# Patient Record
Sex: MALE | Race: BLACK OR AFRICAN AMERICAN | NOT HISPANIC OR LATINO | Employment: OTHER | URBAN - METROPOLITAN AREA
[De-identification: names, ages, dates, MRNs, and addresses within clinical notes are randomized per-mention and may not be internally consistent; named-entity substitution may affect disease eponyms.]

---

## 2017-01-23 ENCOUNTER — HOSPITAL ENCOUNTER (OUTPATIENT)
Dept: RADIOLOGY | Facility: HOSPITAL | Age: 52
Discharge: HOME | End: 2017-01-23
Attending: ORTHOPAEDIC SURGERY
Payer: COMMERCIAL

## 2017-01-23 ENCOUNTER — ALLSCRIPTS OFFICE VISIT (OUTPATIENT)
Dept: OTHER | Facility: OTHER | Age: 52
End: 2017-01-23

## 2017-01-23 DIAGNOSIS — G95.9 DISEASE OF SPINAL CORD (HCC): ICD-10-CM

## 2017-01-23 PROCEDURE — 72040 X-RAY EXAM NECK SPINE 2-3 VW: CPT

## 2017-04-17 ENCOUNTER — HOSPITAL ENCOUNTER (EMERGENCY)
Facility: HOSPITAL | Age: 52
Discharge: HOME | End: 2017-04-17
Attending: EMERGENCY MEDICINE | Admitting: EMERGENCY MEDICINE
Payer: COMMERCIAL

## 2017-04-17 ENCOUNTER — APPOINTMENT (EMERGENCY)
Dept: CT IMAGING | Facility: HOSPITAL | Age: 52
End: 2017-04-17
Payer: COMMERCIAL

## 2017-04-17 VITALS
DIASTOLIC BLOOD PRESSURE: 106 MMHG | RESPIRATION RATE: 16 BRPM | TEMPERATURE: 98.1 F | BODY MASS INDEX: 26.76 KG/M2 | WEIGHT: 197.53 LBS | SYSTOLIC BLOOD PRESSURE: 164 MMHG | HEART RATE: 64 BPM | HEIGHT: 72 IN | OXYGEN SATURATION: 96 %

## 2017-04-17 DIAGNOSIS — R10.84 GENERALIZED ABDOMINAL PAIN: ICD-10-CM

## 2017-04-17 DIAGNOSIS — I10 HYPERTENSION: ICD-10-CM

## 2017-04-17 DIAGNOSIS — K52.9 COLITIS: Primary | ICD-10-CM

## 2017-04-17 LAB
ALBUMIN SERPL BCP-MCNC: 3.9 G/DL (ref 3.5–5)
ALP SERPL-CCNC: 63 U/L (ref 46–116)
ALT SERPL W P-5'-P-CCNC: 17 U/L (ref 12–78)
ANION GAP SERPL CALCULATED.3IONS-SCNC: 6 MMOL/L (ref 4–13)
ANISOCYTOSIS BLD QL SMEAR: PRESENT
AST SERPL W P-5'-P-CCNC: 14 U/L (ref 5–45)
BASOPHILS # BLD MANUAL: 0 THOUSAND/UL (ref 0–0.1)
BASOPHILS NFR MAR MANUAL: 0 % (ref 0–1)
BILIRUB SERPL-MCNC: 0.5 MG/DL (ref 0.2–1)
BUN SERPL-MCNC: 11 MG/DL (ref 5–25)
CALCIUM SERPL-MCNC: 9 MG/DL (ref 8.3–10.1)
CHLORIDE SERPL-SCNC: 104 MMOL/L (ref 100–108)
CO2 SERPL-SCNC: 31 MMOL/L (ref 21–32)
CREAT SERPL-MCNC: 1.07 MG/DL (ref 0.6–1.3)
EOSINOPHIL # BLD MANUAL: 0 THOUSAND/UL (ref 0–0.4)
EOSINOPHIL NFR BLD MANUAL: 0 % (ref 0–6)
ERYTHROCYTE [DISTWIDTH] IN BLOOD BY AUTOMATED COUNT: 15.8 % (ref 11.6–15.1)
GFR SERPL CREATININE-BSD FRML MDRD: >60 ML/MIN/1.73SQ M
GLUCOSE SERPL-MCNC: 93 MG/DL (ref 65–140)
HCT VFR BLD AUTO: 43.4 % (ref 36.5–49.3)
HGB BLD-MCNC: 13.9 G/DL (ref 12–17)
LG PLATELETS BLD QL SMEAR: PRESENT
LIPASE SERPL-CCNC: 201 U/L (ref 73–393)
LYMPHOCYTES # BLD AUTO: 2.93 THOUSAND/UL (ref 0.6–4.47)
LYMPHOCYTES # BLD AUTO: 36 % (ref 14–44)
MCH RBC QN AUTO: 29.1 PG (ref 26.8–34.3)
MCHC RBC AUTO-ENTMCNC: 32 G/DL (ref 31.4–37.4)
MCV RBC AUTO: 91 FL (ref 82–98)
MONOCYTES # BLD AUTO: 0.49 THOUSAND/UL (ref 0–1.22)
MONOCYTES NFR BLD: 6 % (ref 4–12)
NEUTROPHILS # BLD MANUAL: 4.65 THOUSAND/UL (ref 1.85–7.62)
NEUTS SEG NFR BLD AUTO: 57 % (ref 43–75)
PLATELET # BLD AUTO: 284 THOUSANDS/UL (ref 149–390)
PLATELET BLD QL SMEAR: ADEQUATE
PMV BLD AUTO: 9.3 FL (ref 8.9–12.7)
POTASSIUM SERPL-SCNC: 4 MMOL/L (ref 3.5–5.3)
PROT SERPL-MCNC: 8.5 G/DL (ref 6.4–8.2)
RBC # BLD AUTO: 4.77 MILLION/UL (ref 3.88–5.62)
SODIUM SERPL-SCNC: 141 MMOL/L (ref 136–145)
TOTAL CELLS COUNTED SPEC: 100
VARIANT LYMPHS # BLD AUTO: 1 %
WBC # BLD AUTO: 8.15 THOUSAND/UL (ref 4.31–10.16)

## 2017-04-17 PROCEDURE — 80053 COMPREHEN METABOLIC PANEL: CPT | Performed by: PHYSICIAN ASSISTANT

## 2017-04-17 PROCEDURE — 74177 CT ABD & PELVIS W/CONTRAST: CPT

## 2017-04-17 PROCEDURE — 85027 COMPLETE CBC AUTOMATED: CPT | Performed by: PHYSICIAN ASSISTANT

## 2017-04-17 PROCEDURE — 83690 ASSAY OF LIPASE: CPT | Performed by: PHYSICIAN ASSISTANT

## 2017-04-17 PROCEDURE — 85007 BL SMEAR W/DIFF WBC COUNT: CPT | Performed by: PHYSICIAN ASSISTANT

## 2017-04-17 PROCEDURE — 99285 EMERGENCY DEPT VISIT HI MDM: CPT

## 2017-04-17 PROCEDURE — 36415 COLL VENOUS BLD VENIPUNCTURE: CPT | Performed by: PHYSICIAN ASSISTANT

## 2017-04-17 RX ORDER — LISINOPRIL 10 MG/1
10 TABLET ORAL DAILY
Qty: 7 TABLET | Refills: 0 | Status: SHIPPED | OUTPATIENT
Start: 2017-04-17 | End: 2018-05-30

## 2017-04-17 RX ADMIN — IOHEXOL 100 ML: 350 INJECTION, SOLUTION INTRAVENOUS at 11:40

## 2017-04-24 ENCOUNTER — DOCTOR'S OFFICE (OUTPATIENT)
Dept: URBAN - METROPOLITAN AREA CLINIC 137 | Facility: CLINIC | Age: 52
Setting detail: OPHTHALMOLOGY
End: 2017-04-24
Payer: COMMERCIAL

## 2017-04-24 ENCOUNTER — OPTICAL OFFICE (OUTPATIENT)
Dept: URBAN - METROPOLITAN AREA CLINIC 146 | Facility: CLINIC | Age: 52
Setting detail: OPHTHALMOLOGY
End: 2017-04-24
Payer: COMMERCIAL

## 2017-04-24 DIAGNOSIS — H52.4: ICD-10-CM

## 2017-04-24 DIAGNOSIS — H52.223: ICD-10-CM

## 2017-04-24 PROCEDURE — V2203 LENS SPHCYL BIFOCAL 4.00D/.1: HCPCS | Performed by: OPHTHALMOLOGY

## 2017-04-24 PROCEDURE — 92004 COMPRE OPH EXAM NEW PT 1/>: CPT | Performed by: OPHTHALMOLOGY

## 2017-04-24 PROCEDURE — V2744 TINT PHOTOCHROMATIC LENS/ES: HCPCS | Performed by: OPHTHALMOLOGY

## 2017-04-24 PROCEDURE — V2020 VISION SVCS FRAMES PURCHASES: HCPCS | Performed by: OPHTHALMOLOGY

## 2017-04-24 ASSESSMENT — REFRACTION_OUTSIDERX
OS_VA3: 20/
OS_ADD: +1.75
OD_CYLINDER: +0.50
OS_VA2: 20/20(J1+)
OS_VA1: 20/20
OU_VA: 20/
OD_AXIS: 180
OS_AXIS: 180
OS_CYLINDER: +0.25
OD_SPHERE: 0.00
OD_VA2: 20/20(J1+)
OS_SPHERE: +0.50
OD_VA3: 20/
OD_ADD: +1.75
OD_VA1: 20/20

## 2017-04-24 ASSESSMENT — REFRACTION_AUTOREFRACTION
OS_SPHERE: +0.50
OD_SPHERE: 0.00
OD_CYLINDER: +0.50
OS_CYLINDER: +0.25
OS_AXIS: 002
OD_AXIS: 003

## 2017-04-24 ASSESSMENT — CONFRONTATIONAL VISUAL FIELD TEST (CVF)
OD_FINDINGS: FULL
OS_FINDINGS: FULL

## 2017-04-24 ASSESSMENT — SPHEQUIV_DERIVED
OD_SPHEQUIV: 0.25
OS_SPHEQUIV: 0.625

## 2017-04-24 ASSESSMENT — REFRACTION_CURRENTRX
OS_OVR_VA: 20/
OD_OVR_VA: 20/
OS_OVR_VA: 20/
OS_OVR_VA: 20/

## 2017-04-24 ASSESSMENT — VISUAL ACUITY
OD_BCVA: 20/20-1
OS_BCVA: 20/20-1

## 2017-04-24 ASSESSMENT — REFRACTION_MANIFEST
OS_VA1: 20/
OD_VA1: 20/
OU_VA: 20/
OD_VA3: 20/
OS_VA3: 20/
OS_VA2: 20/
OD_VA2: 20/
OS_VA2: 20/
OD_VA2: 20/
OD_VA1: 20/
OD_VA3: 20/
OU_VA: 20/
OS_VA1: 20/
OS_VA3: 20/

## 2017-08-16 ENCOUNTER — GENERIC CONVERSION - ENCOUNTER (OUTPATIENT)
Dept: OTHER | Facility: OTHER | Age: 52
End: 2017-08-16

## 2017-09-13 ENCOUNTER — GENERIC CONVERSION - ENCOUNTER (OUTPATIENT)
Dept: OTHER | Facility: OTHER | Age: 52
End: 2017-09-13

## 2018-01-10 NOTE — CONSULTS
Therapy  Rehabilitation Services Referral:   Patient Status: post-operative  Diagnosis: C3-C6 POSTERIOR CERVICAL LAMINECTOMY AND DECOMPRESSION; C3-T2 INSTRUMENTED POSTERIOR CERVICAL SPINAL FUSION WITH POSSIBLE ADDITIONAL LEVELS WITH ALLOGRAFT AND AUTOGRAFT  Rehabilitation Services: evaluate and treat patient as needed  Frequency: 3 times per week, for 6 weeks  Please send progress report  I hereby certify that the services indicated above are medically necessary        Signatures   Electronically signed by : JOHNNA Harris ; Apr 7 2016  4:04PM EST                       (Author)

## 2018-01-11 NOTE — PROGRESS NOTES
Medical Alert: Tobacco User  Medications: Motrin 600 mg    Peridex  16 oz  Allergies:  Since Last Visit: Medical Alert: No Change    Medications: No Change    Allergies:        No Change  Pain Scale Type: Numeric Pain ScalePain Level: 0  Description:    Pt presented for wax rims  Tried in the wax rims, was was very high and flat  Reduce the height, got the correct VDO and flat plane  Pt was comfortable  swallowing, speaking, "S" sound tested  Bite taken with blue print  Pt  confirmed shade A1 and Mold 135  Pt left in good health  NV: wax try in when case is back    HAMLET Wang Ask    ----- Signed on Wednesday, September 13, 2017 at 11:16:20 AM  -----  ----- Provider: Anuj Lee Dentist -- Clinic: [facility] -----

## 2018-01-11 NOTE — MISCELLANEOUS
Active Problems   Cervical myelopathy (721 1) (G95 9)       Cervical neuritis (723 4) (M54 12)       Pseudoarthrosis of cervical spine (733 82) (S12 9XXA)        Cervical osteophyte (721 8) (M25 78)       Degenerative joint disease of cervical and lumbar spine (721 0) (M47 812)          Surgical History    1  History of Back Surgery   2  History of Rotator Cuff Repair    Family History    1  Family history of malignant neoplasm (V16 9) (Z80 9)    Social History    · Coffee   · Current every day smoker (305 1) (F17 200)   · Daily caffeinated cola consumption   · Drinks coffee   · Tea    Current Meds   1  OxyCODONE HCl - 5 MG Oral Capsule; TAKE 1 CAPSULE EVERY 3 TO 4 HOURS AS   NEEDED FOR PAIN;   Therapy: 32BGV1605 to (Evaluate:74Opo3361); Last Rx:83Bua5720 Ordered   2  Vicodin 5-300 MG Oral Tablet; TAKE 1 TABLET EVERY 4 TO 6 HOURS AS NEEDED FOR   PAIN;   Therapy: 38WOE0257 to (Evaluate:55Uac0742); Last Rx:77Lhy3918 Ordered    Allergies    1  No Known Drug Allergies    Message   Recorded as Task   Date: 04/08/2016 06:31 AM, Created By: System   Task Name: Hospital [facility]   Assigned To: Ina Knox   Regarding Patient: Albert Khoury, Status: In Progress   Comment:    System - 08 Apr 2016 6:31 AM     Patient discharged from hospital   Patient Name: Albert Khoury  Patient YOB: 1965  Discharge Date: 04/07/2016  [facility] - 08 Apr 2016 8:22 AM     TASK REASSIGNED: Previously Assigned To Sarah Nguyen - 08 Apr 2016 11:58 AM     TASK IN PROGRESS   Ayleen Hartley - 08 Apr 2016 12:16 PM     TASK EDITED  Left voice message asking pt to call and set up appt if he plans on coming here for primary care  He has not been seen at this office  Ayleen Waller - 08 Apr 2016 12:26 PM     TASK EDITED  [place]  HE DOES HAVE AN APPT IN [place]   [address]     Future Appointments    Date/Time Provider Specialty Site   04/24/2017 09:10 AM JOHNNA Capps   Orthopedic Surgery [facility]     Signatures   Electronically signed by : Ky Tang, ; Feb 28 2017  2:45PM EST                       (Administrative)    Electronically signed by : Ky Tang, ; Feb 28 2017  2:47PM EST                       (Administrative)    Electronically signed by : JOHNNA Spaulding ; Feb 28 2017  3:20PM EST                       (Co-author)

## 2018-01-11 NOTE — PROGRESS NOTES
Medical Alert: Tobacco User  Medications: Allergies:  Since Last Visit: Medical Alert: No Change    Medications: No Change    Allergies:        No Change  Pain Scale Type: Numeric Pain ScalePain Level: 0  Description:    S: Pt presented as emergency  CC: "My lower gums hurt and are sore"  Pt has a  upper complete denture and a lower complete denture that he did not bring today   with him  The sore spot started when he was wearing the lower denture  Then he   stopped wearing the lower denture because the sore spot, but the sore spot  became worse  O: Clinical exam shows a sore spot on lower anterior vestibule, and every  time pt closes #8/9 denture teeth  hit the sore spot  Pt has been staying  without the lower denture for 3 weeks now and for all three weeks the upper  denture incisors are biting down and cutting the lower anterior vestibule  A: sore spot on lower anterior vestibule where teeth #24/25 would be  P: Prescribed motrin for the pain and peridex  Explained to pt that he needs to   take out the upper denture till the sore spot heals  If he keeps wearing the  upper denture the front upper teeth would keep cutting and biting on the sore  spot and it would never heal  Also advised warm salt water rinses  Pt was  scheduled for denture adjustments  Advised pt to bring both upper an lower  dentures  Pt was requesting smth stronger then Motrin for pain, advised pt to take the  Motrin with two extra strength Tylenol OTC  Pt left in good health  NV: denture adjustments    HAMLET Arzate    ----- Signed on Monday, October 10, 2016 at 2:15:02 PM  -----  ----- Provider: Mauricio Weinberg Dentist -- Clinic: [facility] -----

## 2018-01-12 NOTE — PROGRESS NOTES
Medical Alert: Tobacco User  Medications: Motrin 600 mg    Peridex  16 oz  Allergies:  Since Last Visit: Medical Alert: No Change    Medications: No Change    Allergies:        No Change  Pain Scale Type: Numeric Pain ScalePain Level: 0  Description:    Pt presented for the lower denture adjustment but he didn't bring it with  him  Pt was reminded to bring the lower denture to this appointment  Sore  spot on the lower central vestibule (see previous note) is smaller than last  time, not as ulcerated as last time  But pt is concerned that its cancerous  Today he said that the ulcer has been there for 3-4 months but last visit  said it has been there for 2-3 weeks  Pt history doesn't add up  Pt wears  only the top denture  Lesion looks like its a sore spot that started from the  lower denture and pt keeps irritating with the upper denture teeth when  biting down  He also said that he hurts it by eating chips and hard foods  Decided to refer pt to [employer] since he is concerned about possibility of   cancer and since I cannot get a clear history from him  Gave pt referral to  [facility] and advised to come back with the lower denture for adjustments  Pt  agreed and left in good health  NV: lower denture adjustments    HAMLET Feldman    ----- Signed on Monday, October 24, 2016 at 3:48:23 PM  -----  ----- Provider: Connor Santo -- Clinic: [facility] -----

## 2018-01-13 VITALS
HEART RATE: 67 BPM | DIASTOLIC BLOOD PRESSURE: 94 MMHG | SYSTOLIC BLOOD PRESSURE: 150 MMHG | WEIGHT: 193 LBS | BODY MASS INDEX: 26.18 KG/M2

## 2018-01-16 NOTE — MISCELLANEOUS
Message   Recorded as Task   Date: 08/16/2017 02:22 PM, Created By: Bella Aguilar   Task Name: Miscellaneous   Assigned To: Bella Aguilar   Regarding Patient: Rodger Christensen, Status: In Progress   [facility] 16 Aug 2017 2:22 PM     TASK CREATED  Patient left a message with the answering service that he wanted to schedule an appt  We cannot schedule the patient at [address] because he has [place] & his PCP is not in [place]  Bella Aguilar - 71 Aug 2017 2:23 PM     TASK EDITED  Tried to call patient & his voice mail is not set up to take messages  Ivelisse Almaraz - 16 Aug 2017 2:23 PM     TASK IN PROGRESS        Active Problems    1  Cervical myelopathy (721 1) (G95 9)   2  Cervical neuritis (723 4) (M54 12)   3  Cervical osteophyte (721 8) (M25 78)   4  Degenerative joint disease of cervical and lumbar spine (721 0,721 3)   (M47 812,M47 816)   5  Pseudoarthrosis of cervical spine (733 82) (S12 9XXA)    Current Meds   1  Cephalexin 500 MG Oral Capsule (Keflex); TAKE 1 CAPSULE 4 TIMES DAILY; Therapy: 91OMZ0952 to (Evaluate:08Jun2016)  Requested for: 97MSR2392; Last   Rx:15Ruq6300 Ordered   2  EC-Naprosyn 500 MG Oral Tablet Delayed Release; TAKE 1 TABLET EVERY 12 HOURS   AS NEEDED; Therapy: 52ALU2353 to (Ahoskie Saris)  Requested for: 54Llr0241; Last   Rx:00Rhs6615 Ordered   3  Gabapentin 100 MG Oral Capsule Recorded   4  Gabapentin 300 MG Oral Capsule; TAKE ONE CAPSULE BY MOUTH TWICE A DAY; Therapy: 58UKF8042 to (Evaluate:60Cfs7799)  Requested for: 18FDA1099; Last   Rx:07Jun2016 Ordered   5  OxyCODONE HCl - 5 MG Oral Capsule; TAKE 1 CAPSULE EVERY 3 TO 4 HOURS AS   NEEDED FOR PAIN;   Therapy: 69NMT2752 to (Evaluate:94Dxd5979); Last ZK:85QCP8826 Ordered   6  OxyCODONE HCl - 5 MG Oral Capsule; TAKE 1 CAPSULE EVERY 3 TO 4 HOURS AS   NEEDED FOR PAIN;   Therapy: 93RPL3470 to (Evaluate:16Jun2016); Last Rx:06Jun2016 Ordered   7   OxyCODONE HCl - 5 MG Oral Tablet; TAKE 1 TABLET EVERY 4 HOURS AS NEEDED   FOR PAIN; Therapy: 15MLH5847 to (Evaluate:39Oor7844); Last XY:[id number] Ordered   8  OxyCODONE HCl - 5 MG Oral Tablet; TAKE 1 TABLET EVERY 4 TO 6 HOURS AS   NEEDED FOR PAIN;   Therapy: 21MXU0999 to (Evaluate:55Mnf8996); Last Rx:41Rvp4962 Ordered   9  TraMADol HCl - 50 MG Oral Tablet; TAKE 1 TABLET EVERY 4 TO 6 HOURS AS NEEDED   FOR PAIN;   Therapy: 37QDE4746 to (Evaluate:57Msh1916); Last Rx:23Jan2017 Ordered    Allergies    1  No Known Drug Allergies    Signatures   Electronically signed by :  Brian Marin, ; Aug 16 2017  2:24PM EST                       (Author)

## 2018-04-16 ENCOUNTER — HOSPITAL ENCOUNTER (OUTPATIENT)
Dept: RADIOLOGY | Facility: HOSPITAL | Age: 53
Discharge: HOME | End: 2018-04-16
Attending: ORTHOPAEDIC SURGERY
Payer: COMMERCIAL

## 2018-04-16 VITALS
BODY MASS INDEX: 25.6 KG/M2 | WEIGHT: 189 LBS | SYSTOLIC BLOOD PRESSURE: 120 MMHG | HEART RATE: 178 BPM | HEIGHT: 72 IN | DIASTOLIC BLOOD PRESSURE: 86 MMHG

## 2018-04-16 DIAGNOSIS — Z98.1 S/P CERVICAL SPINAL FUSION: ICD-10-CM

## 2018-04-16 DIAGNOSIS — S12.9XXD PSEUDOARTHROSIS OF CERVICAL SPINE, SUBSEQUENT ENCOUNTER: ICD-10-CM

## 2018-04-16 DIAGNOSIS — M48.02 CERVICAL STENOSIS OF SPINE: ICD-10-CM

## 2018-04-16 DIAGNOSIS — M54.2 NECK PAIN: ICD-10-CM

## 2018-04-16 DIAGNOSIS — M54.2 NECK PAIN: Primary | ICD-10-CM

## 2018-04-16 PROCEDURE — 72050 X-RAY EXAM NECK SPINE 4/5VWS: CPT

## 2018-04-16 PROCEDURE — 99213 OFFICE O/P EST LOW 20 MIN: CPT | Performed by: ORTHOPAEDIC SURGERY

## 2018-04-16 NOTE — PROGRESS NOTES
Assessment/Plan:      Patient seen examined by Dr Mick Villaseñor and myself  He has a history of cervical decompression and fusion C3-T2 for pseudoarthrosis and cervical stenosis with myelopathy in 2016  He Re presents today with complaints of posterior cervical pain  X-rays today show stable instrumentation  We will refer him to Pain Management for ongoing pain control  He can follow up with us on a p r n  basis  Problem List Items Addressed This Visit     Cervical stenosis of spine    Pseudoarthrosis of cervical spine (HCC)    S/P cervical spinal fusion      Other Visit Diagnoses     Neck pain    -  Primary    Relevant Orders    XR spine cervical complete 4 or 5 vw non injury            Subjective:      Patient ID: Cici Urbina is a 46 y o  male  HPI    The patient is a 43-year-old male who presents for follow-up appointment  He has a history of cervical decompression and fusion C3-T2 for pseudoarthrosis and cervical stenosis with myelopathy in 2016  Today he states that since his last visit over one year ago he has had progressively worsening posterior neck pain and soreness  This radiates to both of his shoulders  He denies any associated numbness and tingling of the upper extremities no bowel or bladder incontinence  No recent trauma  The following portions of the patient's history were reviewed and updated as appropriate: allergies, current medications, past family history, past medical history, past social history, past surgical history and problem list     Review of Systems   Constitutional: Negative for chills, diaphoresis, fatigue and fever  Respiratory: Negative for cough, shortness of breath and wheezing  Cardiovascular: Negative for chest pain, palpitations and leg swelling  Gastrointestinal: Negative for constipation, diarrhea and vomiting  Genitourinary: Negative for decreased urine volume and difficulty urinating     Musculoskeletal: Positive for arthralgias, neck pain and neck stiffness  Skin: Negative for pallor, rash and wound  Neurological: Negative for weakness and numbness  Objective:      /86   Pulse (!) 178   Ht 6' 0 01" (1 829 m)   Wt 85 7 kg (189 lb)   BMI 25 63 kg/m²          Physical Exam   Constitutional: He is oriented to person, place, and time  He appears well-developed and well-nourished  No distress  HENT:   Head: Normocephalic and atraumatic  Cardiovascular: Intact distal pulses  Pulmonary/Chest: Effort normal  No respiratory distress  He has no wheezes  Musculoskeletal: He exhibits no edema or tenderness  Neurological: He is alert and oriented to person, place, and time  Skin: Skin is warm and dry  No rash noted  He is not diaphoretic  No erythema  No pallor  Psychiatric: He has a normal mood and affect  Nursing note and vitals reviewed  No acute distress  Gait is normal  Inspection reveals well-healed posterior cervical incision no evidence of infection or wound dehiscence  There is mild tenderness to palpation posterior cervical region surrounding paraspinal musculature  Motor and sensory stable C5 through T1    Positive radial pulses bilaterally

## 2018-05-21 ENCOUNTER — TELEPHONE (OUTPATIENT)
Dept: PAIN MEDICINE | Facility: MEDICAL CENTER | Age: 53
End: 2018-05-21

## 2018-05-21 NOTE — TELEPHONE ENCOUNTER
237 Lakesha Mckeon- Patient called and LM on anserve to schedule  Intake done in [facility]  Patient to have prior records from [facility] sent and will need review

## 2018-05-22 NOTE — TELEPHONE ENCOUNTER
I called out to [facility] and they do have a few records from 2014- I called patient and made him aware  He said they gave him a hard time and stated they didn't have anything  He left bc he didn't want to argue w/ the young lady  I advised him to come to our office (he picked [facility] due to location) to sign a release of medical records  Once he signs that please sent [facility]  I will send it to [facility] and obtain records  Thank you

## 2018-05-22 NOTE — TELEPHONE ENCOUNTER
Pt is calling stating when he went to [facility] to retreive records but they stated he was not a pt there, but pt recalls being seen there because he received Cortizone shots  Pt states he is not able to get those records and wants to know what he has to do next  Pt was last seen 3 years ago from [facility]

## 2018-05-23 NOTE — TELEPHONE ENCOUNTER
Received Release of info and faxed to Dr Jerardo Woodson office  Once records are received I will have them reviewed and f/u with patient

## 2018-05-23 NOTE — TELEPHONE ENCOUNTER
237 [facility]- Patient called back and is appreciative of all the help  He stated he signed the release form and will be awaiting our c/b once his info is rec'd and rev'd

## 2018-05-30 ENCOUNTER — TELEPHONE (OUTPATIENT)
Dept: PAIN MEDICINE | Facility: MEDICAL CENTER | Age: 53
End: 2018-05-30

## 2018-05-30 ENCOUNTER — CONSULT (OUTPATIENT)
Dept: PAIN MEDICINE | Facility: CLINIC | Age: 53
End: 2018-05-30
Payer: COMMERCIAL

## 2018-05-30 ENCOUNTER — APPOINTMENT (OUTPATIENT)
Dept: RADIOLOGY | Facility: CLINIC | Age: 53
End: 2018-05-30
Payer: COMMERCIAL

## 2018-05-30 VITALS
DIASTOLIC BLOOD PRESSURE: 80 MMHG | WEIGHT: 194 LBS | BODY MASS INDEX: 26.28 KG/M2 | TEMPERATURE: 97.9 F | SYSTOLIC BLOOD PRESSURE: 136 MMHG | HEART RATE: 88 BPM | HEIGHT: 72 IN

## 2018-05-30 DIAGNOSIS — Z98.1 S/P CERVICAL SPINAL FUSION: ICD-10-CM

## 2018-05-30 DIAGNOSIS — M79.671 RIGHT FOOT PAIN: ICD-10-CM

## 2018-05-30 DIAGNOSIS — G89.4 CHRONIC PAIN SYNDROME: Primary | ICD-10-CM

## 2018-05-30 DIAGNOSIS — M96.1 POSTLAMINECTOMY SYNDROME OF CERVICAL REGION: ICD-10-CM

## 2018-05-30 PROCEDURE — 73630 X-RAY EXAM OF FOOT: CPT

## 2018-05-30 PROCEDURE — 99244 OFF/OP CNSLTJ NEW/EST MOD 40: CPT | Performed by: ANESTHESIOLOGY

## 2018-05-30 RX ORDER — DICLOFENAC SODIUM 75 MG/1
75 TABLET, DELAYED RELEASE ORAL 2 TIMES DAILY
Qty: 60 TABLET | Refills: 0 | Status: SHIPPED | OUTPATIENT
Start: 2018-05-30 | End: 2018-07-04 | Stop reason: SDUPTHER

## 2018-05-30 RX ORDER — GABAPENTIN 300 MG/1
100 CAPSULE ORAL 3 TIMES DAILY
COMMUNITY

## 2018-05-30 NOTE — PROGRESS NOTES
Assessment:  1  Chronic pain syndrome    2  S/P cervical spinal fusion    3  Postlaminectomy syndrome of cervical region    4  Right foot pain        Plan:  The patient's symptoms, history/physical are consistent with pain that is multifactorial in origin but predominantly the result of a chronic pain syndrome following his prior cervical fusion as well as prior right foot surgery  At this time, since marijuana has been helpful for him, I will refer him to Dr Ramy Temple for medical marijuana  To provide him relief in the interim, I will start him on diclofenac 75 mg twice daily to active a daily anti-inflammatory pain medication  I will also order x-rays of the right foot to evaluate for the etiology of the right foot pain  My impressions and treatment recommendations were discussed in detail with the patient who verbalized understanding and had no further questions  Discharge instructions were provided  I personally saw and examined the patient and I agree with the above discussed plan of care  Orders Placed This Encounter   Procedures    XR foot 3+ vw right     Standing Status:   Future     Standing Expiration Date:   5/30/2022     Scheduling Instructions:      Bring along any outside films relating to this procedure             Order Specific Question:   Reason for Exam:     Answer:   right foot pain    Ambulatory referral to Pain Management     Standing Status:   Future     Standing Expiration Date:   11/30/2018     Referral Priority:   Routine     Referral Type:   Consult - AMB     Referral Reason:   Specialty Services Required     Referred to Provider:   Provider Not In System     Number of Visits Requested:   1     Expiration Date:   5/30/2019     New Medications Ordered This Visit   Medications    gabapentin (NEURONTIN) 300 mg capsule     Sig: Take 100 mg by mouth 3 (three) times a day    diclofenac (VOLTAREN) 75 mg EC tablet     Sig: Take 1 tablet (75 mg total) by mouth 2 (two) times a day Dispense:  60 tablet     Refill:  0       History of Present Illness:    Lisette Child is a 46 y o  male who presents for consultation in regards to neck and upper back pain  Symptoms have been present for 5 years  He has had 2 prior spine surgeries including a posterior decompression and fusion C3-T2 by Dr Lux Olivarez in 2016  Current symptoms are severe rated 8-10/10 on numeric rating scale and felt constantly  Symptoms are sharp, pressure-like, throbbing across the neck and shoulders and upper back as well as shooting with pins and needles  Symptoms are aggravated with standing, bending, walking, exercise and decreased with lying down, sitting and relaxation  There is no change with coughing, sneezing or bowel movements  Treatment history has included his 2 prior cervical spine surgeries which have provided no relief  Prior nerve block injections have provided no relief  He has also had no relief with physical therapy and heat/ice treatment  He is currently on gabapentin 300 mg daily which is providing no relief  He uses marijuana for pain  I have personally reviewed and/or updated the patient's past medical history, past surgical history, family history, social history, current medications, allergies, and vital signs today  Review of Systems:    Review of Systems   Constitutional: Positive for unexpected weight change  Negative for fever  HENT: Negative for trouble swallowing  Eyes: Negative for visual disturbance  Respiratory: Negative for shortness of breath and wheezing  Cardiovascular: Negative for chest pain and palpitations  Gastrointestinal: Negative for constipation, diarrhea, nausea and vomiting  Endocrine: Negative for cold intolerance, heat intolerance and polydipsia  Genitourinary: Negative for difficulty urinating and frequency  Musculoskeletal: Positive for gait problem  Negative for arthralgias, joint swelling and myalgias  Skin: Negative for rash  Neurological: Positive for dizziness and weakness  Negative for seizures, syncope and headaches  Hematological: Does not bruise/bleed easily  Psychiatric/Behavioral: Positive for behavioral problems  Negative for dysphoric mood  All other systems reviewed and are negative  Patient Active Problem List   Diagnosis    Cervical stenosis of spine    Pseudoarthrosis of cervical spine (HCC)    Headache, temporal    Wound dehiscence    Essential hypertension    Anxiety    S/P cervical spinal fusion       Past Medical History:   Diagnosis Date    Anesthesia complication     body temp burns off anesthesia more quickly--woke up during surgery    Anxiety     due to medical conditions    Arthritis     Hypertension        Past Surgical History:   Procedure Laterality Date    BACK SURGERY      CERVICAL FUSION N/A 4/5/2016    Procedure: FUSION CERVICAL POSTERIOR - revision of instrumentation T1;  Surgeon: Tisa Galeazzi, MD;  [facility];  Service:     FOOT MASS EXCISION Right     HERNIA REPAIR Right     x2    INCISION AND DRAINAGE POSTERIOR SPINE N/A 4/27/2016    Procedure: INCISION AND DRAINAGE (I&D) SPINE;  Surgeon: Tisa Galeazzi, MD;  [facility];  Service:     [place] ARTHRODESIS POSTERIOR/POSTEROLATERAL EA ADDL N/A 4/4/2016    Procedure: C3-C6 POSTERIOR CERVICAL LAMINECTOMY AND DECOMPRESSION; C3-T2 INSTRUMENTED POSTERIOR CERVICAL SPINAL FUSION WITH POSSIBLE ADDITIONAL LEVELS WITH ALLOGRAFT AND AUTOGRAFT (IMPULSE MONITORING); Surgeon: Tisa Galeazzi, MD;  [facility];  Service: Orthopedics    SHOULDER OPEN ROTATOR CUFF REPAIR Bilateral        Family History   Problem Relation Age of Onset    Cancer Family        Social History     Occupational History    Not on file       Social History Main Topics    Smoking status: Current Every Day Smoker     Packs/day: 1 00     Years: 10 00     Last attempt to quit: 02/2016    Smokeless tobacco: Not on file    Alcohol use 1 2 oz/week     2 Shots of liquor per week      Comment: daily with tea    Drug use: Yes     Frequency: 7 0 times per week     Types: Marijuana      Comment: one joint daily for pain    Sexual activity: Not on file       Current Outpatient Prescriptions on File Prior to Visit   Medication Sig    [DISCONTINUED] lisinopril (ZESTRIL) 10 mg tablet Take 1 tablet by mouth daily for 7 days     No current facility-administered medications on file prior to visit  No Known Allergies    Physical Exam:    /80   Pulse 88   Temp 97 9 °F (36 6 °C)   Ht 6' (1 829 m)   Wt 88 kg (194 lb)   BMI 26 31 kg/m²     Constitutional: normal, well developed, well nourished, alert, in no distress and non-toxic and no overt pain behavior  Eyes: anicteric  HEENT: grossly intact  Neck: supple, symmetric, trachea midline and no masses   Pulmonary:even and unlabored  Cardiovascular:No edema or pitting edema present  Skin:Normal without rashes or lesions and well hydrated  Psychiatric:Mood and affect appropriate  Neurologic:Cranial Nerves II-XII grossly intact  Musculoskeletal:normal     Cervical Spine Exam  Appearance:  Large posterior vertical incisional scar  Palpation/Tenderness:  left cervical paraspinal tenderness  right cervical paraspinal tenderness  left trapezium tenderness  right trapezium tenderness  Sensory:  no sensory deficits noted  Range of Motion:  Flexion:   Moderately limited  with pain  Extension:  Moderately limited  with pain  Lateral Flexion - Left:  Moderately limited  with pain  Lateral Flexion - Right:  Moderately limited  with pain  Rotation - Left:  Moderately limited  with pain  Rotation - Right:  Moderately limited  with pain  Motor Strength:  Left Arm Flexion  5/5  Left Arm Extension  5/5  Right Arm Flexion  5/5  Right Arm Extension  5/5  Left Wrist Flexion  5/5  Left Wrist Extension  5/5  Left Finger Abduction  5/5  Right Finger Abduction  5/5  Left Pincer Grasp  5/5  Right Pincer Grasp  5/5  Left    5/5  Right   5/5    Imaging    XR CERVICAL SPINE (4/16/2018)     INDICATION:   M54 2: Cervicalgia      COMPARISON:  1/23/2017     VIEWS:  XR SPINE CERVICAL COMPLETE 4 OR 5 VW NON INJURY   Images: 4     FINDINGS:     Patient has undergone anterior and posterior fusion  Posterior fusion extends from C3 to T2 with articulating screws in the cervical spine and pedicle screws in the thoracic spine  Stable posterior hardware      There has been anterior fusion from C6 to T1 with anterior plate and screws extending into the vertebral bodies  The screws extending into the C6 vertebral body on the lateral projection are fractured, unchanged from the prior exam   Bone grafts are   unchanged in position within the C6-7 and C7-T1 disc spaces       Straightening of the normal cervical lordosis  No subluxation      Anterior osteophyte at C5-6, unchanged  Small posterior osteophytes at C6-7    Patient has undergone posterior decompression from C3 through C6       Minimal flexion and extension with no evidence of instability      The prevertebral soft tissues are within normal limits        The lung apices are clear

## 2018-05-30 NOTE — TELEPHONE ENCOUNTER
Pt states that he saw Dr Víctor Mendez this morning and he is referring him to Dr Magdalena Del Castillo for medical marijuana  Pt states that when he called the number, he was told that he would need to go online and register via a [place] medical marijuana website  She did not give him any additional information and there is nothing else on the referral from Dr Víctor Mendez about this  Patient is asking for someone to call him back with some additional information so he can get registered  Pt can be reached at [phone]

## 2018-06-04 ENCOUNTER — TELEPHONE (OUTPATIENT)
Dept: PAIN MEDICINE | Facility: CLINIC | Age: 53
End: 2018-06-04

## 2018-06-04 DIAGNOSIS — M79.671 RIGHT FOOT PAIN: Primary | ICD-10-CM

## 2018-06-04 NOTE — TELEPHONE ENCOUNTER
We don't have any specific podiatrists  He should call his insurance in regards to who accepts it    Dr Rochelle Hernandez phone number is [phone]

## 2018-06-04 NOTE — TELEPHONE ENCOUNTER
S/W pt, reviewed results of x-ray of foot  Pt verbalized understanding  Pt asking if FQ can provide a referral to a podiatrist, because his insurance would like for him to stay in the [place]!  Pt also asking if [facility] has another number to reach Dr Zina Brito in regards to getting registered for the medical marijuana? Advised will c/b with FQ recommendations        ----- Message from Jonn Stanton MD sent at 6/4/2018  9:22 AM EDT -----  Please let patient know that x-ray of foot showed calcaneal spur    He should make an appointment with a podiatrist

## 2018-06-11 NOTE — TELEPHONE ENCOUNTER
Pt called stating that he has not had any luck getting in touch with Dr Zina Brito  Also states that he did not have much luck with the podiatrist either  Pt asking if he can just come back in to see Dr Odails Houston and handle his pain through our office  Pt does not want to keep wasting his time trying to get an appt to see Dr Zina Brito  Pt would like a call back at [phone]

## 2018-06-11 NOTE — TELEPHONE ENCOUNTER
S/w pt and advised the same  Pt would like to resume seeing podiatrist, Dr Dennie Dutch  States he needs a referral         Pt would like referral faxed to Dr Sandra Musa office  Pt will call back with fax #

## 2018-06-11 NOTE — TELEPHONE ENCOUNTER
Patient called back stating he found a podiatrist  Dr Verito Ji fax#[phone]  Would like the referral faxed   any questions c/b [phone]

## 2018-07-04 DIAGNOSIS — G89.4 CHRONIC PAIN SYNDROME: ICD-10-CM

## 2018-07-05 RX ORDER — DICLOFENAC SODIUM 75 MG/1
TABLET, DELAYED RELEASE ORAL
Qty: 60 TABLET | Refills: 0 | Status: SHIPPED | OUTPATIENT
Start: 2018-07-05 | End: 2018-08-14 | Stop reason: SDUPTHER

## 2018-08-14 DIAGNOSIS — G89.4 CHRONIC PAIN SYNDROME: ICD-10-CM

## 2018-08-14 RX ORDER — DICLOFENAC SODIUM 75 MG/1
TABLET, DELAYED RELEASE ORAL
Qty: 60 TABLET | Refills: 0 | OUTPATIENT
Start: 2018-08-14

## 2018-08-14 RX ORDER — DICLOFENAC SODIUM 75 MG/1
75 TABLET, DELAYED RELEASE ORAL 2 TIMES DAILY
Qty: 60 TABLET | Refills: 5 | Status: SHIPPED | OUTPATIENT
Start: 2018-08-14

## 2018-08-14 NOTE — TELEPHONE ENCOUNTER
Left vm on home ph # that refills for diclofenac were sent to his [facility], and FQ only wants him to take 1 tab BID

## 2018-08-14 NOTE — TELEPHONE ENCOUNTER
S/W patient, states the diclofenac 75 mg tablet is helping, but when it is raining patient states he has flare ups and the pain is more excruciating  Patient admits to taking 2 tablets by mouth 2 times a day  Patient states he has 10 tablets left  Patient's prescription is for Diclofenac Sod EC 75 mg 1 tablet by mouth 2 times a day  Advised patient before increasing or decreasing a medication to always call our office to get the doctors permission  Patient apologized and verbalized understanding  Please advise  Thanks

## 2021-06-07 ENCOUNTER — HOSPITAL ENCOUNTER (EMERGENCY)
Facility: HOSPITAL | Age: 56
Discharge: HOME | End: 2021-06-07
Attending: EMERGENCY MEDICINE | Admitting: EMERGENCY MEDICINE
Payer: COMMERCIAL

## 2021-06-07 ENCOUNTER — APPOINTMENT (EMERGENCY)
Dept: RADIOLOGY | Facility: HOSPITAL | Age: 56
End: 2021-06-07
Payer: COMMERCIAL

## 2021-06-07 VITALS
TEMPERATURE: 98.4 F | BODY MASS INDEX: 25.06 KG/M2 | OXYGEN SATURATION: 100 % | DIASTOLIC BLOOD PRESSURE: 90 MMHG | WEIGHT: 185 LBS | HEART RATE: 88 BPM | SYSTOLIC BLOOD PRESSURE: 132 MMHG | HEIGHT: 72 IN | RESPIRATION RATE: 16 BRPM

## 2021-06-07 DIAGNOSIS — S76.012A MUSCLE STRAIN OF GLUTEAL REGION, LEFT, INITIAL ENCOUNTER: Primary | ICD-10-CM

## 2021-06-07 DIAGNOSIS — M62.838 MUSCLE SPASM: ICD-10-CM

## 2021-06-07 PROCEDURE — 99284 EMERGENCY DEPT VISIT MOD MDM: CPT | Performed by: PHYSICIAN ASSISTANT

## 2021-06-07 PROCEDURE — 99283 EMERGENCY DEPT VISIT LOW MDM: CPT

## 2021-06-07 PROCEDURE — 72170 X-RAY EXAM OF PELVIS: CPT

## 2021-06-07 NOTE — ED PROVIDER NOTES
History  Chief Complaint   Patient presents with    Hip Pain     Parient here with c/o left hip pain  Pain for 4-5 months  Taking OTC Aspirin and Tylenol with no relief  Pt with Past Medical History: Anxiety, Arthritis, HTN  Past Surgical History: Right FOOT MASS EXCISION, Right HERNIA REPAIR x 2, I&D POSTERIOR SPINE, C3-C6 POSTERIOR CERVICAL LAMINECTOMY AND DECOMPRESSION; C3-T2 INSTRUMENTED POSTERIOR CERVICAL SPINAL FUSION, B/L SHOULDER OPEN ROTATOR CUFF REPAIR  Presents to ED c/o several month h/o atraumatic, intermittent, but persistent, left buttock, posterior hip pain, worse with hip flexion, going up stairs  Pt told nurse taking OTC Aspirin and Tylenol with no relief; told me today he took " a puff of marijuana", refusing all PO medication here states "medications make him nervous"          Prior to Admission Medications   Prescriptions Last Dose Informant Patient Reported?  Taking?   diclofenac (VOLTAREN) 75 mg EC tablet Not Taking at Unknown time  No No   Sig: Take 1 tablet (75 mg total) by mouth 2 (two) times a day   Patient not taking: Reported on 6/7/2021   gabapentin (NEURONTIN) 300 mg capsule Not Taking at Unknown time Self Yes No   Sig: Take 100 mg by mouth 3 (three) times a day      Facility-Administered Medications: None       Past Medical History:   Diagnosis Date    Anesthesia complication     body temp burns off anesthesia more quickly--woke up during surgery    Anxiety     due to medical conditions    Arthritis     Hypertension        Past Surgical History:   Procedure Laterality Date    BACK SURGERY      CERVICAL FUSION N/A 4/5/2016    Procedure: FUSION CERVICAL POSTERIOR - revision of instrumentation T1;  Surgeon: Olesya Mejia MD;  [facility];  Service:     FOOT MASS EXCISION Right     HERNIA REPAIR Right     x2    INCISION AND DRAINAGE POSTERIOR SPINE N/A 4/27/2016    Procedure: INCISION AND DRAINAGE (I&D) SPINE;  Surgeon: Olesya Mejia MD;  [facility]; Service:     IL ARTHRODESIS POSTERIOR/POSTEROLATERAL EA ADDL N/A 2016    Procedure: C3-C6 POSTERIOR CERVICAL LAMINECTOMY AND DECOMPRESSION; C3-T2 INSTRUMENTED POSTERIOR CERVICAL SPINAL FUSION WITH POSSIBLE ADDITIONAL LEVELS WITH ALLOGRAFT AND AUTOGRAFT (IMPULSE MONITORING); Surgeon: Fabio Squires MD;  Location[facility];  Service: Orthopedics    SHOULDER OPEN ROTATOR CUFF REPAIR Bilateral        Family History   Problem Relation Age of Onset    Cancer Family      I have reviewed and agree with the history as documented  E-Cigarette/Vaping    E-Cigarette Use Never User      E-Cigarette/Vaping Substances     Social History     Tobacco Use    Smoking status: Current Every Day Smoker     Packs/day: 1 00     Years: 10      Pack years: 10      Last attempt to quit: 2016     Years since quittin 3    Smokeless tobacco: Never Used   Substance Use Topics    Alcohol use: Yes     Alcohol/week: 2 0 standard drinks     Types: 2 Shots of liquor per week     Frequency: 2-3 times a week     Drinks per session: 1 or 2     Comment: daily with tea    Drug use: Yes     Frequency: 7 0 times per week     Types: Marijuana     Comment: one joint daily for pain       Review of Systems   Constitutional: Negative for chills and fever  HENT: Negative for hearing loss, mouth sores, sore throat and trouble swallowing  Eyes: Negative for visual disturbance  Respiratory: Negative for cough and shortness of breath  Cardiovascular: Negative for chest pain and leg swelling  Gastrointestinal: Negative for abdominal pain and vomiting  Genitourinary: Negative for difficulty urinating, dysuria, frequency and genital sores  Musculoskeletal: Positive for arthralgias and myalgias  Negative for neck pain  Skin: Negative for color change and pallor  Neurological: Negative for dizziness, weakness, light-headedness and numbness  Psychiatric/Behavioral: Negative for behavioral problems     All other systems reviewed and are negative  Physical Exam  Physical Exam  Vitals signs and nursing note reviewed  Constitutional:       General: He is not in acute distress  Appearance: Normal appearance  He is well-developed  HENT:      Head: Normocephalic and atraumatic  Right Ear: External ear normal       Left Ear: External ear normal       Nose: Nose normal       Mouth/Throat:      Mouth: Mucous membranes are moist       Pharynx: Oropharynx is clear  Eyes:      Conjunctiva/sclera: Conjunctivae normal    Neck:      Musculoskeletal: Normal range of motion and neck supple  No muscular tenderness  Cardiovascular:      Rate and Rhythm: Normal rate  Pulmonary:      Effort: Pulmonary effort is normal  No respiratory distress  Musculoskeletal: Normal range of motion  General: Tenderness present  No swelling or deformity  Comments: Mild tenderness noted along left mid gluteaus musculature, no palpable mass, FROM of LLE no pain at hip joint, no skin changes  Skin:     General: Skin is warm and dry  Capillary Refill: Capillary refill takes less than 2 seconds  Findings: No erythema, lesion or rash  Neurological:      General: No focal deficit present  Mental Status: He is alert and oriented to person, place, and time  Motor: No weakness     Psychiatric:         Behavior: Behavior normal          Vital Signs  ED Triage Vitals [06/07/21 1123]   Temperature Pulse Respirations Blood Pressure SpO2   98 4 °F (36 9 °C) 88 16 132/90 100 %      Temp Source Heart Rate Source Patient Position - Orthostatic VS BP Location FiO2 (%)   Oral Monitor Sitting Left arm --      Pain Score       6           Vitals:    06/07/21 1123   BP: 132/90   Pulse: 88   Patient Position - Orthostatic VS: Sitting         Visual Acuity      ED Medications  Medications - No data to display    Diagnostic Studies  Results Reviewed     None                 XR pelvis ap only 1 or 2 views   ED Interpretation by Edmond Cummings Nimisha Quiroz PA-C (06/07 1144)   No acute  process                 Procedures  Procedures         ED Course                                           MDM    Disposition  Final diagnoses:   Muscle strain of gluteal region, left, initial encounter   Muscle spasm     Time reflects when diagnosis was documented in both MDM as applicable and the Disposition within this note     Time User Action Codes Description Comment    6/7/2021 11:53 AM [place] Add [S76 012A] Muscle strain of gluteal region, left, initial encounter     6/7/2021 11:53 AM [place] Add [F18 341] Muscle spasm       ED Disposition     ED Disposition Condition Date/Time Comment    Discharge Stable Mon Jun 7, 2021 11:52 AM Jermain Carlron discharge to home/self care  Follow-up Information     Follow up With Specialties Details Why Contact Info Additional Information    [address] [facility]   [address] [facility] [address] [phone]  [facility] [address] [facility], [address], [id number], [address]       [address] [facility]   [address] [facility]  [address] [phone]  [address] [facility] [place], [address] [place], [place], [address] [phone]          Patient's Medications   Discharge Prescriptions    No medications on file     No discharge procedures on file      PDMP Review     None          ED Provider  Electronically Signed by           Raisa Montes PA-C  06/07/21 4259

## 2021-10-15 ENCOUNTER — TELEPHONE (OUTPATIENT)
Dept: OBGYN CLINIC | Facility: HOSPITAL | Age: 56
End: 2021-10-15

## 2021-12-04 ENCOUNTER — HOSPITAL ENCOUNTER (OUTPATIENT)
Dept: RADIOLOGY | Facility: HOSPITAL | Age: 56
Discharge: HOME | End: 2021-12-04
Payer: COMMERCIAL

## 2021-12-04 DIAGNOSIS — M25.552 LEFT HIP PAIN: ICD-10-CM

## 2021-12-04 DIAGNOSIS — F17.209 NICOTINE DEPENDENCE WITH NICOTINE-INDUCED DISORDER, UNSPECIFIED NICOTINE PRODUCT TYPE: ICD-10-CM

## 2021-12-04 PROCEDURE — 71046 X-RAY EXAM CHEST 2 VIEWS: CPT

## 2021-12-04 PROCEDURE — 73502 X-RAY EXAM HIP UNI 2-3 VIEWS: CPT

## 2021-12-11 ENCOUNTER — APPOINTMENT (OUTPATIENT)
Dept: LAB | Facility: HOSPITAL | Age: 56
End: 2021-12-11
Payer: COMMERCIAL

## 2021-12-11 DIAGNOSIS — Z12.5 SPECIAL SCREENING FOR MALIGNANT NEOPLASM OF PROSTATE: ICD-10-CM

## 2021-12-11 LAB
25(OH)D3 SERPL-MCNC: 18.9 NG/ML (ref 30–100)
CHOLEST SERPL-MCNC: 225 MG/DL
EST. AVERAGE GLUCOSE BLD GHB EST-MCNC: 114 MG/DL
HBA1C MFR BLD: 5.6 %
HCV AB SER QL: NORMAL
HDLC SERPL-MCNC: 64 MG/DL
LDLC SERPL CALC-MCNC: 147 MG/DL (ref 0–100)
NONHDLC SERPL-MCNC: 161 MG/DL
PSA SERPL-MCNC: 0.5 NG/ML (ref 0–4)
TRIGL SERPL-MCNC: 69.2 MG/DL
TSH SERPL DL<=0.05 MIU/L-ACNC: 3.99 UIU/ML (ref 0.34–5.6)

## 2021-12-11 PROCEDURE — 83036 HEMOGLOBIN GLYCOSYLATED A1C: CPT

## 2021-12-11 PROCEDURE — 82306 VITAMIN D 25 HYDROXY: CPT

## 2021-12-11 PROCEDURE — 87389 HIV-1 AG W/HIV-1&-2 AB AG IA: CPT

## 2021-12-11 PROCEDURE — 84443 ASSAY THYROID STIM HORMONE: CPT

## 2021-12-11 PROCEDURE — 36415 COLL VENOUS BLD VENIPUNCTURE: CPT

## 2021-12-11 PROCEDURE — 86803 HEPATITIS C AB TEST: CPT

## 2021-12-11 PROCEDURE — 80061 LIPID PANEL: CPT

## 2021-12-11 PROCEDURE — G0103 PSA SCREENING: HCPCS

## 2021-12-14 LAB — HIV 1+2 AB+HIV1 P24 AG SERPL QL IA: NORMAL

## 2022-03-25 ENCOUNTER — HOSPITAL ENCOUNTER (OUTPATIENT)
Dept: CT IMAGING | Facility: HOSPITAL | Age: 57
Discharge: HOME | End: 2022-03-25
Payer: MEDICARE

## 2022-03-25 DIAGNOSIS — R93.89 ABNORMAL FINDINGS ON EXAMINATION OF BODY STRUCTURE: ICD-10-CM

## 2022-03-25 PROCEDURE — 71271 CT THORAX LUNG CANCER SCR C-: CPT

## 2022-05-31 ENCOUNTER — HOSPITAL ENCOUNTER (OUTPATIENT)
Dept: RADIOLOGY | Facility: HOSPITAL | Age: 57
Discharge: HOME | End: 2022-05-31
Payer: MEDICARE

## 2022-05-31 DIAGNOSIS — M54.59 OTHER LOW BACK PAIN: ICD-10-CM

## 2022-05-31 DIAGNOSIS — M79.671 PAIN IN RIGHT FOOT: ICD-10-CM

## 2022-05-31 PROCEDURE — 73502 X-RAY EXAM HIP UNI 2-3 VIEWS: CPT

## 2022-05-31 PROCEDURE — 72120 X-RAY BEND ONLY L-S SPINE: CPT

## 2022-05-31 PROCEDURE — 73630 X-RAY EXAM OF FOOT: CPT

## 2022-07-29 ENCOUNTER — TELEPHONE (OUTPATIENT)
Dept: SLEEP CENTER | Facility: CLINIC | Age: 57
End: 2022-07-29

## 2022-07-29 NOTE — TELEPHONE ENCOUNTER
Returned patient's call  Patient was a no-show for a consultation scheduled with Dr Samantha Angulo on 7/28/2022  Patient is scheduled for consultation with Dr Zamora Manifold 8/3/2022

## 2022-08-03 ENCOUNTER — OFFICE VISIT (OUTPATIENT)
Dept: SLEEP CENTER | Facility: CLINIC | Age: 57
End: 2022-08-03
Payer: MEDICARE

## 2022-08-03 ENCOUNTER — TELEPHONE (OUTPATIENT)
Dept: SLEEP CENTER | Facility: CLINIC | Age: 57
End: 2022-08-03

## 2022-08-03 VITALS
HEIGHT: 72 IN | HEART RATE: 83 BPM | WEIGHT: 176 LBS | DIASTOLIC BLOOD PRESSURE: 84 MMHG | BODY MASS INDEX: 23.84 KG/M2 | SYSTOLIC BLOOD PRESSURE: 128 MMHG

## 2022-08-03 DIAGNOSIS — F41.9 ANXIETY AND DEPRESSION: ICD-10-CM

## 2022-08-03 DIAGNOSIS — G47.30 SLEEP APNEA, UNSPECIFIED TYPE: Primary | ICD-10-CM

## 2022-08-03 DIAGNOSIS — F32.A ANXIETY AND DEPRESSION: ICD-10-CM

## 2022-08-03 DIAGNOSIS — G47.19 EXCESSIVE DAYTIME SLEEPINESS: ICD-10-CM

## 2022-08-03 DIAGNOSIS — F43.10 PTSD (POST-TRAUMATIC STRESS DISORDER): ICD-10-CM

## 2022-08-03 DIAGNOSIS — I10 ESSENTIAL HYPERTENSION: ICD-10-CM

## 2022-08-03 DIAGNOSIS — G47.50 PARASOMNIA, UNSPECIFIED TYPE: ICD-10-CM

## 2022-08-03 DIAGNOSIS — G47.09 OTHER INSOMNIA: ICD-10-CM

## 2022-08-03 DIAGNOSIS — G89.4 CHRONIC PAIN DISORDER: ICD-10-CM

## 2022-08-03 PROCEDURE — 99244 OFF/OP CNSLTJ NEW/EST MOD 40: CPT | Performed by: INTERNAL MEDICINE

## 2022-08-03 RX ORDER — HYDROXYZINE HYDROCHLORIDE 25 MG/1
TABLET, FILM COATED ORAL
COMMUNITY
Start: 2022-07-19

## 2022-08-03 RX ORDER — ALBUTEROL SULFATE 90 UG/1
AEROSOL, METERED RESPIRATORY (INHALATION)
COMMUNITY
Start: 2022-05-12

## 2022-08-03 RX ORDER — MELOXICAM 7.5 MG/1
7.5 TABLET ORAL 2 TIMES DAILY PRN
COMMUNITY
Start: 2022-07-19

## 2022-08-03 RX ORDER — MIRTAZAPINE 15 MG/1
15 TABLET, FILM COATED ORAL
COMMUNITY
Start: 2022-07-19

## 2022-08-03 NOTE — TELEPHONE ENCOUNTER
Dr Aiden Alcala asked that I contact patient's PCP for information on patient's treating  Psychiatrist  Called The phone number for E  [address] and spoke with Dayan Said  She reports patient was never seen by them   Records went back to 2016     I tried to [place] A[place]IN at [phone] referring   [facility]   Left a call back message

## 2022-08-03 NOTE — PROGRESS NOTES
Consultation - Sleep Center   Gabby Gloria   62 y o  male  :1965  BAM:[id number]  DOS:8/3/2022    Physician Requesting Consult: Ferny Marshall             Reason for Consult : At your kind request I saw Gabby Gloria  for initial sleep evaluation today  The patient is here to evaluate for suspected Obstructive Sleep Apnea but not of his own volition  Obtaining a coherent history was very challenging  He has memory deficits and rambling speech  PFSH, Problem List, Medications & Allergies were reviewed in EMR  Matt Keith  has a past medical history of Anesthesia complication, Anxiety, Arthritis, and Hypertension  He has a current medication list which includes the following prescription(s): albuterol, gabapentin, hydroxyzine hcl, meloxicam, mirtazapine, and diclofenac  HPI:  He is here at his wife's behest   His main complaints are  pain and recurrent night ruiz that is disturbing sleep  He has had previous cervical surgery and has chronic neck pain  Recently he is also been experiencing hip pain  Presently he sleeps alone because my wife refuses to sleep with me"  He states she wanted to participate in this encounter by phone but he refused  He denies snoring or  reports breathing difficulty during sleep  He reports some heavy breathing  that he attributes to anxiety, but denies choking or gasping  Other Complaints:  His nephew was murdered and body was chopped up   Since then he is experiencing recurring nightmares that involves him 'shooting people"  He is under care of psychiatrist and was prescribed Remeron but refuses to take the medication because I do not want to be stuck in the nightmares"  He got a prescription for hydroxyzine but had to discontinue use because it caused " itching and feeling like things are crawling all over me    (He states he stayed up examining his limbs with a flashlight and had to completely sanitize my home for a few days")  Restless Leg Syndrome: reports no suggestive symptoms but reports neck and hip pain (for which he is due to see pain management in a few days)  Parasomnia:  Unclear whether he has acted out dream content because he states his body is 'jumping and jerking" to the extent that he has injured himself but not others  He denied sleep walking  Sleep Routine (on average):   Typical Bedtime:  9:00 p m  Gets OOB:  8:00 a m  TIB:11 hrs  Sleep latency:< 15 minutes Sleep Interruptions:  "Because of nightmares and I am too scared to go back to sleep "  He notes he will only sleep if someone is present to watch over me as he fears he may die in his sleep  Awakens: spontaneously  Upon awakening: usually feels refreshed " only if I get sufficient sleep and no nightmares   He estimates getting 4 hrs sleep  Adilia Chapa reports Excessive Daytime Sleepiness  takes planned naps   He rated himself at Total score: 24 /24 on the [place] Sleepiness Scale  Habits:  reports that he has been smoking  He has a 10 00 pack-year smoking history  He has never used smokeless tobacco  ;   E-Cigarette/Vaping    E-Cigarette Use Never User     ;  reports current drug use  Frequency: 7 00 times per week  Drug: Marijuana  ;  reports current alcohol use of about 2 0 standard drinks of alcohol per week  ; Caffeine use:very little ; Exercise routine: none   Family History: Negative for sleep disturbance  ROS - reviewed and as attached  Significant for weight loss because of poor appetite  He reports shortness of breath, chest tightness and wheezing  He has palpitations and sweats excessively during sleep  He reports frequent headaches  He has musculoskeletal aches and pains  He also reports feelings of anxiety and depression  Melodie Blend      EXAM:  /84   Pulse 83   Ht 6' (1 829 m)   Wt 79 8 kg (176 lb)   BMI 23 87 kg/m²    General: Well groomed male, drowsy appearing, in mild distress and complaining of "severe" hip pain   Neurological:  Reduced Alertness but orientated for person and place;  cooperative; Cranial nerves intact;    Psychiatric: Speech:incoherent at times; appears depressed and has a flat affect; paranoid thought that is not goal directed; denied suicidal or homicidal ideation but states has thoughts to юлия his hip to relieve the pain    Skin: warm and dry; Color& Hydration good; no facial rashes or lesions   HEENT:  Craniofacial anatomy: normal Sinuses: non- tender  TMJ: Normal    Eyes: EOM's intact;  conjunctiva/corneas clear   Ears: Externallyappear normal     Nasal Airway: is patent Septum:intact; Mucosa: normal; Turbinates: normal; Rhinorrhea: None   Mouth: Lips: normal posture; Dentition: dentures - upper & lower  Mucosa:moist  ; Hard Palate:normal    Oropharryx: crowded and AP narrowing Tongue: Mallampati:Class IV, Mobile and MacroglossiaSoft Palate:  redundant  and Uvular Hypertrophy Tonsils: absent  Neck:; Neck Circumference: 15 5 "; Supple; no abnormal masses; Thyroid:normal  Trachea:central     Lymph: No Cervical or Submandibular Lymhadenopathy  Heart: S1,S2 normal; RRR; no gallop; no murmurs   Lungs: Respiratory Effort:normal  Air entry good bilaterally  No wheezes  No rales  Abdomen: Obese, Soft & non-tender    Extremities: No pedal edema  No clubbing or cyanosis  Musculoskeletal:  Motor normal; Gait:normal        IMPRESSION: Primary/Secondary Sleep Diagnoses (to Medical or Psych conditions) & Comorbidities   1  Sleep apnea, unspecified type  Ambulatory Referral to Sleep Medicine    Diagnostic Sleep Study   2  Other insomnia  Diagnostic Sleep Study   3  Parasomnia, unspecified type  Diagnostic Sleep Study   4  Excessive daytime sleepiness     5  Anxiety and depression     6  PTSD (post-traumatic stress disorder)     7  Chronic pain disorder     8   Essential hypertension          PLAN:   With respect to above conditions, comprehensive counseling provided on pathophysiology; effects on symptoms and comorbidities, diagnostic strategies & limitations; treatment options; risks or no treament; risks & benefits of the various therapeutic options; costs and insurance aspects  I advised weight reduction, avoiding sleeping supine, using alcohol or sedating medications close to bed time and on safe driving practices  Nocturnal polysomnography is indicated and a diagnostic study will be scheduled  Patient is totally unwilling to try Positive airway pressure therapy and is not a candidate for a mandibular advancement device (because of dentures)  The only other option would be surgery  His main concern and problems are pain and his psychiatric issues  He is sleep difficulties secondary and he needs the primary issues to be addressed with some urgency  There is no record of visits with psychiatrist in [facility], so we were unable to contact the psychiatrist   We tried contacting the referring provider but apparently she no longer works at the clinic where he was seen  He did not complete the medical communication consent and only provided a name and number for a [employer]  The person was not available and there was no option to leave a message  We continue attempts to make contact with PCP/psychiatrist/ family  Follow-up to be scheduled after the studies to review results, further details of treatment options and to initiate/adjust therapy  Sincerely,      Authenticated electronically on 30/46/45   Board Certified Specialist     Portions of the record may have been created with voice recognition software  Occasional wrong word or "sound a like" substitutions may have occurred due to the inherent limitations of voice recognition software  There may also be notations and random deletions of words or characters from malfunctioning software  Read the chart carefully and recognize, using context, where substitutions/deletions have occurred

## 2022-08-03 NOTE — PROGRESS NOTES
Review of Systems      Genitourinary difficulty with erection   Cardiology Frequent chest pain or angina,  and palpitations/fluttering feeling in the chest   Gastrointestinal none   Neurology frequent headaches, muscle weakness, numbness/tingling of an extremity, loss of consciousness, forgetfulness, loss of consciousness/blacking out and balance problems   Constitutional excessive sweating at night and weight change   Integumentary none   Psychiatry anxiety, depression, aggressiveness or irritability and mood change   Musculoskeletal joint pain, muscle aches, back pain and legs twitching/jerking   Pulmonary shortness of breath with activity, chest tightness and wheezing   ENT none   Endocrine none   Hematological none

## 2022-08-03 NOTE — PATIENT INSTRUCTIONS

## 2022-08-04 NOTE — TELEPHONE ENCOUNTER
Spoke with Steve Singh from [facility]  She did confirm that Alcides Sinclair is a patient there  He does see [facility] for counseling and Nydia Mota  Per Steve Singh she will send a message to have them reach out to Dr Natanael Haney  Provided the phone number to the [facility] office Dr Thurman's location for today along with the nurses phone number

## 2022-08-10 NOTE — TELEPHONE ENCOUNTER
Dr Marely Najera from [address] [place] [address] manager called for you    [phone] ext [phone]    She left message stating that the patient does have two up coming appointments   Behavioral therapist on 8/23/2022 and the Psychiatric NP on 8/30/2022

## 2022-11-14 ENCOUNTER — OFFICE VISIT (OUTPATIENT)
Dept: DENTISTRY | Facility: CLINIC | Age: 57
End: 2022-11-14

## 2022-11-14 VITALS — DIASTOLIC BLOOD PRESSURE: 86 MMHG | SYSTOLIC BLOOD PRESSURE: 130 MMHG | HEART RATE: 85 BPM

## 2022-11-14 DIAGNOSIS — K08.539 FRACTURE OF DENTURE: Primary | ICD-10-CM

## 2022-11-14 NOTE — PROGRESS NOTES
Patient presents for a dental emergency and verbally consents for treatment:  Reviewed health history-  Pt is ASA type II  Treatment consents signed: Yes  Perio: edentulous   Pain Scale:0  Caries Assessment: n/a  Radiographs:n/a  Oral Hygiene instruction reviewed and given  Hygiene recall visits recommended to the patient      S:Pt presented with CC:"I dropped my denture and tooth broke off"  O:Pt has complete upper and lower denture  #10 denture tooth broken in half  A: broken denture tooth    P:Explained to pt that we will have to send denture back to the lab so they can repair the tooth  Pt requested to have a new set done so he can have one as back up  Request placed in work que to be send to insurance  All questions answered  Pt left satisfied and in good health  Prognosis is Good  Referrals Needed: No      Next visit: alginate impressions for upper and lower complete denture      Tyron Bailey

## 2023-07-26 ENCOUNTER — TELEPHONE (OUTPATIENT)
Age: 58
End: 2023-07-26

## 2023-07-26 DIAGNOSIS — Z12.11 SCREEN FOR COLON CANCER: Primary | ICD-10-CM

## 2023-07-26 NOTE — LETTER
Constantine Solomon. [address] INSTRUCTIONS  PLEASE NOTE. .. AS OF JUNE 1, 2014, OUR OFFICE REQUIRES 72 HOURS NOTICE OF CANCELLATION/RESCHEDULE OF A PROCEDURE TO AVOID INCURRING A MISSED APPOINTMENT FEE. Your Colonoscopy Procedure has been scheduled at:  [address]. [place] [phone]    The Date and Time of your Procedure is: Thursday, August 3, 2023     Dr. Anselmo Bardales  will be performing the procedure. Report to the [facility] 45 minutes prior to the procedure. The total time at the facility will be approximately 1 1/2 hours. Please bring to your procedure at [facility]:   1. Insurance Cards and referrals if required by [employer] company   2. Valid Photo ID   3. Power of  Form if required    Use the bowel preparation as directed. Check with your family doctor if you are taking a blood thinner (Coumadin, Plavix, Xarelto, Pradaxa, Gingko biloba, Ginseng, Feverfew, [place]'s Wort). We suggest stopping these for 3 days. Special instructions may be needed if you are taking aspirin or any aspirin-containing medication. Check with your family physician. If you are on DIABETIC MEDICATION (tablets or insulin) your doctor may make changes in your preparation. Take all medications usual unless otherwise instructed. Feel free to call the physician's office to answer any questions or address any concerns you have regarding your procedure or preparation. A nurse will be happy to assist you. Because anesthesia is given to you during the procedure, the center requires that you be discharged under supervision of an adult to take you home after the procedure is performed. They will also need to sign as a witness on your discharge instructions. Public Transportation such as VAST, BUS, TAXI is Not Acceptable.     It is important you notify our office of any insurance changes prior to your procedure and, if necessary, supply us with referrals from your primary care physician. COLONOSCOPY PREPARATION INSTRUCTIONS    Purchase (prescription not required):  · 238 gram bottle of Miralax® (Glycolax®)  · 4 Dulcolax® (Bisacodyl) Laxative Tablets  · 64 oz. bottle of Gatorade® or your preference of a non-carbonated clear liquid - NOT RED OR PURPLE     One Day Prior to Colonoscopy Procedure  · Nothing to eat the day before your procedure, only clear liquids. · It is important that you drink plenty of clear liquids throughout the day to prevent dehydration. Clear Liquids include:  o Water/Iced Tea/Lemonade/Gatorade®/Black Coffee or tea (no milk or creamers  o Soft drinks: orange, ginger ale, cola, Pepsi®, Sprite®, 7Up®  o Amadou-Aid® (lemonade or orange flavors only)  o Strained fruit juices without pulp such as apple, white grape, white cranberry  o Jell-O®, lemon, lime or orange (no fruit or toppings)  o Popsicles, Alexey Ice (No Ice Cream, sherbets, or fruit bars)  o Chicken or beef bouillon/broth  DO NOT EAT OR DRINK ANYTHING RED OR PURPLE  DO NOT DRINK ANY ALCOHOLIC BEVERAGES  DIABETIC PATIENTS: Consult your physician    At 4:00 pm, take (2) Dulcolax® (Bisacodyl) Laxative Tablets. Swallow the tablets whole with an 8 oz. glass of water. At 8:00 pm, take the additional (2) Dulcolax® (Bisacodyl) Laxative Tablets with 8 oz. of water. The package may direct you not to exceed (2) tablets at any time but for the purpose of this examination, you should take (4) total.    Mix the 238 gm of Miralax® in 64 oz. of Gatorade® and shake the solution until the Miralax® is dissolved. You will drink half (32 oz) of this solution this evening, beginning between 4 and 6 o'clock. Drink 8 oz glassfuls at your own pace. It may take several hours to drink the solution. Remember to stay close to toilet facilities.     DAY OF COLONOSCOPY PROCEDURE    Five (5) hours before your procedure, drink the other half (32 oz) of the Miralax®/Gatorade® mixture within a two (2) hour period. This may require you to get up very early if you are scheduled for an early procedure. NOTHING IS TO BE TAKEN BY MOUTH 3 HOURS PRIOR TO PROCEDURE. If you use an inhaler, please bring it with you to your procedure.

## 2023-07-26 NOTE — LETTER
Cheryl Reeves. [address]      Your Colonoscopy Procedure has been scheduled at: [facility]  [address] [facility]      The Date of your Procedure is: Friday, August 25, 2023   The facility will call you 1-2 days prior to your procedure with your arrival time. Dr. Naomi Castano  will be performing the procedure. Please bring to your procedure at [address] and referrals if required by your insurance company              2. Valid Photo ID              3. Power of  Form if required              4. Current list of medications with dose, route, and frequency. Use the bowel preparation as directed. Check with your family doctor if you are taking a blood thinner (Coumadin, Plavix, Xarelto, Pradaxa, Gingko biloba, Ginseng, Feverfew, [place]'s Wort). We suggest stopping these for 3 days. Special instructions may be needed if you are taking aspirin or any aspirin-containing medication. Check with your family physician. If you are on DIABETIC MEDICATION (tablets or insulin) your doctor may make changes in your preparation. Feel free to call the physician's office to answer any questions or address any concerns you have regarding your procedure or preparation. A nurse will be happy to assist you. Because anesthesia is given to you during the procedure, the center requires that you be discharged under supervision of an adult to take you home after the procedure is performed. Public Transportation such as VAST, BUS, TAXI is Not Acceptable. It is important you notify our office of any insurance changes prior to your procedure and, if necessary, supply us with referrals from your primary care physician.                      COLONOSCOPY PREPARATION INSTRUCTIONS    Purchase (prescription not required):  · 238 gram bottle of Miralax® (Glycolax®)  · 4 Dulcolax® (Bisacodyl) Laxative Tablets  · 64 oz. bottle of Gatorade® or your preference of a non-carbonated clear liquid - NOT RED OR PURPLE     One Day Prior to Colonoscopy Procedure  · Nothing to eat the day before your procedure, only clear liquids. · It is important that you drink plenty of clear liquids throughout the day to prevent dehydration. Clear Liquids include:  o Water/Iced Tea/Lemonade/Gatorade®/Black Coffee or tea (no milk or creamers  o Soft drinks: orange, ginger ale, cola, Pepsi®, Sprite®, 7Up®  o Amadou-Aid® (lemonade or orange flavors only)  o Strained fruit juices without pulp such as apple, white grape, white cranberry  o Jell-O®, lemon, lime or orange (no fruit or toppings)  o Popsicles, Alexey Ice (No Ice Cream, sherbets, or fruit bars)  o Chicken or beef bouillon/broth  DO NOT EAT OR DRINK ANYTHING RED OR PURPLE  DO NOT DRINK ANY ALCOHOLIC BEVERAGES  DIABETIC PATIENTS: Consult your physician    At 4:00 pm, take (2) Dulcolax® (Bisacodyl) Laxative Tablets. Swallow the tablets whole with an 8 oz. glass of water. At 8:00 pm, take the additional (2) Dulcolax® (Bisacodyl) Laxative Tablets with 8 oz. of water. The package may direct you not to exceed (2) tablets at any time but for the purpose of this examination, you should take (4) total.    Mix the 238 gm of Miralax® in 64 oz. of Gatorade® and shake the solution until the Miralax® is dissolved. You will drink half (32 oz) of this solution this evening, beginning between 4 and 6 o'clock. Drink 8 oz glassfuls at your own pace. It may take several hours to drink the solution. Remember to stay close to toilet facilities. DAY OF COLONOSCOPY PROCEDURE    Five (5) hours before your procedure, drink the other half (32 oz) of the Miralax®/Gatorade® mixture within a two (2) hour period. This may require you to get up very early if you are scheduled for an early procedure. NOTHING IS TO BE TAKEN BY MOUTH 3 HOURS PRIOR TO PROCEDURE.      If you use an inhaler, please bring it with you to your procedure.

## 2023-07-26 NOTE — TELEPHONE ENCOUNTER
New patient, no problems, no priors, BMI 23    8/3/23 [facility] TR instructions mailed to patient    Spoke to Keke who is patient's caregiver/. She was not able to do 8/3/23. Rescheduled patient for 8/25/23 at [facility] TR instructions mailed to patient and Keke.

## 2023-08-24 ENCOUNTER — TELEPHONE (OUTPATIENT)
Age: 58
End: 2023-08-24

## 2023-08-24 RX ORDER — SODIUM CHLORIDE, SODIUM LACTATE, POTASSIUM CHLORIDE, CALCIUM CHLORIDE 600; 310; 30; 20 MG/100ML; MG/100ML; MG/100ML; MG/100ML
75 INJECTION, SOLUTION INTRAVENOUS CONTINUOUS
Status: CANCELLED | OUTPATIENT
Start: 2023-08-24

## 2023-08-25 ENCOUNTER — HOSPITAL ENCOUNTER (OUTPATIENT)
Dept: GASTROENTEROLOGY | Facility: AMBULATORY SURGERY CENTER | Age: 58
Discharge: HOME | End: 2023-08-25
Attending: COLON & RECTAL SURGERY
Payer: MEDICARE

## 2023-08-25 ENCOUNTER — ANESTHESIA (OUTPATIENT)
Dept: GASTROENTEROLOGY | Facility: AMBULATORY SURGERY CENTER | Age: 58
End: 2023-08-25

## 2023-08-25 ENCOUNTER — ANESTHESIA EVENT (OUTPATIENT)
Dept: GASTROENTEROLOGY | Facility: AMBULATORY SURGERY CENTER | Age: 58
End: 2023-08-25

## 2023-08-25 VITALS
RESPIRATION RATE: 18 BRPM | WEIGHT: 180 LBS | HEART RATE: 61 BPM | HEIGHT: 72 IN | OXYGEN SATURATION: 98 % | SYSTOLIC BLOOD PRESSURE: 123 MMHG | DIASTOLIC BLOOD PRESSURE: 79 MMHG | BODY MASS INDEX: 24.38 KG/M2 | TEMPERATURE: 98.8 F

## 2023-08-25 DIAGNOSIS — Z12.11 SCREEN FOR COLON CANCER: ICD-10-CM

## 2023-08-25 PROBLEM — IMO0001 SMOKING: Status: ACTIVE | Noted: 2023-08-25

## 2023-08-25 PROBLEM — F17.200 SMOKING: Status: ACTIVE | Noted: 2023-08-25

## 2023-08-25 PROCEDURE — 45385 COLONOSCOPY W/LESION REMOVAL: CPT | Performed by: COLON & RECTAL SURGERY

## 2023-08-25 PROCEDURE — 88305 TISSUE EXAM BY PATHOLOGIST: CPT | Performed by: PATHOLOGY

## 2023-08-25 RX ORDER — PROPOFOL 10 MG/ML
INJECTION, EMULSION INTRAVENOUS AS NEEDED
Status: DISCONTINUED | OUTPATIENT
Start: 2023-08-25 | End: 2023-08-25

## 2023-08-25 RX ORDER — SODIUM CHLORIDE, SODIUM LACTATE, POTASSIUM CHLORIDE, CALCIUM CHLORIDE 600; 310; 30; 20 MG/100ML; MG/100ML; MG/100ML; MG/100ML
INJECTION, SOLUTION INTRAVENOUS CONTINUOUS PRN
Status: DISCONTINUED | OUTPATIENT
Start: 2023-08-25 | End: 2023-08-25

## 2023-08-25 RX ORDER — SODIUM CHLORIDE, SODIUM LACTATE, POTASSIUM CHLORIDE, CALCIUM CHLORIDE 600; 310; 30; 20 MG/100ML; MG/100ML; MG/100ML; MG/100ML
75 INJECTION, SOLUTION INTRAVENOUS CONTINUOUS
Status: DISCONTINUED | OUTPATIENT
Start: 2023-08-25 | End: 2023-08-29 | Stop reason: HOSPADM

## 2023-08-25 RX ADMIN — PROPOFOL 50 MG: 10 INJECTION, EMULSION INTRAVENOUS at 09:11

## 2023-08-25 RX ADMIN — PROPOFOL 50 MG: 10 INJECTION, EMULSION INTRAVENOUS at 09:22

## 2023-08-25 RX ADMIN — PROPOFOL 50 MG: 10 INJECTION, EMULSION INTRAVENOUS at 09:16

## 2023-08-25 RX ADMIN — PROPOFOL 150 MG: 10 INJECTION, EMULSION INTRAVENOUS at 09:09

## 2023-08-25 RX ADMIN — PROPOFOL 50 MG: 10 INJECTION, EMULSION INTRAVENOUS at 09:24

## 2023-08-25 RX ADMIN — PROPOFOL 50 MG: 10 INJECTION, EMULSION INTRAVENOUS at 09:28

## 2023-08-25 RX ADMIN — PROPOFOL 50 MG: 10 INJECTION, EMULSION INTRAVENOUS at 09:19

## 2023-08-25 RX ADMIN — PROPOFOL 50 MG: 10 INJECTION, EMULSION INTRAVENOUS at 09:30

## 2023-08-25 RX ADMIN — PROPOFOL 50 MG: 10 INJECTION, EMULSION INTRAVENOUS at 09:21

## 2023-08-25 RX ADMIN — PROPOFOL 50 MG: 10 INJECTION, EMULSION INTRAVENOUS at 09:10

## 2023-08-25 RX ADMIN — SODIUM CHLORIDE, SODIUM LACTATE, POTASSIUM CHLORIDE, CALCIUM CHLORIDE: 600; 310; 30; 20 INJECTION, SOLUTION INTRAVENOUS at 08:50

## 2023-08-25 RX ADMIN — PROPOFOL 50 MG: 10 INJECTION, EMULSION INTRAVENOUS at 09:13

## 2023-08-25 RX ADMIN — PROPOFOL 50 MG: 10 INJECTION, EMULSION INTRAVENOUS at 09:26

## 2023-08-25 RX ADMIN — PROPOFOL 50 MG: 10 INJECTION, EMULSION INTRAVENOUS at 09:14

## 2023-08-25 NOTE — H&P
History and Physical   Colon and Rectal Surgery   Lucie Larkin 62 y.o. male MRN: [id number]  Unit/Bed#:  Encounter: [id number]  08/25/23   9:04 AM      CC:  Screening of the colon. History of Present Illness   HPI:  Lucie Larkin is a 62 y.o. male with no GI symptoms. He has seen blood with a bowel movement in the past.    Historical Information   Past Medical History:   Diagnosis Date   • Anesthesia complication     body temp burns off anesthesia more quickly--woke up during surgery   • Anxiety     due to medical conditions   • Arthritis    • Hypertension      Past Surgical History:   Procedure Laterality Date   • BACK SURGERY     • CERVICAL FUSION N/A 4/5/2016    Procedure: FUSION CERVICAL POSTERIOR - revision of instrumentation T1;  Surgeon: Soledad Mendez MD;  Location: [facility];  Service:    • FOOT MASS EXCISION Right    • HERNIA REPAIR Right     x2   • INCISION AND DRAINAGE POSTERIOR SPINE N/A 4/27/2016    Procedure: INCISION AND DRAINAGE (I&D) SPINE;  Surgeon: Soledad Mendez MD;  Location: [facility];  Service:    • IA ARTHRODESIS PST/PSTLAT TQ 1NTRSPC EA ADDL NTRSPC N/A 4/4/2016    Procedure: C3-C6 POSTERIOR CERVICAL LAMINECTOMY AND DECOMPRESSION; C3-T2 INSTRUMENTED POSTERIOR CERVICAL SPINAL FUSION WITH POSSIBLE ADDITIONAL LEVELS WITH ALLOGRAFT AND AUTOGRAFT (IMPULSE MONITORING);   Surgeon: Soledad Mendez MD;  Location: [facility];  Service: Orthopedics   • SHOULDER OPEN ROTATOR CUFF REPAIR Bilateral        Meds/Allergies     (Not in a hospital admission)        Current Outpatient Medications:   •  albuterol (PROVENTIL HFA,VENTOLIN HFA) 90 mcg/act inhaler, INHALE 2 PUFFS INTO THE LUNGS EVERY 4 HOURS AS NEEDED FOR 30 DAYS, Disp: , Rfl:   •  diclofenac (VOLTAREN) 75 mg EC tablet, Take 1 tablet (75 mg total) by mouth 2 (two) times a day (Patient not taking: Reported on 6/7/2021), Disp: 60 tablet, Rfl: 5  •  gabapentin (NEURONTIN) 300 mg capsule, Take 100 mg by mouth 3 (three) times a day, Disp: , Rfl:   •  hydrOXYzine HCL (ATARAX) 25 mg tablet, TAKE 1 TABLET BY MOUTH THREE TIMES A DAY AS NEEDED FOR 30 DAYS, Disp: , Rfl:   •  meloxicam (MOBIC) 7.5 mg tablet, Take 7.5 mg by mouth 2 (two) times a day as needed, Disp: , Rfl:   •  mirtazapine (REMERON) 15 mg tablet, Take 15 mg by mouth daily at bedtime, Disp: , Rfl:     Current Facility-Administered Medications:   •  lactated ringers infusion, 75 mL/hr, Intravenous, Continuous, Eunice Jones MD    Facility-Administered Medications Ordered in Other Encounters:   •  lactated ringers infusion, , Intravenous, Continuous PRN, Farzana Radnall CRNA, Last Rate: 75 mL/hr at 08/25/23 0850, New Bag at 08/25/23 0850    No Known Allergies      Social History   Social History     Substance and Sexual Activity   Alcohol Use Yes   • Alcohol/week: 2.0 standard drinks of alcohol   • Types: 2 Shots of liquor per week    Comment: daily with tea     Social History     Substance and Sexual Activity   Drug Use Yes   • Frequency: 7.0 times per week   • Types: Marijuana    Comment: one joint daily for pain last 2 days ago     Social History     Tobacco Use   Smoking Status Every Day   • Packs/day: 0.25   • Years: 15.00   • Total pack years: 3.75   • Types: Cigarettes   Smokeless Tobacco Never         Family History:   Family History   Problem Relation Age of Onset   • Cancer Family          Objective     Current Vitals:   Blood Pressure: 139/99 (08/25/23 0843)  Pulse: 59 (nsr) (08/25/23 0843)  Temperature: 98.8 °F (37.1 °C) (08/25/23 0843)  Temp Source: Temporal (08/25/23 0843)  Respirations: 18 (08/25/23 0843)  Height: 6' (182.9 cm) (08/25/23 0843)  Weight - Scale: 81.6 kg (180 lb) (08/25/23 0843)  SpO2: 99 % (08/25/23 0843)  No intake or output data in the 24 hours ending 08/25/23 0904    Physical Exam:  General:  Well nourished, no distress. Neuro: Alert and oriented  Eyes:Sclera anicteric, conjunctiva pink. Pulm: Clear to auscultation bilaterally.  No respiratory Distress. CV:  Regular rate and rhythm. No murmurs. Abdomen:  Soft, flat, non-tender, without masses or hepatosplenomegaly. Lab Results:       ASSESSMENT:  Lina Alvarado. is a 62 y.o. male for screening. PLAN:  Colonoscopy. Risks , including, but not limited to, bleeding, perforation, missed lesions, and potential need for surgery, were reviewed. Alternatives to colonoscopy were discussed.   Bebeto Weiner MD

## 2023-08-25 NOTE — ANESTHESIA PREPROCEDURE EVALUATION
Procedure:  COLONOSCOPY    Relevant Problems   CARDIO   (+) Essential hypertension      NEURO/PSYCH   (+) Anxiety   (+) Headache, temporal      PULMONARY   (+) Smoking      Other   (+) S/P cervical spinal fusion        Physical Exam    Airway    Mallampati score: II  TM Distance: >3 FB  Neck ROM: full     Dental   upper dentures and lower dentures,     Cardiovascular  Rhythm: regular, Rate: normal,     Pulmonary  Breath sounds clear to auscultation,     Other Findings        Anesthesia Plan  ASA Score- 2     Anesthesia Type- IV sedation with anesthesia with ASA Monitors. Additional Monitors:   Airway Plan:           Plan Factors-    Chart reviewed. Patient is not a current smoker. Induction- intravenous. Postoperative Plan-     Informed Consent- Anesthetic plan and risks discussed with patient. I personally reviewed this patient with the CRNA. Discussed and agreed on the Anesthesia Plan with the CRNA. Kaylin Flores Patient admits to drinking 4 airplane size bottles of alcohol daily and smokes marijuana daily.

## 2023-08-25 NOTE — ANESTHESIA POSTPROCEDURE EVALUATION
Post-Op Assessment Note    CV Status:  Stable  Pain Score: 0    Pain management: adequate     Mental Status:  Sleepy and arousable   Hydration Status:  Stable   PONV Controlled:  Controlled   Airway Patency:  Patent      Post Op Vitals Reviewed: Yes      Staff: CRNA         There were no known notable events for this encounter.     BP   98/65   Temp      Pulse  62   Resp   19   SpO2   100

## 2023-08-31 PROCEDURE — 88305 TISSUE EXAM BY PATHOLOGIST: CPT | Performed by: PATHOLOGY

## 2024-02-16 ENCOUNTER — APPOINTMENT (EMERGENCY)
Dept: RADIOLOGY | Facility: HOSPITAL | Age: 59
DRG: 244 | End: 2024-02-16
Payer: MEDICARE

## 2024-02-16 ENCOUNTER — HOSPITAL ENCOUNTER (INPATIENT)
Facility: HOSPITAL | Age: 59
LOS: 4 days | Discharge: HOME HEALTH | DRG: 244 | End: 2024-02-20
Attending: EMERGENCY MEDICINE | Admitting: SURGERY
Payer: MEDICARE

## 2024-02-16 DIAGNOSIS — N17.9 AKI (ACUTE KIDNEY INJURY) (HCC): ICD-10-CM

## 2024-02-16 DIAGNOSIS — K57.92 DIVERTICULITIS: Primary | ICD-10-CM

## 2024-02-16 DIAGNOSIS — R10.9 ABDOMINAL PAIN: ICD-10-CM

## 2024-02-16 DIAGNOSIS — K57.20 DIVERTICULITIS OF LARGE INTESTINE WITH ABSCESS WITHOUT BLEEDING: ICD-10-CM

## 2024-02-16 LAB
ALBUMIN SERPL BCP-MCNC: 4.4 G/DL (ref 3.5–5)
ALP SERPL-CCNC: 40 U/L (ref 34–104)
ALT SERPL W P-5'-P-CCNC: 9 U/L (ref 7–52)
ANION GAP SERPL CALCULATED.3IONS-SCNC: 10 MMOL/L
APTT PPP: 29 SECONDS (ref 23–37)
AST SERPL W P-5'-P-CCNC: 12 U/L (ref 13–39)
BASOPHILS # BLD AUTO: 0.03 THOUSANDS/ÂΜL (ref 0–0.1)
BASOPHILS NFR BLD AUTO: 0 % (ref 0–1)
BILIRUB SERPL-MCNC: 1.37 MG/DL (ref 0.2–1)
BILIRUB UR QL STRIP: NEGATIVE
BUN SERPL-MCNC: 16 MG/DL (ref 5–25)
CALCIUM SERPL-MCNC: 9.9 MG/DL (ref 8.4–10.2)
CHLORIDE SERPL-SCNC: 101 MMOL/L (ref 96–108)
CLARITY UR: CLEAR
CO2 SERPL-SCNC: 25 MMOL/L (ref 21–32)
COLOR UR: YELLOW
CREAT SERPL-MCNC: 1.19 MG/DL (ref 0.6–1.3)
EOSINOPHIL # BLD AUTO: 0 THOUSAND/ÂΜL (ref 0–0.61)
EOSINOPHIL NFR BLD AUTO: 0 % (ref 0–6)
ERYTHROCYTE [DISTWIDTH] IN BLOOD BY AUTOMATED COUNT: 15.3 % (ref 11.6–15.1)
FLUAV RNA RESP QL NAA+PROBE: NEGATIVE
FLUBV RNA RESP QL NAA+PROBE: NEGATIVE
GFR SERPL CREATININE-BSD FRML MDRD: 66 ML/MIN/1.73SQ M
GLUCOSE SERPL-MCNC: 121 MG/DL (ref 65–140)
GLUCOSE UR STRIP-MCNC: NEGATIVE MG/DL
HCT VFR BLD AUTO: 42.6 % (ref 36.5–49.3)
HGB BLD-MCNC: 14.2 G/DL (ref 12–17)
HGB UR QL STRIP.AUTO: NEGATIVE
IMM GRANULOCYTES # BLD AUTO: 0.1 THOUSAND/UL (ref 0–0.2)
IMM GRANULOCYTES NFR BLD AUTO: 1 % (ref 0–2)
INR PPP: 1.04 (ref 0.84–1.19)
KETONES UR STRIP-MCNC: NEGATIVE MG/DL
LACTATE SERPL-SCNC: 0.8 MMOL/L (ref 0.5–2)
LEUKOCYTE ESTERASE UR QL STRIP: NEGATIVE
LIPASE SERPL-CCNC: 19 U/L (ref 11–82)
LYMPHOCYTES # BLD AUTO: 1.01 THOUSANDS/ÂΜL (ref 0.6–4.47)
LYMPHOCYTES NFR BLD AUTO: 5 % (ref 14–44)
MCH RBC QN AUTO: 30 PG (ref 26.8–34.3)
MCHC RBC AUTO-ENTMCNC: 33.3 G/DL (ref 31.4–37.4)
MCV RBC AUTO: 90 FL (ref 82–98)
MONOCYTES # BLD AUTO: 1.02 THOUSAND/ÂΜL (ref 0.17–1.22)
MONOCYTES NFR BLD AUTO: 5 % (ref 4–12)
NEUTROPHILS # BLD AUTO: 16.71 THOUSANDS/ÂΜL (ref 1.85–7.62)
NEUTS SEG NFR BLD AUTO: 89 % (ref 43–75)
NITRITE UR QL STRIP: NEGATIVE
NRBC BLD AUTO-RTO: 0 /100 WBCS
PH UR STRIP.AUTO: 7.5 [PH]
PLATELET # BLD AUTO: 259 THOUSANDS/UL (ref 149–390)
PMV BLD AUTO: 9 FL (ref 8.9–12.7)
POTASSIUM SERPL-SCNC: 3.9 MMOL/L (ref 3.5–5.3)
PROT SERPL-MCNC: 7.9 G/DL (ref 6.4–8.4)
PROT UR STRIP-MCNC: NEGATIVE MG/DL
PROTHROMBIN TIME: 13.9 SECONDS (ref 11.6–14.5)
RBC # BLD AUTO: 4.73 MILLION/UL (ref 3.88–5.62)
RSV RNA RESP QL NAA+PROBE: NEGATIVE
SARS-COV-2 RNA RESP QL NAA+PROBE: NEGATIVE
SODIUM SERPL-SCNC: 136 MMOL/L (ref 135–147)
SP GR UR STRIP.AUTO: 1.01 (ref 1–1.03)
UROBILINOGEN UR QL STRIP.AUTO: 0.2 E.U./DL
WBC # BLD AUTO: 18.87 THOUSAND/UL (ref 4.31–10.16)

## 2024-02-16 PROCEDURE — 85610 PROTHROMBIN TIME: CPT | Performed by: EMERGENCY MEDICINE

## 2024-02-16 PROCEDURE — 0241U HB NFCT DS VIR RESP RNA 4 TRGT: CPT | Performed by: EMERGENCY MEDICINE

## 2024-02-16 PROCEDURE — 83690 ASSAY OF LIPASE: CPT | Performed by: EMERGENCY MEDICINE

## 2024-02-16 PROCEDURE — 85730 THROMBOPLASTIN TIME PARTIAL: CPT | Performed by: EMERGENCY MEDICINE

## 2024-02-16 PROCEDURE — 85025 COMPLETE CBC W/AUTO DIFF WBC: CPT | Performed by: EMERGENCY MEDICINE

## 2024-02-16 PROCEDURE — G1004 CDSM NDSC: HCPCS

## 2024-02-16 PROCEDURE — 81003 URINALYSIS AUTO W/O SCOPE: CPT | Performed by: EMERGENCY MEDICINE

## 2024-02-16 PROCEDURE — 36415 COLL VENOUS BLD VENIPUNCTURE: CPT | Performed by: EMERGENCY MEDICINE

## 2024-02-16 PROCEDURE — 99285 EMERGENCY DEPT VISIT HI MDM: CPT | Performed by: EMERGENCY MEDICINE

## 2024-02-16 PROCEDURE — 96365 THER/PROPH/DIAG IV INF INIT: CPT

## 2024-02-16 PROCEDURE — 96375 TX/PRO/DX INJ NEW DRUG ADDON: CPT

## 2024-02-16 PROCEDURE — 94760 N-INVAS EAR/PLS OXIMETRY 1: CPT

## 2024-02-16 PROCEDURE — 83605 ASSAY OF LACTIC ACID: CPT | Performed by: EMERGENCY MEDICINE

## 2024-02-16 PROCEDURE — 96376 TX/PRO/DX INJ SAME DRUG ADON: CPT

## 2024-02-16 PROCEDURE — 74177 CT ABD & PELVIS W/CONTRAST: CPT

## 2024-02-16 PROCEDURE — 80053 COMPREHEN METABOLIC PANEL: CPT | Performed by: EMERGENCY MEDICINE

## 2024-02-16 PROCEDURE — 87040 BLOOD CULTURE FOR BACTERIA: CPT | Performed by: EMERGENCY MEDICINE

## 2024-02-16 PROCEDURE — 99285 EMERGENCY DEPT VISIT HI MDM: CPT

## 2024-02-16 RX ORDER — MIRTAZAPINE 15 MG/1
15 TABLET, FILM COATED ORAL
Status: DISCONTINUED | OUTPATIENT
Start: 2024-02-16 | End: 2024-02-20 | Stop reason: HOSPADM

## 2024-02-16 RX ORDER — SODIUM CHLORIDE, SODIUM LACTATE, POTASSIUM CHLORIDE, CALCIUM CHLORIDE 600; 310; 30; 20 MG/100ML; MG/100ML; MG/100ML; MG/100ML
125 INJECTION, SOLUTION INTRAVENOUS CONTINUOUS
Status: DISCONTINUED | OUTPATIENT
Start: 2024-02-16 | End: 2024-02-19

## 2024-02-16 RX ORDER — PANTOPRAZOLE SODIUM 40 MG/10ML
40 INJECTION, POWDER, LYOPHILIZED, FOR SOLUTION INTRAVENOUS
Status: DISCONTINUED | OUTPATIENT
Start: 2024-02-17 | End: 2024-02-20 | Stop reason: HOSPADM

## 2024-02-16 RX ORDER — LANOLIN ALCOHOL/MO/W.PET/CERES
3 CREAM (GRAM) TOPICAL
Status: DISCONTINUED | OUTPATIENT
Start: 2024-02-16 | End: 2024-02-20 | Stop reason: HOSPADM

## 2024-02-16 RX ORDER — NICOTINE 21 MG/24HR
1 PATCH, TRANSDERMAL 24 HOURS TRANSDERMAL DAILY
Status: DISCONTINUED | OUTPATIENT
Start: 2024-02-17 | End: 2024-02-20 | Stop reason: HOSPADM

## 2024-02-16 RX ORDER — LABETALOL HYDROCHLORIDE 5 MG/ML
10 INJECTION, SOLUTION INTRAVENOUS EVERY 4 HOURS PRN
Status: DISCONTINUED | OUTPATIENT
Start: 2024-02-16 | End: 2024-02-20 | Stop reason: HOSPADM

## 2024-02-16 RX ORDER — KETOROLAC TROMETHAMINE 30 MG/ML
15 INJECTION, SOLUTION INTRAMUSCULAR; INTRAVENOUS ONCE
Status: COMPLETED | OUTPATIENT
Start: 2024-02-16 | End: 2024-02-16

## 2024-02-16 RX ORDER — ENOXAPARIN SODIUM 100 MG/ML
40 INJECTION SUBCUTANEOUS DAILY
Status: DISCONTINUED | OUTPATIENT
Start: 2024-02-17 | End: 2024-02-20 | Stop reason: HOSPADM

## 2024-02-16 RX ORDER — ACETAMINOPHEN 325 MG/1
975 TABLET ORAL EVERY 6 HOURS SCHEDULED
Status: DISCONTINUED | OUTPATIENT
Start: 2024-02-17 | End: 2024-02-20 | Stop reason: HOSPADM

## 2024-02-16 RX ORDER — MORPHINE SULFATE 4 MG/ML
4 INJECTION, SOLUTION INTRAMUSCULAR; INTRAVENOUS ONCE
Status: COMPLETED | OUTPATIENT
Start: 2024-02-16 | End: 2024-02-16

## 2024-02-16 RX ORDER — CIPROFLOXACIN 2 MG/ML
400 INJECTION, SOLUTION INTRAVENOUS ONCE
Status: DISCONTINUED | OUTPATIENT
Start: 2024-02-16 | End: 2024-02-16

## 2024-02-16 RX ORDER — METRONIDAZOLE 500 MG/100ML
500 INJECTION, SOLUTION INTRAVENOUS ONCE
Status: DISCONTINUED | OUTPATIENT
Start: 2024-02-16 | End: 2024-02-16

## 2024-02-16 RX ORDER — GABAPENTIN 100 MG/1
100 CAPSULE ORAL 3 TIMES DAILY
Status: DISCONTINUED | OUTPATIENT
Start: 2024-02-16 | End: 2024-02-20 | Stop reason: HOSPADM

## 2024-02-16 RX ORDER — OXYCODONE HYDROCHLORIDE 10 MG/1
10 TABLET ORAL EVERY 4 HOURS PRN
Status: DISCONTINUED | OUTPATIENT
Start: 2024-02-16 | End: 2024-02-20

## 2024-02-16 RX ORDER — OXYCODONE HYDROCHLORIDE 5 MG/1
5 TABLET ORAL EVERY 4 HOURS PRN
Status: DISCONTINUED | OUTPATIENT
Start: 2024-02-16 | End: 2024-02-20

## 2024-02-16 RX ORDER — ONDANSETRON 2 MG/ML
4 INJECTION INTRAMUSCULAR; INTRAVENOUS EVERY 6 HOURS PRN
Status: DISCONTINUED | OUTPATIENT
Start: 2024-02-16 | End: 2024-02-20 | Stop reason: HOSPADM

## 2024-02-16 RX ORDER — HYDROMORPHONE HCL/PF 1 MG/ML
0.5 SYRINGE (ML) INJECTION
Status: DISCONTINUED | OUTPATIENT
Start: 2024-02-16 | End: 2024-02-20

## 2024-02-16 RX ORDER — METRONIDAZOLE 500 MG/1
500 TABLET ORAL ONCE
Status: DISCONTINUED | OUTPATIENT
Start: 2024-02-16 | End: 2024-02-16

## 2024-02-16 RX ORDER — ALBUTEROL SULFATE 90 UG/1
2 AEROSOL, METERED RESPIRATORY (INHALATION)
Status: DISCONTINUED | OUTPATIENT
Start: 2024-02-16 | End: 2024-02-20 | Stop reason: HOSPADM

## 2024-02-16 RX ORDER — ACETAMINOPHEN 325 MG/1
650 TABLET ORAL ONCE
Status: COMPLETED | OUTPATIENT
Start: 2024-02-16 | End: 2024-02-16

## 2024-02-16 RX ORDER — HYDROXYZINE HYDROCHLORIDE 25 MG/1
25 TABLET, FILM COATED ORAL EVERY 6 HOURS PRN
Status: DISCONTINUED | OUTPATIENT
Start: 2024-02-16 | End: 2024-02-20 | Stop reason: HOSPADM

## 2024-02-16 RX ADMIN — MIRTAZAPINE 15 MG: 15 TABLET, FILM COATED ORAL at 23:25

## 2024-02-16 RX ADMIN — SODIUM CHLORIDE, SODIUM LACTATE, POTASSIUM CHLORIDE, AND CALCIUM CHLORIDE 125 ML/HR: .6; .31; .03; .02 INJECTION, SOLUTION INTRAVENOUS at 23:25

## 2024-02-16 RX ADMIN — Medication 3 MG: at 23:25

## 2024-02-16 RX ADMIN — IOHEXOL 100 ML: 350 INJECTION, SOLUTION INTRAVENOUS at 19:29

## 2024-02-16 RX ADMIN — ACETAMINOPHEN 975 MG: 325 TABLET ORAL at 23:25

## 2024-02-16 RX ADMIN — PIPERACILLIN AND TAZOBACTAM 4.5 G: 4; .5 INJECTION, POWDER, FOR SOLUTION INTRAVENOUS at 23:31

## 2024-02-16 RX ADMIN — MORPHINE SULFATE 4 MG: 4 INJECTION INTRAVENOUS at 19:51

## 2024-02-16 RX ADMIN — GABAPENTIN 100 MG: 100 CAPSULE ORAL at 23:25

## 2024-02-16 RX ADMIN — CIPROFLOXACIN 400 MG: 2 INJECTION, SOLUTION INTRAVENOUS at 21:15

## 2024-02-16 RX ADMIN — ACETAMINOPHEN 650 MG: 325 TABLET ORAL at 17:12

## 2024-02-16 RX ADMIN — OXYCODONE HYDROCHLORIDE 10 MG: 10 TABLET ORAL at 23:25

## 2024-02-16 RX ADMIN — MORPHINE SULFATE 4 MG: 4 INJECTION INTRAVENOUS at 21:19

## 2024-02-16 RX ADMIN — KETOROLAC TROMETHAMINE 15 MG: 30 INJECTION, SOLUTION INTRAMUSCULAR; INTRAVENOUS at 18:53

## 2024-02-16 RX ADMIN — ALBUTEROL SULFATE 2 PUFF: 90 AEROSOL, METERED RESPIRATORY (INHALATION) at 23:26

## 2024-02-16 RX ADMIN — METRONIDAZOLE 500 MG: 500 INJECTION, SOLUTION INTRAVENOUS at 21:27

## 2024-02-16 RX ADMIN — HYDROXYZINE HYDROCHLORIDE 25 MG: 25 TABLET ORAL at 23:25

## 2024-02-16 NOTE — ED PROVIDER NOTES
"History  Chief Complaint   Patient presents with    Fever     States he started with fever, chills and sweating last night. Sore throat this morning. Abdominal pain lower abdomen at umbilicus and at rectum. Pain with sitting. No BM for 2 days.     Abdominal Pain     Pt is a 59yo M who presents for abdominal pain and constipation.  Patient reports his pain started 3 days ago.  Patient states it is primarily periumbilical/suprapubic.  Patient states he has also not had a bowel movement since that time.  He states he had a small amount of liquid stool today but feels as though he has more stool \"right there\" that will not pass.  Patient reports he has had constipation in the past.  Patient denies any recent blood in stools.  Patient denies any nausea or vomiting.  Patient reports he has been taking multiple over-the-counter medications with some intermittent relief but states his pain always comes back and has been worsening.  Patient also reports fevers and chills at home.  Patient reports history of 2 previous hernia repairs but denies any other abdominal surgeries.         Prior to Admission Medications   Prescriptions Last Dose Informant Patient Reported? Taking?   albuterol (PROVENTIL HFA,VENTOLIN HFA) 90 mcg/act inhaler More than a month  Yes No   Sig: INHALE 2 PUFFS INTO THE LUNGS EVERY 4 HOURS AS NEEDED FOR 30 DAYS   diclofenac (VOLTAREN) 75 mg EC tablet Not Taking  No No   Sig: Take 1 tablet (75 mg total) by mouth 2 (two) times a day   Patient not taking: Reported on 6/7/2021   gabapentin (NEURONTIN) 300 mg capsule More than a month Self Yes No   Sig: Take 100 mg by mouth 3 (three) times a day   hydrOXYzine HCL (ATARAX) 25 mg tablet Past Month  Yes Yes   Sig: TAKE 1 TABLET BY MOUTH THREE TIMES A DAY AS NEEDED FOR 30 DAYS   meloxicam (MOBIC) 7.5 mg tablet More than a month  Yes No   Sig: Take 7.5 mg by mouth 2 (two) times a day as needed   mirtazapine (REMERON) 15 mg tablet Not Taking  Yes No   Sig: Take 15 mg " by mouth daily at bedtime   Patient not taking: Reported on 2/16/2024      Facility-Administered Medications: None       Past Medical History:   Diagnosis Date    Anesthesia complication     body temp burns off anesthesia more quickly--woke up during surgery    Anxiety     due to medical conditions    Arthritis     Hypertension        Past Surgical History:   Procedure Laterality Date    BACK SURGERY      CERVICAL FUSION N/A 4/5/2016    Procedure: FUSION CERVICAL POSTERIOR - revision of instrumentation T1;  Surgeon: Srikanth Siu MD;  Location: [facility];  Service:     FOOT MASS EXCISION Right     HERNIA REPAIR Right     x2    INCISION AND DRAINAGE POSTERIOR SPINE N/A 4/27/2016    Procedure: INCISION AND DRAINAGE (I&D) SPINE;  Surgeon: Srikanth Siu MD;  Location: [facility];  Service:     SD ARTHRODESIS PST/PSTLAT TQ 1NTRSPC EA ADDL NTRSPC N/A 4/4/2016    Procedure: C3-C6 POSTERIOR CERVICAL LAMINECTOMY AND DECOMPRESSION; C3-T2 INSTRUMENTED POSTERIOR CERVICAL SPINAL FUSION WITH POSSIBLE ADDITIONAL LEVELS WITH ALLOGRAFT AND AUTOGRAFT (IMPULSE MONITORING);  Surgeon: Srikanth Siu MD;  Location: [facility];  Service: Orthopedics    SHOULDER OPEN ROTATOR CUFF REPAIR Bilateral        Family History   Problem Relation Age of Onset    Cancer Family      I have reviewed and agree with the history as documented.    E-Cigarette/Vaping    E-Cigarette Use Never User      E-Cigarette/Vaping Substances    Nicotine No     THC No     CBD No     Flavoring No     Other No     Unknown No      Social History     Tobacco Use    Smoking status: Every Day     Current packs/day: 0.25     Average packs/day: 0.3 packs/day for 15.0 years (3.8 ttl pk-yrs)     Types: Cigarettes    Smokeless tobacco: Never   Vaping Use    Vaping status: Never Used   Substance Use Topics    Alcohol use: Yes     Alcohol/week: 2.0 standard drinks of alcohol     Types: 2 Shots of liquor per week     Comment: daily with tea    Drug use: Yes     Frequency:  7.0 times per week     Types: Marijuana     Comment: one joint daily for pain last 2 days ago       Review of Systems   Constitutional:  Positive for appetite change (decreased), chills and fever.   Gastrointestinal:  Positive for abdominal pain, constipation and rectal pain.   All other systems reviewed and are negative.      Physical Exam  Physical Exam  Vitals reviewed.   Constitutional:       Appearance: He is well-developed. He is not toxic-appearing or diaphoretic.      Comments: Uncomfortable appearing   HENT:      Head: Normocephalic and atraumatic.      Right Ear: External ear normal.      Left Ear: External ear normal.      Nose: Nose normal.      Mouth/Throat:      Pharynx: Oropharynx is clear.   Eyes:      Extraocular Movements: Extraocular movements intact.      Pupils: Pupils are equal, round, and reactive to light.   Cardiovascular:      Rate and Rhythm: Normal rate and regular rhythm.      Heart sounds: Normal heart sounds.   Pulmonary:      Effort: Pulmonary effort is normal. No respiratory distress.      Breath sounds: Normal breath sounds. No stridor. No wheezing.   Abdominal:      General: Bowel sounds are normal. There is no distension.      Palpations: Abdomen is soft. There is no mass.      Tenderness: There is abdominal tenderness (RLQ, suprapubic, epigastric). There is no right CVA tenderness, left CVA tenderness or guarding.   Genitourinary:     Rectum: Normal.   Musculoskeletal:         General: No tenderness or deformity. Normal range of motion.      Cervical back: Neck supple.   Skin:     General: Skin is warm and dry.      Capillary Refill: Capillary refill takes less than 2 seconds.      Coloration: Skin is not pale.      Findings: No erythema or rash.   Neurological:      General: No focal deficit present.      Mental Status: He is alert and oriented to person, place, and time.   Psychiatric:         Speech: Speech normal.         Behavior: Behavior is cooperative.         Vital  Signs  ED Triage Vitals [02/16/24 1706]   Temperature Pulse Respirations Blood Pressure SpO2   (!) 102 °F (38.9 °C) 102 20 132/81 98 %      Temp Source Heart Rate Source Patient Position - Orthostatic VS BP Location FiO2 (%)   Tympanic Monitor Sitting Left arm --      Pain Score       9           Vitals:    02/16/24 2030 02/16/24 2100 02/16/24 2200 02/16/24 2236   BP: 110/59 103/66 115/58 123/73   Pulse: 80 74 74 83   Patient Position - Orthostatic VS:    Lying         Visual Acuity      ED Medications  Medications   albuterol (PROVENTIL HFA,VENTOLIN HFA) inhaler 2 puff (2 puffs Inhalation Given 2/16/24 2326)   gabapentin (NEURONTIN) capsule 100 mg (100 mg Oral Given 2/16/24 2325)   hydrOXYzine HCL (ATARAX) tablet 25 mg (25 mg Oral Given 2/16/24 2325)   mirtazapine (REMERON) tablet 15 mg (15 mg Oral Given 2/16/24 2325)   lactated ringers infusion (125 mL/hr Intravenous New Bag 2/16/24 2325)   ondansetron (ZOFRAN) injection 4 mg (has no administration in time range)   nicotine (NICODERM CQ) 14 mg/24hr TD 24 hr patch 1 patch (has no administration in time range)   enoxaparin (LOVENOX) subcutaneous injection 40 mg (has no administration in time range)   pantoprazole (PROTONIX) injection 40 mg (has no administration in time range)   oxyCODONE (ROXICODONE) IR tablet 5 mg (has no administration in time range)   oxyCODONE (ROXICODONE) immediate release tablet 10 mg (10 mg Oral Given 2/16/24 2325)   HYDROmorphone (DILAUDID) injection 0.5 mg (has no administration in time range)   melatonin tablet 3 mg (3 mg Oral Given 2/16/24 2325)   acetaminophen (TYLENOL) tablet 975 mg (975 mg Oral Given 2/16/24 2325)   piperacillin-tazobactam (ZOSYN) IVPB 4.5 g (has no administration in time range)   labetalol (NORMODYNE) injection 10 mg (has no administration in time range)   acetaminophen (TYLENOL) tablet 650 mg (650 mg Oral Given 2/16/24 6514)   ketorolac (TORADOL) injection 15 mg (15 mg Intravenous Given 2/16/24 3369)   iohexol  (OMNIPAQUE) 350 MG/ML injection (MULTI-DOSE) 100 mL (100 mL Intravenous Given 2/16/24 1929)   morphine injection 4 mg (4 mg Intravenous Given 2/16/24 1951)   morphine injection 4 mg (4 mg Intravenous Given 2/16/24 2119)       Diagnostic Studies  Results Reviewed       Procedure Component Value Units Date/Time    Lactic acid, plasma (w/reflex if result > 2.0) [[id number]]  (Normal) Collected: 02/16/24 2108    Lab Status: Final result Specimen: Blood from Arm, Left Updated: 02/16/24 2131     LACTIC ACID 0.8 mmol/L     Narrative:      Result may be elevated if tourniquet was used during collection.    Protime-INR [[id number]]  (Normal) Collected: 02/16/24 2108    Lab Status: Final result Specimen: Blood from Arm, Left Updated: 02/16/24 2128     Protime 13.9 seconds      INR 1.04    APTT [[id number]]  (Normal) Collected: 02/16/24 2108    Lab Status: Final result Specimen: Blood from Arm, Left Updated: 02/16/24 2128     PTT 29 seconds     Blood culture [[id number]] Collected: 02/16/24 2108    Lab Status: In process Specimen: Blood from Arm, Right Updated: 02/16/24 2114    Blood culture [[id number]] Collected: 02/16/24 2108    Lab Status: In process Specimen: Blood from Arm, Left Updated: 02/16/24 2113    FLU/RSV/COVID - if FLU/RSV clinically relevant [[id number]]  (Normal) Collected: 02/16/24 1847    Lab Status: Final result Specimen: Nares from Nose Updated: 02/16/24 1955     SARS-CoV-2 Negative     INFLUENZA A PCR Negative     INFLUENZA B PCR Negative     RSV PCR Negative    Narrative:      FOR PEDIATRIC PATIENTS - copy/paste COVID Guidelines URL to browser: [email or web address]    SARS-CoV-2 assay is a Nucleic Acid Amplification assay intended for the  qualitative detection of nucleic acid from SARS-CoV-2 in nasopharyngeal  swabs. Results are for the presumptive identification of SARS-CoV-2 RNA.    Positive results are indicative of infection with SARS-CoV-2, the  virus  causing COVID-19, but do not rule out bacterial infection or co-infection  with other viruses. Laboratories within the [place] and its  territories are required to report all positive results to the appropriate  public health authorities. Negative results do not preclude SARS-CoV-2  infection and should not be used as the sole basis for treatment or other  patient management decisions. Negative results must be combined with  clinical observations, patient history, and epidemiological information.  This test has not been FDA cleared or approved.    This test has been authorized by FDA under an Emergency Use Authorization  (EUA). This test is only authorized for the duration of time the  declaration that circumstances exist justifying the authorization of the  emergency use of an in vitro diagnostic tests for detection of SARS-CoV-2  virus and/or diagnosis of COVID-19 infection under section 564(b)(1) of  the Act, 21 U.S.C. 360bbb-3(b)(1), unless the authorization is terminated  or revoked sooner. The test has been validated but independent review by FDA  and CLIA is pending.    Test performed using [employer] GeneXpert: This RT-PCR assay targets N2,  a region unique to SARS-CoV-2. A conserved region in the E-gene was chosen  for pan-Sarbecovirus detection which includes SARS-CoV-2.    According to CMS-2020-01-R, this platform meets the definition of high-throughput technology.    Comprehensive metabolic panel [[id number]]  (Abnormal) Collected: 02/16/24 1847    Lab Status: Final result Specimen: Blood from Arm, Left Updated: 02/16/24 1909     Sodium 136 mmol/L      Potassium 3.9 mmol/L      Chloride 101 mmol/L      CO2 25 mmol/L      ANION GAP 10 mmol/L      BUN 16 mg/dL      Creatinine 1.19 mg/dL      Glucose 121 mg/dL      Calcium 9.9 mg/dL      AST 12 U/L      ALT 9 U/L      Alkaline Phosphatase 40 U/L      Total Protein 7.9 g/dL      Albumin 4.4 g/dL      Total Bilirubin 1.37 mg/dL      eGFR 66  ml/min/1.73sq m     Narrative:      National Kidney Disease Foundation guidelines for Chronic Kidney Disease (CKD):     Stage 1 with normal or high GFR (GFR > 90 mL/min/1.73 square meters)    Stage 2 Mild CKD (GFR = 60-89 mL/min/1.73 square meters)    Stage 3A Moderate CKD (GFR = 45-59 mL/min/1.73 square meters)    Stage 3B Moderate CKD (GFR = 30-44 mL/min/1.73 square meters)    Stage 4 Severe CKD (GFR = 15-29 mL/min/1.73 square meters)    Stage 5 End Stage CKD (GFR <15 mL/min/1.73 square meters)  Note: GFR calculation is accurate only with a steady state creatinine    Lipase [[id number]]  (Normal) Collected: 02/16/24 1847    Lab Status: Final result Specimen: Blood from Arm, Left Updated: 02/16/24 1909     Lipase 19 u/L     UA (URINE) with reflex to Scope [[id number]] Collected: 02/16/24 1847    Lab Status: Final result Specimen: Urine, Clean Catch Updated: 02/16/24 1855     Color, UA Yellow     Clarity, UA Clear     Specific Gravity, UA 1.015     pH, UA 7.5     Leukocytes, UA Negative     Nitrite, UA Negative     Protein, UA Negative mg/dl      Glucose, UA Negative mg/dl      Ketones, UA Negative mg/dl      Urobilinogen, UA 0.2 E.U./dl      Bilirubin, UA Negative     Occult Blood, UA Negative    CBC and differential [[id number]]  (Abnormal) Collected: 02/16/24 1847    Lab Status: Final result Specimen: Blood from Arm, Left Updated: 02/16/24 1853     WBC 18.87 Thousand/uL      RBC 4.73 Million/uL      Hemoglobin 14.2 g/dL      Hematocrit 42.6 %      MCV 90 fL      MCH 30.0 pg      MCHC 33.3 g/dL      RDW 15.3 %      MPV 9.0 fL      Platelets 259 Thousands/uL      nRBC 0 /100 WBCs      Neutrophils Relative 89 %      Immat GRANS % 1 %      Lymphocytes Relative 5 %      Monocytes Relative 5 %      Eosinophils Relative 0 %      Basophils Relative 0 %      Neutrophils Absolute 16.71 Thousands/µL      Immature Grans Absolute 0.10 Thousand/uL      Lymphocytes Absolute 1.01 Thousands/µL      Monocytes Absolute 1.02  Thousand/µL      Eosinophils Absolute 0.00 Thousand/µL      Basophils Absolute 0.03 Thousands/µL                    CT abdomen pelvis with contrast    (Results Pending)              Procedures  Procedures         ED Course  ED Course as of 02/16/24 2329 Fri Feb 16, 2024 1835 Temperature(!): 102 °F (38.9 °C)  Pt given tylenol upon arrival.    1857 Leukocytes, UA: Negative   1857 Nitrite, UA: Negative   1857 Blood, UA: Negative   1857 WBC(!): 18.87  Elevated. Awaiting further w/u.    1857 CBC and differential(!)  Reviewed and without actionable derangement.    1909 Creatinine: 1.19  Mildly elevated from most recent prior. Not meeting criteria for SOY.    1910 Comprehensive metabolic panel(!)  Reviewed and without actionable derangement.    1910 Lipase: 19  WNL   1920 Pt in CT scan.    1933 Awaiting CT read.    1956 FLU/RSV/COVID - if FLU/RSV clinically relevant  Negative.    2043 CT being read.    2103 Per rads, contained perforated diverticulitis. Recommending surgical consult. Surg made aware via TT.    2108 Pt made aware of results. Pt states only slight improvement in his pain. Will redose pain medication.    2129 PTT: 29  WNL   2129 POCT INR: 1.04  WNL   2131 LACTIC ACID: 0.8  WNL                               SBIRT 20yo+      Flowsheet Row Most Recent Value   Initial Alcohol Screen: US AUDIT-C     1. How often do you have a drink containing alcohol? 1 Filed at: 02/16/2024 1708   2. How many drinks containing alcohol do you have on a typical day you are drinking?  0 Filed at: 02/16/2024 1708   3a. Male UNDER 65: How often do you have five or more drinks on one occasion? 0 Filed at: 02/16/2024 1708   Audit-C Score 1 Filed at: 02/16/2024 1708   TAYA: How many times in the past year have you...    Used an illegal drug or used a prescription medication for non-medical reasons? Never Filed at: 02/16/2024 1708                      Medical Decision Making  Pt is a 59yo M who presents with abdominal pain. Exam  pertinent for abdominal tenderness.    Differential diagnosis to include but not limited to constipation, obstruction, appendicitis, urinary tract infection, viral syndrome.  Will plan for abdominal labs and CT abdomen pelvis.  Will also plan for urinalysis.  Will treat symptomatically and reassess.  See ED course for results and details.    Plan to admit pt to Surgery. Pt discussed with admitting team and admission orders placed. Pt admitted without incident.       Amount and/or Complexity of Data Reviewed  Labs: ordered. Decision-making details documented in ED Course.  Radiology: ordered.    Risk  OTC drugs.  Prescription drug management.  Decision regarding hospitalization.             Disposition  Final diagnoses:   Diverticulitis   Abdominal pain     Time reflects when diagnosis was documented in both MDM as applicable and the Disposition within this note       Time User Action Codes Description Comment    2/16/2024  9:40 PM Ilana Hunter [K57.92] Diverticulitis     2/16/2024  9:40 PM Ilana Hunter [R10.9] Abdominal pain           ED Disposition       ED Disposition   Admit    Condition   Stable    Date/Time   Fri Feb 16, 2024 2108    Comment                  Follow-up Information    None         Current Discharge Medication List        CONTINUE these medications which have NOT CHANGED    Details   hydrOXYzine HCL (ATARAX) 25 mg tablet TAKE 1 TABLET BY MOUTH THREE TIMES A DAY AS NEEDED FOR 30 DAYS      albuterol (PROVENTIL HFA,VENTOLIN HFA) 90 mcg/act inhaler INHALE 2 PUFFS INTO THE LUNGS EVERY 4 HOURS AS NEEDED FOR 30 DAYS      diclofenac (VOLTAREN) 75 mg EC tablet Take 1 tablet (75 mg total) by mouth 2 (two) times a day  Qty: 60 tablet, Refills: 5    Associated Diagnoses: Chronic pain syndrome      gabapentin (NEURONTIN) 300 mg capsule Take 100 mg by mouth 3 (three) times a day      meloxicam (MOBIC) 7.5 mg tablet Take 7.5 mg by mouth 2 (two) times a day as needed      mirtazapine (REMERON)  15 mg tablet Take 15 mg by mouth daily at bedtime             No discharge procedures on file.    PDMP Review       None            ED Provider  Electronically Signed by             Ilana Hunter MD  02/16/24 1092

## 2024-02-17 LAB
ANION GAP SERPL CALCULATED.3IONS-SCNC: 9 MMOL/L
BUN SERPL-MCNC: 21 MG/DL (ref 5–25)
CALCIUM SERPL-MCNC: 9.4 MG/DL (ref 8.4–10.2)
CHLORIDE SERPL-SCNC: 99 MMOL/L (ref 96–108)
CO2 SERPL-SCNC: 29 MMOL/L (ref 21–32)
CREAT SERPL-MCNC: 1.5 MG/DL (ref 0.6–1.3)
ERYTHROCYTE [DISTWIDTH] IN BLOOD BY AUTOMATED COUNT: 15.5 % (ref 11.6–15.1)
GFR SERPL CREATININE-BSD FRML MDRD: 50 ML/MIN/1.73SQ M
GLUCOSE SERPL-MCNC: 98 MG/DL (ref 65–140)
HCT VFR BLD AUTO: 41.1 % (ref 36.5–49.3)
HGB BLD-MCNC: 13.6 G/DL (ref 12–17)
MAGNESIUM SERPL-MCNC: 2 MG/DL (ref 1.9–2.7)
MCH RBC QN AUTO: 30.4 PG (ref 26.8–34.3)
MCHC RBC AUTO-ENTMCNC: 33.1 G/DL (ref 31.4–37.4)
MCV RBC AUTO: 92 FL (ref 82–98)
PHOSPHATE SERPL-MCNC: 4.3 MG/DL (ref 2.7–4.5)
PLATELET # BLD AUTO: 255 THOUSANDS/UL (ref 149–390)
PMV BLD AUTO: 10 FL (ref 8.9–12.7)
POTASSIUM SERPL-SCNC: 4.7 MMOL/L (ref 3.5–5.3)
RBC # BLD AUTO: 4.47 MILLION/UL (ref 3.88–5.62)
SODIUM SERPL-SCNC: 137 MMOL/L (ref 135–147)
WBC # BLD AUTO: 18.27 THOUSAND/UL (ref 4.31–10.16)

## 2024-02-17 PROCEDURE — 99222 1ST HOSP IP/OBS MODERATE 55: CPT | Performed by: SURGERY

## 2024-02-17 PROCEDURE — 80048 BASIC METABOLIC PNL TOTAL CA: CPT | Performed by: SURGERY

## 2024-02-17 PROCEDURE — 85027 COMPLETE CBC AUTOMATED: CPT | Performed by: SURGERY

## 2024-02-17 PROCEDURE — 0W9G30Z DRAINAGE OF PERITONEAL CAVITY WITH DRAINAGE DEVICE, PERCUTANEOUS APPROACH: ICD-10-PCS | Performed by: SURGERY

## 2024-02-17 PROCEDURE — NC001 PR NO CHARGE: Performed by: RADIOLOGY

## 2024-02-17 PROCEDURE — 83735 ASSAY OF MAGNESIUM: CPT | Performed by: SURGERY

## 2024-02-17 PROCEDURE — 84100 ASSAY OF PHOSPHORUS: CPT | Performed by: SURGERY

## 2024-02-17 RX ADMIN — ALBUTEROL SULFATE 2 PUFF: 90 AEROSOL, METERED RESPIRATORY (INHALATION) at 16:04

## 2024-02-17 RX ADMIN — PIPERACILLIN AND TAZOBACTAM 4.5 G: 4; .5 INJECTION, POWDER, FOR SOLUTION INTRAVENOUS at 11:58

## 2024-02-17 RX ADMIN — OXYCODONE HYDROCHLORIDE 10 MG: 10 TABLET ORAL at 23:41

## 2024-02-17 RX ADMIN — PIPERACILLIN AND TAZOBACTAM 4.5 G: 4; .5 INJECTION, POWDER, FOR SOLUTION INTRAVENOUS at 22:58

## 2024-02-17 RX ADMIN — PIPERACILLIN AND TAZOBACTAM 4.5 G: 4; .5 INJECTION, POWDER, FOR SOLUTION INTRAVENOUS at 05:08

## 2024-02-17 RX ADMIN — ACETAMINOPHEN 975 MG: 325 TABLET ORAL at 11:58

## 2024-02-17 RX ADMIN — SODIUM CHLORIDE, SODIUM LACTATE, POTASSIUM CHLORIDE, AND CALCIUM CHLORIDE 125 ML/HR: .6; .31; .03; .02 INJECTION, SOLUTION INTRAVENOUS at 21:12

## 2024-02-17 RX ADMIN — MIRTAZAPINE 15 MG: 15 TABLET, FILM COATED ORAL at 22:59

## 2024-02-17 RX ADMIN — ALBUTEROL SULFATE 2 PUFF: 90 AEROSOL, METERED RESPIRATORY (INHALATION) at 11:58

## 2024-02-17 RX ADMIN — ACETAMINOPHEN 975 MG: 325 TABLET ORAL at 17:42

## 2024-02-17 RX ADMIN — ACETAMINOPHEN 975 MG: 325 TABLET ORAL at 05:08

## 2024-02-17 RX ADMIN — ALBUTEROL SULFATE 2 PUFF: 90 AEROSOL, METERED RESPIRATORY (INHALATION) at 20:23

## 2024-02-17 RX ADMIN — OXYCODONE HYDROCHLORIDE 10 MG: 10 TABLET ORAL at 12:04

## 2024-02-17 RX ADMIN — Medication 3 MG: at 22:58

## 2024-02-17 RX ADMIN — PIPERACILLIN AND TAZOBACTAM 4.5 G: 4; .5 INJECTION, POWDER, FOR SOLUTION INTRAVENOUS at 17:42

## 2024-02-17 RX ADMIN — GABAPENTIN 100 MG: 100 CAPSULE ORAL at 16:04

## 2024-02-17 RX ADMIN — ACETAMINOPHEN 975 MG: 325 TABLET ORAL at 23:01

## 2024-02-17 RX ADMIN — ALBUTEROL SULFATE 2 PUFF: 90 AEROSOL, METERED RESPIRATORY (INHALATION) at 09:46

## 2024-02-17 RX ADMIN — GABAPENTIN 100 MG: 100 CAPSULE ORAL at 20:24

## 2024-02-17 RX ADMIN — GABAPENTIN 100 MG: 100 CAPSULE ORAL at 09:45

## 2024-02-17 NOTE — RESPIRATORY THERAPY NOTE
RT Protocol Note  Homero Driver Jr. 58 y.o. male MRN: [id number]  Unit/Bed#: [address] [place] [address] Encounter: [id number]    Assessment    Active Problems:  There are no active Hospital Problems.      Home Pulmonary Medications:  Albuterol inhaler       Past Medical History:   Diagnosis Date    Anesthesia complication     body temp burns off anesthesia more quickly--woke up during surgery    Anxiety     due to medical conditions    Arthritis     Hypertension      Social History     Socioeconomic History    Marital status: /Civil Union     Spouse name: None    Number of children: None    Years of education: None    Highest education level: None   Occupational History    None   Tobacco Use    Smoking status: Every Day     Current packs/day: 0.25     Average packs/day: 0.3 packs/day for 15.0 years (3.8 ttl pk-yrs)     Types: Cigarettes    Smokeless tobacco: Never   Vaping Use    Vaping status: Never Used   Substance and Sexual Activity    Alcohol use: Yes     Alcohol/week: 2.0 standard drinks of alcohol     Types: 2 Shots of liquor per week     Comment: daily with tea    Drug use: Yes     Frequency: 7.0 times per week     Types: Marijuana     Comment: one joint daily for pain last 2 days ago    Sexual activity: None   Other Topics Concern    None   Social History Narrative    Daily caffeinated cola consumption    Drinks coffee    Tea     Social Determinants of Health     Financial Resource Strain: Not on file   Food Insecurity: Not on file   Transportation Needs: Not on file   Physical Activity: Not on file   Stress: Not on file   Social Connections: Not on file   Intimate Partner Violence: Not on file   Housing Stability: Not on file       Subjective         Objective    Physical Exam:   Assessment Type: Assess only  General Appearance: Awake, Alert  Respiratory Pattern: Normal  Chest Assessment: Chest expansion symmetrical  Bilateral Breath Sounds: Diminished  Cough: Productive  O2 Device: RA    Vitals:  Blood pressure  123/73, pulse 79, temperature 100.3 °F (37.9 °C), temperature source Oral, resp. rate 20, height 6' (1.829 m), weight 79.4 kg (175 lb), SpO2 99%.          Imaging and other studies:     O2 Device: RA     Plan    Respiratory Plan: Home Bronchodilator Patient pathway, Discontinue Protocol        Resp Comments: Patient uses an inhaler PRN at home and has one ordered here

## 2024-02-17 NOTE — ED NOTES
Pt triggered SIRS, MD made aware. Hold sepsis work up per MD at this time.      Ivonne Marcum RN  02/16/24 2043

## 2024-02-17 NOTE — PLAN OF CARE
Problem: GASTROINTESTINAL - ADULT  Goal: Minimal or absence of nausea and/or vomiting  Description: INTERVENTIONS:  - Administer IV fluids if ordered to ensure adequate hydration  - Maintain NPO status until nausea and vomiting are resolved  - Nasogastric tube if ordered  - Administer ordered antiemetic medications as needed  - Provide nonpharmacologic comfort measures as appropriate  - Advance diet as tolerated, if ordered  - Consider nutrition services referral to assist patient with adequate nutrition and appropriate food choices  Outcome: Progressing  Goal: Maintains or returns to baseline bowel function  Description: INTERVENTIONS:  - Assess bowel function  - Encourage oral fluids to ensure adequate hydration  - Administer IV fluids if ordered to ensure adequate hydration  - Administer ordered medications as needed  - Encourage mobilization and activity  - Consider nutritional services referral to assist patient with adequate nutrition and appropriate food choices  Outcome: Progressing  Goal: Maintains adequate nutritional intake  Description: INTERVENTIONS:  - Monitor percentage of each meal consumed  - Identify factors contributing to decreased intake, treat as appropriate  - Assist with meals as needed  - Monitor I&O, weight, and lab values if indicated  - Obtain nutrition services referral as needed  Outcome: Progressing  Goal: Oral mucous membranes remain intact  Description: INTERVENTIONS  - Assess oral mucosa and hygiene practices  - Implement preventative oral hygiene regimen  - Implement oral medicated treatments as ordered  - Initiate Nutrition services referral as needed  Outcome: Progressing     Problem: METABOLIC, FLUID AND ELECTROLYTES - ADULT  Goal: Electrolytes maintained within normal limits  Description: INTERVENTIONS:  - Monitor labs and assess patient for signs and symptoms of electrolyte imbalances  - Administer electrolyte replacement as ordered  - Monitor response to electrolyte  replacements, including repeat lab results as appropriate  - Instruct patient on fluid and nutrition as appropriate  Outcome: Progressing  Goal: Fluid balance maintained  Description: INTERVENTIONS:  - Monitor labs   - Monitor I/O and WT  - Instruct patient on fluid and nutrition as appropriate  - Assess for signs & symptoms of volume excess or deficit  Outcome: Progressing

## 2024-02-17 NOTE — H&P
H&P Exam - General Surgery   Homero Driver Jr. 58 y.o. male MRN: [id number]  Unit/Bed#: ED 07 Encounter: [id number]    Assessment/Plan     Assessment:  59 y/o M w perforated diverticulitis, Hinchey 2.     Vitals stable. Febrile on initial eval 102. Abdomen: soft, moderate tender, moderate distension.     Wbc: 18  Cr: 1.19  Lactate: 0.8    Plan:  Surgery admission  NPO  IVF, LR @ 125  Zosyn, IV abx  Serial abdominal exams  IR consult for drainage of pelvic collection    History of Present Illness   History, ROS and PFSH unobtainable from any source due to none.  HPI:  Homero Driver Jr. is a 58 y.o. male who presents with perforated diverticulitis. H/o Htn, and anxiety, arthritis. Reports 3d of abdominal pain. Pain is worse around the umbilicus and suprapubic discomfort. Feels constipated but did endorse a bowel mvt earlier today. No h/o antiplatelet or anticoagulation use. Last colonoscopy in 8/2023. Mild diverticulosis at that time and 2 pedunculated polys removed. No prior history of diverticulitis. Discussed treatment plan of IR if the IR physician able to place drain into pelvic collection. He was agreeable.     Review of Systems   Constitutional:  Positive for chills and fever.   HENT: Negative.     Eyes: Negative.    Respiratory: Negative.     Cardiovascular: Negative.    Gastrointestinal:  Positive for abdominal distention, abdominal pain, constipation, diarrhea and nausea.   Endocrine: Negative.    Genitourinary: Negative.    Musculoskeletal: Negative.    Skin: Negative.    Allergic/Immunologic: Negative.    Neurological: Negative.    Hematological: Negative.    Psychiatric/Behavioral: Negative.         Historical Information   Past Medical History:   Diagnosis Date    Anesthesia complication     body temp burns off anesthesia more quickly--woke up during surgery    Anxiety     due to medical conditions    Arthritis     Hypertension      Past Surgical History:   Procedure Laterality Date    BACK SURGERY       CERVICAL FUSION N/A 4/5/2016    Procedure: FUSION CERVICAL POSTERIOR - revision of instrumentation T1;  Surgeon: Srikanth Siu MD;  [facility];  Service:     FOOT MASS EXCISION Right     HERNIA REPAIR Right     x2    INCISION AND DRAINAGE POSTERIOR SPINE N/A 4/27/2016    Procedure: INCISION AND DRAINAGE (I&D) SPINE;  Surgeon: Srikanth Siu MD;  [facility];  Service:     CA ARTHRODESIS PST/PSTLAT TQ 1NTRSPC EA ADDL NTRSPC N/A 4/4/2016    Procedure: C3-C6 POSTERIOR CERVICAL LAMINECTOMY AND DECOMPRESSION; C3-T2 INSTRUMENTED POSTERIOR CERVICAL SPINAL FUSION WITH POSSIBLE ADDITIONAL LEVELS WITH ALLOGRAFT AND AUTOGRAFT (IMPULSE MONITORING);  Surgeon: Srikanth Siu MD;  [facility];  Service: Orthopedics    SHOULDER OPEN ROTATOR CUFF REPAIR Bilateral      Social History   Social History     Substance and Sexual Activity   Alcohol Use Yes    Alcohol/week: 2.0 standard drinks of alcohol    Types: 2 Shots of liquor per week    Comment: daily with tea     Social History     Substance and Sexual Activity   Drug Use Yes    Frequency: 7.0 times per week    Types: Marijuana    Comment: one joint daily for pain last 2 days ago     Social History     Tobacco Use   Smoking Status Every Day    Current packs/day: 0.25    Average packs/day: 0.3 packs/day for 15.0 years (3.8 ttl pk-yrs)    Types: Cigarettes   Smokeless Tobacco Never     E-Cigarette/Vaping    E-Cigarette Use Never User      E-Cigarette/Vaping Substances    Nicotine No     THC No     CBD No     Flavoring No     Other No     Unknown No      Family History: non-contributory    Meds/Allergies   all current active meds have been reviewed  No Known Allergies    Objective   Vitals: Blood pressure 103/66, pulse 74, temperature 99 °F (37.2 °C), temperature source Oral, resp. rate 20, weight 79.4 kg (175 lb), SpO2 100%.,Body mass index is 23.73 kg/m².  No intake or output data in the 24 hours ending 02/16/24 0644  Invasive Devices        Peripheral Intravenous Line  Duration             Peripheral IV 02/16/24 Left Antecubital <1 day                    Physical Exam  Vitals reviewed.   Constitutional:       Appearance: Normal appearance.   HENT:      Head: Normocephalic and atraumatic.      Nose: Nose normal.      Mouth/Throat:      Mouth: Mucous membranes are moist.   Eyes:      Pupils: Pupils are equal, round, and reactive to light.   Cardiovascular:      Rate and Rhythm: Normal rate.      Pulses: Normal pulses.   Pulmonary:      Effort: Pulmonary effort is normal.   Abdominal:      General: Abdomen is flat. There is no distension.      Palpations: Abdomen is soft.      Tenderness: There is abdominal tenderness. There is no guarding.   Genitourinary:     Comments: deferred  Musculoskeletal:         General: Normal range of motion.      Cervical back: Normal range of motion and neck supple.   Skin:     General: Skin is warm.      Capillary Refill: Capillary refill takes 2 to 3 seconds.   Neurological:      General: No focal deficit present.      Mental Status: He is alert and oriented to person, place, and time.   Psychiatric:         Mood and Affect: Mood normal.         Lab Results: I have personally reviewed pertinent reports.    Imaging: I have personally reviewed pertinent reports.    EKG, Pathology, and Other Studies: I have personally reviewed pertinent reports.      Code Status: Level 1 - Full Code  Advance Directive and Living Will:      Power of :    POLST:      Counseling / Coordination of Care  Counseling/Coordination of Care: Total floor / unit time spent today 30 minutes. Greater than 50% of total time was spent with the patient and / or family counseling and / or coordination of care. A description of the counseling / coordination of care: 30

## 2024-02-17 NOTE — TELEMEDICINE
e-Consult (IPC)  - Interventional Radiology  Homero Driver Jr. 58 y.o. male MRN: [id number]  Unit/Bed#: [address] [place] [address] Encounter: [id number]          Interventional Radiology has been consulted to evaluate Homero Driver Jr.    We were consulted by Dr. Phan concerning this patient with collection.    Inpatient Consult to IR  Consult performed by: Manas Goldsmith DO  Consult ordered by: Miguelito Phan MD        02/17/24    Assessment/Recommendation:   Perf diverticulits, VSS, +leukocytosis, pelvic abscess, possibly amenable to perc drainage via transgluteal approach.     Keep NPO, will attempt over weekend depending on emergency cases.       5-10 minutes, >50% of the total time devoted to medical consultative verbal/EMR discussion between providers. Written report will be generated in the EMR.     Thank you for allowing Interventional Radiology to participate in the care of Homero Driver Jr.. Please don't hesitate to call or TigerText us with any questions.     Manas Goldsmith DO

## 2024-02-18 ENCOUNTER — APPOINTMENT (INPATIENT)
Dept: RADIOLOGY | Facility: HOSPITAL | Age: 59
DRG: 244 | End: 2024-02-18
Attending: RADIOLOGY
Payer: MEDICARE

## 2024-02-18 LAB
ANION GAP SERPL CALCULATED.3IONS-SCNC: 7 MMOL/L
BUN SERPL-MCNC: 14 MG/DL (ref 5–25)
CALCIUM SERPL-MCNC: 8.5 MG/DL (ref 8.4–10.2)
CHLORIDE SERPL-SCNC: 106 MMOL/L (ref 96–108)
CO2 SERPL-SCNC: 25 MMOL/L (ref 21–32)
CREAT SERPL-MCNC: 1.12 MG/DL (ref 0.6–1.3)
GFR SERPL CREATININE-BSD FRML MDRD: 72 ML/MIN/1.73SQ M
GLUCOSE SERPL-MCNC: 98 MG/DL (ref 65–140)
POTASSIUM SERPL-SCNC: 3.9 MMOL/L (ref 3.5–5.3)
SODIUM SERPL-SCNC: 138 MMOL/L (ref 135–147)

## 2024-02-18 PROCEDURE — 87076 CULTURE ANAEROBE IDENT EACH: CPT | Performed by: RADIOLOGY

## 2024-02-18 PROCEDURE — 99232 SBSQ HOSP IP/OBS MODERATE 35: CPT | Performed by: SURGERY

## 2024-02-18 PROCEDURE — 87070 CULTURE OTHR SPECIMN AEROBIC: CPT | Performed by: RADIOLOGY

## 2024-02-18 PROCEDURE — 87186 SC STD MICRODIL/AGAR DIL: CPT | Performed by: RADIOLOGY

## 2024-02-18 PROCEDURE — 49406 IMAGE CATH FLUID PERI/RETRO: CPT | Performed by: RADIOLOGY

## 2024-02-18 PROCEDURE — 99152 MOD SED SAME PHYS/QHP 5/>YRS: CPT | Performed by: RADIOLOGY

## 2024-02-18 PROCEDURE — C1769 GUIDE WIRE: HCPCS

## 2024-02-18 PROCEDURE — NC001 PR NO CHARGE: Performed by: RADIOLOGY

## 2024-02-18 PROCEDURE — 87205 SMEAR GRAM STAIN: CPT | Performed by: RADIOLOGY

## 2024-02-18 PROCEDURE — 87077 CULTURE AEROBIC IDENTIFY: CPT | Performed by: RADIOLOGY

## 2024-02-18 PROCEDURE — 87075 CULTR BACTERIA EXCEPT BLOOD: CPT | Performed by: RADIOLOGY

## 2024-02-18 PROCEDURE — C1729 CATH, DRAINAGE: HCPCS

## 2024-02-18 PROCEDURE — 87185 SC STD ENZYME DETCJ PER NZM: CPT | Performed by: RADIOLOGY

## 2024-02-18 PROCEDURE — 80048 BASIC METABOLIC PNL TOTAL CA: CPT | Performed by: INTERNAL MEDICINE

## 2024-02-18 PROCEDURE — C9113 INJ PANTOPRAZOLE SODIUM, VIA: HCPCS | Performed by: SURGERY

## 2024-02-18 RX ORDER — SODIUM CHLORIDE 9 MG/ML
10 INJECTION, SOLUTION INTRAMUSCULAR; INTRAVENOUS; SUBCUTANEOUS DAILY
Qty: 300 ML | Refills: 2 | Status: SHIPPED | OUTPATIENT
Start: 2024-02-18 | End: 2024-05-18

## 2024-02-18 RX ORDER — LIDOCAINE WITH 8.4% SOD BICARB 0.9%(10ML)
SYRINGE (ML) INJECTION AS NEEDED
Status: COMPLETED | OUTPATIENT
Start: 2024-02-18 | End: 2024-02-18

## 2024-02-18 RX ORDER — MIDAZOLAM HYDROCHLORIDE 2 MG/2ML
INJECTION, SOLUTION INTRAMUSCULAR; INTRAVENOUS AS NEEDED
Status: COMPLETED | OUTPATIENT
Start: 2024-02-18 | End: 2024-02-18

## 2024-02-18 RX ORDER — FENTANYL CITRATE 50 UG/ML
INJECTION, SOLUTION INTRAMUSCULAR; INTRAVENOUS AS NEEDED
Status: COMPLETED | OUTPATIENT
Start: 2024-02-18 | End: 2024-02-18

## 2024-02-18 RX ADMIN — SODIUM CHLORIDE, SODIUM LACTATE, POTASSIUM CHLORIDE, AND CALCIUM CHLORIDE 125 ML/HR: .6; .31; .03; .02 INJECTION, SOLUTION INTRAVENOUS at 06:46

## 2024-02-18 RX ADMIN — FENTANYL CITRATE 50 MCG: 50 INJECTION, SOLUTION INTRAMUSCULAR; INTRAVENOUS at 13:20

## 2024-02-18 RX ADMIN — GABAPENTIN 100 MG: 100 CAPSULE ORAL at 21:02

## 2024-02-18 RX ADMIN — PIPERACILLIN AND TAZOBACTAM 4.5 G: 4; .5 INJECTION, POWDER, FOR SOLUTION INTRAVENOUS at 19:39

## 2024-02-18 RX ADMIN — SODIUM CHLORIDE, SODIUM LACTATE, POTASSIUM CHLORIDE, AND CALCIUM CHLORIDE 125 ML/HR: .6; .31; .03; .02 INJECTION, SOLUTION INTRAVENOUS at 20:04

## 2024-02-18 RX ADMIN — FENTANYL CITRATE 50 MCG: 50 INJECTION, SOLUTION INTRAMUSCULAR; INTRAVENOUS at 13:05

## 2024-02-18 RX ADMIN — ACETAMINOPHEN 975 MG: 325 TABLET ORAL at 14:05

## 2024-02-18 RX ADMIN — FENTANYL CITRATE 50 MCG: 50 INJECTION, SOLUTION INTRAMUSCULAR; INTRAVENOUS at 13:29

## 2024-02-18 RX ADMIN — MIDAZOLAM 1 MG: 1 INJECTION INTRAMUSCULAR; INTRAVENOUS at 13:05

## 2024-02-18 RX ADMIN — GABAPENTIN 100 MG: 100 CAPSULE ORAL at 16:02

## 2024-02-18 RX ADMIN — PIPERACILLIN AND TAZOBACTAM 4.5 G: 4; .5 INJECTION, POWDER, FOR SOLUTION INTRAVENOUS at 05:59

## 2024-02-18 RX ADMIN — Medication 3 MG: at 21:02

## 2024-02-18 RX ADMIN — PIPERACILLIN AND TAZOBACTAM 4.5 G: 4; .5 INJECTION, POWDER, FOR SOLUTION INTRAVENOUS at 14:05

## 2024-02-18 RX ADMIN — ACETAMINOPHEN 975 MG: 325 TABLET ORAL at 19:38

## 2024-02-18 RX ADMIN — GABAPENTIN 100 MG: 100 CAPSULE ORAL at 09:19

## 2024-02-18 RX ADMIN — ALBUTEROL SULFATE 2 PUFF: 90 AEROSOL, METERED RESPIRATORY (INHALATION) at 21:05

## 2024-02-18 RX ADMIN — Medication 5 ML: at 13:13

## 2024-02-18 RX ADMIN — FENTANYL CITRATE 50 MCG: 50 INJECTION, SOLUTION INTRAMUSCULAR; INTRAVENOUS at 13:35

## 2024-02-18 RX ADMIN — OXYCODONE HYDROCHLORIDE 10 MG: 10 TABLET ORAL at 18:07

## 2024-02-18 RX ADMIN — MIDAZOLAM 1 MG: 1 INJECTION INTRAMUSCULAR; INTRAVENOUS at 13:21

## 2024-02-18 RX ADMIN — Medication 4 ML: at 13:10

## 2024-02-18 RX ADMIN — ACETAMINOPHEN 975 MG: 325 TABLET ORAL at 06:47

## 2024-02-18 RX ADMIN — MIRTAZAPINE 15 MG: 15 TABLET, FILM COATED ORAL at 21:02

## 2024-02-18 RX ADMIN — PANTOPRAZOLE SODIUM 40 MG: 40 INJECTION, POWDER, FOR SOLUTION INTRAVENOUS at 06:47

## 2024-02-18 NOTE — CONSULTS
NEPHROLOGY HOSPITAL CONSULTATION   Homero Driver Jr. 58 y.o. male MRN: [id number]  Unit/Bed#: [address] [place] [address] Encounter: [id number]    ASSESSMENT and PLAN:    58-year-old male with no history of kidney disease who presented for abdominal pain and found to have perforated diverticulitis.  Nephrology consulted for acute kidney injury    Acute kidney injury  -- Baseline creatinine appears to be around 1 mg/dL  -- Admission creatinine 1.19 mg/dL  -- Creatinine worsened yesterday to 1.5 mg/dL  -- Suspected to be in the setting of acute perforated diverticulitis, along with contrast associate nephropathy  -- No evidence of hydronephrosis on imaging  -- Urine analysis is bland with no evidence of proteinuria or microscopic hematuria  -- No lab work today to comment on.  Check a BMP  -- Continue balance crystalloid fluids with lactated Ringer's    Perforated diverticulitis  -- Plan for IR drainage of the pelvic abscess today    Blood pressure/volume status  -- Euvolemic, normotensive    Fever  -- Currently afebrile Tmax 102 degrees  -- Antibiotics as per the primary team  -- Secondary to diverticulitis    SUMMARY OF RECOMMENDATIONS:    Please see above    HISTORY OF PRESENT ILLNESS:  Requesting Physician: Lux Duarte MD  Reason for Consult: Acute kidney injury    Homero Driver Jr. is a 58 y.o. male who was admitted to [facility] after presenting with abdominal pain. A renal consultation is requested today for assistance in the management of acute kidney injury.  Patient has taken 4 doses of NSAIDs prior to admission.  Developed acute pain on Thursday.  Renal function prior to admission appears to be around 1 mg/dL.  Present with a creatinine of 1.19 mg/dL which has now worsened to 1.5 yesterday.  Received a CT scan with contrast yesterday.  No blood work today as of yet.  Currently on intravenous fluids.  Reports no symptoms of urinary retention.  Plan for IR granted drainage of the abscess.    PAST MEDICAL  HISTORY:  Past Medical History:   Diagnosis Date    Anesthesia complication     body temp burns off anesthesia more quickly--woke up during surgery    Anxiety     due to medical conditions    Arthritis     Hypertension        PAST SURGICAL HISTORY:  Past Surgical History:   Procedure Laterality Date    BACK SURGERY      CERVICAL FUSION N/A 4/5/2016    Procedure: FUSION CERVICAL POSTERIOR - revision of instrumentation T1;  Surgeon: Srikanth Siu MD;  [facility];  Service:     FOOT MASS EXCISION Right     HERNIA REPAIR Right     x2    INCISION AND DRAINAGE POSTERIOR SPINE N/A 4/27/2016    Procedure: INCISION AND DRAINAGE (I&D) SPINE;  Surgeon: Srikanth Siu MD;  [facility];  Service:     OR ARTHRODESIS PST/PSTLAT TQ 1NTRSPC EA ADDL NTRSPC N/A 4/4/2016    Procedure: C3-C6 POSTERIOR CERVICAL LAMINECTOMY AND DECOMPRESSION; C3-T2 INSTRUMENTED POSTERIOR CERVICAL SPINAL FUSION WITH POSSIBLE ADDITIONAL LEVELS WITH ALLOGRAFT AND AUTOGRAFT (IMPULSE MONITORING);  Surgeon: Srikanth Siu MD;  [facility];  Service: Orthopedics    SHOULDER OPEN ROTATOR CUFF REPAIR Bilateral        ALLERGIES:  No Known Allergies    SOCIAL HISTORY:  Social History     Substance and Sexual Activity   Alcohol Use Yes    Alcohol/week: 2.0 standard drinks of alcohol    Types: 2 Shots of liquor per week    Comment: daily with tea     Social History     Substance and Sexual Activity   Drug Use Yes    Frequency: 7.0 times per week    Types: Marijuana    Comment: one joint daily for pain last 2 days ago     Social History     Tobacco Use   Smoking Status Every Day    Current packs/day: 0.25    Average packs/day: 0.3 packs/day for 15.0 years (3.8 ttl pk-yrs)    Types: Cigarettes   Smokeless Tobacco Never       FAMILY HISTORY:  Family History   Problem Relation Age of Onset    Cancer Family        MEDICATIONS:    Current Facility-Administered Medications:     acetaminophen (TYLENOL) tablet 975 mg, 975 mg, Oral, Q6H [facility],  Miguelito Phan MD, 975 mg at 02/18/24 0647    albuterol (PROVENTIL HFA,VENTOLIN HFA) inhaler 2 puff, 2 puff, Inhalation, 4x Daily, Miguelito Phan MD, 2 puff at 02/17/24 2023    enoxaparin (LOVENOX) subcutaneous injection 40 mg, 40 mg, Subcutaneous, Daily, Miguelito Phan MD    gabapentin (NEURONTIN) capsule 100 mg, 100 mg, Oral, TID, Miguelito Phan MD, 100 mg at 02/17/24 2024    HYDROmorphone (DILAUDID) injection 0.5 mg, 0.5 mg, Intravenous, Q3H PRN, Miguelito Phan MD    hydrOXYzine HCL (ATARAX) tablet 25 mg, 25 mg, Oral, Q6H PRN, Miguelito Phan MD, 25 mg at 02/16/24 2325    labetalol (NORMODYNE) injection 10 mg, 10 mg, Intravenous, Q4H PRN, Miguelito Phan MD    lactated ringers infusion, 125 mL/hr, Intravenous, Continuous, Miguelito Phan MD, Last Rate: 125 mL/hr at 02/18/24 0646, 125 mL/hr at 02/18/24 0646    melatonin tablet 3 mg, 3 mg, Oral, HS, Miguelito Phan MD, 3 mg at 02/17/24 2258    mirtazapine (REMERON) tablet 15 mg, 15 mg, Oral, HS, Miguelito Phan MD, 15 mg at 02/17/24 2259    nicotine (NICODERM CQ) 14 mg/24hr TD 24 hr patch 1 patch, 1 patch, Transdermal, Daily, Miguelito Phan MD    ondansetron (ZOFRAN) injection 4 mg, 4 mg, Intravenous, Q6H PRN, Miguelito Phan MD    oxyCODONE (ROXICODONE) immediate release tablet 10 mg, 10 mg, Oral, Q4H PRN, Miguelito Phan MD, 10 mg at 02/17/24 2341    oxyCODONE (ROXICODONE) IR tablet 5 mg, 5 mg, Oral, Q4H PRN, Miguelito Phan MD    pantoprazole (PROTONIX) injection 40 mg, 40 mg, Intravenous, Q24H BERYL, Miguelito Phan MD, 40 mg at 02/18/24 0647    piperacillin-tazobactam (ZOSYN) IVPB 4.5 g, 4.5 g, Intravenous, Q6H, Miguelito Phan MD, 4.5 g at 02/18/24 0559    REVIEW OF SYSTEMS:  Constitutional: Negative for fatigue, anorexia, fever, chills, diaphoresis  HENT: Negative for postnasal drip  Eyes: Negative for visual disturbance.   Respiratory: Negative for cough, shortness of breath and wheezing.   Cardiovascular: Negative for chest pain, palpitations and leg  swelling.   Gastrointestinal: Positive for abdominal pain  Genitourinary: No dysuria, hematuria  Endocrine: Negative for polyuria.   Musculoskeletal: Negative for arthralgias, back pain and joint swelling.   Skin: Negative for rash.   Neurological: Negative for focal weakness, headaches, dizziness.  Hematological: Negative for easy bruising or bleeding.  Psychiatric/Behavioral: Negative for confusion and sleep disturbance.   All the systems were reviewed and were negative except as documented on the HPI.    PHYSICAL EXAM:  Current Weight: Weight - Scale: 79.4 kg (175 lb)  First Weight: Weight - Scale: 79.4 kg (175 lb)  Vitals:    02/17/24 0900 02/17/24 2025 02/17/24 2334 02/18/24 0745   BP: 111/80 137/74 141/79 112/69   BP Location: Right arm Right arm Right arm Right arm   Pulse: 86 70 81 86   Resp: 18 18 18 18   Temp: 98.3 °F (36.8 °C)  99.6 °F (37.6 °C) 99.2 °F (37.3 °C)   TempSrc: Oral  Oral Oral   SpO2: 99% 98% 100% 97%   Weight:       Height:           Intake/Output Summary (Last 24 hours) at 2/18/2024 0834  Last data filed at 2/17/2024 2340  Gross per 24 hour   Intake 1210 ml   Output --   Net 1210 ml     Physical Exam  Vitals and nursing note reviewed.   Constitutional:       General: He is not in acute distress.     Appearance: He is well-developed.   HENT:      Head: Normocephalic and atraumatic.   Eyes:      General: No scleral icterus.     Conjunctiva/sclera: Conjunctivae normal.      Pupils: Pupils are equal, round, and reactive to light.   Cardiovascular:      Rate and Rhythm: Normal rate and regular rhythm.      Heart sounds: S1 normal and S2 normal. No murmur heard.     No friction rub. No gallop.   Pulmonary:      Effort: Pulmonary effort is normal. No respiratory distress.      Breath sounds: Normal breath sounds. No wheezing or rales.   Abdominal:      General: Bowel sounds are normal.      Palpations: Abdomen is soft.      Tenderness: There is no abdominal tenderness. There is no rebound.    Musculoskeletal:         General: Normal range of motion.      Cervical back: Normal range of motion and neck supple.   Skin:     Findings: No rash.   Neurological:      Mental Status: He is alert and oriented to person, place, and time.   Psychiatric:         Behavior: Behavior normal.           Invasive Devices:      Lab Results:   Results from last 7 days   Lab Units 02/17/24  0516 02/16/24  1847   WBC Thousand/uL 18.27* 18.87*   HEMOGLOBIN g/dL 13.6 14.2   HEMATOCRIT % 41.1 42.6   PLATELETS Thousands/uL 255 259   POTASSIUM mmol/L 4.7 3.9   CHLORIDE mmol/L 99 101   CO2 mmol/L 29 25   BUN mg/dL 21 16   CREATININE mg/dL 1.50* 1.19   CALCIUM mg/dL 9.4 9.9   MAGNESIUM mg/dL 2.0  --    PHOSPHORUS mg/dL 4.3  --    ALK PHOS U/L  --  40   ALT U/L  --  9   AST U/L  --  12*

## 2024-02-18 NOTE — PROGRESS NOTES
Patient arrived to IR for drain placement.    /69 (BP Location: Right arm)   Pulse 86   Temp 99.2 °F (37.3 °C) (Oral)   Resp 18   Ht 6' (1.829 m)   Wt 79.4 kg (175 lb)   SpO2 97%   BMI 23.73 kg/m²       The procedure and risks were discussed with the patient.  All questions were answered. Informed consent was obtained.

## 2024-02-18 NOTE — PLAN OF CARE
Problem: GASTROINTESTINAL - ADULT  Goal: Minimal or absence of nausea and/or vomiting  Description: INTERVENTIONS:  - Administer IV fluids if ordered to ensure adequate hydration  - Maintain NPO status until nausea and vomiting are resolved  - Nasogastric tube if ordered  - Administer ordered antiemetic medications as needed  - Provide nonpharmacologic comfort measures as appropriate  - Advance diet as tolerated, if ordered  - Consider nutrition services referral to assist patient with adequate nutrition and appropriate food choices  Outcome: Progressing  Goal: Maintains or returns to baseline bowel function  Description: INTERVENTIONS:  - Assess bowel function  - Encourage oral fluids to ensure adequate hydration  - Administer IV fluids if ordered to ensure adequate hydration  - Administer ordered medications as needed  - Encourage mobilization and activity  - Consider nutritional services referral to assist patient with adequate nutrition and appropriate food choices  Outcome: Progressing  Goal: Maintains adequate nutritional intake  Description: INTERVENTIONS:  - Monitor percentage of each meal consumed  - Identify factors contributing to decreased intake, treat as appropriate  - Assist with meals as needed  - Monitor I&O, weight, and lab values if indicated  - Obtain nutrition services referral as needed  Outcome: Progressing  Goal: Oral mucous membranes remain intact  Description: INTERVENTIONS  - Assess oral mucosa and hygiene practices  - Implement preventative oral hygiene regimen  - Implement oral medicated treatments as ordered  - Initiate Nutrition services referral as needed  Outcome: Progressing

## 2024-02-18 NOTE — SEDATION DOCUMENTATION
Babita-diverticular abscess drain placed in the left buttock by Dr. Goldsmith. Tube to bulb suction. 8 cc's purulent fluid removed and sent to lab. Dry dressing to site. Patient tolerated well.

## 2024-02-18 NOTE — DISCHARGE INSTRUCTIONS
"     TUBE CARE INSTRUCTIONS    Care after your procedure:    Resume your normal diet. Small sips of flat soda will help with nausea.    1. The properly functioning catheter should be forward flushed once (1x) daily with 10ml of normal saline using clean technique. You will be given a prescription for flushes.   To flush the tube, clean both connections with alcohol swab.Twist off the drainage bag/ bulb  tubing and twist the saline syringe into the drainage tube and flush. Remove the syringe and twist the drainage bag / bulb tubing tubing back on.    2. The drainage bag/bulb may be emptied as necessary. Keep a record of the amount of fluid you drain from your tube. This should be done with clean technique as well.     3. A fresh dressing should be applied daily over the tube insertion site.     4. As the tube is secured to the skin with only a suture,try not to pull on your tube. Tub baths are not permitted. Showers are permitted if the patient's skin entry site is prevented from getting wet. Similarly, washcloth \"baths\" are acceptable.       Contact Interventional Radiology at [phone] if:    1. Leakage or large amounts of liquid around the catheter.    2. Fever of 101 degrees lasting several hours without other obvious cause (such as sore throat, flu, etc).    3. Persistent nausea or vomiting.    4. Diminished drainage, which may be associated with pressure or pain. Or when the     drainage from your tube is less than 10mls for 48 hours.    5. Catheter pulled back or falls out.      The following pharmacies carry the flush syringes.       [facility]                     [facility] [address]                         [phone]  [place]                       [place]  Phone [phone]            Phone [phone]                                                                                        "                Jordy Brewer   [facility]             [facility]                                [phone]  [address] [place]  Phone [phone]            Phone [phone]                      [facility]                                                                                                          [phone]  [facility]  [address]  [place]                                                                               [facility]  Phone [phone]

## 2024-02-18 NOTE — BRIEF OP NOTE (RAD/CATH)
Drain Placement Procedure Note    PATIENT NAME: Homero Driver Jr.  : 1965  MRN: [id number]     Pre-op Diagnosis:   1. Diverticulitis    2. Abdominal pain    3. SOY (acute kidney injury) ([facility])      Post-op Diagnosis:   1. Diverticulitis    2. Abdominal pain    3. SOY (acute kidney injury) ([facility])        Surgeon:   Manas Goldsmith DO  Assistants:     No qualified resident was available.    Estimated Blood Loss: none  Findings: 8.5F pigtail drain placed via transgluteal approach into lower pelvic abscess, connected to bulb drainage.    Specimens: Purulent aspirate,5 mL    Complications:  none    Anesthesia: conscious sedation and local    Manas Goldsmith DO     Date: 2024  Time: 1:40 PM

## 2024-02-18 NOTE — PROGRESS NOTES
Progress Note - Homero Driver Jr. 58 y.o. male MRN: [id number]    Unit/Bed#: [address] [place] [address] Encounter: [id number]      Assessment:  57 y/o M w perforated diverticulitis, Hinchey 2.     Vss. Afebrile. Abdomen soft, moderated tender left sided, non distended.     Plan:  NPO  IVF  Continue abx  To IR for drainage of pelvic abscess today.     Subjective:   No complaints other than abdominal discomfort. Denied any fever chills, night sweats.     Objective:     Vitals: Blood pressure 112/69, pulse 86, temperature 99.2 °F (37.3 °C), temperature source Oral, resp. rate 18, height 6' (1.829 m), weight 79.4 kg (175 lb), SpO2 97%.,Body mass index is 23.73 kg/m².      Intake/Output Summary (Last 24 hours) at 2/18/2024 0819  Last data filed at 2/17/2024 2340  Gross per 24 hour   Intake 1210 ml   Output --   Net 1210 ml       Physical Exam  General: NAD.   HEENT: NC/AT. MMM  Cv: RRR.   Lungs: normal effort  Ab: Soft, non distended.   Ex: no CCE  Neuro: AAOx3      Invasive Devices       Peripheral Intravenous Line  Duration             Peripheral IV 02/16/24 Left Antecubital 1 day                    Lab, Imaging and other studies: I have personally reviewed pertinent reports.    VTE Pharmacologic Prophylaxis: Sequential compression device (Venodyne)   VTE Mechanical Prophylaxis: sequential compression device

## 2024-02-19 LAB
ANION GAP SERPL CALCULATED.3IONS-SCNC: 7 MMOL/L
BASOPHILS # BLD AUTO: 0.02 THOUSANDS/ÂΜL (ref 0–0.1)
BASOPHILS NFR BLD AUTO: 0 % (ref 0–1)
BUN SERPL-MCNC: 9 MG/DL (ref 5–25)
CALCIUM SERPL-MCNC: 8.9 MG/DL (ref 8.4–10.2)
CHLORIDE SERPL-SCNC: 106 MMOL/L (ref 96–108)
CO2 SERPL-SCNC: 24 MMOL/L (ref 21–32)
CREAT SERPL-MCNC: 1.04 MG/DL (ref 0.6–1.3)
EOSINOPHIL # BLD AUTO: 0.07 THOUSAND/ÂΜL (ref 0–0.61)
EOSINOPHIL NFR BLD AUTO: 1 % (ref 0–6)
ERYTHROCYTE [DISTWIDTH] IN BLOOD BY AUTOMATED COUNT: 14.9 % (ref 11.6–15.1)
GFR SERPL CREATININE-BSD FRML MDRD: 78 ML/MIN/1.73SQ M
GLUCOSE SERPL-MCNC: 82 MG/DL (ref 65–140)
HCT VFR BLD AUTO: 39.1 % (ref 36.5–49.3)
HGB BLD-MCNC: 12.2 G/DL (ref 12–17)
IMM GRANULOCYTES # BLD AUTO: 0.04 THOUSAND/UL (ref 0–0.2)
IMM GRANULOCYTES NFR BLD AUTO: 0 % (ref 0–2)
LYMPHOCYTES # BLD AUTO: 1.18 THOUSANDS/ÂΜL (ref 0.6–4.47)
LYMPHOCYTES NFR BLD AUTO: 11 % (ref 14–44)
MCH RBC QN AUTO: 29.3 PG (ref 26.8–34.3)
MCHC RBC AUTO-ENTMCNC: 31.2 G/DL (ref 31.4–37.4)
MCV RBC AUTO: 94 FL (ref 82–98)
MONOCYTES # BLD AUTO: 0.78 THOUSAND/ÂΜL (ref 0.17–1.22)
MONOCYTES NFR BLD AUTO: 7 % (ref 4–12)
NEUTROPHILS # BLD AUTO: 9.2 THOUSANDS/ÂΜL (ref 1.85–7.62)
NEUTS SEG NFR BLD AUTO: 81 % (ref 43–75)
NRBC BLD AUTO-RTO: 0 /100 WBCS
PLATELET # BLD AUTO: 256 THOUSANDS/UL (ref 149–390)
PMV BLD AUTO: 10.1 FL (ref 8.9–12.7)
POTASSIUM SERPL-SCNC: 5.1 MMOL/L (ref 3.5–5.3)
RBC # BLD AUTO: 4.16 MILLION/UL (ref 3.88–5.62)
SODIUM SERPL-SCNC: 137 MMOL/L (ref 135–147)
WBC # BLD AUTO: 11.29 THOUSAND/UL (ref 4.31–10.16)

## 2024-02-19 PROCEDURE — 80048 BASIC METABOLIC PNL TOTAL CA: CPT | Performed by: SURGERY

## 2024-02-19 PROCEDURE — C9113 INJ PANTOPRAZOLE SODIUM, VIA: HCPCS | Performed by: SURGERY

## 2024-02-19 PROCEDURE — 99232 SBSQ HOSP IP/OBS MODERATE 35: CPT | Performed by: INTERNAL MEDICINE

## 2024-02-19 PROCEDURE — 85025 COMPLETE CBC W/AUTO DIFF WBC: CPT | Performed by: SURGERY

## 2024-02-19 PROCEDURE — 99232 SBSQ HOSP IP/OBS MODERATE 35: CPT | Performed by: SURGERY

## 2024-02-19 RX ADMIN — GABAPENTIN 100 MG: 100 CAPSULE ORAL at 22:27

## 2024-02-19 RX ADMIN — ALBUTEROL SULFATE 2 PUFF: 90 AEROSOL, METERED RESPIRATORY (INHALATION) at 15:51

## 2024-02-19 RX ADMIN — OXYCODONE HYDROCHLORIDE 10 MG: 10 TABLET ORAL at 01:59

## 2024-02-19 RX ADMIN — GABAPENTIN 100 MG: 100 CAPSULE ORAL at 15:51

## 2024-02-19 RX ADMIN — NICOTINE 1 PATCH: 14 PATCH, EXTENDED RELEASE TRANSDERMAL at 09:59

## 2024-02-19 RX ADMIN — ACETAMINOPHEN 975 MG: 325 TABLET ORAL at 08:30

## 2024-02-19 RX ADMIN — OXYCODONE HYDROCHLORIDE 10 MG: 10 TABLET ORAL at 14:52

## 2024-02-19 RX ADMIN — MIRTAZAPINE 15 MG: 15 TABLET, FILM COATED ORAL at 22:27

## 2024-02-19 RX ADMIN — Medication 3 MG: at 22:27

## 2024-02-19 RX ADMIN — PANTOPRAZOLE SODIUM 40 MG: 40 INJECTION, POWDER, FOR SOLUTION INTRAVENOUS at 06:02

## 2024-02-19 RX ADMIN — PIPERACILLIN AND TAZOBACTAM 4.5 G: 4; .5 INJECTION, POWDER, FOR SOLUTION INTRAVENOUS at 19:40

## 2024-02-19 RX ADMIN — ACETAMINOPHEN 975 MG: 325 TABLET ORAL at 01:54

## 2024-02-19 RX ADMIN — GABAPENTIN 100 MG: 100 CAPSULE ORAL at 09:58

## 2024-02-19 RX ADMIN — ENOXAPARIN SODIUM 40 MG: 40 INJECTION SUBCUTANEOUS at 09:59

## 2024-02-19 RX ADMIN — ACETAMINOPHEN 975 MG: 325 TABLET ORAL at 19:40

## 2024-02-19 RX ADMIN — ALBUTEROL SULFATE 2 PUFF: 90 AEROSOL, METERED RESPIRATORY (INHALATION) at 08:34

## 2024-02-19 RX ADMIN — ALBUTEROL SULFATE 2 PUFF: 90 AEROSOL, METERED RESPIRATORY (INHALATION) at 19:41

## 2024-02-19 RX ADMIN — PIPERACILLIN AND TAZOBACTAM 4.5 G: 4; .5 INJECTION, POWDER, FOR SOLUTION INTRAVENOUS at 14:49

## 2024-02-19 RX ADMIN — ALBUTEROL SULFATE 2 PUFF: 90 AEROSOL, METERED RESPIRATORY (INHALATION) at 13:03

## 2024-02-19 RX ADMIN — PIPERACILLIN AND TAZOBACTAM 4.5 G: 4; .5 INJECTION, POWDER, FOR SOLUTION INTRAVENOUS at 08:27

## 2024-02-19 RX ADMIN — ACETAMINOPHEN 975 MG: 325 TABLET ORAL at 14:49

## 2024-02-19 RX ADMIN — PIPERACILLIN AND TAZOBACTAM 4.5 G: 4; .5 INJECTION, POWDER, FOR SOLUTION INTRAVENOUS at 01:54

## 2024-02-19 RX ADMIN — LABETALOL HYDROCHLORIDE 10 MG: 5 INJECTION, SOLUTION INTRAVENOUS at 19:53

## 2024-02-19 RX ADMIN — SODIUM CHLORIDE, SODIUM LACTATE, POTASSIUM CHLORIDE, AND CALCIUM CHLORIDE 125 ML/HR: .6; .31; .03; .02 INJECTION, SOLUTION INTRAVENOUS at 05:56

## 2024-02-19 NOTE — PROGRESS NOTES
NEPHROLOGY HOSPITAL PROGRESS NOTE   Homero Driver Jr. 58 y.o. male MRN: [id number]  Unit/Bed#: [address] [place] [address] Encounter: [id number]  Reason for Consult: SOY    ASSESSMENT and PLAN:    58-year-old male with no significant past medical history who initially presents with abdominal pain found to have perforated diverticulitis.  Nephrology was consulted for acute kidney injury.    1-acute kidney injury-    - Baseline creatinine 1 mg/dL  - Admission creatinine on 2/16 1.19 mg deciliter  - Creatinine rising to 1.5 mg/dL on 2/17  - Etiology-likely in setting of hemodynamic shifts/changes in setting of perforated diverticulitis, and contrast nephropathy  - Patient received CT with contrast on 2/16  - Urinalysis relatively bland  - No hydronephrosis on imaging  - Initially on intravenous fluids    Plan  - I/os; avoid nephrotoxic agents  - Avoid hypotension  - From renal standpoint, renal function has returned to baseline with creatinine 1.1 mg/dL on 2/19.  - Reviewed with primary team advanced practitioner, please call renal team back if questions or issues arise and we are in agreement with this plan.  - Drain with feculent appearing material-Per primary team who is evaluating.    2-electrolytes-appropriate sodium.  Potassium borderline elevated but hemolyzed.  No changes for now.    3-acid/base-appropriate    4-fevers-per primary team.  Likely in setting of perforated diverticulitis.  Afebrile overnight.    5-perforated diverticulitis-IR for pelvic abscess drain placement-further plans per IR and surgery team    SUBJECTIVE / 24H INTERVAL HISTORY:  Patient denies complaints but still with pain in rectum region.  New    OBJECTIVE:  Current Weight: Weight - Scale: 79.4 kg (175 lb)  Vitals:    02/18/24 1500 02/18/24 1932 02/18/24 2255 02/19/24 0815   BP: 132/72 140/79 162/79 144/85   BP Location: Right arm Right arm  Right arm   Pulse: 77 80 72 77   Resp:  20  20   Temp: 99.1 °F (37.3 °C) 97.8 °F (36.6 °C) 98 °F (36.7 °C) 98 °F  (36.7 °C)   TempSrc: Tympanic Oral Oral Oral   SpO2: 99% 99%  100%   Weight:       Height:           Intake/Output Summary (Last 24 hours) at 2/19/2024 0916  Last data filed at 2/19/2024 0735  Gross per 24 hour   Intake 5 ml   Output 28 ml   Net -23 ml     General: NAD  Skin: no rash  Eyes: anicteric sclera  ENT: moist mucous membrane  Neck: supple  Chest: CTA b/l, no ronchii, no wheeze, no rubs, no rales  CVS: s1s2, no murmur, no gallop, no rub  Abdomen: soft, nontender, nl sounds  Extremities: no edema LE b/l, left gluteal drain site covered.  Drain with feculent colored material.  : no rosado  Neuro: AAOX3  Psych: normal affect    Medications:    Current Facility-Administered Medications:     acetaminophen (TYLENOL) tablet 975 mg, 975 mg, Oral, Q6H BERYL, Miguelito Phan MD, 975 mg at 02/19/24 0830    albuterol (PROVENTIL HFA,VENTOLIN HFA) inhaler 2 puff, 2 puff, Inhalation, 4x Daily, Miguelito Phan MD, 2 puff at 02/19/24 0834    enoxaparin (LOVENOX) subcutaneous injection 40 mg, 40 mg, Subcutaneous, Daily, Miguelito Phan MD    gabapentin (NEURONTIN) capsule 100 mg, 100 mg, Oral, TID, Miguelito Phan MD, 100 mg at 02/18/24 2102    HYDROmorphone (DILAUDID) injection 0.5 mg, 0.5 mg, Intravenous, Q3H PRN, Miguelito Phan MD    hydrOXYzine HCL (ATARAX) tablet 25 mg, 25 mg, Oral, Q6H PRN, Miguelito Phan MD, 25 mg at 02/16/24 2325    labetalol (NORMODYNE) injection 10 mg, 10 mg, Intravenous, Q4H PRN, Miguelito Phan MD    melatonin tablet 3 mg, 3 mg, Oral, HS, Miguelito Phan MD, 3 mg at 02/18/24 2102    mirtazapine (REMERON) tablet 15 mg, 15 mg, Oral, HS, Miguelito Phan MD, 15 mg at 02/18/24 2102    nicotine (NICODERM CQ) 14 mg/24hr TD 24 hr patch 1 patch, 1 patch, Transdermal, Daily, Miguelito Phan MD    ondansetron (ZOFRAN) injection 4 mg, 4 mg, Intravenous, Q6H PRN, Miguelito Phan MD    oxyCODONE (ROXICODONE) immediate release tablet 10 mg, 10 mg, Oral, Q4H PRN, Miguelito Phan MD, 10 mg at 02/19/24 0151     oxyCODONE (ROXICODONE) IR tablet 5 mg, 5 mg, Oral, Q4H PRN, Miguelito Phan MD    pantoprazole (PROTONIX) injection 40 mg, 40 mg, Intravenous, Q24H BERYL, Miguelito Phan MD, 40 mg at 02/19/24 0602    piperacillin-tazobactam (ZOSYN) IVPB 4.5 g, 4.5 g, Intravenous, Q6H, Miguelito Phan MD, 4.5 g at 02/19/24 0827    Laboratory Results:  Results from last 7 days   Lab Units 02/19/24  0441 02/18/24  0948 02/17/24  0516 02/16/24  1847   WBC Thousand/uL 11.29*  --  18.27* 18.87*   HEMOGLOBIN g/dL 12.2  --  13.6 14.2   HEMATOCRIT % 39.1  --  41.1 42.6   PLATELETS Thousands/uL 256  --  255 259   POTASSIUM mmol/L 5.1 3.9 4.7 3.9   CHLORIDE mmol/L 106 106 99 101   CO2 mmol/L 24 25 29 25   BUN mg/dL 9 14 21 16   CREATININE mg/dL 1.04 1.12 1.50* 1.19   CALCIUM mg/dL 8.9 8.5 9.4 9.9   MAGNESIUM mg/dL  --   --  2.0  --    PHOSPHORUS mg/dL  --   --  4.3  --

## 2024-02-19 NOTE — UTILIZATION REVIEW
NOTIFICATION OF INPATIENT ADMISSION   AUTHORIZATION REQUEST   SERVICING FACILITY:   [place] [address]  Tax ID: [id number]  NPI: [id number] ATTENDING PROVIDER:  Attending Name and NPI#: Lux Duarte Md [[id number]]  Address: [address] [place] [address]  Phone: [phone]   ADMISSION INFORMATION:  Place of Service: Inpatient [facility]  Place of Service Code: 21  Inpatient Admission Date/Time: 2/16/24  9:38 PM  Discharge Date/Time: No discharge date for patient encounter.  Admitting Diagnosis Code/Description:  Diverticulitis [K57.92]  Abdominal pain [R10.9]  Fever [R50.9]     UTILIZATION REVIEW CONTACT:  [place] Utilization   Network Utilization Review Department  Phone: [phone]  Fax [phone]  Email: [email or web address]  Contact for approvals/pending authorizations, clinical reviews, and discharge.     PHYSICIAN ADVISORY SERVICES:  Medical Necessity Denial & Ggdy-cv-Ayza Review  Phone: [phone]  Fax: [phone]  Email: [email or web address]     DISCHARGE SUPPORT TEAM:  For Patients Discharge Needs & Updates  Phone: [phone] opt. 2 Fax: [phone]  Email: [email or web address]

## 2024-02-19 NOTE — UTILIZATION REVIEW
Initial Clinical Review    Admission: Date/Time/Statement:   Admission Orders (From admission, onward)       Ordered        02/16/24 2138  Inpatient Admission  Once                          Orders Placed This Encounter   Procedures    Inpatient Admission     Standing Status:   Standing     Number of Occurrences:   1     Order Specific Question:   Level of Care     Answer:   Med Surg [16]     Order Specific Question:   Estimated length of stay     Answer:   More than 2 Midnights     Order Specific Question:   Certification     Answer:   I certify that inpatient services are medically necessary for this patient for a duration of greater than two midnights. See H&P and MD Progress Notes for additional information about the patient's course of treatment.     ED Arrival Information       Expected   -    Arrival   2/16/2024 16:28    Acuity   Emergent              Means of arrival   Walk-In    Escorted by   Self    Service   Surgery-General    Admission type   Emergency              Arrival complaint   CONSTIPATED             Chief Complaint   Patient presents with    Fever     States he started with fever, chills and sweating last night. Sore throat this morning. Abdominal pain lower abdomen at umbilicus and at rectum. Pain with sitting. No BM for 2 days.     Abdominal Pain       Initial Presentation:   58 yom to ER from home for periumbilical/suprapubic abd pain & constipation x 3 days. Patient reports he has been taking multiple over-the-counter medications with some intermittent relief but states his pain always comes back and has been worsening. Also reports fevers and chills at home. Hx anxiety, arthritis, HTN, hernia repairs x 2. Presents, febrile, tachycardic, abd pain & tenderness>RLQ, suprapubic & epigastric areas. Admission CT highly suspicious for perforated acute sigmoid diverticulitis with small collection along the inferior margin of mid distal sigmoid bowel loop concerning for developing abscess;  leukocytosis, elevated Tbili.  Admitted to inpatient status for  perforated diverticulitis.    Date: 2/17/24   Day 2:   Hinchey 2 diverticulitis. Continued conservative tx wiith IVABT, cultures pending.    2/18/24:  IVABT continued. Scheduled for drain placement today in IR. Mike consulted for SOY.  Per renal: SOY  -- Baseline creatinine appears to be around 1 mg/dL  -- Admission creatinine 1.19 mg/dL  -- Creatinine worsened yesterday to 1.5 mg/dL  -- Suspected to be in the setting of acute perforated diverticulitis, along with contrast associate nephropathy  -- No evidence of hydronephrosis on imaging  -- Urine analysis is bland with no evidence of proteinuria or microscopic hematuria  -- Continue balance crystalloid fluids with lactated Ringer's    To IR for: drain placement  Findings: 8.5F pigtail drain placed via transgluteal approach into lower pelvic abscess, connected to bulb drainage.  Specimens: Purulent aspirate, 5 mL  Complications:  none  Anesthesia: conscious sedation and local    2/19/24:  S/p perforated hinchey 2 diverticulitis s/p IR drain placement on 2/18. Maintain IR drain. Continue IVABT, Abd +flatus, +BM.       ED Triage Vitals [02/16/24 1706]   Temperature Pulse Respirations Blood Pressure SpO2   (!) 102 °F (38.9 °C) 102 20 132/81 98 %      Temp Source Heart Rate Source Patient Position - Orthostatic VS BP Location FiO2 (%)   Tympanic Monitor Sitting Left arm --      Pain Score       9          Wt Readings from Last 1 Encounters:   02/16/24 79.4 kg (175 lb)     Additional Vital Signs:   Date/Time Temp Pulse Resp BP MAP (mmHg) SpO2 O2 Device Patient Position - Orthostatic VS   02/19/24 0815 98 °F (36.7 °C) 77 20 144/85 109 100 % None (Room air) Lying   02/18/24 2255 98 °F (36.7 °C) 72 -- 162/79 106 -- -- Lying   02/18/24 1932 97.8 °F (36.6 °C) 80 20 140/79 104 99 % None (Room air) Lying   02/18/24 1500 99.1 °F (37.3 °C) 77 -- 132/72 95 99 % None (Room air) Lying   02/18/24 1406 97.9 °F (36.6  °C) 77 17 141/93 -- 100 % None (Room air) Lying   02/18/24 13:40:47 -- 75 16 152/82 -- 100 % -- --   02/18/24 13:35:03 -- 82 20 139/81 -- 100 % -- --   02/18/24 1329 -- 79 19 137/73 -- 100 % -- --   02/18/24 13:24:38 -- 80 18 131/67 -- 100 % -- --   02/18/24 13:20:55 -- 79 16 149/76 -- 100 % -- --   02/18/24 13:16:11 -- 78 16 149/81 -- 100 % -- --   02/18/24 13:10:55 -- 77 14 150/78 -- 100 % -- --   02/18/24 13:05:49 -- 78 17 150/87 -- 99 % -- --   02/18/24 12:45:45 -- 84 16 148/81 -- 100 % -- --   02/18/24 0745 99.2 °F (37.3 °C) 86 18 112/69 -- 97 % None (Room air) Lying   02/17/24 2334 99.6 °F (37.6 °C) 81 18 141/79 104 100 % -- Lying   02/17/24 2025 -- 70 18 137/74 94 98 % None (Room air) Lying   02/17/24 0900 98.3 °F (36.8 °C) 86 18 111/80 92 99 % None (Room air) Lying   02/17/24 0401 99 °F (37.2 °C) 78 19 120/89 85 98 % None (Room air) --     Pertinent Labs/Diagnostic Test Results:   CT abdomen pelvis with contrast   Final Result (02/16 2105)      Findings above highly suspicious for perforated acute sigmoid diverticulitis with small collection along the inferior margin of mid distal sigmoid bowel loop concerning for developing abscess. Surgical consult advised.      Cholelithiasis and additional nonemergent findings above.        Results from last 7 days   Lab Units 02/16/24  1847   SARS-COV-2  Negative     Results from last 7 days   Lab Units 02/19/24  0441 02/17/24  0516 02/16/24  1847   WBC Thousand/uL 11.29* 18.27* 18.87*   HEMOGLOBIN g/dL 12.2 13.6 14.2   HEMATOCRIT % 39.1 41.1 42.6   PLATELETS Thousands/uL 256 255 259   NEUTROS ABS Thousands/µL 9.20*  --  16.71*     Results from last 7 days   Lab Units 02/19/24  0441 02/18/24  0948 02/17/24  0516 02/16/24  1847   SODIUM mmol/L 137 138 137 136   POTASSIUM mmol/L 5.1 3.9 4.7 3.9   CHLORIDE mmol/L 106 106 99 101   CO2 mmol/L 24 25 29 25   ANION GAP mmol/L 7 7 9 10   BUN mg/dL 9 14 21 16   CREATININE mg/dL 1.04 1.12 1.50* 1.19   EGFR ml/min/1.73sq m 78 72 50  66   CALCIUM mg/dL 8.9 8.5 9.4 9.9   MAGNESIUM mg/dL  --   --  2.0  --    PHOSPHORUS mg/dL  --   --  4.3  --      Results from last 7 days   Lab Units 02/16/24  1847   AST U/L 12*   ALT U/L 9   ALK PHOS U/L 40   TOTAL PROTEIN g/dL 7.9   ALBUMIN g/dL 4.4   TOTAL BILIRUBIN mg/dL 1.37*     Results from last 7 days   Lab Units 02/19/24  0441 02/18/24  0948 02/17/24  0516 02/16/24  1847   GLUCOSE RANDOM mg/dL 82 98 98 121     Results from last 7 days   Lab Units 02/16/24  2108   PROTIME seconds 13.9   INR  1.04   PTT seconds 29     Results from last 7 days   Lab Units 02/16/24  2108   LACTIC ACID mmol/L 0.8     Results from last 7 days   Lab Units 02/16/24  1847   LIPASE u/L 19     Results from last 7 days   Lab Units 02/16/24  1847   CLARITY UA  Clear   COLOR UA  Yellow   SPEC GRAV UA  1.015   PH UA  7.5   GLUCOSE UA mg/dl Negative   KETONES UA mg/dl Negative   BLOOD UA  Negative   PROTEIN UA mg/dl Negative   NITRITE UA  Negative   BILIRUBIN UA  Negative   UROBILINOGEN UA E.U./dl 0.2   LEUKOCYTES UA  Negative     Results from last 7 days   Lab Units 02/16/24  1847   INFLUENZA A PCR  Negative   INFLUENZA B PCR  Negative   RSV PCR  Negative     Results from last 7 days   Lab Units 02/18/24  1337 02/16/24  2108   BLOOD CULTURE   --  No Growth at 24 hrs.  No Growth at 24 hrs.   GRAM STAIN RESULT  2+ Polys*  2+ Gram positive rods*  1+ Gram negative rods*  --      ED Treatment:   Medication Administration from 02/16/2024 1627 to 02/16/2024 2233         Date/Time Order Dose Route Action     02/16/2024 1712 EST acetaminophen (TYLENOL) tablet 650 mg 650 mg Oral Given     02/16/2024 1853 EST ketorolac (TORADOL) injection 15 mg 15 mg Intravenous Given     02/16/2024 1929 EST iohexol (OMNIPAQUE) 350 MG/ML injection (MULTI-DOSE) 100 mL 100 mL Intravenous Given     02/16/2024 1951 EST morphine injection 4 mg 4 mg Intravenous Given     02/16/2024 2115 EST ciprofloxacin (CIPRO) IVPB (premix in 5% dextrose) 400 mg 200 mL 400 mg  Intravenous New Bag     02/16/2024 2119 EST morphine injection 4 mg 4 mg Intravenous Given     02/16/2024 2127 EST metroNIDAZOLE (FLAGYL) IVPB (premix) 500 mg 100 mL 500 mg Intravenous New Bag          Past Medical History:   Diagnosis Date    Anesthesia complication     body temp burns off anesthesia more quickly--woke up during surgery    Anxiety     due to medical conditions    Arthritis     Hypertension      Admitting Diagnosis: Diverticulitis [K57.92]  Abdominal pain [R10.9]  Fever [R50.9]  Age/Sex: 58 y.o. male  Admission Orders:  Scd/foot pumps  Consult IR  Consult renal    IR tube care:  Location: Pelvic   Laterality: Left   Tube to: KECIA bulb drainage   Record Tube Output? Yes   Record Tube Output: Every 24 Hours   Flush Tube? Yes   Flush tube with: 5ml NSS   Flush frequency: Once Each Day       Scheduled Medications:  Medications 02/10 02/11 02/12 02/13 02/14 02/15 02/16 02/17 02/18 02/19   acetaminophen (TYLENOL) tablet 650 mg  Dose: 650 mg  Freq: Once Route: PO  Indications of Use: FEVER  Start: 02/16/24 1715 End: 02/16/24 1712          1712 [C]           acetaminophen (TYLENOL) tablet 975 mg  Dose: 975 mg  Freq: Every 6 hours scheduled Route: PO  Start: 02/17/24 0000          2325      0508     1158     1742     2301      0647     1405     1938      0154     0830     1400     2000        albuterol (PROVENTIL HFA,VENTOLIN HFA) inhaler 2 puff  Dose: 2 puff  Freq: 4 times daily (RESP) Route: IN  Start: 02/16/24 2200   Admin Instructions:             2326      0946     1156     1607     2023      (1424) (9749) (7209) [phone]     1200     1600     2000        ciprofloxacin (CIPRO) IVPB (premix in 5% dextrose) 400 mg 200 mL  Dose: 400 mg  Freq: Once Route: IV  Last Dose: 400 mg (02/16/24 2115)  Start: 02/16/24 2100 End: 02/16/24 2139   Admin Instructions:      Order specific questions:             2115           enoxaparin (LOVENOX) subcutaneous injection 40 mg  Dose: 40 mg  Freq: Daily  Route: SC  Start: 02/17/24 0900   Admin Instructions:              4325 (4525) 5916        gabapentin (NEURONTIN) capsule 100 mg  Dose: 100 mg  Freq: 3 times daily Route: PO  Start: 02/16/24 2200   Admin Instructions:             2325      0945     1604     2024      0919     1602     2102      0958     1600     2100        ketorolac (TORADOL) injection 15 mg  Dose: 15 mg  Freq: Once Route: IV  Start: 02/16/24 1845 End: 02/16/24 1853   Admin Instructions:             1853           melatonin tablet 3 mg  Dose: 3 mg  Freq: Daily at bedtime Route: PO  Start: 02/16/24 2200          2325      2258      2102      2200        metroNIDAZOLE (FLAGYL) IVPB (premix) 500 mg 100 mL  Dose: 500 mg  Freq: Once Route: IV  Last Dose: Stopped (02/16/24 2150)  Start: 02/16/24 2130 End: 02/16/24 2139   Admin Instructions:      Order specific questions:             2127 2150           metroNIDAZOLE (FLAGYL) tablet 500 mg  Dose: 500 mg  Freq: Once Route: PO  Start: 02/16/24 2100 End: 02/16/24 2120   Admin Instructions:      Order specific questions:             2120) 5270-D/C'd         mirtazapine (REMERON) tablet 15 mg  Dose: 15 mg  Freq: Daily at bedtime Route: PO  Start: 02/16/24 2200          2325      2259 2102 2200        morphine injection 4 mg  Dose: 4 mg  Freq: Once Route: IV  Start: 02/16/24 2115 End: 02/16/24 2119   Admin Instructions:             2119           morphine injection 4 mg  Dose: 4 mg  Freq: Once Route: IV  Start: 02/16/24 1945 End: 02/16/24 1951   Admin Instructions:             1951           nicotine (NICODERM CQ) 14 mg/24hr TD 24 hr patch 1 patch  Dose: 1 patch  Freq: Daily Route: TD  Start: 02/17/24 0900   Admin Instructions:              (2168) (5413) 3717        pantoprazole (PROTONIX) injection 40 mg  Dose: 40 mg  Freq: Every 24 hours scheduled Route: IV  Start: 02/17/24 0700   Admin Instructions:              ([phone]        piperacillin-tazobactam  (ZOSYN) IVPB 4.5 g  Dose: 4.5 g  Freq: Every 6 hours Route: IV  Start: 02/16/24 2300   Order specific questions:             2331      0508     1158     1742     2258      0559     1405     1939      0154     0827     1400     2000        Legend:       [id number]/1202/1302/1402/1502/1602/1702/1802/19        Continuous Meds Sorted by Name  for Homero Driver Jr. as of 02/10/24 through 2/19/24  Legend:       Medications 02/10 02/11 02/12 02/13 02/14 02/15 02/16 02/17 02/18 02/19   lactated ringers infusion  Rate: 125 mL/hr Dose: 125 mL/hr  Freq: Continuous Route: IV  Last Dose: Stopped (02/19/24 0848)  Start: 02/16/24 2145 End: 02/19/24 0804          2325      2112      0646     2004      0556     0804-D/C'd  0848        Legend:       [id number]/1102/1202/1302/1402/1502/1602/1702/1802/19        PRN Meds Sorted by Name  for Homero Driver Jr. as of 02/10/24 through 2/19/24  Legend:         Medications 02/10 02/11 02/12 02/13 02/14 02/15 02/16 02/17 02/18 02/19   fentanyl citrate (PF) 100 MCG/2ML  Freq: As needed Route: IV  Start: 02/18/24 1305 End: 02/18/24 1335            1305     1320     1329     1335         HYDROmorphone (DILAUDID) injection 0.5 mg  Dose: 0.5 mg  Freq: Every 3 hours PRN Route: IV  PRN Reason: breakthrough pain  Start: 02/16/24 2138   Admin Instructions:                   hydrOXYzine HCL (ATARAX) tablet 25 mg  Dose: 25 mg  Freq: Every 6 hours PRN Route: PO  PRN Reasons: itching,anxiety  Start: 02/16/24 2138   Admin Instructions:             2325           iohexol (OMNIPAQUE) 350 MG/ML injection (MULTI-DOSE) 100 mL  Dose: 100 mL  Freq: Once in imaging Route: IV  PRN Reason: contrast  Start: 02/16/24 1929 End: 02/16/24 1929 1929           labetalol (NORMODYNE) injection 10 mg  Dose: 10 mg  Freq: Every 4 hours PRN Route: IV  PRN Reason: high blood pressure  PRN Comment: systolic > 160  Start: 02/16/24 2139   Admin Instructions:                   lidocaine 1% buffered  Freq:  As needed Route: INFILTRATION  Start: 02/18/24 1310 End: 02/18/24 1313            1310     1313         midazolam (VERSED) injection  Freq: As needed  Start: 02/18/24 1305 End: 02/18/24 1321            1305     1321         ondansetron (ZOFRAN) injection 4 mg  Dose: 4 mg  Freq: Every 6 hours PRN Route: IV  PRN Reasons: nausea,vomiting  Start: 02/16/24 2138   Admin Instructions:                   oxyCODONE (ROXICODONE) immediate release tablet 10 mg  Dose: 10 mg  Freq: Every 4 hours PRN Route: PO  PRN Reason: severe pain  Start: 02/16/24 2138   Admin Instructions:             2325      1204     2341      1807      0159        oxyCODONE (ROXICODONE) IR tablet 5 mg  Dose: 5 mg  Freq: Every 4 hours PRN Route: PO  PRN Reason: moderate pain  Start: 02/16/24 2138   Admin Instructions:                       Network Utilization Review Department  ATTENTION: Please call with any questions or concerns to [phone] and carefully listen to the prompts so that you are directed to the right person. All voicemails are confidential.   For Discharge needs, contact Care Management DC Support Team at [phone] opt. 2  Send all requests for admission clinical reviews, approved or denied determinations and any other requests to dedicated fax number below belonging to the campus where the patient is receiving treatment. List of dedicated fax numbers for the Facilities:  FACILITY NAME UR FAX NUMBER   ADMISSION DENIALS (Administrative/Medical Necessity) [phone]   DISCHARGE SUPPORT TEAM (NETWORK) [phone]   PARENT CHILD HEALTH (Maternity/NICU/Pediatrics) [phone]   [facility] [phone]   [facility] [phone]   [facility] [phone]   [facility] [phone]   [facility] [phone]   [facility] [phone]   [facility] [phone]    [facility] [phone]   [facility] [phone]   Critical access hospital [phone]   [facility] [phone]   [facility] [phone]

## 2024-02-19 NOTE — CASE MANAGEMENT
Case Management Assessment & Discharge Planning Note    Patient name Homreo Driver Jr.  Location 3 [place] [facility]/3 [place] 324-* MRN [id number]  : 1965 Date 2024       Current Admission Date: 2024  Current Admission Diagnosis:Diverticulitis, Abdominal pain, Fever   Patient Active Problem List    Diagnosis Date Noted    Smoking 2023    S/P cervical spinal fusion 2018    Essential hypertension 2016    Anxiety 2016    Wound dehiscence 2016    Headache, temporal 2016    Cervical stenosis of spine 2016    Pseudoarthrosis of cervical spine (HCC) 2016      LOS (days): 3  Geometric Mean LOS (GMLOS) (days):   Days to GMLOS:     OBJECTIVE:    Risk of Unplanned Readmission Score: 5.97         Current admission status: Inpatient  Referral Reason: VNA    Preferred Pharmacy:   [facility]/pharmacy #[id number] - [address] [address]  Phone: [phone] Fax: [phone]    Primary Care Provider: No primary care provider on file.    Primary Insurance: [employer]  Secondary Insurance:     ASSESSMENT:  Active Health Care Proxies    There are no active Health Care Proxies on file.       Readmission Root Cause  30 Day Readmission: No    Patient Information  Admitted from:: Home  Mental Status: Alert  During Assessment patient was accompanied by: Not accompanied during assessment  Assessment information provided by:: Patient  Primary Caregiver: Spouse  Caregiver's Name:: Laura Meera (Significant Other)  Caregiver's Relationship to Patient:: Significant Other  Caregiver's Telephone Number:: [phone]  Support Systems: Spouse/significant other  County of Residence: [place]  What [facility] do you live in?: [place]  Type of Current Residence: Apartment  Living Arrangements: Lives w/ Spouse/significant other    Activities of Daily Living Prior to Admission  Functional Status: Independent  Ambulates independently?:  Yes  Does patient use assisted devices?: No  Does patient currently own DME?: No  Does patient have a history of Outpatient Therapy (PT/OT)?: No  Does the patient have a history of Short-Term Rehab?: No  Does patient have a history of HHC?: Yes  Does patient currently have HHC?: No     Patient Information Continued  Does patient have prescription coverage?: Yes  Does patient receive dialysis treatments?: No     Means of Transportation  Means of Transport to [facility]:: Family transport      Social Determinants of Health (SDOH)      Flowsheet Row Most Recent Value   Housing Stability    In the last 12 months, was there a time when you were not able to pay the mortgage or rent on time? N   In the last 12 months, how many places have you lived? 1   In the last 12 months, was there a time when you did not have a steady place to sleep or slept in a shelter (including now)? N   Transportation Needs    In the past 12 months, has lack of transportation kept you from medical appointments or from getting medications? no   In the past 12 months, has lack of transportation kept you from meetings, work, or from getting things needed for daily living? No   Food Insecurity    Within the past 12 months, you worried that your food would run out before you got the money to buy more. Never true   Within the past 12 months, the food you bought just didn't last and you didn't have money to get more. Never true   Utilities    In the past 12 months has the electric, gas, oil, or water company threatened to shut off services in your home? No            DISCHARGE DETAILS:    Discharge planning discussed with:: Patient  Freedom of Choice: Yes  Comments - Freedom of Choice: HHC, no preference per patient     Were Treatment Team discharge recommendations reviewed with patient/caregiver?: Yes  Did patient/caregiver verbalize understanding of patient care needs?: Yes  Were patient/caregiver advised of the risks associated with not following  Treatment Team discharge recommendations?: Yes    Contacts  Patient Contacts: Laura Estes (Significant Other)  Relationship to Patient:: Family    Requested Home Health Care         Is the patient interested in HHC at discharge?: Yes  Home Health Discipline requested:: Nursing  Home Health Follow-Up Provider:: PCP  Home Health Services Needed:: Post-Op Care and Assessment, Other (comment) (Patient will require drain site care, flushing, and drainage instructions)  Homebound Criteria Met:: Requires the Assistance of Another Person for Safe Ambulation or to Leave the Home  Supporting Clincal Findings:: Limited Endurance    DME Referral Provided  Referral made for DME?: No    Other Referral/Resources/Interventions Provided:  Interventions: HHC  Referral Comments: CM spoke with patient at bedside, introduced self and role, conduct assessment and discharge planning.Patient stated he lives with his wife for 20 years and she assists him with transportation for medical appts. Discussed hhc for drain care, patient agreeable, denied having any preference and agreeable for CM to send referrals. Referrals sent in [facility] for SN services.     Treatment Team Recommendation: Home with Home Health Care  Discharge Destination Plan:: Home with Home Health Care  Transport at Discharge : Family

## 2024-02-19 NOTE — PROGRESS NOTES
Progress Note - Homero Driver Jr. 58 y.o. male MRN: [id number]    Unit/Bed#: [address] [place] [address] Encounter: [id number]      Assessment:  59 y/o M w perforated hinchey 2 diverticulitis s/p IR drain placement on 2/18.     Doing well. Vss. Afebrile. Abd s/nt/nd  + flatus  + bm    Plan:  Surg soft diet  Continue iv abx  Maintain IR drain  Lovenox Dvt ppx  Anticipate dc home on 2/20 w drain in place and outpatient c scope in 6 weeks  Outpatient referral to GI    Subjective:   Feels well. + flatus. + bowel mvt. Denied fever, chills, chest pain, shortness of breath, nausea, vomiting, or abdominal pain this morning.       Objective:     Vitals: Blood pressure 162/79, pulse 72, temperature 98 °F (36.7 °C), temperature source Oral, resp. rate 20, height 6' (1.829 m), weight 79.4 kg (175 lb), SpO2 99%.,Body mass index is 23.73 kg/m².      Intake/Output Summary (Last 24 hours) at 2/19/2024 0804  Last data filed at 2/19/2024 0735  Gross per 24 hour   Intake 5 ml   Output 28 ml   Net -23 ml       Physical Exam  General: NAD.   HEENT: NC/AT. MMM  Cv: RRR.   Lungs: normal effort  Ab: Soft, NT/ND  Ex: no CCE  Neuro: AAOx3    Scheduled Meds:  Current Facility-Administered Medications   Medication Dose Route Frequency Provider Last Rate    acetaminophen  975 mg Oral Q6H [facility] Miguelito Phan MD      albuterol  2 puff Inhalation 4x Daily Miguelito Phan MD      enoxaparin  40 mg Subcutaneous Daily Miguelito Phan MD      gabapentin  100 mg Oral TID Miguelito Phan MD      HYDROmorphone  0.5 mg Intravenous Q3H PRN Miguelito Phan MD      hydrOXYzine HCL  25 mg Oral Q6H PRN Miguelito Phan MD      labetalol  10 mg Intravenous Q4H PRN Miguelito Phan MD      melatonin  3 mg Oral HS Miguelito Phna MD      mirtazapine  15 mg Oral HS Miguelito Phan MD      nicotine  1 patch Transdermal Daily Miguelito Phan MD      ondansetron  4 mg Intravenous Q6H PRN Miguelito Phan MD      oxyCODONE  10 mg Oral Q4H PRN Miguelito Phan MD      oxyCODONE  5 mg  Oral Q4H PRN Miguelito Phan MD      pantoprazole  40 mg Intravenous Q24H [facility] Miguelito Phan MD      piperacillin-tazobactam  4.5 g Intravenous Q6H Miguelito Phan MD       Continuous Infusions:   PRN Meds:.  HYDROmorphone    hydrOXYzine HCL    labetalol    ondansetron    oxyCODONE    oxyCODONE      Invasive Devices       Peripheral Intravenous Line  Duration             Peripheral IV 02/16/24 Left Antecubital 2 days              Drain  Duration             Abscess Drain Buttock <1 day                    Lab, Imaging and other studies: I have personally reviewed pertinent reports.    VTE Pharmacologic Prophylaxis: Sequential compression device (Venodyne)   VTE Mechanical Prophylaxis: sequential compression device

## 2024-02-20 ENCOUNTER — HOME HEALTH ADMISSION (OUTPATIENT)
Dept: HOME HEALTH SERVICES | Facility: HOME HEALTHCARE | Age: 59
End: 2024-02-20

## 2024-02-20 ENCOUNTER — HOME CARE VISIT (OUTPATIENT)
Dept: HOME HEALTH SERVICES | Facility: HOME HEALTHCARE | Age: 59
End: 2024-02-20

## 2024-02-20 VITALS
RESPIRATION RATE: 19 BRPM | WEIGHT: 175 LBS | HEIGHT: 72 IN | BODY MASS INDEX: 23.7 KG/M2 | TEMPERATURE: 98.5 F | DIASTOLIC BLOOD PRESSURE: 84 MMHG | SYSTOLIC BLOOD PRESSURE: 154 MMHG | HEART RATE: 90 BPM | OXYGEN SATURATION: 100 %

## 2024-02-20 PROBLEM — K57.20 DIVERTICULITIS OF LARGE INTESTINE WITH ABSCESS: Status: ACTIVE | Noted: 2024-02-20

## 2024-02-20 LAB
ANION GAP SERPL CALCULATED.3IONS-SCNC: 7 MMOL/L
BASOPHILS # BLD AUTO: 0.02 THOUSANDS/ÂΜL (ref 0–0.1)
BASOPHILS NFR BLD AUTO: 0 % (ref 0–1)
BUN SERPL-MCNC: 8 MG/DL (ref 5–25)
CALCIUM SERPL-MCNC: 9 MG/DL (ref 8.4–10.2)
CHLORIDE SERPL-SCNC: 105 MMOL/L (ref 96–108)
CO2 SERPL-SCNC: 26 MMOL/L (ref 21–32)
CREAT SERPL-MCNC: 0.88 MG/DL (ref 0.6–1.3)
EOSINOPHIL # BLD AUTO: 0.07 THOUSAND/ÂΜL (ref 0–0.61)
EOSINOPHIL NFR BLD AUTO: 1 % (ref 0–6)
ERYTHROCYTE [DISTWIDTH] IN BLOOD BY AUTOMATED COUNT: 14.7 % (ref 11.6–15.1)
GFR SERPL CREATININE-BSD FRML MDRD: 94 ML/MIN/1.73SQ M
GLUCOSE SERPL-MCNC: 99 MG/DL (ref 65–140)
HCT VFR BLD AUTO: 35.8 % (ref 36.5–49.3)
HGB BLD-MCNC: 12 G/DL (ref 12–17)
IMM GRANULOCYTES # BLD AUTO: 0.04 THOUSAND/UL (ref 0–0.2)
IMM GRANULOCYTES NFR BLD AUTO: 0 % (ref 0–2)
LYMPHOCYTES # BLD AUTO: 1.51 THOUSANDS/ÂΜL (ref 0.6–4.47)
LYMPHOCYTES NFR BLD AUTO: 16 % (ref 14–44)
MCH RBC QN AUTO: 30.3 PG (ref 26.8–34.3)
MCHC RBC AUTO-ENTMCNC: 33.5 G/DL (ref 31.4–37.4)
MCV RBC AUTO: 90 FL (ref 82–98)
MONOCYTES # BLD AUTO: 0.87 THOUSAND/ÂΜL (ref 0.17–1.22)
MONOCYTES NFR BLD AUTO: 9 % (ref 4–12)
NEUTROPHILS # BLD AUTO: 7.23 THOUSANDS/ÂΜL (ref 1.85–7.62)
NEUTS SEG NFR BLD AUTO: 74 % (ref 43–75)
NRBC BLD AUTO-RTO: 0 /100 WBCS
PLATELET # BLD AUTO: 276 THOUSANDS/UL (ref 149–390)
PMV BLD AUTO: 9.6 FL (ref 8.9–12.7)
POTASSIUM SERPL-SCNC: 3.9 MMOL/L (ref 3.5–5.3)
RBC # BLD AUTO: 3.96 MILLION/UL (ref 3.88–5.62)
SODIUM SERPL-SCNC: 138 MMOL/L (ref 135–147)
WBC # BLD AUTO: 9.74 THOUSAND/UL (ref 4.31–10.16)

## 2024-02-20 PROCEDURE — 99238 HOSP IP/OBS DSCHRG MGMT 30/<: CPT | Performed by: PHYSICIAN ASSISTANT

## 2024-02-20 PROCEDURE — C9113 INJ PANTOPRAZOLE SODIUM, VIA: HCPCS | Performed by: SURGERY

## 2024-02-20 PROCEDURE — 80048 BASIC METABOLIC PNL TOTAL CA: CPT | Performed by: SURGERY

## 2024-02-20 PROCEDURE — 85025 COMPLETE CBC W/AUTO DIFF WBC: CPT | Performed by: SURGERY

## 2024-02-20 PROCEDURE — NC001 PR NO CHARGE: Performed by: SURGERY

## 2024-02-20 RX ORDER — HYDROMORPHONE HCL IN WATER/PF 6 MG/30 ML
0.2 PATIENT CONTROLLED ANALGESIA SYRINGE INTRAVENOUS
Status: DISCONTINUED | OUTPATIENT
Start: 2024-02-20 | End: 2024-02-20 | Stop reason: HOSPADM

## 2024-02-20 RX ORDER — OXYCODONE HYDROCHLORIDE 5 MG/1
5 TABLET ORAL EVERY 4 HOURS PRN
Status: DISCONTINUED | OUTPATIENT
Start: 2024-02-20 | End: 2024-02-20 | Stop reason: HOSPADM

## 2024-02-20 RX ORDER — METRONIDAZOLE 500 MG/1
500 TABLET ORAL EVERY 8 HOURS SCHEDULED
Qty: 18 TABLET | Refills: 0 | Status: SHIPPED | OUTPATIENT
Start: 2024-02-20 | End: 2024-02-26

## 2024-02-20 RX ORDER — CEFPODOXIME PROXETIL 200 MG/1
200 TABLET, FILM COATED ORAL 2 TIMES DAILY
Qty: 12 TABLET | Refills: 0 | Status: SHIPPED | OUTPATIENT
Start: 2024-02-20 | End: 2024-02-26

## 2024-02-20 RX ADMIN — ENOXAPARIN SODIUM 40 MG: 40 INJECTION SUBCUTANEOUS at 09:33

## 2024-02-20 RX ADMIN — OXYCODONE HYDROCHLORIDE 10 MG: 10 TABLET ORAL at 01:36

## 2024-02-20 RX ADMIN — ALBUTEROL SULFATE 2 PUFF: 90 AEROSOL, METERED RESPIRATORY (INHALATION) at 09:45

## 2024-02-20 RX ADMIN — PIPERACILLIN AND TAZOBACTAM 4.5 G: 4; .5 INJECTION, POWDER, FOR SOLUTION INTRAVENOUS at 01:30

## 2024-02-20 RX ADMIN — PIPERACILLIN AND TAZOBACTAM 4.5 G: 4; .5 INJECTION, POWDER, FOR SOLUTION INTRAVENOUS at 09:31

## 2024-02-20 RX ADMIN — OXYCODONE HYDROCHLORIDE 5 MG: 5 TABLET ORAL at 09:31

## 2024-02-20 RX ADMIN — NICOTINE 1 PATCH: 14 PATCH, EXTENDED RELEASE TRANSDERMAL at 09:31

## 2024-02-20 RX ADMIN — PANTOPRAZOLE SODIUM 40 MG: 40 INJECTION, POWDER, FOR SOLUTION INTRAVENOUS at 06:04

## 2024-02-20 RX ADMIN — ACETAMINOPHEN 975 MG: 325 TABLET ORAL at 09:31

## 2024-02-20 RX ADMIN — GABAPENTIN 100 MG: 100 CAPSULE ORAL at 09:31

## 2024-02-20 NOTE — PROGRESS NOTES
Progress Note - Homero Driver Jr. 58 y.o. male MRN: [id number]    Unit/Bed#: [address] [place] [address] Encounter: [id number]      Assessment:  57 y/o M w perforated hinchey 2 diverticulitis s/p IR drain placement on 2/18.     Vss. Afebrile. Abd soft, mild LLQ tender, non distended.   IR drain w 30 cc purulent output  Wbc: 11-> 9    Plan:  Soft diet  Dc on 10 d cefpodoxime flagyl  Follow up with IR for drain check in 2 weeks  Follow up w GI for c scope in 6 weeks  Follow up w surgery clinic in 2 weeks    Subjective:   Feels well. Had normal bowel mvt and passing flatus. Did 100 sit ups yesterday in hospital room and complains of abd discomfort today. Explained this is not advised. Needs to rest.     Objective:     Vitals: Blood pressure (!) 169/106, pulse 90, temperature 98.5 °F (36.9 °C), resp. rate 19, height 6' (1.829 m), weight 79.4 kg (175 lb), SpO2 100%.,Body mass index is 23.73 kg/m².      Intake/Output Summary (Last 24 hours) at 2/20/2024 0933  Last data filed at 2/19/2024 1512  Gross per 24 hour   Intake 5 ml   Output 10 ml   Net -5 ml       Physical Exam  General: NAD.   HEENT: NC/AT. MMM  Cv: RRR.   Lungs: normal effort  Ab: Soft, NT/ND  Ex: no CCE  Neuro: AAOx3    Scheduled Meds:  Current Facility-Administered Medications   Medication Dose Route Frequency Provider Last Rate    acetaminophen  975 mg Oral Q6H [facility] Miguelito Phan MD      albuterol  2 puff Inhalation 4x Daily Miguelito Phan MD      enoxaparin  40 mg Subcutaneous Daily Miguelito Phan MD      gabapentin  100 mg Oral TID Miguelito Phan MD      HYDROmorphone  0.2 mg Intravenous Q3H PRN Jesús Ibarra PA-C      hydrOXYzine HCL  25 mg Oral Q6H PRN Miguelito Phan MD      labetalol  10 mg Intravenous Q4H PRN Miguelito Phan MD      melatonin  3 mg Oral HS Miguelito Phan MD      mirtazapine  15 mg Oral HS Miguelito Phan MD      nicotine  1 patch Transdermal Daily Miguelito Phan MD      ondansetron  4 mg Intravenous Q6H PRN Miguelito Phan MD      oxyCODONE   5 mg Oral Q4H PRN Jesús Ibarra PA-C      pantoprazole  40 mg Intravenous Q24H [facility] Miguelito Phan MD      piperacillin-tazobactam  4.5 g Intravenous Q6H Miguelito Phan MD       Continuous Infusions:   PRN Meds:.  HYDROmorphone    hydrOXYzine HCL    labetalol    ondansetron    oxyCODONE      Invasive Devices       Peripheral Intravenous Line  Duration             Peripheral IV 02/19/24 Left;Ventral (anterior) Forearm <1 day              Drain  Duration             Abscess Drain Buttock 1 day                    Lab, Imaging and other studies: I have personally reviewed pertinent reports.    VTE Pharmacologic Prophylaxis: Sequential compression device (Venodyne)   VTE Mechanical Prophylaxis: sequential compression device

## 2024-02-20 NOTE — DISCHARGE SUMMARY
Discharge Summary - Homero Driver Jr. 58 y.o. male MRN: [id number]    Unit/Bed#: [address] [place] [address] Encounter: [id number]    Admission Date: 2/16/2024   Discharge Date: 2/20/2024    Admitting Diagnosis:   Diverticulitis [K57.92]  Abdominal pain [R10.9]  Fever [R50.9]    Discharge Diagnoses: Principal Problem:    Diverticulitis of large intestine with abscess      Consultations: Nephrology, interventional radiology    Procedures Performed: Percutaneous drain placement by IR    HPI per admission H&P:  Homero Driver Jr. is a 58 y.o. male who presents with perforated diverticulitis. H/o Htn, and anxiety, arthritis. Reports 3d of abdominal pain. Pain is worse around the umbilicus and suprapubic discomfort. Feels constipated but did endorse a bowel mvt earlier today. No h/o antiplatelet or anticoagulation use. Last colonoscopy in 8/2023. Mild diverticulosis at that time and 2 pedunculated polys removed. No prior history of diverticulitis. Discussed treatment plan of IR if the IR physician able to place drain into pelvic collection. He was agreeable.        Hospital Course: Homero Driver Jr. is a 58 y.o. male admitted for complicated diverticulitis.  Patient met sepsis criteria with fever and leukocytosis in the setting of perforated diverticulitis with contained pelvic fluid collection that was identified on CT.  Interventional radiology was consulted for percutaneous drain placement which elicited purulent output.  Patient was kept on Zosyn.  Nephrology was consulted for acute kidney injury that developed on hospital day 1 after CT with IV contrast that ultimately resolved with IV fluid resuscitation.  Patient's abdominal pain improved and he tolerated advance to low fiber diet.  Patient referred to GI for outpatient screening colonoscopy in 6 to 8 weeks.  Patient to follow-up with PCP to discuss hypertension.  Sent home with instruction to complete total 10 day course of antibiotics.        Condition at Discharge: good      Discharge instructions/Information to patient and family:   See after visit summary for information provided to patient and family.      Provisions for Follow-Up Care:  See after visit summary for information related to follow-up care and any pertinent home health orders.      Disposition: Home    Planned Readmission: No    Discharge Statement   I spent 20 minutes discharging the patient. This time was spent on the day of discharge. I had direct contact with the patient on the day of discharge. Additional documentation is required if more than 30 minutes were spent on discharge.     Discharge Medications:  See after visit summary for reconciled discharge medications provided to patient and family.

## 2024-02-20 NOTE — DISCHARGE INSTR - AVS FIRST PAGE
Please rest and recover after leaving the hospital.  Please consider taking off work for at least 7 to 10 days.  Please record the daily amount of output from the drainage tube.   Please follow a low fiber diet and complete the total antibiotic course  Follow remaining instructions as stated below.

## 2024-02-21 LAB
BACTERIA SPEC ANAEROBE CULT: ABNORMAL
BACTERIA SPEC BFLD CULT: ABNORMAL
GRAM STN SPEC: ABNORMAL

## 2024-02-21 NOTE — UTILIZATION REVIEW
NOTIFICATION OF ADMISSION DISCHARGE   This is a Notification of Discharge from [facility]. Please be advised that this patient has been discharge from our facility. Below you will find the admission and discharge date and time including the patient’s disposition.   UTILIZATION REVIEW CONTACT:  Pam Jay  Utilization   Network Utilization Review Department  Phone: [phone] x carefully listen to the prompts. All voicemails are confidential.  Email: [email or web address]     ADMISSION INFORMATION  PRESENTATION DATE: 2/16/2024  6:15 PM  OBERVATION ADMISSION DATE:   INPATIENT ADMISSION DATE: 2/16/24  9:38 PM   DISCHARGE DATE: 2/20/2024  2:20 PM   DISPOSITION:Home with Home Health Care    Network Utilization Review Department  ATTENTION: Please call with any questions or concerns to [phone] and carefully listen to the prompts so that you are directed to the right person. All voicemails are confidential.   For Discharge needs, contact Care Management DC Support Team at [phone] opt. 2  Send all requests for admission clinical reviews, approved or denied determinations and any other requests to dedicated fax number below belonging to the campus where the patient is receiving treatment. List of dedicated fax numbers for the Facilities:  FACILITY NAME UR FAX NUMBER   ADMISSION DENIALS (Administrative/Medical Necessity) [phone]   DISCHARGE SUPPORT TEAM ([facility]) [phone]   PARENT CHILD HEALTH (Maternity/NICU/Pediatrics) [phone]   [facility] [phone]   [facility] [phone]   [facility] [phone]   [facility] [phone]   [facility] [phone]   [facility] [phone]   [facility] [phone]   [facility]  [phone]   [facility] [phone]   [facility] [phone]   [facility] [phone]   [facility] [phone]

## 2024-02-22 LAB
BACTERIA BLD CULT: NORMAL
BACTERIA BLD CULT: NORMAL

## 2024-03-01 ENCOUNTER — HOSPITAL ENCOUNTER (OUTPATIENT)
Dept: NON INVASIVE DIAGNOSTICS | Facility: HOSPITAL | Age: 59
Discharge: HOME | End: 2024-03-01
Attending: RADIOLOGY
Payer: MEDICARE

## 2024-03-01 DIAGNOSIS — K57.92 DIVERTICULITIS: Primary | ICD-10-CM

## 2024-03-01 PROCEDURE — C1887 CATHETER, GUIDING: HCPCS

## 2024-03-01 PROCEDURE — C1769 GUIDE WIRE: HCPCS

## 2024-03-01 PROCEDURE — 49423 EXCHANGE DRAINAGE CATHETER: CPT | Performed by: RADIOLOGY

## 2024-03-01 PROCEDURE — 75984 XRAY CONTROL CATHETER CHANGE: CPT | Performed by: RADIOLOGY

## 2024-03-01 PROCEDURE — 49423 EXCHANGE DRAINAGE CATHETER: CPT

## 2024-03-01 PROCEDURE — C1729 CATH, DRAINAGE: HCPCS

## 2024-03-01 PROCEDURE — 75984 XRAY CONTROL CATHETER CHANGE: CPT

## 2024-03-01 RX ORDER — LIDOCAINE WITH 8.4% SOD BICARB 0.9%(10ML)
SYRINGE (ML) INJECTION AS NEEDED
Status: COMPLETED | OUTPATIENT
Start: 2024-03-01 | End: 2024-03-01

## 2024-03-01 RX ADMIN — Medication 6 ML: at 08:46

## 2024-03-01 RX ADMIN — IOHEXOL 20 ML: 350 INJECTION, SOLUTION INTRAVENOUS at 09:38

## 2024-03-01 NOTE — SEDATION DOCUMENTATION
Procedure completed by Dr Carmona. Pt tolerated without issues. Education provided to pt prior to and throughout procedure, questions answered as offered. Tube sutured, dry dressing to site.

## 2024-03-01 NOTE — DISCHARGE INSTRUCTIONS
" Please call Dr Lux Duarte's office to schedule a follow-up appointment at [phone].            TUBE CARE INSTRUCTIONS    Care after your procedure:    Resume your normal diet. Small sips of flat soda will help with nausea.    1. The properly functioning catheter should be forward flushed once (1x) daily with 5ml of normal saline using clean technique. You will be given a prescription for flushes.   To flush the tube, clean both connections with alcohol swab.Twist off the drainage bag/ bulb  tubing and twist the saline syringe into the drainage tube and flush. Remove the syringe and twist the drainage bag / bulb tubing tubing back on.    2. The drainage bag/bulb may be emptied as necessary. Keep a record of the amount of fluid you drain from your tube. This should be done with clean technique as well.     3. A fresh dressing should be applied daily over the tube insertion site.     4. As the tube is secured to the skin with only a suture,try not to pull on your tube. Tub baths are not permitted. Showers are permitted if the patient's skin entry site is prevented from getting wet. Similarly, washcloth \"baths\" are acceptable.     Contact Interventional Radiology at [phone] (GÓMEZ PATIENTS: Contact Interventional Radiology at [phone]) ([facility] PATIENTS: Contact Interventional Radiology at [phone]) if:    1. Leakage or large amounts of liquid around the catheter.    2. Fever of 101 degrees lasting several hours without other obvious cause (such as sore throat, flu, etc).    3. Persistent nausea or vomiting.    4. Diminished drainage, which may be associated with pressure or pain. Or when the     drainage from your tube is less than 10mls for 48 hours.    5. Catheter pulled back or falls out.      The following pharmacies carry the flush syringes.       [facility]                     [facility] [address]                     [address] [facility]                         [phone]  [place]                       [place]  Phone [phone]            Phone [phone]                                                                                                       [facility]             [facility]                                [phone]  [address] [place] [address]  [employer] [facility] the [facility] [place]NA [place]  Phone [phone]            Phone [phone]                      [facility]                                                                                                          [phone]  [facility]  [address]  [place]                                                                               [facility]  Phone [phone]

## 2024-03-01 NOTE — BRIEF OP NOTE (RAD/CATH)
INTERVENTIONAL RADIOLOGY PROCEDURE NOTE    Date: 3/1/2024    Procedure: IR DRAINAGE TUBE CHECK/CHANGE/REPOSITION/REINSERTION/UPSIZE     Preoperative diagnosis:   1. Diverticulitis       Postoperative diagnosis: Same.    Surgeon: Billy Carmona MD     Assistant: None. No qualified resident was available.    Blood loss: minimal    Specimens: none    Findings: Diverticular abscess drain was occluded and partially pulled back.  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  Plan:  Return in 2 weeks for another tube change.  Flush with only 5cc NSS qd.  Following up with GI.  I will schedule the patient to also follow up with Dr. Lux Duarte who saw the patient in the hospital just in case the fistula does not close on it's own and requires surgical resection.    Complications: None immediate.    Anesthesia: local

## 2024-03-09 ENCOUNTER — HOSPITAL ENCOUNTER (OUTPATIENT)
Dept: RADIOLOGY | Facility: HOSPITAL | Age: 59
Discharge: HOME | End: 2024-03-09
Payer: MEDICARE

## 2024-03-09 DIAGNOSIS — S46.011A STRAIN OF MUSCLE(S) AND TENDON(S) OF THE ROTATOR CUFF OF RIGHT SHOULDER, INITIAL ENCOUNTER: ICD-10-CM

## 2024-03-09 DIAGNOSIS — M54.12 CERVICAL RADICULOPATHY: ICD-10-CM

## 2024-03-09 PROCEDURE — 72040 X-RAY EXAM NECK SPINE 2-3 VW: CPT

## 2024-03-09 PROCEDURE — 73030 X-RAY EXAM OF SHOULDER: CPT

## 2024-03-11 ENCOUNTER — TELEPHONE (OUTPATIENT)
Age: 59
End: 2024-03-11

## 2024-03-11 NOTE — TELEPHONE ENCOUNTER
Patient is looking to call [facility], I gave him the phone number [phone]

## 2024-03-21 ENCOUNTER — HOSPITAL ENCOUNTER (OUTPATIENT)
Dept: NON INVASIVE DIAGNOSTICS | Facility: HOSPITAL | Age: 59
Discharge: HOME | End: 2024-03-21
Attending: RADIOLOGY | Admitting: RADIOLOGY
Payer: MEDICARE

## 2024-03-21 DIAGNOSIS — K57.92 DIVERTICULITIS: ICD-10-CM

## 2024-03-21 PROCEDURE — 49424 ASSESS CYST CONTRAST INJECT: CPT

## 2024-03-21 PROCEDURE — 49424 ASSESS CYST CONTRAST INJECT: CPT | Performed by: RADIOLOGY

## 2024-03-21 PROCEDURE — 76080 X-RAY EXAM OF FISTULA: CPT

## 2024-03-21 PROCEDURE — 76080 X-RAY EXAM OF FISTULA: CPT | Performed by: RADIOLOGY

## 2024-03-21 RX ADMIN — IOHEXOL 5 ML: 350 INJECTION, SOLUTION INTRAVENOUS at 11:52

## 2024-03-21 NOTE — SEDATION DOCUMENTATION
Procedure completed by Dr Draper. Pt tolerated without issues. Per Dr Draper's assessment, now connected to bag drainage. Supplies and prescriptions for flushes provided to pt. Again re-iterated need for follow-up with surgery. Pt verbalized understanding.

## 2024-03-21 NOTE — BRIEF OP NOTE (RAD/CATH)
INTERVENTIONAL RADIOLOGY PROCEDURE NOTE    Date: 3/21/2024    Procedure:   Procedure Summary       Date: 03/21/24 Room / Location: [facility] Cardiac Cath Lab    Anesthesia Start:  Anesthesia Stop:     Procedure: IR DRAINAGE TUBE CHECK/CHANGE/REPOSITION/REINSERTION/UPSIZE Diagnosis:       Diverticulitis      (Sigmoid diverticular abscess with fistula to colon for another 2 week check and follow-up with GI and Surgery)    Scheduled Providers:  Responsible Provider:     Anesthesia Type: Not recorded ASA Status: Not recorded            Preoperative diagnosis:   1. Diverticulitis         Postoperative diagnosis: Same.    Surgeon: Jakob Draper MD     Assistant: None. No qualified resident was available.    Blood loss: None    Specimens: None     Findings: Diverticulitis abscess drain check showed persistent small fistulous communication to colon.  Drain kept in place to bag gravity drainage.    Complications: None immediate.    Anesthesia: none

## 2024-03-21 NOTE — H&P
Interventional Radiology  History and Physical 3/21/2024     Homero Driver Jr.   1965   [id number]    Assessment/Plan:  58 year old male with history of perforated sigmoid diverticulitis s/p drainage catheter placement on 2/18/2024 followed by exchange on 3/1/2024 returns for drain check.    Problem List Items Addressed This Visit    None  Visit Diagnoses       Diverticulitis        Relevant Orders    IR drainage tube check/change/reposition/reinsertion/upsize               Subjective:     Patient ID: Homero Driver Jr. is a 58 y.o. male.    History of Present Illness  Patient with history of perforated sigmoid diverticulitis s/p drainage catheter placement on 2/18/2024 followed by exchange on 3/1/2024 returns for drain check.    Review of Systems      Past Medical History:   Diagnosis Date    Anesthesia complication     body temp burns off anesthesia more quickly--woke up during surgery    Anxiety     due to medical conditions    Arthritis     Hypertension         Past Surgical History:   Procedure Laterality Date    BACK SURGERY      CERVICAL FUSION N/A 4/5/2016    Procedure: FUSION CERVICAL POSTERIOR - revision of instrumentation T1;  Surgeon: Srikanth Siu MD;  [facility];  Service:     FOOT MASS EXCISION Right     HERNIA REPAIR Right     x2    INCISION AND DRAINAGE POSTERIOR SPINE N/A 4/27/2016    Procedure: INCISION AND DRAINAGE (I&D) SPINE;  Surgeon: Srikanth Siu MD;  [facility];  Service:     IR DRAINAGE TUBE CHECK/CHANGE/REPOSITION/REINSERTION/UPSIZE  3/1/2024    IR DRAINAGE TUBE PLACEMENT  2/18/2024    AK ARTHRODESIS PST/PSTLAT TQ 1NTRSPC EA ADDL NTRSPC N/A 4/4/2016    Procedure: C3-C6 POSTERIOR CERVICAL LAMINECTOMY AND DECOMPRESSION; C3-T2 INSTRUMENTED POSTERIOR CERVICAL SPINAL FUSION WITH POSSIBLE ADDITIONAL LEVELS WITH ALLOGRAFT AND AUTOGRAFT (IMPULSE MONITORING);  Surgeon: Srikanth Siu MD;  [facility];  Service: Orthopedics    SHOULDER OPEN ROTATOR CUFF REPAIR  "Bilateral         Social History     Tobacco Use   Smoking Status Every Day    Current packs/day: 0.25    Average packs/day: 0.3 packs/day for 15.0 years (3.8 ttl pk-yrs)    Types: Cigarettes   Smokeless Tobacco Never        Social History     Substance and Sexual Activity   Alcohol Use Yes    Alcohol/week: 2.0 standard drinks of alcohol    Types: 2 Shots of liquor per week    Comment: daily with tea        Social History     Substance and Sexual Activity   Drug Use Yes    Frequency: 7.0 times per week    Types: Marijuana    Comment: one joint daily for pain last 2 days ago        No Known Allergies    Current Outpatient Medications   Medication Sig Dispense Refill    albuterol (PROVENTIL HFA,VENTOLIN HFA) 90 mcg/act inhaler INHALE 2 PUFFS INTO THE LUNGS EVERY 4 HOURS AS NEEDED FOR 30 DAYS      diclofenac (VOLTAREN) 75 mg EC tablet Take 1 tablet (75 mg total) by mouth 2 (two) times a day (Patient not taking: Reported on 6/7/2021) 60 tablet 5    gabapentin (NEURONTIN) 300 mg capsule Take 100 mg by mouth 3 (three) times a day      hydrOXYzine HCL (ATARAX) 25 mg tablet TAKE 1 TABLET BY MOUTH THREE TIMES A DAY AS NEEDED FOR 30 DAYS      meloxicam (MOBIC) 7.5 mg tablet Take 7.5 mg by mouth 2 (two) times a day as needed      mirtazapine (REMERON) 15 mg tablet Take 15 mg by mouth daily at bedtime (Patient not taking: Reported on 2/16/2024)      sodium chloride, PF, 0.9 % 10 mL by Intracatheter route daily Intracatheter flushing daily. May substitute prefilled syringe with normal saline 10 mL vials, 10 mL syringes, and 18 g blunt needles 300 mL 2     No current facility-administered medications for this encounter.          Objective:    There were no vitals filed for this visit.     Physical Exam  Genitourinary:     Comments: Transgluteal abscess drain present.          No results found for: \"BNP\"   Lab Results   Component Value Date    WBC 9.74 02/20/2024    HGB 12.0 02/20/2024    HCT 35.8 (L) 02/20/2024    MCV 90 " 02/20/2024     02/20/2024     Lab Results   Component Value Date    INR 1.04 02/16/2024    INR 0.99 02/29/2016    PROTIME 13.9 02/16/2024    PROTIME 12.5 02/29/2016     Lab Results   Component Value Date    PTT 29 02/16/2024         I have personally reviewed pertinent imaging and laboratory results.     Code Status: Prior  Advance Directive and Living Will:      Power of :    POLST:      This text is generated with voice recognition software. There may be translation, syntax,  or grammatical errors. If you have any questions, please contact the dictating provider.

## 2024-03-21 NOTE — DISCHARGE INSTRUCTIONS
"     TUBE CARE INSTRUCTIONS    Care after your procedure:    Resume your normal diet. Small sips of flat soda will help with nausea.    1. The properly functioning catheter should be forward flushed once (1x) daily with 5ml of normal saline using clean technique. You will be given a prescription for flushes.   To flush the tube, clean both connections with alcohol swab.Twist off the drainage bag/ bulb  tubing and twist the saline syringe into the drainage tube and flush. Remove the syringe and twist the drainage bag / bulb tubing tubing back on.    2. The drainage bag/bulb may be emptied as necessary. Keep a record of the amount of fluid you drain from your tube. This should be done with clean technique as well.     3. A fresh dressing should be applied daily over the tube insertion site.     4. As the tube is secured to the skin with only a suture,try not to pull on your tube. Tub baths are not permitted. Showers are permitted if the patient's skin entry site is prevented from getting wet. Similarly, washcloth \"baths\" are acceptable.     Contact Interventional Radiology at [phone] ([place] PATIENTS: Contact Interventional Radiology at [phone]) ([place] PATIENTS: Contact Interventional Radiology at [phone]) if:    1. Leakage or large amounts of liquid around the catheter.    2. Fever of 101 degrees lasting several hours without other obvious cause (such as sore throat, flu, etc).    3. Persistent nausea or vomiting.    4. Diminished drainage, which may be associated with pressure or pain. Or when the     drainage from your tube is less than 10mls for 48 hours.    5. Catheter pulled back or falls out.      The following pharmacies carry the flush syringes.       [facility]                     [facility] [address] [address]                     [address] [place] [address]                         [phone]  John uMrrell " [place]  Phone [phone]            Phone [phone]                                                                                                       Jordy Brewer   [facility]             [facility]                                [phone]  [address] [place] [address] GEOVANNA AUSTIN  Phone [phone]            Phone [phone]                      [facility]                                                                                                          [phone]  [facility]  [address]  John AUSTIN                                                                               [facility]  Phone [phone]

## 2024-04-05 ENCOUNTER — HOSPITAL ENCOUNTER (OUTPATIENT)
Dept: NON INVASIVE DIAGNOSTICS | Facility: HOSPITAL | Age: 59
Discharge: HOME | End: 2024-04-05
Attending: RADIOLOGY
Payer: MEDICARE

## 2024-04-05 DIAGNOSIS — K57.92 DIVERTICULITIS: ICD-10-CM

## 2024-04-05 PROCEDURE — 76080 X-RAY EXAM OF FISTULA: CPT | Performed by: RADIOLOGY

## 2024-04-05 PROCEDURE — 76080 X-RAY EXAM OF FISTULA: CPT

## 2024-04-05 PROCEDURE — 75984 XRAY CONTROL CATHETER CHANGE: CPT

## 2024-04-05 PROCEDURE — 49423 EXCHANGE DRAINAGE CATHETER: CPT

## 2024-04-05 PROCEDURE — 49424 ASSESS CYST CONTRAST INJECT: CPT

## 2024-04-05 PROCEDURE — 49424 ASSESS CYST CONTRAST INJECT: CPT | Performed by: RADIOLOGY

## 2024-04-05 RX ADMIN — IOHEXOL 8 ML: 350 INJECTION, SOLUTION INTRAVENOUS at 11:22

## 2024-04-05 NOTE — DISCHARGE INSTRUCTIONS
"     TUBE CARE INSTRUCTIONS    Care after your procedure:    Resume your normal diet. Small sips of flat soda will help with nausea.    1. The properly functioning catheter should be forward flushed once (1x) daily with 10ml of normal saline using clean technique. You will be given a prescription for flushes.   To flush the tube, clean both connections with alcohol swab.Twist off the drainage bag/ bulb  tubing and twist the saline syringe into the drainage tube and flush. Remove the syringe and twist the drainage bag / bulb tubing tubing back on.    2. The drainage bag/bulb may be emptied as necessary. Keep a record of the amount of fluid you drain from your tube. This should be done with clean technique as well.     3. A fresh dressing should be applied daily over the tube insertion site.     4. As the tube is secured to the skin with only a suture,try not to pull on your tube. Tub baths are not permitted. Showers are permitted if the patient's skin entry site is prevented from getting wet. Similarly, washcloth \"baths\" are acceptable.     Contact Interventional Radiology at [phone] ([place] PATIENTS: Contact Interventional Radiology at [phone]) ([place] PATIENTS: Contact Interventional Radiology at [phone]) if:    1. Leakage or large amounts of liquid around the catheter.    2. Fever of 101 degrees lasting several hours without other obvious cause (such as sore throat, flu, etc).    3. Persistent nausea or vomiting.    4. Diminished drainage, which may be associated with pressure or pain. Or when the     drainage from your tube is less than 10mls for 48 hours.    5. Catheter pulled back or falls out.      The following pharmacies carry the flush syringes.       [facility]                     [facility] [address] [address]                     [address] [place] [address]                         [phone]  John Murrell " [place]  Phone [phone]            Phone [phone]                                                                                                       Jordy Brewer   [facility]             [facility]                                [phone]  [address] [place] [address] GEOVANNA AUSTIN  Phone [phone]            Phone [phone]                      [facility]                                                                                                          [phone]  [facility]  [address]  John AUSTIN                                                                               [facility]  Phone [phone]

## 2024-04-05 NOTE — SEDATION DOCUMENTATION
Pt tolerated tube check. Drainage tube remain intact to bag drain. Dry dressing to site. Pt to follow up in 2weeks. Pt instructed to follow up with surgery team.

## 2024-04-05 NOTE — BRIEF OP NOTE (RAD/CATH)
INTERVENTIONAL RADIOLOGY PROCEDURE NOTE    Date: 4/5/2024    Procedure:   Procedure Summary       Date: 04/05/24 Room / Location: [facility] Cardiac Cath Lab    Anesthesia Start:  Anesthesia Stop:     Procedure: IR DRAINAGE TUBE CHECK/CHANGE/REPOSITION/REINSERTION/UPSIZE Diagnosis:       Diverticulitis      (diverticulitis abscess drain check)    Scheduled Providers:  Responsible Provider:     Anesthesia Type: Not recorded ASA Status: Not recorded            Preoperative diagnosis:   1. Diverticulitis         Postoperative diagnosis: Same.    Surgeon: Jose Roberto Sy MD     Assistant: None. No qualified resident was available.    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 Catheter left in place.    Complications: None immediate.    Anesthesia: none

## 2024-04-19 ENCOUNTER — TELEPHONE (OUTPATIENT)
Age: 59
End: 2024-04-19

## 2024-04-22 ENCOUNTER — HOSPITAL ENCOUNTER (OUTPATIENT)
Facility: HOSPITAL | Age: 59
Setting detail: SURGERY ADMIT
End: 2024-04-22
Attending: SURGERY | Admitting: SURGERY
Payer: MEDICARE

## 2024-04-22 ENCOUNTER — OFFICE VISIT (OUTPATIENT)
Dept: SURGERY | Facility: CLINIC | Age: 59
End: 2024-04-22
Payer: MEDICARE

## 2024-04-22 VITALS
BODY MASS INDEX: 25.41 KG/M2 | DIASTOLIC BLOOD PRESSURE: 93 MMHG | HEIGHT: 72 IN | OXYGEN SATURATION: 99 % | HEART RATE: 71 BPM | TEMPERATURE: 96.9 F | SYSTOLIC BLOOD PRESSURE: 152 MMHG | WEIGHT: 187.6 LBS

## 2024-04-22 DIAGNOSIS — Z01.818 PREOPERATIVE EXAMINATION: Primary | ICD-10-CM

## 2024-04-22 DIAGNOSIS — K57.20 PERFORATION OF SIGMOID COLON DUE TO DIVERTICULITIS: ICD-10-CM

## 2024-04-22 PROCEDURE — 99213 OFFICE O/P EST LOW 20 MIN: CPT | Performed by: SURGERY

## 2024-04-22 RX ORDER — METRONIDAZOLE 500 MG/100ML
500 INJECTION, SOLUTION INTRAVENOUS ONCE
OUTPATIENT
Start: 2024-04-22 | End: 2024-04-22

## 2024-04-22 RX ORDER — CEFAZOLIN SODIUM 2 G/50ML
2000 SOLUTION INTRAVENOUS ONCE
OUTPATIENT
Start: 2024-04-22 | End: 2024-04-22

## 2024-04-22 NOTE — PROGRESS NOTES
"[facility] History and Physical Note:    Assessment:  Perforated sigmoid diverticulitis with fistula to colon    Plan:  CBC, CMP   2. Laparoscopic hand-assisted sigmoid colectomy, possible Jazmin's  3.  We will place bilateral ureteral stents at the time of surgery due to the degree of pelvic involvement.    Chief Complaint:  \"I'm here for f/u\"    HPI  Pt is a 57 yo male with h/o perforated sigmoid diverticulitis in Feb. This was treated with perc drainage and ABx.  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 Catheter left in place.  Complications: None immediate.  Anesthesia: none     Pt has missed/rescheduled some f/u дмитрий'ts with me.    Last colonoscopy Aug 2023:  IMPRESSION:  Two polyps measuring from 7 mm up to 12 mm in the rectosigmoid; removed by cold snare  Diverticulosis of mild severity in the sigmoid colon.  RECOMMENDATION:    Repeat colonoscopy in 3 years     Personal history of colon polyps       PMH:  Past Medical History:   Diagnosis Date    Anesthesia complication     body temp burns off anesthesia more quickly--woke up during surgery    Anxiety     due to medical conditions    Arthritis     Hypertension        PSH:  Past Surgical History:   Procedure Laterality Date    BACK SURGERY      CERVICAL FUSION N/A 4/5/2016    Procedure: FUSION CERVICAL POSTERIOR - revision of instrumentation T1;  Surgeon: Srikanth Siu MD;  [facility];  Service:     FOOT MASS EXCISION Right     HERNIA REPAIR Right     x2    INCISION AND DRAINAGE POSTERIOR SPINE N/A 4/27/2016    Procedure: INCISION AND DRAINAGE (I&D) SPINE;  Surgeon: Srikanth Siu MD;  [facility];  Service:     IR DRAINAGE TUBE CHECK/CHANGE/REPOSITION/REINSERTION/UPSIZE  3/1/2024    IR DRAINAGE TUBE CHECK/CHANGE/REPOSITION/REINSERTION/UPSIZE  3/21/2024    IR DRAINAGE TUBE CHECK/CHANGE/REPOSITION/REINSERTION/UPSIZE  4/5/2024    IR " DRAINAGE TUBE PLACEMENT  2/18/2024    [place] ARTHRODESIS PST/PSTLAT TQ 1NTRSPC EA ADDL NTRSPC N/A 4/4/2016    Procedure: C3-C6 POSTERIOR CERVICAL LAMINECTOMY AND DECOMPRESSION; C3-T2 INSTRUMENTED POSTERIOR CERVICAL SPINAL FUSION WITH POSSIBLE ADDITIONAL LEVELS WITH ALLOGRAFT AND AUTOGRAFT (IMPULSE MONITORING);  Surgeon: Srikanth Siu MD;  [facility];  Service: Orthopedics    SHOULDER OPEN ROTATOR CUFF REPAIR Bilateral        Home Meds:  Current Outpatient Medications on File Prior to Visit   Medication Sig Dispense Refill    albuterol (PROVENTIL HFA,VENTOLIN HFA) 90 mcg/act inhaler INHALE 2 PUFFS INTO THE LUNGS EVERY 4 HOURS AS NEEDED FOR 30 DAYS      diclofenac (VOLTAREN) 75 mg EC tablet Take 1 tablet (75 mg total) by mouth 2 (two) times a day (Patient not taking: Reported on 6/7/2021) 60 tablet 5    gabapentin (NEURONTIN) 300 mg capsule Take 100 mg by mouth 3 (three) times a day      hydrOXYzine HCL (ATARAX) 25 mg tablet TAKE 1 TABLET BY MOUTH THREE TIMES A DAY AS NEEDED FOR 30 DAYS      meloxicam (MOBIC) 7.5 mg tablet Take 7.5 mg by mouth 2 (two) times a day as needed      mirtazapine (REMERON) 15 mg tablet Take 15 mg by mouth daily at bedtime (Patient not taking: Reported on 2/16/2024)      sodium chloride, PF, 0.9 % 10 mL by Intracatheter route daily Intracatheter flushing daily. May substitute prefilled syringe with normal saline 10 mL vials, 10 mL syringes, and 18 g blunt needles 300 mL 2     No current facility-administered medications on file prior to visit.       Allergies:  No Known Allergies    Social Hx:  Social History     Socioeconomic History    Marital status: /Civil Union     Spouse name: Not on file    Number of children: Not on file    Years of education: Not on file    Highest education level: Not on file   Occupational History    Not on file   Tobacco Use    Smoking status: Every Day     Current packs/day: 0.25     Average packs/day: 0.3 packs/day for 15.0 years (3.8 ttl pk-yrs)      Types: Cigarettes    Smokeless tobacco: Never   Vaping Use    Vaping status: Never Used   Substance and Sexual Activity    Alcohol use: Yes     Alcohol/week: 2.0 standard drinks of alcohol     Types: 2 Shots of liquor per week     Comment: daily with tea    Drug use: Yes     Frequency: 7.0 times per week     Types: Marijuana     Comment: one joint daily for pain last 2 days ago    Sexual activity: Not on file   Other Topics Concern    Not on file   Social History Narrative    Daily caffeinated cola consumption    Drinks coffee    Tea     Social Determinants of Health     Financial Resource Strain: Not on file   Food Insecurity: No Food Insecurity (2/19/2024)    Hunger Vital Sign     Worried About Running Out of Food in the Last Year: Never true     Ran Out of Food in the Last Year: Never true   Transportation Needs: No Transportation Needs (2/19/2024)    PRAPARE - Transportation     Lack of Transportation (Medical): No     Lack of Transportation (Non-Medical): No   Physical Activity: Not on file   Stress: Not on file   Social Connections: Not on file   Intimate Partner Violence: Not on file   Housing Stability: Low Risk  (2/19/2024)    Housing Stability Vital Sign     Unable to Pay for Housing in the Last Year: No     Number of Places Lived in the Last Year: 1     Unstable Housing in the Last Year: No        Family Hx:    Family History   Problem Relation Age of Onset    Cancer Family          Review of Systems   Constitutional:  Negative for chills and fever.   Respiratory:          Smoking   Cardiovascular: Negative.    Gastrointestinal:         See HPI   Genitourinary: Negative.    Musculoskeletal: Negative.    Skin: Negative.    Neurological: Negative.    Hematological: Negative.      /93 (BP Location: Left arm, Patient Position: Sitting, Cuff Size: Large)   Pulse 71   Temp (!) 96.9 °F (36.1 °C) (Core)   Ht 6' (1.829 m)   Wt 85.1 kg (187 lb 9.6 oz)   SpO2 99%   BMI 25.44 kg/m²         Physical  Exam  Vitals reviewed.   Constitutional:       General: He is not in acute distress.     Appearance: Normal appearance.   HENT:      Head: Normocephalic and atraumatic.      Nose: Nose normal.   Cardiovascular:      Rate and Rhythm: Normal rate and regular rhythm.   Pulmonary:      Effort: Pulmonary effort is normal. No respiratory distress.      Breath sounds: Normal breath sounds.   Abdominal:      General: Abdomen is flat. There is no distension.      Palpations: Abdomen is soft.      Tenderness: There is no abdominal tenderness.      Comments: Trans gluteal drain in place   Musculoskeletal:         General: Normal range of motion.      Cervical back: Normal range of motion and neck supple. No rigidity.   Skin:     General: Skin is warm and dry.   Neurological:      General: No focal deficit present.      Mental Status: He is alert.   Psychiatric:         Mood and Affect: Mood normal.     Pertinent labs reviewed    Pertinent images and available reads personally reviewed    Pertinent notes reviewed       Lux Duarte MD FACS  [facility]  [phone]

## 2024-04-22 NOTE — H&P
"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 Laparoscopic hand-assisted sigmoid colectomy, possible Jazmin's  3.  We will place bilateral ureteral stents at the time of surgery due to the degree of pelvic involvement.    Chief Complaint:  \"I'm here for f/u\"    HPI  Pt is a 59 yo male with h/o perforated sigmoid diverticulitis in Feb. This was treated with perc drainage and ABx.  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 Catheter left in place.  Complications: None immediate.  Anesthesia: none     Pt has missed/rescheduled some f/u дмитрий'ts with me.    Last colonoscopy Aug 2023:  IMPRESSION:  Two polyps measuring from 7 mm up to 12 mm in the rectosigmoid; removed by cold snare  Diverticulosis of mild severity in the sigmoid colon.  RECOMMENDATION:    Repeat colonoscopy in 3 years     Personal history of colon polyps       PMH:  Past Medical History:   Diagnosis Date    Anesthesia complication     body temp burns off anesthesia more quickly--woke up during surgery    Anxiety     due to medical conditions    Arthritis     Hypertension        PSH:  Past Surgical History:   Procedure Laterality Date    BACK SURGERY      CERVICAL FUSION N/A 4/5/2016    Procedure: FUSION CERVICAL POSTERIOR - revision of instrumentation T1;  Surgeon: Srikanth Siu MD;  [facility];  Service:     FOOT MASS EXCISION Right     HERNIA REPAIR Right     x2    INCISION AND DRAINAGE POSTERIOR SPINE N/A 4/27/2016    Procedure: INCISION AND DRAINAGE (I&D) SPINE;  Surgeon: Srikanth Siu MD;  [facility];  Service:     IR DRAINAGE TUBE CHECK/CHANGE/REPOSITION/REINSERTION/UPSIZE  3/1/2024    IR DRAINAGE TUBE CHECK/CHANGE/REPOSITION/REINSERTION/UPSIZE  3/21/2024    IR DRAINAGE TUBE CHECK/CHANGE/REPOSITION/REINSERTION/UPSIZE  4/5/2024    IR " DRAINAGE TUBE PLACEMENT  2/18/2024    [place] ARTHRODESIS PST/PSTLAT TQ 1NTRSPC EA ADDL NTRSPC N/A 4/4/2016    Procedure: C3-C6 POSTERIOR CERVICAL LAMINECTOMY AND DECOMPRESSION; C3-T2 INSTRUMENTED POSTERIOR CERVICAL SPINAL FUSION WITH POSSIBLE ADDITIONAL LEVELS WITH ALLOGRAFT AND AUTOGRAFT (IMPULSE MONITORING);  Surgeon: Srikanth Siu MD;  [facility];  Service: Orthopedics    SHOULDER OPEN ROTATOR CUFF REPAIR Bilateral        Home Meds:  Current Outpatient Medications on File Prior to Visit   Medication Sig Dispense Refill    albuterol (PROVENTIL HFA,VENTOLIN HFA) 90 mcg/act inhaler INHALE 2 PUFFS INTO THE LUNGS EVERY 4 HOURS AS NEEDED FOR 30 DAYS      diclofenac (VOLTAREN) 75 mg EC tablet Take 1 tablet (75 mg total) by mouth 2 (two) times a day (Patient not taking: Reported on 6/7/2021) 60 tablet 5    gabapentin (NEURONTIN) 300 mg capsule Take 100 mg by mouth 3 (three) times a day      hydrOXYzine HCL (ATARAX) 25 mg tablet TAKE 1 TABLET BY MOUTH THREE TIMES A DAY AS NEEDED FOR 30 DAYS      meloxicam (MOBIC) 7.5 mg tablet Take 7.5 mg by mouth 2 (two) times a day as needed      mirtazapine (REMERON) 15 mg tablet Take 15 mg by mouth daily at bedtime (Patient not taking: Reported on 2/16/2024)      sodium chloride, PF, 0.9 % 10 mL by Intracatheter route daily Intracatheter flushing daily. May substitute prefilled syringe with normal saline 10 mL vials, 10 mL syringes, and 18 g blunt needles 300 mL 2     No current facility-administered medications on file prior to visit.       Allergies:  No Known Allergies    Social Hx:  Social History     Socioeconomic History    Marital status: /Civil Union     Spouse name: Not on file    Number of children: Not on file    Years of education: Not on file    Highest education level: Not on file   Occupational History    Not on file   Tobacco Use    Smoking status: Every Day     Current packs/day: 0.25     Average packs/day: 0.3 packs/day for 15.0 years (3.8 ttl pk-yrs)      Types: Cigarettes    Smokeless tobacco: Never   Vaping Use    Vaping status: Never Used   Substance and Sexual Activity    Alcohol use: Yes     Alcohol/week: 2.0 standard drinks of alcohol     Types: 2 Shots of liquor per week     Comment: daily with tea    Drug use: Yes     Frequency: 7.0 times per week     Types: Marijuana     Comment: one joint daily for pain last 2 days ago    Sexual activity: Not on file   Other Topics Concern    Not on file   Social History Narrative    Daily caffeinated cola consumption    Drinks coffee    Tea     Social Determinants of Health     Financial Resource Strain: Not on file   Food Insecurity: No Food Insecurity (2/19/2024)    Hunger Vital Sign     Worried About Running Out of Food in the Last Year: Never true     Ran Out of Food in the Last Year: Never true   Transportation Needs: No Transportation Needs (2/19/2024)    PRAPARE - Transportation     Lack of Transportation (Medical): No     Lack of Transportation (Non-Medical): No   Physical Activity: Not on file   Stress: Not on file   Social Connections: Not on file   Intimate Partner Violence: Not on file   Housing Stability: Low Risk  (2/19/2024)    Housing Stability Vital Sign     Unable to Pay for Housing in the Last Year: No     Number of Places Lived in the Last Year: 1     Unstable Housing in the Last Year: No        Family Hx:    Family History   Problem Relation Age of Onset    Cancer Family          Review of Systems   Constitutional:  Negative for chills and fever.   Respiratory:          Smoking   Cardiovascular: Negative.    Gastrointestinal:         See HPI   Genitourinary: Negative.    Musculoskeletal: Negative.    Skin: Negative.    Neurological: Negative.    Hematological: Negative.      /93 (BP Location: Left arm, Patient Position: Sitting, Cuff Size: Large)   Pulse 71   Temp (!) 96.9 °F (36.1 °C) (Core)   Ht 6' (1.829 m)   Wt 85.1 kg (187 lb 9.6 oz)   SpO2 99%   BMI 25.44 kg/m²         Physical  Exam  Vitals reviewed.   Constitutional:       General: He is not in acute distress.     Appearance: Normal appearance.   HENT:      Head: Normocephalic and atraumatic.      Nose: Nose normal.   Cardiovascular:      Rate and Rhythm: Normal rate and regular rhythm.   Pulmonary:      Effort: Pulmonary effort is normal. No respiratory distress.      Breath sounds: Normal breath sounds.   Abdominal:      General: Abdomen is flat. There is no distension.      Palpations: Abdomen is soft.      Tenderness: There is no abdominal tenderness.      Comments: Trans gluteal drain in place   Musculoskeletal:         General: Normal range of motion.      Cervical back: Normal range of motion and neck supple. No rigidity.   Skin:     General: Skin is warm and dry.   Neurological:      General: No focal deficit present.      Mental Status: He is alert.   Psychiatric:         Mood and Affect: Mood normal.     Pertinent labs reviewed    Pertinent images and available reads personally reviewed    Pertinent notes reviewed       Lux Duarte MD FACS  [facility]  [phone]

## 2024-04-24 ENCOUNTER — OFFICE VISIT (OUTPATIENT)
Dept: GASTROENTEROLOGY | Facility: CLINIC | Age: 59
End: 2024-04-24
Payer: MEDICARE

## 2024-04-24 VITALS
HEART RATE: 69 BPM | WEIGHT: 187 LBS | HEIGHT: 72 IN | BODY MASS INDEX: 25.33 KG/M2 | SYSTOLIC BLOOD PRESSURE: 162 MMHG | DIASTOLIC BLOOD PRESSURE: 120 MMHG

## 2024-04-24 DIAGNOSIS — Z12.11 SCREENING FOR COLON CANCER: Primary | ICD-10-CM

## 2024-04-24 DIAGNOSIS — K57.20 DIVERTICULITIS OF LARGE INTESTINE WITH ABSCESS WITHOUT BLEEDING: ICD-10-CM

## 2024-04-24 PROCEDURE — 99243 OFF/OP CNSLTJ NEW/EST LOW 30: CPT | Performed by: NURSE PRACTITIONER

## 2024-04-24 RX ORDER — DULOXETIN HYDROCHLORIDE 20 MG/1
CAPSULE, DELAYED RELEASE ORAL EVERY EVENING
COMMUNITY
Start: 2024-04-20

## 2024-04-24 RX ORDER — CYCLOBENZAPRINE HCL 10 MG
TABLET ORAL
COMMUNITY
Start: 2024-02-28

## 2024-04-24 NOTE — PROGRESS NOTES
[facility] - Outpatient Consultation  Homero Driver Jr. 58 y.o. male MRN: [id number]  Encounter: [id number]          ASSESSMENT AND PLAN:      1. Diverticulitis of large intestine with abscess without bleeding  Patient  was recently hospitalized 2/2024 due to perforated diverticulitis. Patient was treated medically and drain was placed by IR at that time.  Patient is scheduled for laparoscopic hand-assisted sigmoid colectomy, possible Rea's procedure on 5/8 with Dr. Collazo .   - Ambulatory Referral to Gastroenterology   -I reached out to Dr. Collazo to find out if he was requesting a colonoscopy to be obtained prior to surgical intervention.  Patient aware patient's last colonoscopy was August 2023.  Per Dr. Duarte no colonoscopy is needed at this time prior to surgery, patient notified.    2. Screening for colon cancer  3. Hx adenomatous colon polyp  Colonoscopy done August 2023 which showed 2 polyps measuring from 7 mm to 12 mm in sigmoid colon removed by cold snare.  Diverticulosis of mild severity in the sigmoid colon.  Recommendations at that time was for repeat colonoscopy in 3 years.  -Surveillance colonoscopy due 8/2026    ______________________________________________________________________    HPI:  Homero Driver Jr. patient denies nausea, vomiting, acid reflux, heartburn, epigastric abdominal pain.  Patient reported prior to admission to the hospital for diverticular is a pleasant 58-year-old male with a past medical history of hypertension, fever and cirrhosis of the cervical spine,s/p cervical spinal fusion, wound dehiscence, anxiety, smoking, and diverticulitis of large intestine with abscess who presents to office as consult.  Patient  was recently hospitalized 2/2024 due to perforated diverticulitis.  Patient was treated medically and drain was placed by IR at that time.  Patient is scheduled for laparoscopic hand-assisted sigmoid colectomy, possible Rea's procedure on  5/8 with Dr. Collazo. Patient denies nausea, vomiting, acid reflux, heartburn, epigastric abdominal pain. Patient denies any black tarry stool.  Patient reported he did have a small amount of blood in stool prior to hospitalization for diverticulitis with perforation. Patient reports no further episodes of blood in stool since that time and since drain was placed by IR. Bowel patterns are regular.  Abdomen exam benign no abdominal tenderness or guarding. Recent lab work reviewed.     CT abdomen and pelvis done 2/16/2024 showed findings above highly suspicious for perforated acute sigmoid diverticulitis with small collection along the inferior margin of mid distal sigmoid bowel loop concerning for developing abscess.  Cholelithiasis and additional nonemergent findings above.  Colonoscopy done August 2023 which showed 2 polyps measuring from 7 mm to 12 mm in sigmoid colon removed by cold snare.  Diverticulosis of mild severity in the sigmoid colon.  Recommendations at that time was for repeat colonoscopy in 3 years. Biopsy showed tubular adenoma and hyperplastic polyp.  Negative for high-grade dysplasia or carcinoma.Patient drinks socially.  Patient does smoke. Patient uses marijuana occasionally.  No family history of gastric or colon cancer.      REVIEW OF SYSTEMS:    CONSTITUTIONAL: Denies any fever, chills, rigors, and weight loss.  HEENT: No earache or tinnitus. Denies hearing loss or visual disturbances.  CARDIOVASCULAR: No chest pain or palpitations.   RESPIRATORY: Denies any cough, hemoptysis, shortness of breath or dyspnea on exertion.  GASTROINTESTINAL: As noted in the History of Present Illness.   GENITOURINARY: No problems with urination. Denies any hematuria or dysuria.  NEUROLOGIC: No dizziness or vertigo, denies headaches.   MUSCULOSKELETAL: Denies any muscle or joint pain.   SKIN: Denies skin rashes or itching.   ENDOCRINE: Denies excessive thirst. Denies intolerance to heat or cold.  PSYCHOSOCIAL:  Denies depression or anxiety. Denies any recent memory loss.       Historical Information   Past Medical History:   Diagnosis Date    Anesthesia complication     body temp burns off anesthesia more quickly--woke up during surgery    Anxiety     due to medical conditions    Arthritis     Hypertension      Past Surgical History:   Procedure Laterality Date    BACK SURGERY      CERVICAL FUSION N/A 4/5/2016    Procedure: FUSION CERVICAL POSTERIOR - revision of instrumentation T1;  Surgeon: Srikanth Siu MD;  [facility];  Service:     FOOT MASS EXCISION Right     HERNIA REPAIR Right     x2    INCISION AND DRAINAGE POSTERIOR SPINE N/A 4/27/2016    Procedure: INCISION AND DRAINAGE (I&D) SPINE;  Surgeon: Srikanth Siu MD;  [facility];  Service:     IR DRAINAGE TUBE CHECK/CHANGE/REPOSITION/REINSERTION/UPSIZE  3/1/2024    IR DRAINAGE TUBE CHECK/CHANGE/REPOSITION/REINSERTION/UPSIZE  3/21/2024    IR DRAINAGE TUBE CHECK/CHANGE/REPOSITION/REINSERTION/UPSIZE  4/5/2024    IR DRAINAGE TUBE PLACEMENT  2/18/2024    [place] ARTHRODESIS PST/PSTLAT TQ 1NTRSPC EA ADDL NTRSPC N/A 4/4/2016    Procedure: C3-C6 POSTERIOR CERVICAL LAMINECTOMY AND DECOMPRESSION; C3-T2 INSTRUMENTED POSTERIOR CERVICAL SPINAL FUSION WITH POSSIBLE ADDITIONAL LEVELS WITH ALLOGRAFT AND AUTOGRAFT (IMPULSE MONITORING);  Surgeon: Srikanth Siu MD;  [facility];  Service: Orthopedics    SHOULDER OPEN ROTATOR CUFF REPAIR Bilateral      Social History   Social History     Substance and Sexual Activity   Alcohol Use Yes    Alcohol/week: 2.0 standard drinks of alcohol    Types: 2 Shots of liquor per week    Comment: daily with tea     Social History     Substance and Sexual Activity   Drug Use Yes    Frequency: 7.0 times per week    Types: Marijuana    Comment: one joint daily for pain last 2 days ago     Social History     Tobacco Use   Smoking Status Every Day    Current packs/day: 0.25    Average packs/day: 0.3 packs/day for 15.0 years  (3.8 ttl pk-yrs)    Types: Cigarettes   Smokeless Tobacco Never     Family History   Problem Relation Age of Onset    Cancer Family        Meds/Allergies       Current Outpatient Medications:     albuterol (PROVENTIL HFA,VENTOLIN HFA) 90 mcg/act inhaler    cyclobenzaprine (FLEXERIL) 10 mg tablet    diclofenac (VOLTAREN) 75 mg EC tablet    DULoxetine (CYMBALTA) 20 mg capsule    gabapentin (NEURONTIN) 300 mg capsule    hydrOXYzine HCL (ATARAX) 25 mg tablet    meloxicam (MOBIC) 7.5 mg tablet    mirtazapine (REMERON) 15 mg tablet    sodium chloride, PF, 0.9 %    No Known Allergies        Objective     Blood pressure (!) 162/120, pulse 69, height 6' (1.829 m), weight 84.8 kg (187 lb). Body mass index is 25.36 kg/m².        PHYSICAL EXAM:      General Appearance:   Alert, cooperative, no distress   HEENT:   Normocephalic, atraumatic, anicteric.     Neck:  Supple, symmetrical, trachea midline   Lungs:   Clear to auscultation bilaterally; no rales, rhonchi or wheezing; respirations unlabored    Heart::   Regular rate and rhythm; no murmur, rub, or gallop.   Abdomen:   Soft, non-tender, non-distended; normal bowel sounds; no masses, no organomegaly    Genitalia:   Deferred    Rectal:   Deferred    Extremities:  No cyanosis, clubbing or edema    Pulses:  2+ and symmetric    Skin:  No jaundice, rashes, or lesions    Lymph nodes:  No palpable cervical lymphadenopathy        Lab Results:   No visits with results within 1 Day(s) from this visit.   Latest known visit with results is:   Admission on 02/16/2024, Discharged on 02/20/2024   Component Date Value    WBC 02/16/2024 18.87 (H)     RBC 02/16/2024 4.73     Hemoglobin 02/16/2024 14.2     Hematocrit 02/16/2024 42.6     MCV 02/16/2024 90     MCH 02/16/2024 30.0     MCHC 02/16/2024 33.3     RDW 02/16/2024 15.3 (H)     MPV 02/16/2024 9.0     Platelets 02/16/2024 259     nRBC 02/16/2024 0     Segmented % 02/16/2024 89 (H)     Immature Grans % 02/16/2024 1     Lymphocytes %  02/16/2024 5 (L)     Monocytes % 02/16/2024 5     Eosinophils Relative 02/16/2024 0     Basophils Relative 02/16/2024 0     Absolute Neutrophils 02/16/2024 16.71 (H)     Absolute Immature Grans 02/16/2024 0.10     Absolute Lymphocytes 02/16/2024 1.01     Absolute Monocytes 02/16/2024 1.02     Eosinophils Absolute 02/16/2024 0.00     Basophils Absolute 02/16/2024 0.03     Sodium 02/16/2024 136     Potassium 02/16/2024 3.9     Chloride 02/16/2024 101     CO2 02/16/2024 25     ANION GAP 02/16/2024 10     BUN 02/16/2024 16     Creatinine 02/16/2024 1.19     Glucose 02/16/2024 121     Calcium 02/16/2024 9.9     AST 02/16/2024 12 (L)     ALT 02/16/2024 9     Alkaline Phosphatase 02/16/2024 40     Total Protein 02/16/2024 7.9     Albumin 02/16/2024 4.4     Total Bilirubin 02/16/2024 1.37 (H)     eGFR 02/16/2024 66     Lipase 02/16/2024 19     SARS-CoV-2 02/16/2024 Negative     INFLUENZA A PCR 02/16/2024 Negative     INFLUENZA B PCR 02/16/2024 Negative     RSV PCR 02/16/2024 Negative     Color, UA 02/16/2024 Yellow     Clarity, UA 02/16/2024 Clear     Specific Gravity, UA 02/16/2024 1.015     pH, UA 02/16/2024 7.5     Leukocytes, UA 02/16/2024 Negative     Nitrite, UA 02/16/2024 Negative     Protein, UA 02/16/2024 Negative     Glucose, UA 02/16/2024 Negative     Ketones, UA 02/16/2024 Negative     Urobilinogen, UA 02/16/2024 0.2     Bilirubin, UA 02/16/2024 Negative     Occult Blood, UA 02/16/2024 Negative     Blood Culture 02/16/2024 No Growth After 5 Days.     Blood Culture 02/16/2024 No Growth After 5 Days.     LACTIC ACID 02/16/2024 0.8     Protime 02/16/2024 13.9     INR 02/16/2024 1.04     PTT 02/16/2024 29     Sodium 02/17/2024 137     Potassium 02/17/2024 4.7     Chloride 02/17/2024 99     CO2 02/17/2024 29     ANION GAP 02/17/2024 9     BUN 02/17/2024 21     Creatinine 02/17/2024 1.50 (H)     Glucose 02/17/2024 98     Calcium 02/17/2024 9.4     eGFR 02/17/2024 50     Magnesium 02/17/2024 2.0     Phosphorus  02/17/2024 4.3     WBC 02/17/2024 18.27 (H)     RBC 02/17/2024 4.47     Hemoglobin 02/17/2024 13.6     Hematocrit 02/17/2024 41.1     MCV 02/17/2024 92     MCH 02/17/2024 30.4     MCHC 02/17/2024 33.1     RDW 02/17/2024 15.5 (H)     Platelets 02/17/2024 255     MPV 02/17/2024 10.0     Sodium 02/18/2024 138     Potassium 02/18/2024 3.9     Chloride 02/18/2024 106     CO2 02/18/2024 25     ANION GAP 02/18/2024 7     BUN 02/18/2024 14     Creatinine 02/18/2024 1.12     Glucose 02/18/2024 98     Calcium 02/18/2024 8.5     eGFR 02/18/2024 72     Body Fluid Culture, Ster* 02/18/2024 1+ Growth of Pseudomonas aeruginosa (A)     Body Fluid Culture, Ster* 02/18/2024 1+ Growth of Escherichia coli (A)     Body Fluid Culture, Ster* 02/18/2024 1+ Growth of     Gram Stain Result 02/18/2024 2+ Polys (A)     Gram Stain Result 02/18/2024 2+ Gram positive rods (A)     Gram Stain Result 02/18/2024 1+ Gram negative rods (A)     Anaerobic Culture 02/18/2024 4+ Growth of Bacteroides fragilis (A)     Anaerobic Culture 02/18/2024 4+ Growth of Bacteroides vulgatus group (A)     Anaerobic Culture 02/18/2024 4+ Growth of - Presumptive -  Eggerthella lenta (A)     WBC 02/19/2024 11.29 (H)     RBC 02/19/2024 4.16     Hemoglobin 02/19/2024 12.2     Hematocrit 02/19/2024 39.1     MCV 02/19/2024 94     MCH 02/19/2024 29.3     MCHC 02/19/2024 31.2 (L)     RDW 02/19/2024 14.9     MPV 02/19/2024 10.1     Platelets 02/19/2024 256     nRBC 02/19/2024 0     Segmented % 02/19/2024 81 (H)     Immature Grans % 02/19/2024 0     Lymphocytes % 02/19/2024 11 (L)     Monocytes % 02/19/2024 7     Eosinophils Relative 02/19/2024 1     Basophils Relative 02/19/2024 0     Absolute Neutrophils 02/19/2024 9.20 (H)     Absolute Immature Grans 02/19/2024 0.04     Absolute Lymphocytes 02/19/2024 1.18     Absolute Monocytes 02/19/2024 0.78     Eosinophils Absolute 02/19/2024 0.07     Basophils Absolute 02/19/2024 0.02     Sodium 02/19/2024 137     Potassium 02/19/2024 5.1      Chloride 02/19/2024 106     CO2 02/19/2024 24     ANION GAP 02/19/2024 7     BUN 02/19/2024 9     Creatinine 02/19/2024 1.04     Glucose 02/19/2024 82     Calcium 02/19/2024 8.9     eGFR 02/19/2024 78     WBC 02/20/2024 9.74     RBC 02/20/2024 3.96     Hemoglobin 02/20/2024 12.0     Hematocrit 02/20/2024 35.8 (L)     MCV 02/20/2024 90     MCH 02/20/2024 30.3     MCHC 02/20/2024 33.5     RDW 02/20/2024 14.7     MPV 02/20/2024 9.6     Platelets 02/20/2024 276     nRBC 02/20/2024 0     Segmented % 02/20/2024 74     Immature Grans % 02/20/2024 0     Lymphocytes % 02/20/2024 16     Monocytes % 02/20/2024 9     Eosinophils Relative 02/20/2024 1     Basophils Relative 02/20/2024 0     Absolute Neutrophils 02/20/2024 7.23     Absolute Immature Grans 02/20/2024 0.04     Absolute Lymphocytes 02/20/2024 1.51     Absolute Monocytes 02/20/2024 0.87     Eosinophils Absolute 02/20/2024 0.07     Basophils Absolute 02/20/2024 0.02     Sodium 02/20/2024 138     Potassium 02/20/2024 3.9     Chloride 02/20/2024 105     CO2 02/20/2024 26     ANION GAP 02/20/2024 7     BUN 02/20/2024 8     Creatinine 02/20/2024 0.88     Glucose 02/20/2024 99     Calcium 02/20/2024 9.0     eGFR 02/20/2024 94          Radiology Results:   IR drainage tube check/change/reposition/reinsertion/upsize    Result Date: 4/5/2024  Narrative: Abscess tube check Clinical History: 58-year-old male with pelvic abscess Contrast: 8 mL of iohexol (OMNIPAQUE) Fluoro time: 0.3 MIN Number of Images: Multiple Radiation dose: 8 mGy Conscious sedation time: N/A Technique: The patient was brought to the interventional radiology suite and placed supine on the table. After a  view was obtained, contrast was injected into the abscess drainage catheter and multiple images were obtained. Findings: Persistent fistulous communication to bowel. The drainage catheter was left in place.     Impression: Impression: 1.  Persistent fistulous communication to bowel. The drainage  catheter was left in place. PLAN: Left to bag drainage. The patient is flushing once daily with 5 cc. Tube check in 2 weeks. Workstation performed: [id number]

## 2024-04-25 ENCOUNTER — APPOINTMENT (OUTPATIENT)
Dept: LAB | Facility: CLINIC | Age: 59
End: 2024-04-25
Payer: MEDICARE

## 2024-04-26 ENCOUNTER — APPOINTMENT (OUTPATIENT)
Dept: LAB | Facility: CLINIC | Age: 59
End: 2024-04-26
Payer: MEDICARE

## 2024-04-26 DIAGNOSIS — Z01.818 PREOPERATIVE EXAMINATION: ICD-10-CM

## 2024-04-26 LAB
ALBUMIN SERPL BCP-MCNC: 4.2 G/DL (ref 3.5–5)
ALP SERPL-CCNC: 54 U/L (ref 34–104)
ALT SERPL W P-5'-P-CCNC: 6 U/L (ref 7–52)
ANION GAP SERPL CALCULATED.3IONS-SCNC: 8 MMOL/L (ref 4–13)
AST SERPL W P-5'-P-CCNC: 11 U/L (ref 13–39)
BILIRUB SERPL-MCNC: 0.63 MG/DL (ref 0.2–1)
BUN SERPL-MCNC: 18 MG/DL (ref 5–25)
CALCIUM SERPL-MCNC: 9.3 MG/DL (ref 8.4–10.2)
CHLORIDE SERPL-SCNC: 105 MMOL/L (ref 96–108)
CO2 SERPL-SCNC: 29 MMOL/L (ref 21–32)
CREAT SERPL-MCNC: 0.8 MG/DL (ref 0.6–1.3)
ERYTHROCYTE [DISTWIDTH] IN BLOOD BY AUTOMATED COUNT: 15.9 % (ref 11.6–15.1)
GFR SERPL CREATININE-BSD FRML MDRD: 98 ML/MIN/1.73SQ M
GLUCOSE P FAST SERPL-MCNC: 90 MG/DL (ref 65–99)
HCT VFR BLD AUTO: 40.2 % (ref 36.5–49.3)
HGB BLD-MCNC: 12.4 G/DL (ref 12–17)
MCH RBC QN AUTO: 28.4 PG (ref 26.8–34.3)
MCHC RBC AUTO-ENTMCNC: 30.8 G/DL (ref 31.4–37.4)
MCV RBC AUTO: 92 FL (ref 82–98)
PLATELET # BLD AUTO: 324 THOUSANDS/UL (ref 149–390)
PMV BLD AUTO: 9.7 FL (ref 8.9–12.7)
POTASSIUM SERPL-SCNC: 4.4 MMOL/L (ref 3.5–5.3)
PROT SERPL-MCNC: 7.4 G/DL (ref 6.4–8.4)
RBC # BLD AUTO: 4.36 MILLION/UL (ref 3.88–5.62)
SODIUM SERPL-SCNC: 142 MMOL/L (ref 135–147)
WBC # BLD AUTO: 7.17 THOUSAND/UL (ref 4.31–10.16)

## 2024-04-26 PROCEDURE — 85027 COMPLETE CBC AUTOMATED: CPT

## 2024-04-26 PROCEDURE — 36415 COLL VENOUS BLD VENIPUNCTURE: CPT

## 2024-04-26 PROCEDURE — 80053 COMPREHEN METABOLIC PANEL: CPT

## 2024-04-30 NOTE — PRE-PROCEDURE INSTRUCTIONS
Pre-Surgery Instructions:   Medication Instructions    albuterol (PROVENTIL HFA,VENTOLIN HFA) 90 mcg/act inhaler Uses PRN- OK to take day of surgery    cyclobenzaprine (FLEXERIL) 10 mg tablet Hold day of surgery.    diclofenac (VOLTAREN) 75 mg EC tablet Hold day of surgery.    DULoxetine (CYMBALTA) 20 mg capsule Take night before surgery    gabapentin (NEURONTIN) 300 mg capsule Take night before surgery    meloxicam (MOBIC) 7.5 mg tablet Stop taking 7 days prior to surgery.    mirtazapine (REMERON) 15 mg tablet Take night before surgery    Medication instructions for day surgery reviewed. Please use only a sip of water to take your instructed medications. Avoid all over the counter vitamins, supplements and NSAIDS for one week prior to surgery per anesthesia guidelines. Tylenol is ok to take as needed.     You will receive a call one business day prior to surgery with an arrival time and hospital directions. If your surgery is scheduled on a Monday, the hospital will be calling you on the Friday prior to your surgery. If you have not heard from anyone by 8pm, please call the hospital supervisor through the hospital  at [phone]. ([place] [phone] or [place] [phone]).    Do not eat or drink anything after midnight the night before your surgery, including candy, mints, lifesavers, or chewing gum. Do not drink alcohol 24hrs before your surgery. Try not to smoke at least 24hrs before your surgery.       Follow the pre surgery showering instructions as listed in the “My Surgical Experience Booklet” or otherwise provided by your surgeon's office. Do not use a blade to shave the surgical area 1 week before surgery. It is okay to use a clean electric clippers up to 24 hours before surgery. Do not apply any lotions, creams, including makeup, cologne, deodorant, or perfumes after showering on the day of your surgery. Do not use dry shampoo, hair spray, hair gel, or any type of hair products.     No  contact lenses, eye make-up, or artificial eyelashes. Remove nail polish, including gel polish, and any artificial, gel, or acrylic nails if possible. Remove all jewelry including rings and body piercing jewelry.     Wear causal clothing that is easy to take on and off. Consider your type of surgery.    Keep any valuables, jewelry, piercings at home. Please bring any specially ordered equipment (sling, braces) if indicated.    Arrange for a responsible person to drive you to and from the hospital on the day of your surgery. Please confirm the visitor policy for the day of your procedure when you receive your phone call with an arrival time.     Call the surgeon's office with any new illnesses, exposures, or additional questions prior to surgery.    Please reference your “My Surgical Experience Booklet” for additional information to prepare for your upcoming surgery.

## 2024-05-05 PROBLEM — M19.90 ARTHRITIS: Status: ACTIVE | Noted: 2024-05-05

## 2024-05-05 PROBLEM — Z92.84 HISTORY OF UNINTENDED AWARENESS UNDER GENERAL ANESTHESIA: Status: ACTIVE | Noted: 2024-05-05

## 2024-05-05 RX ORDER — SODIUM CHLORIDE, SODIUM LACTATE, POTASSIUM CHLORIDE, CALCIUM CHLORIDE 600; 310; 30; 20 MG/100ML; MG/100ML; MG/100ML; MG/100ML
75 INJECTION, SOLUTION INTRAVENOUS CONTINUOUS
OUTPATIENT
Start: 2024-05-08

## 2024-05-07 ENCOUNTER — TELEPHONE (OUTPATIENT)
Dept: SURGERY | Facility: CLINIC | Age: 59
End: 2024-05-07

## 2024-05-07 NOTE — TELEPHONE ENCOUNTER
Patient called and informed that his surgery is approved and we a go for tomorrow. He's to start his bowel prep

## 2024-05-08 ENCOUNTER — TELEPHONE (OUTPATIENT)
Dept: SURGERY | Facility: CLINIC | Age: 59
End: 2024-05-08

## 2024-05-08 NOTE — TELEPHONE ENCOUNTER
I informed patient that Sofia Gonzalez from Dr Abernathy office will be calling him and schedule the surgery. The surgery will be at [facility] with Dr Abernathy

## 2024-05-09 ENCOUNTER — TELEPHONE (OUTPATIENT)
Age: 59
End: 2024-05-09

## 2024-05-09 NOTE — TELEPHONE ENCOUNTER
Patient reschedule his 05/09/2024 11:15 am appt to 05/28/2024 at 11:00 am due to family emergency.

## 2024-05-20 ENCOUNTER — TELEPHONE (OUTPATIENT)
Dept: SURGERY | Facility: CLINIC | Age: 59
End: 2024-05-20

## 2024-05-23 ENCOUNTER — HOSPITAL ENCOUNTER (OUTPATIENT)
Dept: NON INVASIVE DIAGNOSTICS | Facility: HOSPITAL | Age: 59
Discharge: HOME | End: 2024-05-23
Payer: MEDICARE

## 2024-05-23 DIAGNOSIS — K57.92 DIVERTICULITIS: ICD-10-CM

## 2024-05-23 PROCEDURE — 49424 ASSESS CYST CONTRAST INJECT: CPT | Performed by: RADIOLOGY

## 2024-05-23 PROCEDURE — 76080 X-RAY EXAM OF FISTULA: CPT

## 2024-05-23 PROCEDURE — 49424 ASSESS CYST CONTRAST INJECT: CPT

## 2024-05-23 PROCEDURE — 76080 X-RAY EXAM OF FISTULA: CPT | Performed by: RADIOLOGY

## 2024-05-23 RX ORDER — SODIUM CHLORIDE 9 MG/ML
10 INJECTION, SOLUTION INTRAMUSCULAR; INTRAVENOUS; SUBCUTANEOUS DAILY
Qty: 300 ML | Refills: 0 | Status: SHIPPED | OUTPATIENT
Start: 2024-05-23 | End: 2024-05-30

## 2024-05-23 RX ADMIN — IOHEXOL 5 ML: 350 INJECTION, SOLUTION INTRAVENOUS at 14:44

## 2024-05-23 NOTE — BRIEF OP NOTE (RAD/CATH)
INTERVENTIONAL RADIOLOGY PROCEDURE NOTE    Date: 5/23/2024    Procedure:   Procedure Summary       Date: 05/23/24 Room / Location: Pending sale to [facility]    Anesthesia Start:  Anesthesia Stop:     Procedure: IR DRAINAGE TUBE CHECK/CHANGE/REPOSITION/REINSERTION/UPSIZE Diagnosis:       Diverticulitis      (tube check)    Scheduled Providers:  Responsible Provider:     Anesthesia Type: Not recorded ASA Status: Not recorded            Preoperative diagnosis:   1. Diverticulitis         Postoperative diagnosis: Same.    Surgeon: Jose Roberto Sy MD     Assistant: None. No qualified resident was available.    Blood loss: None    Specimens: None     Findings: Persistent fistula to colon, tube left in place.    Scheduled to see surgery 5/28/24.    Patient did not want a new skin stitch placed.     Complications: None immediate.    Anesthesia: none

## 2024-05-23 NOTE — SEDATION DOCUMENTATION
Procedure completed by Dr Sy. Per his assessment, tube to stay in place at this time. Education provided on importance of following up with surgery for further management. Pt verbalized understanding.

## 2024-05-24 PROBLEM — Z12.11 SCREENING FOR COLON CANCER: Status: RESOLVED | Noted: 2024-04-24 | Resolved: 2024-05-24

## 2024-05-30 ENCOUNTER — OFFICE VISIT (OUTPATIENT)
Dept: SURGERY | Facility: CLINIC | Age: 59
End: 2024-05-30
Payer: MEDICARE

## 2024-05-30 VITALS
HEART RATE: 85 BPM | OXYGEN SATURATION: 98 % | SYSTOLIC BLOOD PRESSURE: 124 MMHG | WEIGHT: 185 LBS | HEIGHT: 72 IN | TEMPERATURE: 98.9 F | DIASTOLIC BLOOD PRESSURE: 80 MMHG | BODY MASS INDEX: 25.06 KG/M2

## 2024-05-30 DIAGNOSIS — K57.92 DIVERTICULITIS: ICD-10-CM

## 2024-05-30 DIAGNOSIS — K57.20 DIVERTICULITIS OF LARGE INTESTINE WITH ABSCESS WITHOUT BLEEDING: Primary | ICD-10-CM

## 2024-05-30 PROBLEM — M19.90 ARTHRITIS: Status: RESOLVED | Noted: 2024-05-05 | Resolved: 2024-05-30

## 2024-05-30 PROBLEM — Z01.818 PREOPERATIVE EXAMINATION: Status: RESOLVED | Noted: 2024-04-22 | Resolved: 2024-05-30

## 2024-05-30 PROCEDURE — 99213 OFFICE O/P EST LOW 20 MIN: CPT | Performed by: SURGERY

## 2024-05-30 RX ORDER — SODIUM CHLORIDE 9 MG/ML
10 INJECTION, SOLUTION INTRAMUSCULAR; INTRAVENOUS; SUBCUTANEOUS DAILY
Qty: 300 ML | Refills: 0 | Status: SHIPPED | OUTPATIENT
Start: 2024-05-30 | End: 2024-08-28

## 2024-05-30 NOTE — PROGRESS NOTES
Assessment/Plan:     Diagnoses and all orders for this visit:    Diverticulitis of large intestine with abscess without bleeding    Diverticulitis  -     sodium chloride, PF, 0.9 %; 10 mL by Intracatheter route daily Intracatheter flushing daily. May substitute prefilled syringe with normal saline 10 mL vials, 10 mL syringes, and 18 g blunt needles  -     Type and screen; Future  -     ECG 12 lead; Future  -     polyethylene glycol (GOLYTELY) 4000 mL solution; Take 4,000 mL by mouth once for 1 dose     Patient is being scheduled for open sigmoid colectomy with intraoperative flexible sigmoidoscopy.  Patient will have ureteric stents placed preoperatively by the urologist.    Procedure was discussed with patient and informed consent was obtained.    Risks include bleeding, infection, wound complications, need for blood transfusion, anastomotic dehiscence, need for colostomy, ureteric injury    Bowel prep instructions were given to the patient.  He will take clear liquid diet the day prior to surgery and 1 gallon of GoLytely.  Prescription given for preoperative antibiotic bowel prep.    PATs: Type and screen, EKG    Urology will be consulted for preoperative ureteric stents.    Subjective:      Patient ID: Homero Driver Jr. is a 58 y.o. male.  Retired male    HPI  Patient was admitted in February 2024 at [facility] with perforated sigmoid diverticulitis.  He was found to have a pericolonic abscess for which a transgluteal drain was placed.  This has grown a mixture of anaerobes with Pseudomonas and E. coli.  The catheter is still present as he has a persistent colocutaneous fistula.  Patient was hence scheduled for a sigmoid colectomy at [facility] last month, this however could not be performed because of insurance reasons.    This was patient's first episode of diverticulitis.  Patient had undergone a colonoscopy last year which revealed mild sigmoid diverticulosis.    The following portions of the  patient's history were reviewed and updated as appropriate: allergies, current medications, past family history, past medical history, past social history, past surgical history, and problem list.    Review of Systems    History of smoking.  Patient smokes about quarter pack per day  History of bilateral shoulder surgeries.  Patient states that he still has rotator cuff problems with left shoulder  History of cervical stenosis with cervical fusion  History of right inguinal hernia repair performed twice    Patient denies history of cardiac disease    Patient will undergo a preoperative evaluation by his PCP next month.    Patient has history of wakefulness under general anesthesia.    Patient is single.  He lives with his daughter.    Objective:    /80 (BP Location: Left arm, Patient Position: Sitting, Cuff Size: Standard)   Pulse 85   Temp 98.9 °F (37.2 °C) (Tympanic)   Ht 6' (1.829 m)   Wt 83.9 kg (185 lb)   SpO2 98%   BMI 25.09 kg/m²        Physical Exam    No pallor or icterus  No cervical lymphadenopathy    Heart sounds are normal  Chest is clear to auscultation  Abdominal exam reveals a flat abdomen with no tenderness or masses.  Patient has a left gluteal drain.  External genitalia are normal.  Extremity exam is normal.

## 2024-06-03 DIAGNOSIS — K57.20 DIVERTICULITIS OF LARGE INTESTINE WITH ABSCESS WITHOUT BLEEDING: Primary | ICD-10-CM

## 2024-06-03 RX ORDER — POLYETHYLENE GLYCOL-3350 AND ELECTROLYTES WITH FLAVOR PACK 240; 5.84; 2.98; 6.72; 22.72 G/278.26G; G/278.26G; G/278.26G; G/278.26G; G/278.26G
4000 POWDER, FOR SOLUTION ORAL ONCE
Qty: 4000 ML | Refills: 0 | Status: SHIPPED | OUTPATIENT
Start: 2024-06-03 | End: 2024-06-03

## 2024-06-27 ENCOUNTER — ANESTHESIA EVENT (OUTPATIENT)
Dept: PERIOP | Facility: HOSPITAL | Age: 59
DRG: 230 | End: 2024-06-27
Payer: MEDICARE

## 2024-06-27 RX ORDER — AMLODIPINE BESYLATE 10 MG/1
10 TABLET ORAL DAILY
COMMUNITY
Start: 2024-05-06

## 2024-06-27 RX ORDER — NEOMYCIN SULFATE 500 MG/1
TABLET ORAL
COMMUNITY
Start: 2024-05-02 | End: 2024-07-31

## 2024-06-27 RX ORDER — MELOXICAM 15 MG/1
TABLET ORAL
COMMUNITY
Start: 2024-05-20

## 2024-06-27 RX ORDER — METRONIDAZOLE 500 MG/1
TABLET ORAL
COMMUNITY
Start: 2024-05-02 | End: 2024-07-31

## 2024-06-27 NOTE — PRE-PROCEDURE INSTRUCTIONS
Pre-Surgery Instructions:   Medication Instructions    albuterol (PROVENTIL HFA,VENTOLIN HFA) 90 mcg/act inhaler Uses PRN- OK to take day of surgery    amLODIPine (NORVASC) 10 mg tablet Take day of surgery.    cyclobenzaprine (FLEXERIL) 10 mg tablet Uses PRN- OK to take day of surgery    DULoxetine (CYMBALTA) 20 mg capsule Take night before surgery    gabapentin (NEURONTIN) 300 mg capsule Take night before surgery    meloxicam (MOBIC) 15 mg tablet Stop taking 3 days prior to surgery.    metroNIDAZOLE (FLAGYL) 500 mg tablet Instructions provided by MD    mirtazapine (REMERON) 15 mg tablet Take night before surgery    neomycin (MYCIFRADIN) 500 mg tablet Instructions provided by MD    polyethylene glycol (GaviLyte-C) 4000 mL solution Instructions provided by MD    sodium chloride, PF, 0.9 % Instructions provided by MD   Medication instructions for day surgery reviewed. Please use only a sip of water to take your instructed medications. Avoid all over the counter vitamins, supplements and NSAIDS for one week prior to surgery per anesthesia guidelines. Tylenol is ok to take as needed.     You will receive a call one business day prior to surgery with an arrival time and hospital directions. If your surgery is scheduled on a Monday, the hospital will be calling you on the Friday prior to your surgery. If you have not heard from anyone by 8pm, please call the hospital supervisor through the hospital  at [phone]. ([place] [phone] or [place] [phone]).    Do not eat or drink anything after midnight the night before your surgery, including candy, mints, lifesavers, or chewing gum. Do not drink alcohol 24hrs before your surgery. Try not to smoke at least 24hrs before your surgery.       Follow the pre surgery showering instructions as listed in the “My Surgical Experience Booklet” or otherwise provided by your surgeon's office. Do not use a blade to shave the surgical area 1 week before surgery. It is  okay to use a clean electric clippers up to 24 hours before surgery. Do not apply any lotions, creams, including makeup, cologne, deodorant, or perfumes after showering on the day of your surgery. Do not use dry shampoo, hair spray, hair gel, or any type of hair products.     No contact lenses, eye make-up, or artificial eyelashes. Remove nail polish, including gel polish, and any artificial, gel, or acrylic nails if possible. Remove all jewelry including rings and body piercing jewelry.     Wear causal clothing that is easy to take on and off. Consider your type of surgery.    Keep any valuables, jewelry, piercings at home. Please bring any specially ordered equipment (sling, braces) if indicated.    Arrange for a responsible person to drive you to and from the hospital on the day of your surgery. Please confirm the visitor policy for the day of your procedure when you receive your phone call with an arrival time.     Call the surgeon's office with any new illnesses, exposures, or additional questions prior to surgery.    Please reference your “My Surgical Experience Booklet” for additional information to prepare for your upcoming surgery.    Aware bowel prep and antibiotic order by surgeon.    Advise Smoking Cessation Education not interested.

## 2024-07-08 ENCOUNTER — TELEPHONE (OUTPATIENT)
Dept: SURGERY | Facility: CLINIC | Age: 59
End: 2024-07-08

## 2024-07-08 NOTE — TELEPHONE ENCOUNTER
LM for patient that I don't see medical clearance for his upcoming surgery. If patient doesn't go for clearance, his surgery will have to be postponed.

## 2024-07-10 ENCOUNTER — APPOINTMENT (OUTPATIENT)
Dept: LAB | Facility: HOSPITAL | Age: 59
End: 2024-07-10
Payer: MEDICARE

## 2024-07-10 ENCOUNTER — OFFICE VISIT (OUTPATIENT)
Dept: LAB | Facility: HOSPITAL | Age: 59
End: 2024-07-10
Payer: MEDICARE

## 2024-07-10 ENCOUNTER — LAB REQUISITION (OUTPATIENT)
Dept: LAB | Facility: HOSPITAL | Age: 59
End: 2024-07-10
Payer: MEDICARE

## 2024-07-10 DIAGNOSIS — K57.92 DIVERTICULITIS: ICD-10-CM

## 2024-07-10 DIAGNOSIS — Z01.818 OTHER SPECIFIED PRE-OPERATIVE EXAMINATION: ICD-10-CM

## 2024-07-10 DIAGNOSIS — K57.20 DIVERTICULITIS OF LARGE INTESTINE WITH PERFORATION AND ABSCESS WITHOUT BLEEDING: ICD-10-CM

## 2024-07-10 LAB
ABO GROUP BLD: NORMAL
BLD GP AB SCN SERPL QL: NEGATIVE
RH BLD: POSITIVE
SPECIMEN EXPIRATION DATE: NORMAL

## 2024-07-10 PROCEDURE — 36415 COLL VENOUS BLD VENIPUNCTURE: CPT

## 2024-07-10 PROCEDURE — 93005 ELECTROCARDIOGRAM TRACING: CPT

## 2024-07-10 PROCEDURE — 86900 BLOOD TYPING SEROLOGIC ABO: CPT | Performed by: SURGERY

## 2024-07-10 PROCEDURE — 86850 RBC ANTIBODY SCREEN: CPT | Performed by: SURGERY

## 2024-07-10 PROCEDURE — 86901 BLOOD TYPING SEROLOGIC RH(D): CPT | Performed by: SURGERY

## 2024-07-11 ENCOUNTER — NURSE TRIAGE (OUTPATIENT)
Dept: SURGERY | Facility: CLINIC | Age: 59
End: 2024-07-11

## 2024-07-11 LAB
ATRIAL RATE: 57 BPM
P AXIS: 39 DEGREES
PR INTERVAL: 158 MS
QRS AXIS: 43 DEGREES
QRSD INTERVAL: 106 MS
QT INTERVAL: 450 MS
QTC INTERVAL: 438 MS
T WAVE AXIS: 52 DEGREES
VENTRICULAR RATE: 57 BPM

## 2024-07-11 PROCEDURE — 93010 ELECTROCARDIOGRAM REPORT: CPT | Performed by: INTERNAL MEDICINE

## 2024-07-11 NOTE — TELEPHONE ENCOUNTER
"Pt of Dr. Abernathy, surgery scheduled for tomorrow 7/12/24- SIGMOID COLECTOMY, FLEXIBLE SIGMOIDOSCOPY (Abdomen)       FLEXIBLE SIGMOIDOSCOPY (Abdomen)       INSERTION URETERAL CATHETERS PREOP (Bilateral: Ureter)     REASON FOR CALL: pre-op physical needed?    Coral from patient's PCP Dr. Reyes's office calling.  States pt called and said he needed a physical prior to tomorrow's surgery and they need to confirm if he needs this and would also need pre-op physical form. Advise will reach out to Surgery Coordinator and call back.    Office [phone]    Answer Assessment - Initial Assessment Questions  1. REASON FOR CALL or QUESTION: \"What is your reason for calling today?\" or \"How can I best help you?\" or \"What question do you have that I can help answer?\"      PCP office calling regarding, pt called them about having pre-op physical before tomorrow's surgery.    Protocols used: Information Only Call - No Triage-ADULT-[place]    "

## 2024-07-11 NOTE — TELEPHONE ENCOUNTER
Called [place] office since I was not sure which location pt goes to for PCP. No answer, left message stating that patient does need pre-clearance completed and I just need fax number for the office to send clearance form. Asked them to call my direct number back [phone].

## 2024-07-12 ENCOUNTER — ANESTHESIA (OUTPATIENT)
Dept: PERIOP | Facility: HOSPITAL | Age: 59
DRG: 230 | End: 2024-07-12
Payer: MEDICARE

## 2024-07-15 ENCOUNTER — APPOINTMENT (OUTPATIENT)
Dept: LAB | Facility: HOSPITAL | Age: 59
End: 2024-07-15
Payer: MEDICARE

## 2024-07-15 DIAGNOSIS — Z01.89 ENCOUNTER FOR OTHER SPECIFIED SPECIAL EXAMINATIONS: ICD-10-CM

## 2024-07-15 LAB
ALBUMIN SERPL BCG-MCNC: 4.4 G/DL (ref 3.5–5)
ALP SERPL-CCNC: 41 U/L (ref 34–104)
ALT SERPL W P-5'-P-CCNC: 6 U/L (ref 7–52)
ANION GAP SERPL CALCULATED.3IONS-SCNC: 7 MMOL/L (ref 4–13)
AST SERPL W P-5'-P-CCNC: 12 U/L (ref 13–39)
BASOPHILS # BLD AUTO: 0.01 THOUSANDS/ÂΜL (ref 0–0.1)
BASOPHILS NFR BLD AUTO: 0 % (ref 0–1)
BILIRUB SERPL-MCNC: 0.55 MG/DL (ref 0.2–1)
BUN SERPL-MCNC: 10 MG/DL (ref 5–25)
CALCIUM SERPL-MCNC: 9.8 MG/DL (ref 8.4–10.2)
CHLORIDE SERPL-SCNC: 102 MMOL/L (ref 96–108)
CO2 SERPL-SCNC: 31 MMOL/L (ref 21–32)
CREAT SERPL-MCNC: 0.9 MG/DL (ref 0.6–1.3)
EOSINOPHIL # BLD AUTO: 0.02 THOUSAND/ÂΜL (ref 0–0.61)
EOSINOPHIL NFR BLD AUTO: 0 % (ref 0–6)
ERYTHROCYTE [DISTWIDTH] IN BLOOD BY AUTOMATED COUNT: 15.7 % (ref 11.6–15.1)
GFR SERPL CREATININE-BSD FRML MDRD: 93 ML/MIN/1.73SQ M
GLUCOSE P FAST SERPL-MCNC: 97 MG/DL (ref 65–99)
HCT VFR BLD AUTO: 42.5 % (ref 36.5–49.3)
HGB BLD-MCNC: 13.6 G/DL (ref 12–17)
IMM GRANULOCYTES # BLD AUTO: 0.03 THOUSAND/UL (ref 0–0.2)
IMM GRANULOCYTES NFR BLD AUTO: 0 % (ref 0–2)
INR PPP: 1.05 (ref 0.84–1.19)
LYMPHOCYTES # BLD AUTO: 1.12 THOUSANDS/ÂΜL (ref 0.6–4.47)
LYMPHOCYTES NFR BLD AUTO: 12 % (ref 14–44)
MCH RBC QN AUTO: 29.1 PG (ref 26.8–34.3)
MCHC RBC AUTO-ENTMCNC: 32 G/DL (ref 31.4–37.4)
MCV RBC AUTO: 91 FL (ref 82–98)
MONOCYTES # BLD AUTO: 0.47 THOUSAND/ÂΜL (ref 0.17–1.22)
MONOCYTES NFR BLD AUTO: 5 % (ref 4–12)
NEUTROPHILS # BLD AUTO: 7.75 THOUSANDS/ÂΜL (ref 1.85–7.62)
NEUTS SEG NFR BLD AUTO: 83 % (ref 43–75)
NRBC BLD AUTO-RTO: 0 /100 WBCS
PLATELET # BLD AUTO: 291 THOUSANDS/UL (ref 149–390)
PMV BLD AUTO: 9.7 FL (ref 8.9–12.7)
POTASSIUM SERPL-SCNC: 4.3 MMOL/L (ref 3.5–5.3)
PROT SERPL-MCNC: 7.8 G/DL (ref 6.4–8.4)
PROTHROMBIN TIME: 13.6 SECONDS (ref 11.6–14.5)
RBC # BLD AUTO: 4.67 MILLION/UL (ref 3.88–5.62)
SODIUM SERPL-SCNC: 140 MMOL/L (ref 135–147)
WBC # BLD AUTO: 9.4 THOUSAND/UL (ref 4.31–10.16)

## 2024-07-15 PROCEDURE — 36415 COLL VENOUS BLD VENIPUNCTURE: CPT

## 2024-07-15 PROCEDURE — 85025 COMPLETE CBC W/AUTO DIFF WBC: CPT

## 2024-07-15 PROCEDURE — 80053 COMPREHEN METABOLIC PANEL: CPT

## 2024-07-15 PROCEDURE — 85610 PROTHROMBIN TIME: CPT

## 2024-07-16 PROCEDURE — 93010 ELECTROCARDIOGRAM REPORT: CPT | Performed by: INTERNAL MEDICINE

## 2024-07-19 ENCOUNTER — TELEPHONE (OUTPATIENT)
Age: 59
End: 2024-07-19

## 2024-07-19 NOTE — TELEPHONE ENCOUNTER
Patient called reporting his surgery for SIGMOID COLECTOMY, FLEXIBLE SIGMOIDOSCOPY was postponed until 7/26/24, patient is inquiring if labs and EKG that was completed on 7/15/24 can be used or does he need to repeat. [phone][facility]. Please advise.

## 2024-07-19 NOTE — TELEPHONE ENCOUNTER
Patient of . Having sigmoid colectomy done on 7/26/24. Reaching out to confirm all recent labs done on 7/15/24 and EKG done on 7/10/24 are still good and that he does not have to repeat. Confirmed this with patient.

## 2024-07-23 NOTE — TELEPHONE ENCOUNTER
BLANCO for patient to call back to schedule appt with Dr. Abernathy on 7/25 for a pre-op appt with him.

## 2024-07-25 ENCOUNTER — OFFICE VISIT (OUTPATIENT)
Dept: SURGERY | Facility: CLINIC | Age: 59
End: 2024-07-25
Payer: MEDICARE

## 2024-07-25 VITALS
BODY MASS INDEX: 24.3 KG/M2 | HEART RATE: 78 BPM | HEIGHT: 72 IN | TEMPERATURE: 96.4 F | DIASTOLIC BLOOD PRESSURE: 86 MMHG | OXYGEN SATURATION: 98 % | SYSTOLIC BLOOD PRESSURE: 124 MMHG | WEIGHT: 179.4 LBS

## 2024-07-25 DIAGNOSIS — K80.20 CHOLELITHIASIS: ICD-10-CM

## 2024-07-25 DIAGNOSIS — K57.20 DIVERTICULITIS OF LARGE INTESTINE WITH ABSCESS WITHOUT BLEEDING: Primary | ICD-10-CM

## 2024-07-25 PROBLEM — I10 HTN (HYPERTENSION): Status: ACTIVE | Noted: 2024-07-25

## 2024-07-25 PROBLEM — G47.30 SLEEP APNEA: Status: ACTIVE | Noted: 2024-07-25

## 2024-07-25 PROCEDURE — 99213 OFFICE O/P EST LOW 20 MIN: CPT | Performed by: SURGERY

## 2024-07-25 NOTE — H&P (VIEW-ONLY)
Assessment/Plan:     Diagnoses and all orders for this visit:    Diverticulitis of large intestine with abscess without bleeding    Cholelithiasis     Patient is scheduled for surgery tomorrow at [facility].  Patient will undergo placement of ureteric stents by Urology followed by sigmoid colectomy, flexible sigmoidoscopy and cholecystectomy.    Procedure was discussed with patient and informed consent was obtained.  Patient was also told that if access to the gallbladder is difficult we may forego the cholecystectomy.    Risks include bleeding, infection, anastomotic dehiscence, wound complications, ureteric injury, need for blood transfusion, bile duct injury.    Bowel prep instructions have been given.  Patient will be on clear liquid diet today.  He will drink GoLytely and also take his oral antibiotics.    Preadmission testing is complete  Patient got preoperative clearance from his PCP    Subjective:      Patient ID: Homero Driver Jr. is a 59 y.o. male.  Works in [employer]  Patient was admitted in February 2024 at [facility] with perforated sigmoid diverticulitis.  He was found to have a pericolonic abscess for which a transgluteal drain was placed.  This has grown a mixture of anaerobes with Pseudomonas and E. coli.  The catheter is still present as he has a persistent colocutaneous fistula.  Patient was hence scheduled for a sigmoid colectomy at [facility] last month, this however could not be performed because of insurance reasons.     This was patient's first episode of diverticulitis.  Patient had undergone a colonoscopy last year which revealed mild sigmoid diverticulosis.     The following portions of the patient's history were reviewed and updated as appropriate: allergies, current medications, past family history, past medical history, past social history, past surgical history.     Review of Systems     History of smoking.  Patient smokes about quarter pack per day  History of  bilateral shoulder surgeries.  Patient states that he still has rotator cuff problems with left shoulder  History of cervical stenosis with cervical fusion  History of right inguinal hernia repair performed twice     Patient denies history of cardiac disease       Patient has history of wakefulness under general anesthesia.     Patient is single.  He lives with his daughter and his girlfriend     Objective:     /80 (BP Location: Left arm, Patient Position: Sitting, Cuff Size: Standard)   Pulse 85   Temp 98.9 °F (37.2 °C) (Tympanic)   Ht 6' (1.829 m)   Wt 83.9 kg (185 lb)   SpO2 98%   BMI 25.09 kg/m²         Physical Exam     No pallor or icterus  No cervical lymphadenopathy     Heart sounds are normal  Chest is clear to auscultation  Abdominal exam reveals a flat abdomen with no tenderness or masses.  Patient has a left gluteal drain.  External genitalia are normal.  Extremity exam is normal.       The following portions of the patient's history were reviewed and updated as appropriate: allergies, current medications, past family history, past medical history, past social history, past surgical history, and problem list.    Patient's CT scan has shown gallstones.  Patient does not have history of biliary colic.  Merits of a incidental cholecystectomy were discussed with the patient.  I told him if it is accessible we would remove his gallbladder however if this is technically not possible we will not do it.  Patient is agreeable to this.  Patient has amended his consent form.

## 2024-07-25 NOTE — ANESTHESIA PREPROCEDURE EVALUATION
Procedure:  SIGMOID COLECTOMY, FLEXIBLE SIGMOIDOSCOPY, CHOLECYSTECTOMY (Abdomen)  FLEXIBLE SIGMOIDOSCOPY (Abdomen)  INSERTION URETERAL CATHETERS PREOP (Bilateral: Ureter)    Relevant Problems   ANESTHESIA   (+) History of unintended awareness under general anesthesia      CARDIO   (+) HTN (hypertension)      NEURO/PSYCH   (+) Anxiety      PULMONARY   (+) Sleep apnea   (+) Smoking      Digestive   (+) Cholelithiasis      Neurology/Sleep   (+) S/P cervical spinal fusion      Orthopedic/Musculoskeletal   (+) Cervical stenosis of spine      FEN/Gastrointestinal   (+) Diverticulitis of large intestine with abscess        Physical Exam    Airway    Mallampati score: II  TM Distance: >3 FB  Neck ROM: limited     Dental    upper dentures and lower dentures    Cardiovascular  Cardiovascular exam normal    Pulmonary  Pulmonary exam normal     Other Findings        Anesthesia Plan  ASA Score- 2     Anesthesia Type- general with ASA Monitors.         Additional Monitors:     Airway Plan: ETT.           Plan Factors-Exercise tolerance (METS): >4 METS.    Chart reviewed. EKG reviewed.  Existing labs reviewed. Patient summary reviewed.    Patient is a current smoker.  Patient instructed to abstain from smoking on day of procedure. Patient did not smoke on day of surgery.            Induction- intravenous.    Postoperative Plan- Plan for postoperative opioid use.     Perioperative Resuscitation Plan - Level 1 - Full Code.       Informed Consent- Anesthetic plan and risks discussed with patient and spouse.  I personally reviewed this patient with the CRNA. Discussed and agreed on the Anesthesia Plan with the CRNA..      Lab Results   Component Value Date    HGBA1C 5.6 12/11/2021       Lab Results   Component Value Date     03/25/2015    K 4.3 07/15/2024     07/15/2024    CO2 31 07/15/2024    ANIONGAP 6 03/25/2015    BUN 10 07/15/2024    CREATININE 0.90 07/15/2024    GLUCOSE 82 03/25/2015    GLUF 97 07/15/2024    CALCIUM  9.8 07/15/2024    AST 12 (L) 07/15/2024    ALT 6 (L) 07/15/2024    ALKPHOS 41 07/15/2024    PROT 7.5 03/25/2015    BILITOT 0.7 03/25/2015    EGFR 93 07/15/2024       Lab Results   Component Value Date    WBC 9.40 07/15/2024    HGB 13.6 07/15/2024    HCT 42.5 07/15/2024    MCV 91 07/15/2024     07/15/2024     Sinus bradycardia  Otherwise normal ECG  When compared with ECG of 29-FEB-2016 11:01,  No significant change was found  Confirmed by Luis Enrique Godoy ([id number]) on 7/16/2024 9:59:07 AM      Specimen Collected: 07/10/24 10:22

## 2024-07-25 NOTE — PROGRESS NOTES
Assessment/Plan:     Diagnoses and all orders for this visit:    Diverticulitis of large intestine with abscess without bleeding    Cholelithiasis     Patient is scheduled for surgery tomorrow at [facility].  Patient will undergo placement of ureteric stents by Urology followed by sigmoid colectomy, flexible sigmoidoscopy and cholecystectomy.    Procedure was discussed with patient and informed consent was obtained.  Patient was also told that if access to the gallbladder is difficult we may forego the cholecystectomy.    Risks include bleeding, infection, anastomotic dehiscence, wound complications, ureteric injury, need for blood transfusion, bile duct injury.    Bowel prep instructions have been given.  Patient will be on clear liquid diet today.  He will drink GoLytely and also take his oral antibiotics.    Preadmission testing is complete  Patient got preoperative clearance from his PCP    Subjective:      Patient ID: Homero Driver Jr. is a 59 y.o. male.  Works in [employer]  Patient was admitted in February 2024 at [facility] with perforated sigmoid diverticulitis.  He was found to have a pericolonic abscess for which a transgluteal drain was placed.  This has grown a mixture of anaerobes with Pseudomonas and E. coli.  The catheter is still present as he has a persistent colocutaneous fistula.  Patient was hence scheduled for a sigmoid colectomy at [facility] last month, this however could not be performed because of insurance reasons.     This was patient's first episode of diverticulitis.  Patient had undergone a colonoscopy last year which revealed mild sigmoid diverticulosis.     The following portions of the patient's history were reviewed and updated as appropriate: allergies, current medications, past family history, past medical history, past social history, past surgical history.     Review of Systems     History of smoking.  Patient smokes about quarter pack per day  History of  bilateral shoulder surgeries.  Patient states that he still has rotator cuff problems with left shoulder  History of cervical stenosis with cervical fusion  History of right inguinal hernia repair performed twice     Patient denies history of cardiac disease       Patient has history of wakefulness under general anesthesia.     Patient is single.  He lives with his daughter and his girlfriend     Objective:     /80 (BP Location: Left arm, Patient Position: Sitting, Cuff Size: Standard)   Pulse 85   Temp 98.9 °F (37.2 °C) (Tympanic)   Ht 6' (1.829 m)   Wt 83.9 kg (185 lb)   SpO2 98%   BMI 25.09 kg/m²         Physical Exam     No pallor or icterus  No cervical lymphadenopathy     Heart sounds are normal  Chest is clear to auscultation  Abdominal exam reveals a flat abdomen with no tenderness or masses.  Patient has a left gluteal drain.  External genitalia are normal.  Extremity exam is normal.       The following portions of the patient's history were reviewed and updated as appropriate: allergies, current medications, past family history, past medical history, past social history, past surgical history, and problem list.    Patient's CT scan has shown gallstones.  Patient does not have history of biliary colic.  Merits of a incidental cholecystectomy were discussed with the patient.  I told him if it is accessible we would remove his gallbladder however if this is technically not possible we will not do it.  Patient is agreeable to this.  Patient has amended his consent form.

## 2024-07-26 ENCOUNTER — HOSPITAL ENCOUNTER (INPATIENT)
Facility: HOSPITAL | Age: 59
LOS: 5 days | Discharge: HOME HEALTH | DRG: 230 | End: 2024-07-31
Attending: SURGERY | Admitting: SURGERY
Payer: MEDICARE

## 2024-07-26 DIAGNOSIS — F41.9 ANXIETY: ICD-10-CM

## 2024-07-26 DIAGNOSIS — F17.200 SMOKING: ICD-10-CM

## 2024-07-26 DIAGNOSIS — Z90.49 HISTORY OF OPEN SIGMOIDECTOMY: Primary | ICD-10-CM

## 2024-07-26 DIAGNOSIS — I10 HYPERTENSION, UNSPECIFIED TYPE: ICD-10-CM

## 2024-07-26 DIAGNOSIS — Z98.890 HISTORY OF OPEN SIGMOIDECTOMY: Primary | ICD-10-CM

## 2024-07-26 DIAGNOSIS — K57.20 DIVERTICULITIS OF LARGE INTESTINE WITH ABSCESS WITHOUT BLEEDING: ICD-10-CM

## 2024-07-26 LAB
PLATELET # BLD AUTO: 261 THOUSANDS/UL (ref 149–390)
PMV BLD AUTO: 8.7 FL (ref 8.9–12.7)

## 2024-07-26 PROCEDURE — 47600 CHOLECYSTECTOMY: CPT | Performed by: SURGERY

## 2024-07-26 PROCEDURE — C1758 CATHETER, URETERAL: HCPCS | Performed by: SURGERY

## 2024-07-26 PROCEDURE — 0DTN0ZZ RESECTION OF SIGMOID COLON, OPEN APPROACH: ICD-10-PCS | Performed by: SURGERY

## 2024-07-26 PROCEDURE — 0D1B0Z4 BYPASS ILEUM TO CUTANEOUS, OPEN APPROACH: ICD-10-PCS | Performed by: SURGERY

## 2024-07-26 PROCEDURE — 94664 DEMO&/EVAL PT USE INHALER: CPT

## 2024-07-26 PROCEDURE — 85049 AUTOMATED PLATELET COUNT: CPT | Performed by: PHYSICIAN ASSISTANT

## 2024-07-26 PROCEDURE — 47600 CHOLECYSTECTOMY: CPT | Performed by: PHYSICIAN ASSISTANT

## 2024-07-26 PROCEDURE — 0DJD8ZZ INSPECTION OF LOWER INTESTINAL TRACT, VIA NATURAL OR ARTIFICIAL OPENING ENDOSCOPIC: ICD-10-PCS | Performed by: SURGERY

## 2024-07-26 PROCEDURE — C1769 GUIDE WIRE: HCPCS | Performed by: SURGERY

## 2024-07-26 PROCEDURE — C1765 ADHESION BARRIER: HCPCS | Performed by: SURGERY

## 2024-07-26 PROCEDURE — 44146 PARTIAL REMOVAL OF COLON: CPT | Performed by: PHYSICIAN ASSISTANT

## 2024-07-26 PROCEDURE — 88307 TISSUE EXAM BY PATHOLOGIST: CPT | Performed by: STUDENT IN AN ORGANIZED HEALTH CARE EDUCATION/TRAINING PROGRAM

## 2024-07-26 PROCEDURE — 88304 TISSUE EXAM BY PATHOLOGIST: CPT | Performed by: STUDENT IN AN ORGANIZED HEALTH CARE EDUCATION/TRAINING PROGRAM

## 2024-07-26 PROCEDURE — 44146 PARTIAL REMOVAL OF COLON: CPT | Performed by: SURGERY

## 2024-07-26 PROCEDURE — 0FT40ZZ RESECTION OF GALLBLADDER, OPEN APPROACH: ICD-10-PCS | Performed by: SURGERY

## 2024-07-26 RX ORDER — HYDROMORPHONE HCL/PF 1 MG/ML
SYRINGE (ML) INJECTION AS NEEDED
Status: DISCONTINUED | OUTPATIENT
Start: 2024-07-26 | End: 2024-07-26

## 2024-07-26 RX ORDER — ONDANSETRON 2 MG/ML
INJECTION INTRAMUSCULAR; INTRAVENOUS AS NEEDED
Status: DISCONTINUED | OUTPATIENT
Start: 2024-07-26 | End: 2024-07-26

## 2024-07-26 RX ORDER — LEVOFLOXACIN 5 MG/ML
750 INJECTION, SOLUTION INTRAVENOUS ONCE
Status: COMPLETED | OUTPATIENT
Start: 2024-07-26 | End: 2024-07-26

## 2024-07-26 RX ORDER — ENOXAPARIN SODIUM 100 MG/ML
40 INJECTION SUBCUTANEOUS DAILY
Status: DISCONTINUED | OUTPATIENT
Start: 2024-07-27 | End: 2024-07-31 | Stop reason: HOSPADM

## 2024-07-26 RX ORDER — MAGNESIUM HYDROXIDE 1200 MG/15ML
LIQUID ORAL AS NEEDED
Status: DISCONTINUED | OUTPATIENT
Start: 2024-07-26 | End: 2024-07-26 | Stop reason: HOSPADM

## 2024-07-26 RX ORDER — HYDROMORPHONE HCL/PF 1 MG/ML
0.5 SYRINGE (ML) INJECTION
Status: DISCONTINUED | OUTPATIENT
Start: 2024-07-26 | End: 2024-07-26 | Stop reason: HOSPADM

## 2024-07-26 RX ORDER — KETAMINE HCL IN NACL, ISO-OSM 100MG/10ML
SYRINGE (ML) INJECTION AS NEEDED
Status: DISCONTINUED | OUTPATIENT
Start: 2024-07-26 | End: 2024-07-26

## 2024-07-26 RX ORDER — ONDANSETRON 2 MG/ML
4 INJECTION INTRAMUSCULAR; INTRAVENOUS ONCE AS NEEDED
Status: DISCONTINUED | OUTPATIENT
Start: 2024-07-26 | End: 2024-07-26 | Stop reason: HOSPADM

## 2024-07-26 RX ORDER — AMLODIPINE BESYLATE 10 MG/1
10 TABLET ORAL DAILY
Status: DISCONTINUED | OUTPATIENT
Start: 2024-07-27 | End: 2024-07-31 | Stop reason: HOSPADM

## 2024-07-26 RX ORDER — DEXAMETHASONE SODIUM PHOSPHATE 10 MG/ML
INJECTION, SOLUTION INTRAMUSCULAR; INTRAVENOUS AS NEEDED
Status: DISCONTINUED | OUTPATIENT
Start: 2024-07-26 | End: 2024-07-26

## 2024-07-26 RX ORDER — ALBUTEROL SULFATE 90 UG/1
1 AEROSOL, METERED RESPIRATORY (INHALATION) EVERY 4 HOURS PRN
Status: DISCONTINUED | OUTPATIENT
Start: 2024-07-26 | End: 2024-07-31 | Stop reason: HOSPADM

## 2024-07-26 RX ORDER — METRONIDAZOLE 500 MG/100ML
500 INJECTION, SOLUTION INTRAVENOUS ONCE
Status: COMPLETED | OUTPATIENT
Start: 2024-07-26 | End: 2024-07-26

## 2024-07-26 RX ORDER — SODIUM CHLORIDE, SODIUM LACTATE, POTASSIUM CHLORIDE, CALCIUM CHLORIDE 600; 310; 30; 20 MG/100ML; MG/100ML; MG/100ML; MG/100ML
50 INJECTION, SOLUTION INTRAVENOUS CONTINUOUS
Status: DISCONTINUED | OUTPATIENT
Start: 2024-07-26 | End: 2024-07-26

## 2024-07-26 RX ORDER — DULOXETIN HYDROCHLORIDE 20 MG/1
20 CAPSULE, DELAYED RELEASE ORAL DAILY
Status: DISCONTINUED | OUTPATIENT
Start: 2024-07-27 | End: 2024-07-31 | Stop reason: HOSPADM

## 2024-07-26 RX ORDER — ONDANSETRON 2 MG/ML
4 INJECTION INTRAMUSCULAR; INTRAVENOUS EVERY 6 HOURS PRN
Status: DISCONTINUED | OUTPATIENT
Start: 2024-07-26 | End: 2024-07-31 | Stop reason: HOSPADM

## 2024-07-26 RX ORDER — ACETAMINOPHEN 10 MG/ML
1000 INJECTION, SOLUTION INTRAVENOUS EVERY 6 HOURS PRN
Status: DISCONTINUED | OUTPATIENT
Start: 2024-07-26 | End: 2024-07-29

## 2024-07-26 RX ORDER — HEPARIN SODIUM 5000 [USP'U]/ML
5000 INJECTION, SOLUTION INTRAVENOUS; SUBCUTANEOUS ONCE
Status: COMPLETED | OUTPATIENT
Start: 2024-07-26 | End: 2024-07-26

## 2024-07-26 RX ORDER — GLYCOPYRROLATE 0.2 MG/ML
INJECTION INTRAMUSCULAR; INTRAVENOUS AS NEEDED
Status: DISCONTINUED | OUTPATIENT
Start: 2024-07-26 | End: 2024-07-26

## 2024-07-26 RX ORDER — PROPOFOL 10 MG/ML
INJECTION, EMULSION INTRAVENOUS AS NEEDED
Status: DISCONTINUED | OUTPATIENT
Start: 2024-07-26 | End: 2024-07-26

## 2024-07-26 RX ORDER — SODIUM CHLORIDE, SODIUM LACTATE, POTASSIUM CHLORIDE, CALCIUM CHLORIDE 600; 310; 30; 20 MG/100ML; MG/100ML; MG/100ML; MG/100ML
50 INJECTION, SOLUTION INTRAVENOUS CONTINUOUS
Status: DISCONTINUED | OUTPATIENT
Start: 2024-07-26 | End: 2024-07-30

## 2024-07-26 RX ORDER — SODIUM CHLORIDE, SODIUM LACTATE, POTASSIUM CHLORIDE, CALCIUM CHLORIDE 600; 310; 30; 20 MG/100ML; MG/100ML; MG/100ML; MG/100ML
INJECTION, SOLUTION INTRAVENOUS CONTINUOUS PRN
Status: DISCONTINUED | OUTPATIENT
Start: 2024-07-26 | End: 2024-07-26

## 2024-07-26 RX ORDER — FENTANYL CITRATE 50 UG/ML
INJECTION, SOLUTION INTRAMUSCULAR; INTRAVENOUS AS NEEDED
Status: DISCONTINUED | OUTPATIENT
Start: 2024-07-26 | End: 2024-07-26

## 2024-07-26 RX ORDER — BUPIVACAINE HYDROCHLORIDE AND EPINEPHRINE 2.5; 5 MG/ML; UG/ML
INJECTION, SOLUTION INFILTRATION; PERINEURAL AS NEEDED
Status: DISCONTINUED | OUTPATIENT
Start: 2024-07-26 | End: 2024-07-26 | Stop reason: HOSPADM

## 2024-07-26 RX ORDER — MIDAZOLAM HYDROCHLORIDE 2 MG/2ML
INJECTION, SOLUTION INTRAMUSCULAR; INTRAVENOUS AS NEEDED
Status: DISCONTINUED | OUTPATIENT
Start: 2024-07-26 | End: 2024-07-26

## 2024-07-26 RX ORDER — LABETALOL HYDROCHLORIDE 5 MG/ML
INJECTION, SOLUTION INTRAVENOUS AS NEEDED
Status: DISCONTINUED | OUTPATIENT
Start: 2024-07-26 | End: 2024-07-26

## 2024-07-26 RX ORDER — ROCURONIUM BROMIDE 10 MG/ML
INJECTION, SOLUTION INTRAVENOUS AS NEEDED
Status: DISCONTINUED | OUTPATIENT
Start: 2024-07-26 | End: 2024-07-26

## 2024-07-26 RX ORDER — FENTANYL CITRATE/PF 50 MCG/ML
50 SYRINGE (ML) INJECTION
Status: DISCONTINUED | OUTPATIENT
Start: 2024-07-26 | End: 2024-07-26 | Stop reason: HOSPADM

## 2024-07-26 RX ORDER — LIDOCAINE HYDROCHLORIDE 20 MG/ML
INJECTION, SOLUTION EPIDURAL; INFILTRATION; INTRACAUDAL; PERINEURAL AS NEEDED
Status: DISCONTINUED | OUTPATIENT
Start: 2024-07-26 | End: 2024-07-26

## 2024-07-26 RX ADMIN — DEXAMETHASONE SODIUM PHOSPHATE 10 MG: 10 INJECTION, SOLUTION INTRAMUSCULAR; INTRAVENOUS at 09:05

## 2024-07-26 RX ADMIN — SODIUM CHLORIDE, SODIUM LACTATE, POTASSIUM CHLORIDE, AND CALCIUM CHLORIDE 125 ML/HR: .6; .31; .03; .02 INJECTION, SOLUTION INTRAVENOUS at 18:15

## 2024-07-26 RX ADMIN — ACETAMINOPHEN 1000 MG: 10 INJECTION INTRAVENOUS at 21:56

## 2024-07-26 RX ADMIN — FENTANYL CITRATE 50 MCG: 50 INJECTION, SOLUTION INTRAMUSCULAR; INTRAVENOUS at 11:52

## 2024-07-26 RX ADMIN — SODIUM CHLORIDE, SODIUM LACTATE, POTASSIUM CHLORIDE, AND CALCIUM CHLORIDE: .6; .31; .03; .02 INJECTION, SOLUTION INTRAVENOUS at 08:36

## 2024-07-26 RX ADMIN — Medication 30 MG: at 08:43

## 2024-07-26 RX ADMIN — FENTANYL CITRATE 50 MCG: 50 INJECTION, SOLUTION INTRAMUSCULAR; INTRAVENOUS at 14:08

## 2024-07-26 RX ADMIN — FENTANYL CITRATE 50 MCG: 50 INJECTION, SOLUTION INTRAMUSCULAR; INTRAVENOUS at 12:55

## 2024-07-26 RX ADMIN — HYDROMORPHONE HYDROCHLORIDE 1 MG: 1 INJECTION, SOLUTION INTRAMUSCULAR; INTRAVENOUS; SUBCUTANEOUS at 10:52

## 2024-07-26 RX ADMIN — FENTANYL CITRATE 50 MCG: 50 INJECTION, SOLUTION INTRAMUSCULAR; INTRAVENOUS at 08:40

## 2024-07-26 RX ADMIN — PHENYLEPHRINE HYDROCHLORIDE 100 MCG: 10 INJECTION INTRAVENOUS at 09:18

## 2024-07-26 RX ADMIN — HYDROMORPHONE HYDROCHLORIDE 1 MG: 1 INJECTION, SOLUTION INTRAMUSCULAR; INTRAVENOUS; SUBCUTANEOUS at 14:50

## 2024-07-26 RX ADMIN — DEXMEDETOMIDINE HYDROCHLORIDE 12 MCG: 100 INJECTION, SOLUTION INTRAVENOUS at 15:17

## 2024-07-26 RX ADMIN — ROCURONIUM BROMIDE 50 MG: 50 INJECTION, SOLUTION INTRAVENOUS at 08:44

## 2024-07-26 RX ADMIN — LEVOFLOXACIN: 5 INJECTION, SOLUTION INTRAVENOUS at 08:47

## 2024-07-26 RX ADMIN — HEPARIN SODIUM 5000 UNITS: 5000 INJECTION INTRAVENOUS; SUBCUTANEOUS at 08:00

## 2024-07-26 RX ADMIN — FENTANYL CITRATE 50 MCG: 50 INJECTION INTRAMUSCULAR; INTRAVENOUS at 16:18

## 2024-07-26 RX ADMIN — PROPOFOL 50 MG: 10 INJECTION, EMULSION INTRAVENOUS at 08:44

## 2024-07-26 RX ADMIN — HYDROMORPHONE HYDROCHLORIDE: 10 INJECTION, SOLUTION INTRAMUSCULAR; INTRAVENOUS; SUBCUTANEOUS at 16:23

## 2024-07-26 RX ADMIN — SODIUM CHLORIDE, SODIUM LACTATE, POTASSIUM CHLORIDE, AND CALCIUM CHLORIDE: .6; .31; .03; .02 INJECTION, SOLUTION INTRAVENOUS at 11:05

## 2024-07-26 RX ADMIN — PROPOFOL 50 MG: 10 INJECTION, EMULSION INTRAVENOUS at 10:56

## 2024-07-26 RX ADMIN — SODIUM CHLORIDE, SODIUM LACTATE, POTASSIUM CHLORIDE, AND CALCIUM CHLORIDE: .6; .31; .03; .02 INJECTION, SOLUTION INTRAVENOUS at 14:15

## 2024-07-26 RX ADMIN — LABETALOL 20 MG/4 ML (5 MG/ML) INTRAVENOUS SYRINGE 5 MG: at 11:05

## 2024-07-26 RX ADMIN — MIDAZOLAM HYDROCHLORIDE 2 MG: 1 INJECTION, SOLUTION INTRAMUSCULAR; INTRAVENOUS at 08:36

## 2024-07-26 RX ADMIN — ROCURONIUM BROMIDE 20 MG: 50 INJECTION, SOLUTION INTRAVENOUS at 09:43

## 2024-07-26 RX ADMIN — SUGAMMADEX 175 MG: 100 INJECTION, SOLUTION INTRAVENOUS at 15:05

## 2024-07-26 RX ADMIN — DEXMEDETOMIDINE HYDROCHLORIDE 12 MCG: 100 INJECTION, SOLUTION INTRAVENOUS at 15:12

## 2024-07-26 RX ADMIN — MIDAZOLAM HYDROCHLORIDE 2 MG: 1 INJECTION, SOLUTION INTRAMUSCULAR; INTRAVENOUS at 10:59

## 2024-07-26 RX ADMIN — FENTANYL CITRATE 50 MCG: 50 INJECTION, SOLUTION INTRAMUSCULAR; INTRAVENOUS at 11:27

## 2024-07-26 RX ADMIN — FENTANYL CITRATE 50 MCG: 50 INJECTION, SOLUTION INTRAMUSCULAR; INTRAVENOUS at 10:31

## 2024-07-26 RX ADMIN — FENTANYL CITRATE 50 MCG: 50 INJECTION, SOLUTION INTRAMUSCULAR; INTRAVENOUS at 09:36

## 2024-07-26 RX ADMIN — ROCURONIUM BROMIDE 20 MG: 50 INJECTION, SOLUTION INTRAVENOUS at 10:36

## 2024-07-26 RX ADMIN — ROCURONIUM BROMIDE 20 MG: 50 INJECTION, SOLUTION INTRAVENOUS at 11:52

## 2024-07-26 RX ADMIN — PHENYLEPHRINE HYDROCHLORIDE 2 DROP: 1 SPRAY NASAL at 08:53

## 2024-07-26 RX ADMIN — FENTANYL CITRATE 50 MCG: 50 INJECTION, SOLUTION INTRAMUSCULAR; INTRAVENOUS at 10:46

## 2024-07-26 RX ADMIN — DEXMEDETOMIDINE HYDROCHLORIDE 8 MCG: 100 INJECTION, SOLUTION INTRAVENOUS at 15:25

## 2024-07-26 RX ADMIN — GLYCOPYRROLATE 0.2 MG: 0.2 INJECTION, SOLUTION INTRAMUSCULAR; INTRAVENOUS at 09:00

## 2024-07-26 RX ADMIN — LIDOCAINE HYDROCHLORIDE 100 MG: 20 INJECTION, SOLUTION EPIDURAL; INFILTRATION; INTRACAUDAL; PERINEURAL at 08:43

## 2024-07-26 RX ADMIN — PROPOFOL 50 MG: 10 INJECTION, EMULSION INTRAVENOUS at 09:36

## 2024-07-26 RX ADMIN — ROCURONIUM BROMIDE 20 MG: 50 INJECTION, SOLUTION INTRAVENOUS at 11:10

## 2024-07-26 RX ADMIN — PROPOFOL 100 MG: 10 INJECTION, EMULSION INTRAVENOUS at 08:43

## 2024-07-26 RX ADMIN — LABETALOL 20 MG/4 ML (5 MG/ML) INTRAVENOUS SYRINGE 5 MG: at 11:10

## 2024-07-26 RX ADMIN — PHENYLEPHRINE HYDROCHLORIDE 30 MCG/MIN: 10 INJECTION INTRAVENOUS at 09:14

## 2024-07-26 RX ADMIN — ROCURONIUM BROMIDE 20 MG: 50 INJECTION, SOLUTION INTRAVENOUS at 14:08

## 2024-07-26 RX ADMIN — LABETALOL 20 MG/4 ML (5 MG/ML) INTRAVENOUS SYRINGE 5 MG: at 11:17

## 2024-07-26 RX ADMIN — Medication 20 MG: at 08:45

## 2024-07-26 RX ADMIN — ROCURONIUM BROMIDE 20 MG: 50 INJECTION, SOLUTION INTRAVENOUS at 13:02

## 2024-07-26 RX ADMIN — ONDANSETRON 4 MG: 2 INJECTION INTRAMUSCULAR; INTRAVENOUS at 14:55

## 2024-07-26 RX ADMIN — METRONIDAZOLE: 500 INJECTION, SOLUTION INTRAVENOUS at 09:30

## 2024-07-26 NOTE — INTERVAL H&P NOTE
H&P reviewed. After examining the patient I find no changes in the patients condition since the H&P had been written.    Vitals:    07/26/24 0746   BP: 141/86   Pulse: 73   Resp: 20   Temp: 97.7 °F (36.5 °C)   SpO2: 100%

## 2024-07-26 NOTE — RESPIRATORY THERAPY NOTE
RT Protocol Note  Homero Driver Jr. 59 y.o. male MRN: [id number]  Unit/Bed#: -01 Encounter: [id number]    Assessment    Principal Problem:    Diverticulitis of large intestine with abscess  Active Problems:    HTN (hypertension)    Sleep apnea      Home Pulmonary Medications:  None       Past Medical History:   Diagnosis Date    Anesthesia complication     body temp burns off anesthesia more quickly--woke up during surgery    Anxiety     due to medical conditions    Arthritis     Hypertension     Sleep apnea     Tobacco abuse      Social History     Socioeconomic History    Marital status: /Civil Union     Spouse name: None    Number of children: None    Years of education: None    Highest education level: None   Occupational History    None   Tobacco Use    Smoking status: Every Day     Current packs/day: 0.25     Average packs/day: 0.3 packs/day for 15.0 years (3.8 ttl pk-yrs)     Types: Cigarettes     Passive exposure: Never    Smokeless tobacco: Never    Tobacco comments:     2 cigarettes per day     Vapes on weekends   Vaping Use    Vaping status: Some Days    Substances: Flavoring   Substance and Sexual Activity    Alcohol use: Yes     Alcohol/week: 2.0 standard drinks of alcohol     Types: 2 Shots of liquor per week     Comment: weekends    Drug use: Yes     Frequency: 7.0 times per week     Types: Marijuana     Comment: one joint daily for pain last 2 days ago    Sexual activity: None   Other Topics Concern    None   Social History Narrative    Daily caffeinated cola consumption    Drinks coffee    Tea     Social Determinants of Health     Financial Resource Strain: Not on file   Food Insecurity: No Food Insecurity (2/19/2024)    Hunger Vital Sign     Worried About Running Out of Food in the Last Year: Never true     Ran Out of Food in the Last Year: Never true   Transportation Needs: No Transportation Needs (2/19/2024)    PRAPARE - Transportation     Lack of Transportation (Medical): No     Lack  of Transportation (Non-Medical): No   Physical Activity: Not on file   Stress: Not on file   Social Connections: Not on file   Intimate Partner Violence: Not on file   Housing Stability: Low Risk  (2/19/2024)    Housing Stability Vital Sign     Unable to Pay for Housing in the Last Year: No     Number of Times Moved in the Last Year: 1     Homeless in the Last Year: No       Subjective         Objective    Physical Exam:   Assessment Type: Assess only  General Appearance: Awake, Alert  Respiratory Pattern: Normal  Chest Assessment: Chest expansion symmetrical  Bilateral Breath Sounds: Diminished    Vitals:  Blood pressure 129/78, pulse 77, temperature 97.6 °F (36.4 °C), temperature source Oral, resp. rate 17, height 6' (1.829 m), weight 79.5 kg (175 lb 4.8 oz), SpO2 93%.          Imaging and other studies: I have personally reviewed pertinent reports.            Plan    Respiratory Plan: Home Bronchodilator Patient pathway        Resp Comments: Assessed pt per protocol. Pt has no pulmonary history. Pt is a Post-op pt. Currently on Capnography and on RA. No respiratoy distress. Respiratory to follow.

## 2024-07-26 NOTE — OP NOTE
OPERATIVE REPORT  PATIENT NAME: Homero Driver Jr.    :  1965  MRN: [id number]  Pt Location: [facility] OR ROOM 02    SURGERY DATE: 2024    Surgeons and Role:  Panel 1:     * Adelso Abernathy MD - Primary     * Steve Arroyo MD - Assisting     * Brissa Rowell PA-C - Assisting  Panel 2:     * Vitaliy Leahy MD - Primary    Preop Diagnosis:  Diverticulitis of large intestine with abscess without bleeding [K57.20]  Cholelithiasis    Post-Op Diagnosis Codes:     * Diverticulitis of large intestine with abscess without bleeding [K57.20]  Cholelithiasis    Procedure(s):  SIGMOID COLECTOMY. CHOLECYSTECTOMY. CREATION OF LOOP ILEOSTOMY. REMOVAL OF URETERAL STENTS. REMOVAL OF LEFT BUTTOCK ABSCESS DRAIN.  FLEXIBLE SIGMOIDOSCOPY  Bilateral - INSERTION URETERAL CATHETERS PREOP    Open cholecystectomy  Sigmoid colectomy with low rectal anastomosis  Flexible sigmoidoscopy  De-functioning loop ileostomy    Specimen(s):  ID Type Source Tests Collected by Time Destination   1 :  Tissue Gallbladder TISSUE EXAM Adelso Abernathy MD 2024 1030    2 : RECTO-SIGMOID Tissue Large Intestine, Sigmoid Colon TISSUE EXAM Adelso bAernathy MD 2024 1236        Estimated Blood Loss:   200 mL    Drains:  NG/OG/Enteral Tube Nasogastric 18 Fr Right nare (Active)   Number of days: 0       Ileostomy Loop RUQ (Active)   Number of days: 0       Urethral Catheter Latex 16 Fr. (Active)   Site Assessment Clean;Skin intact 24 1102   Number of days: 0       [REMOVED] Ureteral Internal Stent Right ureter 5 Fr. (Removed)   Site Assessment Clean;Intact 24 1022   Number of days: 0       [REMOVED] Ureteral Internal Stent Left ureter 5 Fr. (Removed)   Site Assessment Clean;Intact 24 1022   Number of days: 0       Anesthesia Type:   General endotracheal anesthesia and local anesthesia used 45 mL of 0.25% Marcaine with 1 and 200,000 epinephrine    Operative Indications:  Diverticulitis of large intestine  with abscess without bleeding [K57.20]  This is a 59-year-old male who had been admitted at [facility] with perforated sigmoid diverticulitis in February 2024.  This was his first episode of diverticulitis.  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  A left transgluteal drain was placed and patient was treated conservatively.  Subsequent to this due to various reasons he did not come for definitive surgery until April of this year.  Then due to insurance reasons his surgery was switched from [place] to [place].  During drain exchanges every time he was found that the fecal fistula was still present.  His last colonoscopy was done in July 2023 and showed mild sigmoid diverticulosis.    Patient was also found on a preoperative CT scan to have radiopaque gallstones.  He requested a cholecystectomy.    Although patient did not give us any history, it was found during surgery that he probably had a laparoscopic repair of an umbilical hernia as there was composix mesh present in the center of the abdomen.    Operative Findings:  Patient had cholelithiasis.  He had palpable large stones.    To the undersurface of the mesh in the central abdomen where a bowel and omental adhesions.    There was extensive amount of inflammation and scarring in the pelvis and in the pelvic inlet.  It appears that the perforation was very close to the rectosigmoid junction.  Because of the prolonged duration of the inflammation scarring extended below the peritoneal reflection.    Bilateral ureteric stents were placed preoperatively.  It was observed by the urologist that the left stent was difficult to pass which also was probably secondary to the inflammation      Complications:   None  Patient was given a deep functioning loop ileostomy to protect the low rectal anastomosis.    Procedure and Technique:  Patient was brought to the operating room suite.  He was identified by me.  He was laid supine  on the operating table.  General endotracheal anesthesia was given.  Patient was placed in lithotomy position in [facility].    The left transgluteal drain was removed.    Bilateral ureteric stents were placed by the urologist for which there should be a separate dictation.    Patient was given preoperative doses of Levaquin and Metronidazole.  He was also given subcutaneous Heparin.    Abdominal wall was cleaned with ChloraPrep and the perineal area was cleaned with Betadine.  Patient was draped.  Local infiltration anesthesia was given for a vertical midline incision.  Skin and subcutaneous tissues were divided.  The fascia was opened carefully and at this time we realized that the patient had mesh placed in the central abdomen.  We had to open the abdominal cavity very carefully.  There were small bowel and omental adhesions to the mesh which were taken down by sharp dissection.  Once the peritoneal cavity was entered there were no other adhesions found.    Open cholecystectomy was performed first.  Self-retaining retractor was placed.  The gastro-duodenum and the right colon were retracted inferiorly.  The gallbladder was grasped at the fundus and at the neck.  We dissected the Calot's triangle out and clearly identified the cystic duct.  A silk suture was passed around the cystic duct without dividing the duct.  A fundus down approach was used.  Using a combination of sharp dissection and Bovie cautery the gallbladder was released from the liver bed until we reached the neck of the gallbladder.  Attachments at the neck were divided between clips.  The gallbladder in the end was attached only to the cystic duct which was then divided between clips.  Specimen was sent to pathology.    We then turned our attention to performing the sigmoid colectomy.  The sigmoid colon attachments to the left lateral pelvic wall were taken down by dividing the line of Toldt.  This division was taken all the way up to the  splenic flexure.  Distally we took the attachments down laterally all the way to the pelvic floor.  We identified both the ureters by palpation.  The left ureter was very close to the rectosigmoid area.  As stated above the perforation was very close to the rectosigmoid junction hence there was very dense inflammation and scarring around the rectosigmoid junction extending even below the pelvic floor.  The pelvic floor was opened anteriorly and posteriorly.  Further dissection was done to strip the urinary bladder of the anterior wall of the rectum, the rectum from the lateral attachments and the rectum from the Waldeyer's fascia on the sacrum.  This was a difficult dissection because of extensive inflammation.  The site of the fecal fistula was identified and a colotomy was made at that site which had to be repaired with 3-0 silk sutures.  After we reached the middle one third of the rectum below the pelvic floor we were able to palpate the normal rectum.  We then divided the proximal sigmoid colon with a KEYSHA stapler at the level of the iliac crest and then divided the middle one third of the rectum with a Contour stapler.  Specimen was sent to pathology.  We had mobilized the left colon adequately and it reached the pelvis without any tension.    We then attempted to perform a anastomosis.  We selected a size 29 EEA stapler.  The proximal colon staple line was opened and a pursestring suture was applied with 2-0 Prolene.  After using appropriate dilators the anvil of the stapler was placed.  The pursestring suture was tightened over the anvil.  We then performed an anal exam and passed the dilators through the anus.  We were able to palpate the dilator just distal to the rectal staple line.  We tried using the EEA stapler however we had a significant tear on the rectal stump and this step was hence abandoned.    We then had to refashion the rectal stump by excising a segment of the stump and then closing the  rectal stump with a continuous 2-0 Prolene suture.  An end to end the EEA stapled anastomosis was performed.    Flexible sigmoidoscopy was then done.  The leak test was positive.  There was a small colotomy on the side of the staple line which was repaired with interrupted 2-0 Prolene sutures.  The leak test was repeated and this time no leak was found.    However due to the extensive inflammation and extensive dissection around the rectal stump and the fact that the rectal anastomosis was a low anastomosis we decided to give the patient a protective loop ileostomy.    A segment of ileum was identified about 1 foot proximal to the ileocecal valve and was brought in the right upper quadrant lateral to the mesh.  We had created a stoma by excising the skin and the subcutaneous fat.  Fascia was opened in a cruciate fashion.  The loop of ileum was delivered.    We then closed the midline.  We used 3 strands of #0 Prolene.  Prolene was used because of the presence of the mesh in the fascia.  We used Seprafilm under the closure.  After the fascia was closed the wound was irrigated and Marcaine were injected for postop pain relief.  Skin was closed with staples.  The wound was protected.    The loop ileostomy was then matured.  We used interrupted 3-0 Vicryl sutures and the ileostomy was performed over a red rubber bridge.  The bridge was secured to skin with a 3-0 nylon suture.  A stoma bag was applied.    We removed the ureteric stents at the end of the procedure.    Patient tolerated procedure well.    I was present for the entire procedure., A qualified resident physician was not available., A co-surgeon was required because of skills and techniques relevant to speciality. A physician assistant was required during the procedure for retraction, tissue handling, dissection and suturing.    Co-surgeon was required for the complexity of surgery.  The co-surgeon was needed for doing the EEA anastomosis, the flexible  sigmoidoscopy and the repair of the anastomosis while the other surgeon performed a leak test.    Patient Disposition:  PACU     This procedure was not performed to treat colon cancer through resection      SIGNATURE:  Adelso Abernathy MD  DATE: July 26, 2024  TIME: 3:31 PM

## 2024-07-26 NOTE — ANESTHESIA POSTPROCEDURE EVALUATION
Post-Op Assessment Note    CV Status:  Stable  Pain Score: 0    Pain management: adequate       Mental Status:  Sleepy and arousable   Hydration Status:  Euvolemic   PONV Controlled:  Controlled   Airway Patency:  Patent     Post Op Vitals Reviewed: Yes      Staff: Anesthesiologist, CRNA               /90 (07/26/24 1536)    Temp 98.7 °F (37.1 °C) (07/26/24 1536)    Pulse 83 (07/26/24 1536)   Resp 16 (07/26/24 1536)    SpO2 (!) 80 % (07/26/24 1536)

## 2024-07-26 NOTE — OP NOTE
OPERATIVE REPORT  PATIENT NAME: Homero Driver Jr.    :  1965  MRN: [id number]  Pt Location:  OR ROOM 02    SURGERY DATE: 2024    Surgeons and Role:  Panel 1:     * Adelso Abernathy MD - Primary     * Brissa Rowell PA-C - Assisting  Panel 2:     * Vitaliy Leahy MD - Primary    Preop Diagnosis:  Diverticulitis of large intestine with abscess without bleeding [K57.20]    Post-Op Diagnosis Codes:     * Diverticulitis of large intestine with abscess without bleeding [K57.20]    Procedure(s):  SIGMOID COLECTOMY. FLEXIBLE SIGMOIDOSCOPY. CHOLECYSTECTOMY  FLEXIBLE SIGMOIDOSCOPY  Bilateral - INSERTION URETERAL CATHETERS PREOP    Specimen(s):  * No specimens in log *    Estimated Blood Loss:   Minimal    Drains:  Abscess Drain Buttock (Active)   Number of days: 147       Anesthesia Type:   General    Operative Indications:  Diverticulitis of large intestine with abscess without bleeding [K57.20]  59-year-old male admitted to [facility] with severe diverticulitis abscess undergoing perineal drainage appropriate antibiotics discharged home now scheduled to undergo colectomy possible cholecystectomy requiring bilateral stents patient met in the surgical preop unit stating he has no urologic history    Operative Findings:  3.0 cm bilobar obstructing prostate  Trabeculation  5 Setswana open-ended catheter was placed into the right and left ureter to be removed by surgical team  Rectal exam smooth enlarged prostate      Complications:   None    Procedure and Technique:  Patient identified in the preop surgical unit procedure discussed consent signed with family member present wish to proceed with procedure placed in the OR suite after general anesthesia was induced drain removed by staff placed in the thigh position draped and prepped in sterile fashion timeout performed 22 Setswana cystoscope with 30 lens passed through the anterior the posterior third anterior to showed no abnormalities  posterior to confirmed 3.0 by lobar obstructing prostatic urethra scope inserted the bladder lumen with trabeculation noted left orifice was then cannulated with a 0.038 sensor wire followed by 5 Montenegrin open-ended stent up into the left renal pelvis this was done on the right side with 5 Montenegrin stent in similar manner the cystoscope was then removed leaving in the stents 60 Montenegrin Richard catheter was inserted with 10 cc in the balloon 14 Montenegrin Angiocath was then inserted into the distal end of the Richard with the distal end of the ureteral cath port through on both the right and left side and then tied in with 4-0 silk scrotal exam confirms right atrophic testicle normal left testicle possible hydrocele rectal exam smooth nontender prostate rest of this this will be done by Dr. Abernathy or staff   I was present for the entire procedure.    Patient Disposition:  PACU           SIGNATURE: Vitaliy Leahy MD  DATE: July 26, 2024  TIME: 8:55 AM

## 2024-07-27 LAB
ALBUMIN SERPL BCG-MCNC: 3.6 G/DL (ref 3.5–5)
ALP SERPL-CCNC: 44 U/L (ref 34–104)
ALT SERPL W P-5'-P-CCNC: 17 U/L (ref 7–52)
ANION GAP SERPL CALCULATED.3IONS-SCNC: 11 MMOL/L (ref 4–13)
AST SERPL W P-5'-P-CCNC: 29 U/L (ref 13–39)
BASOPHILS # BLD AUTO: 0.02 THOUSANDS/ÂΜL (ref 0–0.1)
BASOPHILS NFR BLD AUTO: 0 % (ref 0–1)
BILIRUB SERPL-MCNC: 0.67 MG/DL (ref 0.2–1)
BUN SERPL-MCNC: 16 MG/DL (ref 5–25)
CALCIUM SERPL-MCNC: 9.2 MG/DL (ref 8.4–10.2)
CHLORIDE SERPL-SCNC: 101 MMOL/L (ref 96–108)
CO2 SERPL-SCNC: 26 MMOL/L (ref 21–32)
CREAT SERPL-MCNC: 0.95 MG/DL (ref 0.6–1.3)
EOSINOPHIL # BLD AUTO: 0 THOUSAND/ÂΜL (ref 0–0.61)
EOSINOPHIL NFR BLD AUTO: 0 % (ref 0–6)
ERYTHROCYTE [DISTWIDTH] IN BLOOD BY AUTOMATED COUNT: 15.2 % (ref 11.6–15.1)
GFR SERPL CREATININE-BSD FRML MDRD: 87 ML/MIN/1.73SQ M
GLUCOSE SERPL-MCNC: 112 MG/DL (ref 65–140)
HCT VFR BLD AUTO: 40.1 % (ref 36.5–49.3)
HGB BLD-MCNC: 12.9 G/DL (ref 12–17)
IMM GRANULOCYTES # BLD AUTO: 0.03 THOUSAND/UL (ref 0–0.2)
IMM GRANULOCYTES NFR BLD AUTO: 0 % (ref 0–2)
LYMPHOCYTES # BLD AUTO: 1.87 THOUSANDS/ÂΜL (ref 0.6–4.47)
LYMPHOCYTES NFR BLD AUTO: 16 % (ref 14–44)
MCH RBC QN AUTO: 28.8 PG (ref 26.8–34.3)
MCHC RBC AUTO-ENTMCNC: 32.2 G/DL (ref 31.4–37.4)
MCV RBC AUTO: 90 FL (ref 82–98)
MONOCYTES # BLD AUTO: 0.7 THOUSAND/ÂΜL (ref 0.17–1.22)
MONOCYTES NFR BLD AUTO: 6 % (ref 4–12)
NEUTROPHILS # BLD AUTO: 9.11 THOUSANDS/ÂΜL (ref 1.85–7.62)
NEUTS SEG NFR BLD AUTO: 78 % (ref 43–75)
NRBC BLD AUTO-RTO: 0 /100 WBCS
PLATELET # BLD AUTO: 285 THOUSANDS/UL (ref 149–390)
PMV BLD AUTO: 9.6 FL (ref 8.9–12.7)
POTASSIUM SERPL-SCNC: 4.1 MMOL/L (ref 3.5–5.3)
PROT SERPL-MCNC: 6.9 G/DL (ref 6.4–8.4)
RBC # BLD AUTO: 4.48 MILLION/UL (ref 3.88–5.62)
SODIUM SERPL-SCNC: 138 MMOL/L (ref 135–147)
WBC # BLD AUTO: 11.73 THOUSAND/UL (ref 4.31–10.16)

## 2024-07-27 PROCEDURE — 85025 COMPLETE CBC W/AUTO DIFF WBC: CPT | Performed by: PHYSICIAN ASSISTANT

## 2024-07-27 PROCEDURE — 99252 IP/OBS CONSLTJ NEW/EST SF 35: CPT

## 2024-07-27 PROCEDURE — 99024 POSTOP FOLLOW-UP VISIT: CPT | Performed by: SPECIALIST

## 2024-07-27 PROCEDURE — 80053 COMPREHEN METABOLIC PANEL: CPT | Performed by: PHYSICIAN ASSISTANT

## 2024-07-27 RX ORDER — NICOTINE 21 MG/24HR
14 PATCH, TRANSDERMAL 24 HOURS TRANSDERMAL DAILY
Status: DISCONTINUED | OUTPATIENT
Start: 2024-07-27 | End: 2024-07-31 | Stop reason: HOSPADM

## 2024-07-27 RX ORDER — HYDRALAZINE HYDROCHLORIDE 20 MG/ML
10 INJECTION INTRAMUSCULAR; INTRAVENOUS EVERY 6 HOURS PRN
Status: DISCONTINUED | OUTPATIENT
Start: 2024-07-27 | End: 2024-07-31 | Stop reason: HOSPADM

## 2024-07-27 RX ORDER — KETOROLAC TROMETHAMINE 30 MG/ML
15 INJECTION, SOLUTION INTRAMUSCULAR; INTRAVENOUS ONCE
Status: COMPLETED | OUTPATIENT
Start: 2024-07-27 | End: 2024-07-27

## 2024-07-27 RX ADMIN — SODIUM CHLORIDE, SODIUM LACTATE, POTASSIUM CHLORIDE, AND CALCIUM CHLORIDE 125 ML/HR: .6; .31; .03; .02 INJECTION, SOLUTION INTRAVENOUS at 10:12

## 2024-07-27 RX ADMIN — DULOXETINE HYDROCHLORIDE 20 MG: 20 CAPSULE, DELAYED RELEASE ORAL at 10:13

## 2024-07-27 RX ADMIN — NICOTINE 14 MG: 14 PATCH, EXTENDED RELEASE TRANSDERMAL at 05:04

## 2024-07-27 RX ADMIN — ACETAMINOPHEN 1000 MG: 10 INJECTION INTRAVENOUS at 12:53

## 2024-07-27 RX ADMIN — ENOXAPARIN SODIUM 40 MG: 40 INJECTION SUBCUTANEOUS at 10:14

## 2024-07-27 RX ADMIN — KETOROLAC TROMETHAMINE 15 MG: 30 INJECTION, SOLUTION INTRAMUSCULAR at 21:59

## 2024-07-27 RX ADMIN — Medication 1 SPRAY: at 21:59

## 2024-07-27 RX ADMIN — ACETAMINOPHEN 1000 MG: 10 INJECTION INTRAVENOUS at 19:38

## 2024-07-27 RX ADMIN — AMLODIPINE BESYLATE 10 MG: 10 TABLET ORAL at 10:13

## 2024-07-27 RX ADMIN — SODIUM CHLORIDE, SODIUM LACTATE, POTASSIUM CHLORIDE, AND CALCIUM CHLORIDE 125 ML/HR: .6; .31; .03; .02 INJECTION, SOLUTION INTRAVENOUS at 01:48

## 2024-07-27 NOTE — PLAN OF CARE
Problem: PAIN - ADULT  Goal: Verbalizes/displays adequate comfort level or baseline comfort level  Description: Interventions:  - Encourage patient to monitor pain and request assistance  - Assess pain using appropriate pain scale  - Administer analgesics based on type and severity of pain and evaluate response  - Implement non-pharmacological measures as appropriate and evaluate response  - Consider cultural and social influences on pain and pain management  - Notify physician/advanced practitioner if interventions unsuccessful or patient reports new pain  Outcome: Progressing     Problem: INFECTION - ADULT  Goal: Absence or prevention of progression during hospitalization  Description: INTERVENTIONS:  - Assess and monitor for signs and symptoms of infection  - Monitor lab/diagnostic results  - Monitor all insertion sites, i.e. indwelling lines, tubes, and drains  - Monitor endotracheal if appropriate and nasal secretions for changes in amount and color  - [place] appropriate cooling/warming therapies per order  - Administer medications as ordered  - Instruct and encourage patient and family to use good hand hygiene technique  - Identify and instruct in appropriate isolation precautions for identified infection/condition  Outcome: Progressing     Problem: SAFETY ADULT  Goal: Patient will remain free of falls  Description: INTERVENTIONS:  - Educate patient/family on patient safety including physical limitations  - Instruct patient to call for assistance with activity   - Consult OT/PT to assist with strengthening/mobility   - Keep Call bell within reach  - Keep bed low and locked with side rails adjusted as appropriate  - Keep care items and personal belongings within reach  - Initiate and maintain comfort rounds  - Make Fall Risk Sign visible to staff  - Offer Toileting every 2 Hours, in advance of need  - Initiate/Maintain alarm  - Obtain necessary fall risk management equipment:   - Apply yellow socks and  bracelet for high fall risk patients  - Consider moving patient to room near nurses station  Outcome: Progressing  Goal: Maintain or return to baseline ADL function  Description: INTERVENTIONS:  -  Assess patient's ability to carry out ADLs; assess patient's baseline for ADL function and identify physical deficits which impact ability to perform ADLs (bathing, care of mouth/teeth, toileting, grooming, dressing, etc.)  - Assess/evaluate cause of self-care deficits   - Assess range of motion  - Assess patient's mobility; develop plan if impaired  - Assess patient's need for assistive devices and provide as appropriate  - Encourage maximum independence but intervene and supervise when necessary  - Involve family in performance of ADLs  - Assess for home care needs following discharge   - Consider OT consult to assist with ADL evaluation and planning for discharge  - Provide patient education as appropriate  Outcome: Progressing  Goal: Maintains/Returns to pre admission functional level  Description: INTERVENTIONS:  - Perform AM-PAC 6 Click Basic Mobility/ Daily Activity assessment daily.  - Set and communicate daily mobility goal to care team and patient/family/caregiver.   - Collaborate with rehabilitation services on mobility goals if consulted  - Perform Range of Motion 4 times a day.  - Reposition patient every 2 hours.  - Dangle patient 3 times a day  - Stand patient 3 times a day  - Ambulate patient 3 times a day  - Out of bed to chair 3 times a day   - Out of bed for meals 3 times a day  - Out of bed for toileting  - Record patient progress and toleration of activity level   Outcome: Progressing     Problem: DISCHARGE PLANNING  Goal: Discharge to home or other facility with appropriate resources  Description: INTERVENTIONS:  - Identify barriers to discharge w/patient and caregiver  - Arrange for needed discharge resources and transportation as appropriate  - Identify discharge learning needs (meds, wound care,  etc.)  - Arrange for interpretive services to assist at discharge as needed  - Refer to Case Management Department for coordinating discharge planning if the patient needs post-hospital services based on physician/advanced practitioner order or complex needs related to functional status, cognitive ability, or social support system  Outcome: Progressing     Problem: Knowledge Deficit  Goal: Patient/family/caregiver demonstrates understanding of disease process, treatment plan, medications, and discharge instructions  Description: Complete learning assessment and assess knowledge base.  Interventions:  - Provide teaching at level of understanding  - Provide teaching via preferred learning methods  Outcome: Progressing     Problem: GASTROINTESTINAL - ADULT  Goal: Minimal or absence of nausea and/or vomiting  Description: INTERVENTIONS:  - Administer IV fluids if ordered to ensure adequate hydration  - Maintain NPO status until nausea and vomiting are resolved  - Nasogastric tube if ordered  - Administer ordered antiemetic medications as needed  - Provide nonpharmacologic comfort measures as appropriate  - Advance diet as tolerated, if ordered  - Consider nutrition services referral to assist patient with adequate nutrition and appropriate food choices  Outcome: Progressing  Goal: Maintains or returns to baseline bowel function  Description: INTERVENTIONS:  - Assess bowel function  - Encourage oral fluids to ensure adequate hydration  - Administer IV fluids if ordered to ensure adequate hydration  - Administer ordered medications as needed  - Encourage mobilization and activity  - Consider nutritional services referral to assist patient with adequate nutrition and appropriate food choices  Outcome: Progressing  Goal: Maintains adequate nutritional intake  Description: INTERVENTIONS:  - Monitor percentage of each meal consumed  - Identify factors contributing to decreased intake, treat as appropriate  - Assist with meals  as needed  - Monitor I&O, weight, and lab values if indicated  - Obtain nutrition services referral as needed  Outcome: Progressing  Goal: Establish and maintain optimal ostomy function  Description: INTERVENTIONS:  - Assess bowel function  - Encourage oral fluids to ensure adequate hydration  - Administer IV fluids if ordered to ensure adequate hydration   - Administer ordered medications as needed  - Encourage mobilization and activity  - Nutrition services referral to assist patient with appropriate food choices  - Assess stoma site  - Consider wound care consult   Outcome: Progressing  Goal: Oral mucous membranes remain intact  Description: INTERVENTIONS  - Assess oral mucosa and hygiene practices  - Implement preventative oral hygiene regimen  - Implement oral medicated treatments as ordered  - Initiate Nutrition services referral as needed  Outcome: Progressing

## 2024-07-27 NOTE — PLAN OF CARE
Problem: PAIN - ADULT  Goal: Verbalizes/displays adequate comfort level or baseline comfort level  Description: Interventions:  - Encourage patient to monitor pain and request assistance  - Assess pain using appropriate pain scale  - Administer analgesics based on type and severity of pain and evaluate response  - Implement non-pharmacological measures as appropriate and evaluate response  - Consider cultural and social influences on pain and pain management  - Notify physician/advanced practitioner if interventions unsuccessful or patient reports new pain  Outcome: Progressing     Problem: INFECTION - ADULT  Goal: Absence or prevention of progression during hospitalization  Description: INTERVENTIONS:  - Assess and monitor for signs and symptoms of infection  - Monitor lab/diagnostic results  - Monitor all insertion sites, i.e. indwelling lines, tubes, and drains  - Monitor endotracheal if appropriate and nasal secretions for changes in amount and color  - [place] appropriate cooling/warming therapies per order  - Administer medications as ordered  - Instruct and encourage patient and family to use good hand hygiene technique  - Identify and instruct in appropriate isolation precautions for identified infection/condition  Outcome: Progressing     Problem: DISCHARGE PLANNING  Goal: Discharge to home or other facility with appropriate resources  Description: INTERVENTIONS:  - Identify barriers to discharge w/patient and caregiver  - Arrange for needed discharge resources and transportation as appropriate  - Identify discharge learning needs (meds, wound care, etc.)  - Arrange for interpretive services to assist at discharge as needed  - Refer to Case Management Department for coordinating discharge planning if the patient needs post-hospital services based on physician/advanced practitioner order or complex needs related to functional status, cognitive ability, or social support system  Outcome: Progressing      Problem: Knowledge Deficit  Goal: Patient/family/caregiver demonstrates understanding of disease process, treatment plan, medications, and discharge instructions  Description: Complete learning assessment and assess knowledge base.  Interventions:  - Provide teaching at level of understanding  - Provide teaching via preferred learning methods  Outcome: Progressing     Problem: GASTROINTESTINAL - ADULT  Goal: Minimal or absence of nausea and/or vomiting  Description: INTERVENTIONS:  - Administer IV fluids if ordered to ensure adequate hydration  - Maintain NPO status until nausea and vomiting are resolved  - Nasogastric tube if ordered  - Administer ordered antiemetic medications as needed  - Provide nonpharmacologic comfort measures as appropriate  - Advance diet as tolerated, if ordered  - Consider nutrition services referral to assist patient with adequate nutrition and appropriate food choices  Outcome: Progressing

## 2024-07-27 NOTE — ASSESSMENT & PLAN NOTE
Smokes 1 pack of cigarettes daily  Education on smoking cessation was provided at bedside greater than 3 minutes  Nicotine patch ordered

## 2024-07-27 NOTE — ASSESSMENT & PLAN NOTE
BP in the 150s and 130s systolic  On Norvasc daily, continue her regimen  Monitor BP per unit protocol or when needed

## 2024-07-27 NOTE — ASSESSMENT & PLAN NOTE
Since admission his O2 sat has been % on room air  Does not wear CPAP at home  Per chart review noted he visited sleep medicine last January, provider referred him to ambulatory sleep study.  Unfortunately the patient did not follow-up  Monitor respiratory function especially now that he is on a PCA pump

## 2024-07-27 NOTE — PROGRESS NOTES
Progress Note - General Surgery   Patient: Homero Driver Jr.   : 1965 Sex: male MRN: [id number]   CSN: [id number] PCP: Rachelle Tavares MD  Unit/Bed#: -Geni     SUBJECTIVE:   Doing well my NG tube is bothering me just near my nose  I have lot of pain  When can you take my NG tube out and start feeding    OBJECTIVE  Stable overnight labs reviewed    Vitals:   I/O last 24 hours:  In: 3506.2 [I.V.:3256.2; IV Piggyback:250]  Out: 1415 [Urine:1185; Emesis/NG output:30; Blood:200]Blood pressure 161/92, pulse 79, temperature 97.6 °F (36.4 °C), temperature source Oral, resp. rate 16, height 6' (1.829 m), weight 79.5 kg (175 lb 4.3 oz), SpO2 98%.,Body mass index is 23.77 kg/m².  Invasive Devices       Peripheral Intravenous Line  Duration             Peripheral IV 24 Left Forearm 1 day              Drain  Duration             NG/OG/Enteral Tube Nasogastric 18 Fr Right nare 1 day    Urethral Catheter Latex 16 Fr. 1 day    Ileostomy Loop RUQ <1 day                  Active medications:    Current Facility-Administered Medications:     acetaminophen (Ofirmev) injection 1,000 mg, 1,000 mg, Intravenous, Q6H PRN, 1,000 mg at 24 1253    albuterol (PROVENTIL HFA,VENTOLIN HFA) inhaler 1 puff, 1 puff, Inhalation, Q4H PRN    amLODIPine (NORVASC) tablet 10 mg, 10 mg, Oral, Daily, 10 mg at 24 1013    DULoxetine (CYMBALTA) delayed release capsule 20 mg, 20 mg, Oral, Daily, 20 mg at 24 1013    enoxaparin (LOVENOX) subcutaneous injection 40 mg, 40 mg, Subcutaneous, Daily, 40 mg at 24 1014    hydrALAZINE (APRESOLINE) injection 10 mg, 10 mg, Intravenous, Q6H PRN    HYDROmorphone (DILAUDID) 1 mg/mL 50 mL PCA, , Intravenous, Continuous, New Bag at 24 1623    lactated ringers bolus 1,000 mL, 1,000 mL, Intravenous, Once PRN **AND** lactated ringers bolus 1,000 mL, 1,000 mL, Intravenous, Once PRN    lactated ringers infusion, 125 mL/hr, Intravenous, Continuous, 125 mL/hr at 24 1012     naloxone (NARCAN) 0.04 mg/mL syringe 0.04 mg, 0.04 mg, Intravenous, Q3 min PRN    nicotine (NICODERM CQ) 14 mg/24hr TD 24 hr patch 14 mg, 14 mg, Transdermal, Daily, 14 mg at 07/27/24 0504    ondansetron (ZOFRAN) injection 4 mg, 4 mg, Intravenous, Q6H PRN    sodium chloride 0.9 % bolus 1,000 mL, 1,000 mL, Intravenous, Once PRN **AND** sodium chloride 0.9 % bolus 1,000 mL, 1,000 mL, Intravenous, Once PRN    Physical Exam:   General Alert awake   Normocephalic atraumatic PERRLA  Abdomen soft,non tender Bowel sounds absent ileostomy no bleeding no output  Skin: surgical dressing is C/D/I  Ext: No clubbing, cyanosis, edema    Lab, Imaging and other studies:  Recent Results (from the past 24 hour(s))   Platelet count    Collection Time: 07/26/24  4:12 PM   Result Value Ref Range    Platelets 261 149 - 390 Thousands/uL    MPV 8.7 (L) 8.9 - 12.7 fL   Comprehensive metabolic panel    Collection Time: 07/27/24  5:27 AM   Result Value Ref Range    Sodium 138 135 - 147 mmol/L    Potassium 4.1 3.5 - 5.3 mmol/L    Chloride 101 96 - 108 mmol/L    CO2 26 21 - 32 mmol/L    ANION GAP 11 4 - 13 mmol/L    BUN 16 5 - 25 mg/dL    Creatinine 0.95 0.60 - 1.30 mg/dL    Glucose 112 65 - 140 mg/dL    Calcium 9.2 8.4 - 10.2 mg/dL    AST 29 13 - 39 U/L    ALT 17 7 - 52 U/L    Alkaline Phosphatase 44 34 - 104 U/L    Total Protein 6.9 6.4 - 8.4 g/dL    Albumin 3.6 3.5 - 5.0 g/dL    Total Bilirubin 0.67 0.20 - 1.00 mg/dL    eGFR 87 ml/min/1.73sq m   CBC and differential    Collection Time: 07/27/24  5:27 AM   Result Value Ref Range    WBC 11.73 (H) 4.31 - 10.16 Thousand/uL    RBC 4.48 3.88 - 5.62 Million/uL    Hemoglobin 12.9 12.0 - 17.0 g/dL    Hematocrit 40.1 36.5 - 49.3 %    MCV 90 82 - 98 fL    MCH 28.8 26.8 - 34.3 pg    MCHC 32.2 31.4 - 37.4 g/dL    RDW 15.2 (H) 11.6 - 15.1 %    MPV 9.6 8.9 - 12.7 fL    Platelets 285 149 - 390 Thousands/uL    nRBC 0 /100 WBCs    Segmented % 78 (H) 43 - 75 %    Immature Grans % 0 0 - 2 %    Lymphocytes % 16 14  - 44 %    Monocytes % 6 4 - 12 %    Eosinophils Relative 0 0 - 6 %    Basophils Relative 0 0 - 1 %    Absolute Neutrophils 9.11 (H) 1.85 - 7.62 Thousands/µL    Absolute Immature Grans 0.03 0.00 - 0.20 Thousand/uL    Absolute Lymphocytes 1.87 0.60 - 4.47 Thousands/µL    Absolute Monocytes 0.70 0.17 - 1.22 Thousand/µL    Eosinophils Absolute 0.00 0.00 - 0.61 Thousand/µL    Basophils Absolute 0.02 0.00 - 0.10 Thousands/µL     VTE Pharmacologic Prophylaxis: Enoxaparin (Lovenox)  VTE Mechanical Prophylaxis: sequential compression device         Diet Orders   (From admission, onward)                 Start     Ordered    07/26/24 1733  Diet NPO; Sips with meds  Diet effective now        Comments: Clamp NGT for one hour after administration of oral medications.   References:    Adult Nutrition Support Algorithm    RD Therapeutic Diet Order Protocol   Question Answer Comment   Diet Type NPO    NPO Except: Sips with meds    RD to adjust diet per protocol? No        07/26/24 1733                .  Admitting Diagnosis: Diverticulitis of large intestine with abscess without bleeding [K57.20]  Code Status: Level 1 - Full Code  Patient Active Problem List   Diagnosis    Cervical stenosis of spine    Pseudoarthrosis of cervical spine (HCC)    Anxiety    S/P cervical spinal fusion    Smoking    Diverticulitis of large intestine with abscess    History of unintended awareness under general anesthesia    Cholelithiasis    HTN (hypertension)    Sleep apnea     PT/OT/ST reviewed.  Plan was coordinated with Nursing staff & Case management.  Plan of care was discussed with patient    Assessment/ Plan:  Homero Driver Jr. is a 59 y.o. male 1 day(s) POD # first postop day s/p Rea's procedure reversal   protective ileostomy    Pain control  Pulmonary hygiene  DVT prophylaxis  DC Richard catheter tomorrow a.m.  Labs in a.m.  Will remove the tape and retape the NG tube and the more comfortable position discussed with nursing  "staff    Counseling / Coordination of Care  Total floor / unit time spent today 30minutes.  Greater than 50% of total time was spent with the patient and / or family counseling and / or coordination of care.     Son Celestin MD MS FRCS FACS  [facility]  07/27/24 1:32 PM  [facility]:  [address], [address] [place] [address]  Office  [phone] Fax [phone]    Portions of the record may have been created with voice recognition software. Occasional wrong word or \"sound a like\" substitutions may have occurred due to the inherent limitations of voice recognition software. Read the chart carefully and recognize, using context, where substitutions have occurred.        "

## 2024-07-27 NOTE — ASSESSMENT & PLAN NOTE
Chart review done and noted patient was admitted at [facility] on February 2024 due to perforated sigmoid diverticulitis.  At that time it was found to have a pericolonic abscess and a transgluteal drain was placed.  Per attending note the patient has had a sure of Pseudomonas and E. coli.  Also note a colocutaneous fistula.  On 7/25/2024 the patient was a schedule for OR due to diverticulitis of large intestine with abscess without bleeding and cholelithiasis  Admitted under surgery service on 7/26/2024.  The patient underwent sigmoid colectomy, cholecystectomy, creation of loop ileostomy, removal of ureteral stents and removal of left buttocks abscess drain  On a PCA pump and LR continuous infusion  Primary team is surgery

## 2024-07-27 NOTE — QUICK NOTE
"A consult note was completed by my colleague today at 0400.  I did see and evaluate the patient at bedside.  He notes discomfort from his NG tube.  He also notes chronic shoulder pain, chronic foot pain, and moderate discomfort in his abdomen.  He would rate his pain 8 out of 10.  Overall, patient is trying to stay optimistic but is disappointed with his diagnosis.  Patient has been utilizing PCA pump.  Patient notes he does feel somewhat anxious and \"just wants to sleep.\"  With persistent anxiety can consider trialing an as needed benzodiazepine.  Would be cautious of polypharmacy in setting of PCA pump.      "

## 2024-07-27 NOTE — ASSESSMENT & PLAN NOTE
On Cymbalta daily, continue with medicine  Follows with PCP as outpatient  Stable per patient report

## 2024-07-27 NOTE — CONSULTS
[facility]  Consult  Name: Homero Driver Jr. 59 y.o. male I MRN: [id number]  Unit/Bed#: -01 I Date of Admission: 7/26/2024   Date of Service: 7/27/2024 I Hospital Day: 1    Inpatient consult to Internal Medicine  Consult performed by: MOON Rosenthal  Consult ordered by: Brissa Rowell PA-C        Assessment & Plan   * Diverticulitis of large intestine with abscess  Assessment & Plan    Chart review done and noted patient was admitted at [facility] on February 2024 due to perforated sigmoid diverticulitis.  At that time it was found to have a pericolonic abscess and a transgluteal drain was placed.  Per attending note the patient has had a sure of Pseudomonas and E. coli.  Also note a colocutaneous fistula.  On 7/25/2024 the patient was a schedule for OR due to diverticulitis of large intestine with abscess without bleeding and cholelithiasis  Admitted under surgery service on 7/26/2024.  The patient underwent sigmoid colectomy, cholecystectomy, creation of loop ileostomy, removal of ureteral stents and removal of left buttocks abscess drain  On a PCA pump and LR continuous infusion  Primary team is surgery      HTN (hypertension)  Assessment & Plan  BP in the 150s and 130s systolic  On Norvasc daily, continue her regimen  Monitor BP per unit protocol or when needed    Sleep apnea  Assessment & Plan  Since admission his O2 sat has been % on room air  Does not wear CPAP at home  Per chart review noted he visited sleep medicine last January, provider referred him to ambulatory sleep study.  Unfortunately the patient did not follow-up  Monitor respiratory function especially now that he is on a PCA pump    Anxiety  Assessment & Plan  On Cymbalta daily, continue with medicine  Follows with PCP as outpatient  Stable per patient report    Smoking  Assessment & Plan  Smokes 1 pack of cigarettes daily  Education on smoking cessation was provided at bedside greater than 3  minutes  Nicotine patch ordered         VTE Prophylaxis: VTE Score: 5 High Risk (Score >/= 5) - Pharmacological DVT Prophylaxis Contraindicated. Sequential Compression Devices Ordered.    Mobility:   Basic Mobility Inpatient Raw Score: 12  JH-HLM Goal: 4: Move to chair/commode  JH-HLM Achieved: 2: Bed activities/Dependent transfer  JH-HLM Goal achieved. Continue to encourage appropriate mobility.    Recommendations for Discharge:  Follow-up with a sleep medicine and a sleep study  Smoking cessation    Total Time Spent on Date of Encounter in care of patient: 45 mins. This time was spent on one or more of the following: performing physical exam; counseling and coordination of care; obtaining or reviewing history; documenting in the medical record; reviewing/ordering tests, medications or procedures; communicating with other healthcare professionals and discussing with patient's family/caregivers.    Collaboration of Care: Were Recommendations Directly Discussed with Primary Treatment Team? No    History of Present Illness:  Homero Driver Jr. is a 59 y.o. male who is originally admitted to the surgery service due to diverticulitis of large intestine with abscess. We are consulted for management of chronic illness.  Patient was admitted at [facility] last February due to perforated sigmoid diverticulitis.  At that time the patient was found to have a pericolonic abscess and a transgluteal drain was placed, growth of Pseudomonas and E. coli was noted and require changes of catheter.  On 726 the patient and there went sigmoid colectomy, cholecystectomy and a creation of loop ileostomy with removal of ureteral stents and also removal of left buttock axis drain.  The patient is currently on a PCA pump, reports pain is well-controlled.  Mr. Driver was seen and evaluated at bedside, does not report any symptoms of pain or discomfort, he denies fevers, chills, nausea, vomiting, or any other symptoms than the stated above.   Internal medicine has been consulted to manage chronic illnesses, surgery is primary team.    Review of Systems:  Review of Systems   Constitutional:  Negative for chills and fever.   HENT:  Negative for ear pain and sore throat.    Eyes:  Negative for pain and visual disturbance.   Respiratory:  Negative for cough and shortness of breath.    Cardiovascular:  Negative for chest pain and palpitations.   Gastrointestinal:  Negative for abdominal pain and vomiting.   Genitourinary:  Negative for dysuria and hematuria.   Musculoskeletal:  Negative for arthralgias and back pain.   Skin:  Negative for color change and rash.   Neurological:  Negative for seizures and syncope.   All other systems reviewed and are negative.      Past Medical and Surgical History:   Past Medical History:   Diagnosis Date    Anesthesia complication     body temp burns off anesthesia more quickly--woke up during surgery    Anxiety     due to medical conditions    Arthritis     Hypertension     Sleep apnea     Tobacco abuse        Past Surgical History:   Procedure Laterality Date    BACK SURGERY      CERVICAL FUSION N/A 04/05/2016    Procedure: FUSION CERVICAL POSTERIOR - revision of instrumentation T1;  Surgeon: Srikanth Siu MD;  Location: [facility];  Service:     COLONOSCOPY      FOOT MASS EXCISION Right     HERNIA REPAIR Right     x2    INCISION AND DRAINAGE POSTERIOR SPINE N/A 04/27/2016    Procedure: INCISION AND DRAINAGE (I&D) SPINE;  Surgeon: Srikanth Siu MD;  Location: [facility];  Service:     IR DRAINAGE TUBE CHECK/CHANGE/REPOSITION/REINSERTION/UPSIZE  03/01/2024    IR DRAINAGE TUBE CHECK/CHANGE/REPOSITION/REINSERTION/UPSIZE  03/21/2024    IR DRAINAGE TUBE CHECK/CHANGE/REPOSITION/REINSERTION/UPSIZE  04/05/2024    IR DRAINAGE TUBE CHECK/CHANGE/REPOSITION/REINSERTION/UPSIZE  05/23/2024    IR DRAINAGE TUBE PLACEMENT  02/18/2024    MO ARTHRODESIS PST/PSTLAT TQ 1NTRSPC EA ADDL NTRSPC N/A 04/04/2016    Procedure: C3-C6 POSTERIOR  CERVICAL LAMINECTOMY AND DECOMPRESSION; C3-T2 INSTRUMENTED POSTERIOR CERVICAL SPINAL FUSION WITH POSSIBLE ADDITIONAL LEVELS WITH ALLOGRAFT AND AUTOGRAFT (IMPULSE MONITORING);  Surgeon: Srikanth Siu MD;  Location: [facility];  Service: Orthopedics    SHOULDER OPEN ROTATOR CUFF REPAIR Bilateral        Meds/Allergies:  all medications and allergies reviewed    Allergies: No Known Allergies    Social History:  Marital Status: /Civil Union  Substance Use History:   Social History     Substance and Sexual Activity   Alcohol Use Yes    Alcohol/week: 2.0 standard drinks of alcohol    Types: 2 Shots of liquor per week    Comment: weekends     Social History     Tobacco Use   Smoking Status Every Day    Current packs/day: 0.25    Average packs/day: 0.3 packs/day for 15.0 years (3.8 ttl pk-yrs)    Types: Cigarettes    Passive exposure: Never   Smokeless Tobacco Never   Tobacco Comments    2 cigarettes per day    Vapes on weekends     Social History     Substance and Sexual Activity   Drug Use Yes    Frequency: 7.0 times per week    Types: Marijuana    Comment: one joint daily for pain last 2 days ago       Family History:  Family History   Problem Relation Age of Onset    Cancer Mother     Cancer Father     Cancer Family        Physical Exam:   Vitals:   Blood Pressure: 131/78 (07/27/24 0312)  Pulse: 66 (07/27/24 0312)  Temperature: 97.6 °F (36.4 °C) (07/27/24 0312)  Temp Source: Oral (07/27/24 0312)  Respirations: 16 (07/27/24 0312)  Height: 6' (182.9 cm) (07/26/24 2108)  Weight - Scale: 79.5 kg (175 lb 4.3 oz) (07/26/24 2108)  SpO2: 96 % (07/27/24 0312)    Physical Exam  Vitals and nursing note reviewed.   Constitutional:       General: He is not in acute distress.     Appearance: Normal appearance. He is well-developed.   HENT:      Head: Normocephalic and atraumatic.      Comments: Collar in place     Nose:      Comments: NGT right nostril, patent     Mouth/Throat:      Mouth: Mucous membranes are moist.       Pharynx: Oropharynx is clear.   Eyes:      Conjunctiva/sclera: Conjunctivae normal.      Pupils: Pupils are equal, round, and reactive to light.   Cardiovascular:      Rate and Rhythm: Normal rate and regular rhythm.      Heart sounds: No murmur heard.  Pulmonary:      Effort: Pulmonary effort is normal. No respiratory distress.      Breath sounds: Normal breath sounds.   Abdominal:      General: Abdomen is flat. Bowel sounds are normal.      Palpations: Abdomen is soft.      Tenderness: There is no abdominal tenderness.      Comments: Colostomy present, midline incision covered with silver post operative dressing. Dressing is dry and intact.   Genitourinary:     Comments: Richard catheter in place  Musculoskeletal:         General: No swelling.      Cervical back: Neck supple.   Skin:     General: Skin is warm and dry.      Capillary Refill: Capillary refill takes less than 2 seconds.   Neurological:      General: No focal deficit present.      Mental Status: He is alert and oriented to person, place, and time. Mental status is at baseline.   Psychiatric:         Mood and Affect: Mood normal.      Comments: Pleasant          Additional Data:   Lab Results:    Results from last 7 days   Lab Units 07/26/24  1612   PLATELETS Thousands/uL 261                 Lab Results   Component Value Date    HGBA1C 5.6 12/11/2021               Imaging: I have reviewed all imaging pertaining this consult.  No orders to display       EKG, Pathology, and Other Studies Reviewed on Admission:   EKG: I have reviewed EKG from 7/10/2024 showing sinus bradycardia, otherwise normal ECG, heart rate 57 bpm.    ** Please Note: This note may have been constructed using a voice recognition system. **

## 2024-07-27 NOTE — UTILIZATION REVIEW
Initial Clinical Review    Elective inpatient  surgical procedure    Age/Sex: 59 y.o. malewho had been admitted at [facility] with perforated sigmoid diverticulitis in February 2024. This was his first episode of diverticulitis. 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 A left transgluteal drain was placed and patient was treated conservatively. Subsequent to this due to various reasons he did not come for definitive surgery until April of this year. Then due to insurance reasons his surgery was switched from [place] to [place]. During drain exchanges every time he was found that the fecal fistula was still present. His last colonoscopy was done in July 2023 and showed mild sigmoid diverticulosis.         Anesthesia Start Date/Time: 07/26/24 0839         Procedures:      SIGMOID COLECTOMY, CHOLECYSTECTOMY, CREATION OF LOOP ILEOSTOMY, REMOVAL OF URETERAL STENTS. REMOVAL OF LEFT BUTTOCK ABSCESS DRAIN. (Abdomen)      FLEXIBLE SIGMOIDOSCOPY (Abdomen)      INSERTION URETERAL CATHETERS PREOP (Bilateral: Ureter)   Anesthesia type: general   Diagnosis: Diverticulitis of large intestine with abscess without bleeding [K57.20]   Pre-op diagnosis: Diverticulitis of large intestine with abscess without bleeding [K57.20]     Procedure(s):  SIGMOID COLECTOMY. CHOLECYSTECTOMY. CREATION OF LOOP ILEOSTOMY. REMOVAL OF URETERAL STENTS. REMOVAL OF LEFT BUTTOCK ABSCESS DRAIN.  FLEXIBLE SIGMOIDOSCOPY  Bilateral - INSERTION URETERAL CATHETERS PREOP     Open cholecystectomy  Sigmoid colectomy with low rectal anastomosis  Flexible sigmoidoscopy  De-functioning loop ileostomy    Estimated Blood Loss:   200 mL  Patient was also found on a preoperative CT scan to have radiopaque gallstones.  He requested a cholecystectomy.     Although patient did not give us any history, it was found during surgery that he probably had a laparoscopic repair of an umbilical hernia as there was composix mesh  present in the center of the abdomen.     Operative Findings:  Patient had cholelithiasis.  He had palpable large stones.     To the undersurface of the mesh in the central abdomen where a bowel and omental adhesions.     There was extensive amount of inflammation and scarring in the pelvis and in the pelvic inlet.  It appears that the perforation was very close to the rectosigmoid junction.  Because of the prolonged duration of the inflammation scarring extended below the peritoneal reflection.     Bilateral ureteric stents were placed preoperatively.  It was observed by the urologist that the left stent was difficult to pass which also was probably secondary to the inflammation   Patient was given a deep functioning loop ileostomy to protect the low rectal anastomosis.        Complications:   None    POD#1 Progress Note: 7-27-24   NPO / sips, NGT, rosado catheter, ileostomy loop to RUQ.  Abdomen soft.  No bowel sounds,  Ileostomy no bleeding no output. Surgical incision dressing CDI. Afebrile.  VSS.      Admission Orders: Date/Time/Statement:   Admission Orders (From admission, onward)       Ordered        07/26/24 0819  Inpatient Admission  Once                          Orders Placed This Encounter   Procedures    Inpatient Admission     Standing Status:   Standing     Number of Occurrences:   1     Order Specific Question:   Level of Care     Answer:   Med Surg [16]     Order Specific Question:   Estimated length of stay     Answer:   More than 2 Midnights     Order Specific Question:   Certification     Answer:   I certify that inpatient services are medically necessary for this patient for a duration of greater than two midnights. See H&P and MD Progress Notes for additional information about the patient's course of treatment.       Vital Signs (last 3 days)       Date/Time Temp Pulse Resp BP MAP  SpO2 O2 Device Pam Coma Scale Score Pain    07/27/24 1500 97.3 °F (36.3 °C) 87 18 136/85 -- 97 % None (Room  air) -- --    07/27/24 1211 -- -- -- -- -- 98 % -- -- --    07/27/24 0800 -- -- -- -- -- -- -- 15 3    07/27/24 0632 97.6 °F (36.4 °C) 79 16 161/92 116 -- -- -- --    07/27/24 0312 97.6 °F (36.4 °C) 66 16 131/78 101 96 % None (Room air) -- --    07/26/24 2253 -- -- -- -- -- 97 % -- -- 4    07/26/24 2217 98 °F (36.7 °C) 81 16 141/84 112 98 % None (Room air) -- --    07/26/24 2108 98.1 °F (36.7 °C) 72 16 128/66 -- -- -- -- --    07/26/24 2019 98 °F (36.7 °C) 72 16 118/64 83 96 % None (Room air) -- --    07/26/24 1930 98.1 °F (36.7 °C) 79 17 129/90 109 97 % None (Room air) 15 4    07/26/24 1900 -- -- -- -- -- 97 % None (Room air) -- --    07/26/24 1808 98.1 °F (36.7 °C) 80 17 124/86 106 97 % None (Room air) 15 --    07/26/24 1738 97.6 °F (36.4 °C) 77 17 129/78 99 93 % None (Room air) -- --    07/26/24 1720 -- -- -- -- -- 98 % None (Room air) -- --    07/26/24 1708 98.6 °F (37 °C) 73 17 134/86 102 98 % None (Room air) -- --    07/26/24 1635 98.6 °F (37 °C) 75 16 135/95 -- 98 % None (Room air) -- --    07/26/24 1620 98.1 °F (36.7 °C) 74 16 131/85 -- 97 % None (Room air) -- --    07/26/24 1618 -- -- -- -- -- -- -- -- 10 - Worst Possible Pain    07/26/24 1605 98.3 °F (36.8 °C) 79 16 132/80 -- 99 % None (Room air) -- 3    07/26/24 1550 99.3 °F (37.4 °C) 80 16 135/90 -- 98 % None (Room air) -- --    07/26/24 1536 98.7 °F (37.1 °C) 83 16 136/90 -- 80 % None (Room air) -- --    07/26/24 1535 98.7 °F (37.1 °C) 85 20 136/90 -- 99 % None (Room air) -- --    07/26/24 0746 97.7 °F (36.5 °C) 73 20 141/86 -- 100 % None (Room air) -- No Pain    07/26/24 0742 -- -- -- -- -- -- -- -- No Pain          Weight (last 2 days)       Date/Time Weight    07/26/24 2108 79.5 (175.27)    07/26/24 0746 79.5 (175.3)            Pertinent Labs/Diagnostic Test Results:   Radiology:  No orders to display     Cardiology:  No orders to display     GI:  No orders to display           Results from last 7 days   Lab Units 07/27/24  0527 07/26/24  1612   WBC  Thousand/uL 11.73*  --    HEMOGLOBIN g/dL 12.9  --    HEMATOCRIT % 40.1  --    PLATELETS Thousands/uL 285 261   TOTAL NEUT ABS Thousands/µL 9.11*  --          Results from last 7 days   Lab Units 07/27/24  0527   SODIUM mmol/L 138   POTASSIUM mmol/L 4.1   CHLORIDE mmol/L 101   CO2 mmol/L 26   ANION GAP mmol/L 11   BUN mg/dL 16   CREATININE mg/dL 0.95   EGFR ml/min/1.73sq m 87   CALCIUM mg/dL 9.2     Results from last 7 days   Lab Units 07/27/24  0527   AST U/L 29   ALT U/L 17   ALK PHOS U/L 44   TOTAL PROTEIN g/dL 6.9   ALBUMIN g/dL 3.6   TOTAL BILIRUBIN mg/dL 0.67         Results from last 7 days   Lab Units 07/27/24 0527   GLUCOSE RANDOM mg/dL 112       Diet: NGT, NPO SIPS  Mobility: up as tolerated  DVT Prophylaxis: lovenox    Medications/Pain Control:   Scheduled Medications:  amLODIPine, 10 mg, Oral, Daily  DULoxetine, 20 mg, Oral, Daily  enoxaparin, 40 mg, Subcutaneous, Daily  nicotine, 14 mg, Transdermal, Daily      Continuous IV Infusions:  HYDROmorphone, , Intravenous, Continuous  lactated ringers, 125 mL/hr, Intravenous, Continuous      PRN Meds:  acetaminophen, 1,000 mg, Intravenous, Q6H PRN  albuterol, 1 puff, Inhalation, Q4H PRN  hydrALAZINE, 10 mg, Intravenous, Q6H PRN  lactated ringers, 1,000 mL, Intravenous, Once PRN   And  lactated ringers, 1,000 mL, Intravenous, Once PRN  naloxone, 0.04 mg, Intravenous, Q3 min PRN  ondansetron, 4 mg, Intravenous, Q6H PRN  sodium chloride, 1,000 mL, Intravenous, Once PRN   And  sodium chloride, 1,000 mL, Intravenous, Once PRN        Network Utilization Review Department  ATTENTION: Please call with any questions or concerns to [phone] and carefully listen to the prompts so that you are directed to the right person. All voicemails are confidential.   For Discharge needs, contact Care Management DC Support Team at [phone] opt. [phone]  Send all requests for admission clinical reviews, approved or denied determinations and any other requests to dedicated fax  number below belonging to the campus where the patient is receiving treatment. List of dedicated fax numbers for the Facilities:  FACILITY NAME UR FAX NUMBER   ADMISSION DENIALS (Administrative/Medical Necessity) [phone]   DISCHARGE SUPPORT TEAM (NETWORK) [phone]   PARENT CHILD HEALTH (Maternity/NICU/Pediatrics) [phone]   [facility] [phone]   [facility] [phone]   [facility] [phone]   [facility] [phone]   [facility] [phone]   [facility] [phone]   [facility] [phone]   [facility] [phone]   [facility] [phone]   [facility] [phone]   [facility] [phone]   [facility] [phone]

## 2024-07-28 LAB
ANION GAP SERPL CALCULATED.3IONS-SCNC: 12 MMOL/L (ref 4–13)
ATRIAL RATE: 94 BPM
BACTERIA UR QL AUTO: ABNORMAL /HPF
BASOPHILS # BLD AUTO: 0.01 THOUSANDS/ÂΜL (ref 0–0.1)
BASOPHILS NFR BLD AUTO: 0 % (ref 0–1)
BILIRUB UR QL STRIP: ABNORMAL
BUN SERPL-MCNC: 13 MG/DL (ref 5–25)
CALCIUM SERPL-MCNC: 9.2 MG/DL (ref 8.4–10.2)
CHLORIDE SERPL-SCNC: 98 MMOL/L (ref 96–108)
CLARITY UR: CLEAR
CO2 SERPL-SCNC: 24 MMOL/L (ref 21–32)
COLOR UR: YELLOW
CREAT SERPL-MCNC: 0.87 MG/DL (ref 0.6–1.3)
EOSINOPHIL # BLD AUTO: 0.01 THOUSAND/ÂΜL (ref 0–0.61)
EOSINOPHIL NFR BLD AUTO: 0 % (ref 0–6)
ERYTHROCYTE [DISTWIDTH] IN BLOOD BY AUTOMATED COUNT: 14.8 % (ref 11.6–15.1)
GFR SERPL CREATININE-BSD FRML MDRD: 94 ML/MIN/1.73SQ M
GLUCOSE SERPL-MCNC: 92 MG/DL (ref 65–140)
GLUCOSE UR STRIP-MCNC: NEGATIVE MG/DL
HCT VFR BLD AUTO: 37.2 % (ref 36.5–49.3)
HGB BLD-MCNC: 12.3 G/DL (ref 12–17)
HGB UR QL STRIP.AUTO: ABNORMAL
IMM GRANULOCYTES # BLD AUTO: 0.07 THOUSAND/UL (ref 0–0.2)
IMM GRANULOCYTES NFR BLD AUTO: 0 % (ref 0–2)
KETONES UR STRIP-MCNC: ABNORMAL MG/DL
LACTATE SERPL-SCNC: 0.9 MMOL/L (ref 0.5–2)
LEUKOCYTE ESTERASE UR QL STRIP: NEGATIVE
LYMPHOCYTES # BLD AUTO: 1.38 THOUSANDS/ÂΜL (ref 0.6–4.47)
LYMPHOCYTES NFR BLD AUTO: 8 % (ref 14–44)
MAGNESIUM SERPL-MCNC: 1.7 MG/DL (ref 1.9–2.7)
MCH RBC QN AUTO: 29.3 PG (ref 26.8–34.3)
MCHC RBC AUTO-ENTMCNC: 33.1 G/DL (ref 31.4–37.4)
MCV RBC AUTO: 89 FL (ref 82–98)
MONOCYTES # BLD AUTO: 0.68 THOUSAND/ÂΜL (ref 0.17–1.22)
MONOCYTES NFR BLD AUTO: 4 % (ref 4–12)
MUCOUS THREADS UR QL AUTO: ABNORMAL
NEUTROPHILS # BLD AUTO: 14.52 THOUSANDS/ÂΜL (ref 1.85–7.62)
NEUTS SEG NFR BLD AUTO: 88 % (ref 43–75)
NITRITE UR QL STRIP: NEGATIVE
NON-SQ EPI CELLS URNS QL MICRO: ABNORMAL /HPF
NRBC BLD AUTO-RTO: 0 /100 WBCS
P AXIS: 178 DEGREES
PH UR STRIP.AUTO: 7.5 [PH]
PHOSPHATE SERPL-MCNC: 2.2 MG/DL (ref 2.7–4.5)
PLATELET # BLD AUTO: 265 THOUSANDS/UL (ref 149–390)
PMV BLD AUTO: 9.5 FL (ref 8.9–12.7)
POTASSIUM SERPL-SCNC: 3.6 MMOL/L (ref 3.5–5.3)
PR INTERVAL: 194 MS
PROCALCITONIN SERPL-MCNC: 0.45 NG/ML
PROT UR STRIP-MCNC: ABNORMAL MG/DL
QRS AXIS: 33 DEGREES
QRSD INTERVAL: 96 MS
QT INTERVAL: 358 MS
QTC INTERVAL: 447 MS
RBC # BLD AUTO: 4.2 MILLION/UL (ref 3.88–5.62)
RBC #/AREA URNS AUTO: ABNORMAL /HPF
SODIUM SERPL-SCNC: 134 MMOL/L (ref 135–147)
SP GR UR STRIP.AUTO: 1.02 (ref 1–1.03)
T WAVE AXIS: 55 DEGREES
UROBILINOGEN UR QL STRIP.AUTO: 0.2 E.U./DL
VENTRICULAR RATE: 94 BPM
WBC # BLD AUTO: 16.67 THOUSAND/UL (ref 4.31–10.16)
WBC #/AREA URNS AUTO: ABNORMAL /HPF

## 2024-07-28 PROCEDURE — 85025 COMPLETE CBC W/AUTO DIFF WBC: CPT | Performed by: SPECIALIST

## 2024-07-28 PROCEDURE — 80048 BASIC METABOLIC PNL TOTAL CA: CPT | Performed by: SPECIALIST

## 2024-07-28 PROCEDURE — 83605 ASSAY OF LACTIC ACID: CPT

## 2024-07-28 PROCEDURE — 99232 SBSQ HOSP IP/OBS MODERATE 35: CPT | Performed by: FAMILY MEDICINE

## 2024-07-28 PROCEDURE — 87040 BLOOD CULTURE FOR BACTERIA: CPT

## 2024-07-28 PROCEDURE — 81001 URINALYSIS AUTO W/SCOPE: CPT

## 2024-07-28 PROCEDURE — 93005 ELECTROCARDIOGRAM TRACING: CPT

## 2024-07-28 PROCEDURE — 99024 POSTOP FOLLOW-UP VISIT: CPT | Performed by: SPECIALIST

## 2024-07-28 PROCEDURE — 83735 ASSAY OF MAGNESIUM: CPT | Performed by: SPECIALIST

## 2024-07-28 PROCEDURE — 84100 ASSAY OF PHOSPHORUS: CPT | Performed by: SPECIALIST

## 2024-07-28 PROCEDURE — 84145 PROCALCITONIN (PCT): CPT

## 2024-07-28 PROCEDURE — 93010 ELECTROCARDIOGRAM REPORT: CPT | Performed by: INTERNAL MEDICINE

## 2024-07-28 RX ORDER — HYDROMORPHONE HCL/PF 1 MG/ML
0.5 SYRINGE (ML) INJECTION EVERY 4 HOURS PRN
Status: DISCONTINUED | OUTPATIENT
Start: 2024-07-28 | End: 2024-07-29

## 2024-07-28 RX ORDER — MAGNESIUM SULFATE HEPTAHYDRATE 40 MG/ML
2 INJECTION, SOLUTION INTRAVENOUS ONCE
Status: COMPLETED | OUTPATIENT
Start: 2024-07-28 | End: 2024-07-28

## 2024-07-28 RX ADMIN — SODIUM CHLORIDE 385 ML: 0.9 INJECTION, SOLUTION INTRAVENOUS at 12:40

## 2024-07-28 RX ADMIN — ENOXAPARIN SODIUM 40 MG: 40 INJECTION SUBCUTANEOUS at 10:19

## 2024-07-28 RX ADMIN — SODIUM CHLORIDE, SODIUM LACTATE, POTASSIUM CHLORIDE, AND CALCIUM CHLORIDE 125 ML/HR: .6; .31; .03; .02 INJECTION, SOLUTION INTRAVENOUS at 02:20

## 2024-07-28 RX ADMIN — PIPERACILLIN AND TAZOBACTAM 4.5 G: 4; .5 INJECTION, POWDER, FOR SOLUTION INTRAVENOUS at 07:40

## 2024-07-28 RX ADMIN — SODIUM CHLORIDE 1000 ML: 0.9 INJECTION, SOLUTION INTRAVENOUS at 07:40

## 2024-07-28 RX ADMIN — HYDROMORPHONE HYDROCHLORIDE 0.5 MG: 1 INJECTION, SOLUTION INTRAMUSCULAR; INTRAVENOUS; SUBCUTANEOUS at 19:38

## 2024-07-28 RX ADMIN — MAGNESIUM SULFATE HEPTAHYDRATE 2 G: 40 INJECTION, SOLUTION INTRAVENOUS at 06:34

## 2024-07-28 RX ADMIN — NICOTINE 14 MG: 14 PATCH, EXTENDED RELEASE TRANSDERMAL at 10:19

## 2024-07-28 RX ADMIN — HYDROMORPHONE HYDROCHLORIDE 0.5 MG: 1 INJECTION, SOLUTION INTRAMUSCULAR; INTRAVENOUS; SUBCUTANEOUS at 12:46

## 2024-07-28 RX ADMIN — HYDROMORPHONE HYDROCHLORIDE 0.5 MG: 1 INJECTION, SOLUTION INTRAMUSCULAR; INTRAVENOUS; SUBCUTANEOUS at 16:33

## 2024-07-28 RX ADMIN — SODIUM CHLORIDE 1000 ML: 0.9 INJECTION, SOLUTION INTRAVENOUS at 10:10

## 2024-07-28 RX ADMIN — PIPERACILLIN AND TAZOBACTAM 4.5 G: 4; .5 INJECTION, POWDER, FOR SOLUTION INTRAVENOUS at 12:36

## 2024-07-28 RX ADMIN — PIPERACILLIN AND TAZOBACTAM 4.5 G: 4; .5 INJECTION, POWDER, FOR SOLUTION INTRAVENOUS at 19:25

## 2024-07-28 RX ADMIN — DULOXETINE HYDROCHLORIDE 20 MG: 20 CAPSULE, DELAYED RELEASE ORAL at 10:19

## 2024-07-28 RX ADMIN — AMLODIPINE BESYLATE 10 MG: 10 TABLET ORAL at 10:19

## 2024-07-28 NOTE — SEPSIS NOTE
Sepsis Note   Homero Driver Jr. 59 y.o. male MRN: [id number]  Unit/Bed#: -01 Encounter: [id number]              Body mass index is 23.77 kg/m².  Wt Readings from Last 1 Encounters:   07/26/24 79.5 kg (175 lb 4.3 oz)        Ideal body weight: 77.6 kg (171 lb 1.2 oz)  Adjusted ideal body weight: 78.4 kg (172 lb 12 oz)  AM blood work reviewed and noted increased in leukocytosis. The night of 7/27/24 the patient developed a fever and continues with tachycardia. As of now the patient meets SEPSIS criteria as evidence of fever, tachycardia, leukocytosis with a possible source related to diverticulitis of large intestine with abscess. The patient was empirically started on Zosyn, lactic, procal, blood cultures, EKG and fluids 30cc xKg ordered. Continue monitoring VS and WBC trends. Nurse was instructed to monitor the patient closely.

## 2024-07-28 NOTE — PLAN OF CARE
Problem: Knowledge Deficit  Goal: Patient/family/caregiver demonstrates understanding of disease process, treatment plan, medications, and discharge instructions  Description: Complete learning assessment and assess knowledge base.  Interventions:  - Provide teaching at level of understanding  - Provide teaching via preferred learning methods  Outcome: Progressing     Problem: GASTROINTESTINAL - ADULT  Goal: Minimal or absence of nausea and/or vomiting  Description: INTERVENTIONS:  - Administer IV fluids if ordered to ensure adequate hydration  - Maintain NPO status until nausea and vomiting are resolved  - Nasogastric tube if ordered  - Administer ordered antiemetic medications as needed  - Provide nonpharmacologic comfort measures as appropriate  - Advance diet as tolerated, if ordered  - Consider nutrition services referral to assist patient with adequate nutrition and appropriate food choices  Outcome: Progressing     Problem: Prexisting or High Potential for Compromised Skin Integrity  Goal: Skin integrity is maintained or improved  Description: INTERVENTIONS:  - Identify patients at risk for skin breakdown  - Assess and monitor skin integrity  - Assess and monitor nutrition and hydration status  - Monitor labs   - Assess for incontinence   - Turn and reposition patient  - Assist with mobility/ambulation  - Relieve pressure over bony prominences  - Avoid friction and shearing  - Provide appropriate hygiene as needed including keeping skin clean and dry  - Evaluate need for skin moisturizer/barrier cream  - Collaborate with interdisciplinary team   - Patient/family teaching  - Consider wound care consult   Outcome: Progressing     Problem: PAIN - ADULT  Goal: Verbalizes/displays adequate comfort level or baseline comfort level  Description: Interventions:  - Encourage patient to monitor pain and request assistance  - Assess pain using appropriate pain scale  - Administer analgesics based on type and severity  of pain and evaluate response  - Implement non-pharmacological measures as appropriate and evaluate response  - Consider cultural and social influences on pain and pain management  - Notify physician/advanced practitioner if interventions unsuccessful or patient reports new pain  Outcome: Not Progressing

## 2024-07-28 NOTE — QUICK NOTE
"Contacted by RN regarding reports of pain and discomfort in the abdominal area along with fever. Per RN, the patient states he feels like\"something is pulling.\" The patient also reports PCA pump is not helping much with pain. He describes his pain is constant, 8/10 since the am of 7/27/24, patient also states NGT is uncomfortable. Exam is benign at bedside, abdomen is soft and non-tender, NGT in place. On call surgery was contacted by me and suggested a one time dose of Ketorolac. Phenol, ketorolac, aqua -K order was added to plan of care. RN was made aware and instructed to keep monitoring the patient closely.   "

## 2024-07-28 NOTE — ASSESSMENT & PLAN NOTE
Chart review done and noted patient was admitted at [facility] on February 2024 due to perforated sigmoid diverticulitis.  At that time it was found to have a pericolonic abscess and a transgluteal drain was placed.  Per attending note the patient has had a sure of Pseudomonas and E. coli.  Also note a colocutaneous fistula.  On 7/25/2024 the patient was a schedule for OR due to diverticulitis of large intestine with abscess without bleeding and cholelithiasis  Admitted under surgery service on 7/26/2024.  The patient underwent sigmoid colectomy, cholecystectomy, creation of loop ileostomy, removal of ureteral stents and removal of left buttocks abscess drain  On a PCA pump and LR continuous infusion  Primary team is surgery  7/28: Pain improved, minimal NGT output, surgery plans to DC NGT

## 2024-07-28 NOTE — PROGRESS NOTES
[facility]  Progress Note  Name: Homero Barney I  MRN: [id number]  Unit/Bed#: -01 I Date of Admission: 7/26/2024   Date of Service: 7/28/2024 I Hospital Day: 2    Assessment & Plan   Sleep apnea  Assessment & Plan  Since admission his O2 sat has been % on room air  Does not wear CPAP at home  Per chart review noted he visited sleep medicine last January, provider referred him to ambulatory sleep study.  Unfortunately the patient did not follow-up  Monitor respiratory function especially now that he is on a PCA pump    HTN (hypertension)  Assessment & Plan  BP in the 150s and 130s systolic  On Norvasc daily, continue her regimen  Monitor BP per unit protocol or when needed    Smoking  Assessment & Plan  Smokes 1 pack of cigarettes daily  Education on smoking cessation was provided at bedside greater than 3 minutes  Nicotine patch ordered    Anxiety  Assessment & Plan  On Cymbalta daily, continue with medicine  Follows with PCP as outpatient  Stable per patient report    * Diverticulitis of large intestine with abscess  Assessment & Plan    Chart review done and noted patient was admitted at [facility] on February 2024 due to perforated sigmoid diverticulitis.  At that time it was found to have a pericolonic abscess and a transgluteal drain was placed.  Per attending note the patient has had a sure of Pseudomonas and E. coli.  Also note a colocutaneous fistula.  On 7/25/2024 the patient was a schedule for OR due to diverticulitis of large intestine with abscess without bleeding and cholelithiasis  Admitted under surgery service on 7/26/2024.  The patient underwent sigmoid colectomy, cholecystectomy, creation of loop ileostomy, removal of ureteral stents and removal of left buttocks abscess drain  On a PCA pump and LR continuous infusion  Primary team is surgery  7/28: Pain improved, minimal NGT output, surgery plans to DC NGT                  VTE Pharmacologic Prophylaxis:  VTE Score: 5 High Risk (Score >/= 5) - Pharmacological DVT Prophylaxis Ordered: enoxaparin (Lovenox). Sequential Compression Devices Ordered.    Mobility:   Basic Mobility Inpatient Raw Score: 12  JH-HLM Goal: 4: Move to chair/commode  JH-HLM Achieved: 3: Sit at edge of bed  JH-HLM Goal NOT achieved. Continue with multidisciplinary rounding and encourage appropriate mobility to improve upon JH-HLM goals.    Patient Centered Rounds: I performed bedside rounds with nursing staff today.   Discussions with Specialists or Other Care Team Provider: Surgery     Education and Discussions with Family / Patient: Patient declined call to .     Total Time Spent on Date of Encounter in care of patient: 35 mins. This time was spent on one or more of the following: performing physical exam; counseling and coordination of care; obtaining or reviewing history; documenting in the medical record; reviewing/ordering tests, medications or procedures; communicating with other healthcare professionals and discussing with patient's family/caregivers.    Current Length of Stay: 2 day(s)  Current Patient Status: Inpatient   Certification Statement: The patient will continue to require additional inpatient hospital stay due to IV antibiotics   Discharge Plan: Anticipate discharge in 24-48 hrs to home.    Code Status: Level 1 - Full Code    Subjective:   Patient seen and examined. Having hiccups turning into dry heaving. Reports pain improved today in abdomen, 5/10. Bothered by NG tube.     Objective:     Vitals:   Temp (24hrs), Av °F (37.2 °C), Min:97.3 °F (36.3 °C), Max:100.9 °F (38.3 °C)    Temp:  [97.3 °F (36.3 °C)-100.9 °F (38.3 °C)] 98.8 °F (37.1 °C)  HR:  [] 101  Resp:  [12-19] 12  BP: (136-177)/(81-88) 155/81  SpO2:  [95 %-98 %] 98 %  Body mass index is 23.77 kg/m².     Input and Output Summary (last 24 hours):     Intake/Output Summary (Last 24 hours) at 2024 1002  Last data filed at 2024 0739  Gross  per 24 hour   Intake 7 ml   Output 1920 ml   Net -1913 ml       Physical Exam:   Physical Exam  Constitutional:       Appearance: Normal appearance.   HENT:      Nose:      Comments: NGT in place with small amount of brown bile   Pulmonary:      Effort: Pulmonary effort is normal. No respiratory distress.      Breath sounds: No wheezing or rales.   Abdominal:      General: Bowel sounds are normal. There is no distension.      Palpations: Abdomen is soft.      Tenderness: There is no abdominal tenderness. There is no guarding or rebound.      Comments: Colostomy in place    Musculoskeletal:      Right lower leg: No edema.      Left lower leg: No edema.   Skin:     Findings: No rash.   Neurological:      General: No focal deficit present.      Mental Status: He is alert and oriented to person, place, and time.   Psychiatric:         Mood and Affect: Mood normal.         Behavior: Behavior normal.          Additional Data:     Labs:  Results from last 7 days   Lab Units 07/28/24  0501   WBC Thousand/uL 16.67*   HEMOGLOBIN g/dL 12.3   HEMATOCRIT % 37.2   PLATELETS Thousands/uL 265   SEGS PCT % 88*   LYMPHO PCT % 8*   MONO PCT % 4   EOS PCT % 0     Results from last 7 days   Lab Units 07/28/24  0501 07/27/24  0527   SODIUM mmol/L 134* 138   POTASSIUM mmol/L 3.6 4.1   CHLORIDE mmol/L 98 101   CO2 mmol/L 24 26   BUN mg/dL 13 16   CREATININE mg/dL 0.87 0.95   ANION GAP mmol/L 12 11   CALCIUM mg/dL 9.2 9.2   ALBUMIN g/dL  --  3.6   TOTAL BILIRUBIN mg/dL  --  0.67   ALK PHOS U/L  --  44   ALT U/L  --  17   AST U/L  --  29   GLUCOSE RANDOM mg/dL 92 112                 Results from last 7 days   Lab Units 07/28/24  0715 07/28/24  0501   LACTIC ACID mmol/L 0.9  --    PROCALCITONIN ng/ml  --  0.45*       Lines/Drains:  Invasive Devices       Peripheral Intravenous Line  Duration             Peripheral IV 07/26/24 Left Forearm 2 days    Peripheral IV 07/28/24 Proximal;Right;Ventral (anterior) Forearm <1 day              Drain   Duration             NG/OG/Enteral Tube Nasogastric 18 Fr Right nare 2 days    Urethral Catheter Latex 16 Fr. 2 days    Ileostomy Loop RUQ 1 day                  Urinary Catheter:  Goal for removal: Voiding trial when ambulation improves               Imaging: No pertinent imaging reviewed.    Recent Cultures (last 7 days):         Last 24 Hours Medication List:   Current Facility-Administered Medications   Medication Dose Route Frequency Provider Last Rate    acetaminophen  1,000 mg Intravenous Q6H PRN Brissa Rowell PA-C 1,000 mg (07/27/24 1938)    albuterol  1 puff Inhalation Q4H PRN Brissa Rowell PA-C      amLODIPine  10 mg Oral Daily Brissa Rowell PA-C      DULoxetine  20 mg Oral Daily GEOVANNA Boykin-CAMMY      enoxaparin  40 mg Subcutaneous Daily Brissa Rowell PA-CAMMY      hydrALAZINE  10 mg Intravenous Q6H PRN Veda Mauro PA-C      HYDROmorphone   Intravenous Continuous GEOVANNA Boykin-CAMMY      lactated ringers  125 mL/hr Intravenous Continuous Brissa Rowell PA-C 125 mL/hr (07/28/24 0220)    naloxone  0.04 mg Intravenous Q3 min PRN Brissa Rowell PA-C      nicotine  14 mg Transdermal Daily MOON Rosenthal      ondansetron  4 mg Intravenous Q6H PRN GEOVANNA Boykin-CAMMY      phenol  1 spray Mouth/Throat Q2H PRN MOON Rosenthal      piperacillin-tazobactam  4.5 g Intravenous Q8H MOON Rosenthal      sodium chloride  1,000 mL Intravenous Once MOON Rosenthal      Followed by    sodium chloride  1,000 mL Intravenous Once MOON Rosenthal          Today, Patient Was Seen By: Selena Olmos DO    **Please Note: This note may have been constructed using a voice recognition system.**

## 2024-07-28 NOTE — PROGRESS NOTES
Progress Note - General Surgery   Patient: Homero Driver Jr.   : 1965 Sex: male MRN: [id number]   CSN: [id number] PCP: Rachelle Tavares MD  Unit/Bed#: -[facility]     SUBJECTIVE:   Had some abdominal discomfort overnight  And fever with elevated WBC count  Patient was a started on sepsis protocol by internal medicine    OBJECTIVE  Doing better  Ileostomy functioning 350 cc  Minimal NG output 270 cc  Good urine output  Resolving temperature  Still complaining of left lower quadrant pain    Vitals:   I/O last 24 hours:  In: 7 [I.V.:7]  Out: 1920 [Urine:1300; Emesis/NG output:270; Stool:350]Blood pressure 155/81, pulse 101, temperature 98.8 °F (37.1 °C), temperature source Oral, resp. rate 12, height 6' (1.829 m), weight 79.5 kg (175 lb 4.3 oz), SpO2 98%.,Body mass index is 23.77 kg/m².  Invasive Devices       Peripheral Intravenous Line  Duration             Peripheral IV 24 Left Forearm 2 days    Peripheral IV 24 Proximal;Right;Ventral (anterior) Forearm <1 day              Drain  Duration             NG/OG/Enteral Tube Nasogastric 18 Fr Right nare 2 days    Urethral Catheter Latex 16 Fr. 2 days    Ileostomy Loop RUQ 1 day                    Active medications:    Current Facility-Administered Medications:     acetaminophen (Ofirmev) injection 1,000 mg, 1,000 mg, Intravenous, Q6H PRN, 1,000 mg at 24    albuterol (PROVENTIL HFA,VENTOLIN HFA) inhaler 1 puff, 1 puff, Inhalation, Q4H PRN    amLODIPine (NORVASC) tablet 10 mg, 10 mg, Oral, Daily, 10 mg at 24 1013    DULoxetine (CYMBALTA) delayed release capsule 20 mg, 20 mg, Oral, Daily, 20 mg at 24 1013    enoxaparin (LOVENOX) subcutaneous injection 40 mg, 40 mg, Subcutaneous, Daily, 40 mg at 24 1014    hydrALAZINE (APRESOLINE) injection 10 mg, 10 mg, Intravenous, Q6H PRN    HYDROmorphone (DILAUDID) 1 mg/mL 50 mL PCA, , Intravenous, Continuous, Rate Verify at 24 0738    lactated ringers infusion, 125 mL/hr,  Intravenous, Continuous, 125 mL/hr at 07/28/24 0220    naloxone (NARCAN) 0.04 mg/mL syringe 0.04 mg, 0.04 mg, Intravenous, Q3 min PRN    nicotine (NICODERM CQ) 14 mg/24hr TD 24 hr patch 14 mg, 14 mg, Transdermal, Daily, 14 mg at 07/27/24 0504    ondansetron (ZOFRAN) injection 4 mg, 4 mg, Intravenous, Q6H PRN    phenol (CHLORASEPTIC) 1.4 % mucosal liquid 1 spray, 1 spray, Mouth/Throat, Q2H PRN, 1 spray at 07/27/24 2159    piperacillin-tazobactam (ZOSYN) 4.5 g in sodium chloride 0.9 % 100 mL IVPB (EXTENDED INFUSION), 4.5 g, Intravenous, Q8H    [COMPLETED] sodium chloride 0.9 % bolus 1,000 mL, 1,000 mL, Intravenous, Once, 1,000 mL at 07/28/24 0740 **FOLLOWED BY** sodium chloride 0.9 % bolus 1,000 mL, 1,000 mL, Intravenous, Once **FOLLOWED BY** sodium chloride 0.9 % bolus 1,000 mL, 1,000 mL, Intravenous, Once    Physical Exam:   General Alert awake   Normocephalic atraumatic PERRLA  Lungs clear bilaterally  Cardiac normal S1 normal S2  Abdomen soft, ostomy functioning no flatus no bowel movement surgical dressing dry clean intact   Ext: No clubbing, cyanosis, edema    Lab, Imaging and other studies:  Recent Results (from the past 24 hour(s))   CBC and differential    Collection Time: 07/28/24  5:01 AM   Result Value Ref Range    WBC 16.67 (H) 4.31 - 10.16 Thousand/uL    RBC 4.20 3.88 - 5.62 Million/uL    Hemoglobin 12.3 12.0 - 17.0 g/dL    Hematocrit 37.2 36.5 - 49.3 %    MCV 89 82 - 98 fL    MCH 29.3 26.8 - 34.3 pg    MCHC 33.1 31.4 - 37.4 g/dL    RDW 14.8 11.6 - 15.1 %    MPV 9.5 8.9 - 12.7 fL    Platelets 265 149 - 390 Thousands/uL    nRBC 0 /100 WBCs    Segmented % 88 (H) 43 - 75 %    Immature Grans % 0 0 - 2 %    Lymphocytes % 8 (L) 14 - 44 %    Monocytes % 4 4 - 12 %    Eosinophils Relative 0 0 - 6 %    Basophils Relative 0 0 - 1 %    Absolute Neutrophils 14.52 (H) 1.85 - 7.62 Thousands/µL    Absolute Immature Grans 0.07 0.00 - 0.20 Thousand/uL    Absolute Lymphocytes 1.38 0.60 - 4.47 Thousands/µL    Absolute  Monocytes 0.68 0.17 - 1.22 Thousand/µL    Eosinophils Absolute 0.01 0.00 - 0.61 Thousand/µL    Basophils Absolute 0.01 0.00 - 0.10 Thousands/µL   Basic metabolic panel    Collection Time: 07/28/24  5:01 AM   Result Value Ref Range    Sodium 134 (L) 135 - 147 mmol/L    Potassium 3.6 3.5 - 5.3 mmol/L    Chloride 98 96 - 108 mmol/L    CO2 24 21 - 32 mmol/L    ANION GAP 12 4 - 13 mmol/L    BUN 13 5 - 25 mg/dL    Creatinine 0.87 0.60 - 1.30 mg/dL    Glucose 92 65 - 140 mg/dL    Calcium 9.2 8.4 - 10.2 mg/dL    eGFR 94 ml/min/1.73sq m   Magnesium    Collection Time: 07/28/24  5:01 AM   Result Value Ref Range    Magnesium 1.7 (L) 1.9 - 2.7 mg/dL   Phosphorus    Collection Time: 07/28/24  5:01 AM   Result Value Ref Range    Phosphorus 2.2 (L) 2.7 - 4.5 mg/dL   Procalcitonin    Collection Time: 07/28/24  5:01 AM   Result Value Ref Range    Procalcitonin 0.45 (H) <=0.25 ng/ml   Lactic acid, plasma (w/reflex if result > 2.0)    Collection Time: 07/28/24  7:15 AM   Result Value Ref Range    LACTIC ACID 0.9 0.5 - 2.0 mmol/L     VTE Pharmacologic Prophylaxis: Fondaparinux (Arixtra)  VTE Mechanical Prophylaxis: sequential compression device         Diet Orders   (From admission, onward)                 Start     Ordered    07/26/24 1733  Diet NPO; Sips with meds  Diet effective now        Comments: Clamp NGT for one hour after administration of oral medications.   References:    Adult Nutrition Support Algorithm    RD Therapeutic Diet Order Protocol   Question Answer Comment   Diet Type NPO    NPO Except: Sips with meds    RD to adjust diet per protocol? No        07/26/24 1733                .  Admitting Diagnosis: Diverticulitis of large intestine with abscess without bleeding [K57.20]  Code Status: Level 1 - Full Code  Patient Active Problem List   Diagnosis    Cervical stenosis of spine    Pseudoarthrosis of cervical spine (HCC)    Anxiety    S/P cervical spinal fusion    Smoking    Diverticulitis of large intestine with  "abscess    History of unintended awareness under general anesthesia    Cholelithiasis    HTN (hypertension)    Sleep apnea     PT/OT/ST reviewed.  Plan was coordinated with Nursing staff & Case management.  Plan of care was discussed with patient    Assessment/ Plan:  Homero Driver Jr. is a 59 y.o. male 2 day(s) POD # second s/p reversal of Rea's procedure and protective ileostomy    Pain control  Pulmonary hygiene  DVT prophylaxis  OOB/Ambulation  DC NG tube  DC Richard catheter  Ileostomy functioning  Will follow labs from patient had fever overnight on antibiotics patient is on sepsis protocol  Replace electrolyte/follow-up CBC    Counseling / Coordination of Care  Total floor / unit time spent today 30minutes.  Greater than 50% of total time was spent with the patient and / or family counseling and / or coordination of care.     Son Celestin MD MS FRCS FACS  [facility]  07/28/24 9:42 AM  [facility]:  [address], [address] [place] [address]  Office  [phone] Fax [phone]    Portions of the record may have been created with voice recognition software. Occasional wrong word or \"sound a like\" substitutions may have occurred due to the inherent limitations of voice recognition software. Read the chart carefully and recognize, using context, where substitutions have occurred.        "

## 2024-07-28 NOTE — QUICK NOTE
AM blood work reviewed and noted increased in leukocytosis. The night of 7/27/24 the patient developed a fever and continues with tachycardia. As of now the patient meets SEPSIS criteria as evidence of fever, tachycardia, leukocytosis with a possible source related diverticulitis of large intestine with abscess. The patient was empirically started on Zosyn, lactic, procal, blood cultures, EKG and fluids 30cc xKg ordered. Continue monitoring VS and WBC trends. Nurse was instructed to monitor the patient closely.

## 2024-07-28 NOTE — PLAN OF CARE
Problem: PAIN - ADULT  Goal: Verbalizes/displays adequate comfort level or baseline comfort level  Description: Interventions:  - Encourage patient to monitor pain and request assistance  - Assess pain using appropriate pain scale  - Administer analgesics based on type and severity of pain and evaluate response  - Implement non-pharmacological measures as appropriate and evaluate response  - Consider cultural and social influences on pain and pain management  - Notify physician/advanced practitioner if interventions unsuccessful or patient reports new pain  Outcome: Progressing     Problem: INFECTION - ADULT  Goal: Absence or prevention of progression during hospitalization  Description: INTERVENTIONS:  - Assess and monitor for signs and symptoms of infection  - Monitor lab/diagnostic results  - Monitor all insertion sites, i.e. indwelling lines, tubes, and drains  - Monitor endotracheal if appropriate and nasal secretions for changes in amount and color  - [place] appropriate cooling/warming therapies per order  - Administer medications as ordered  - Instruct and encourage patient and family to use good hand hygiene technique  - Identify and instruct in appropriate isolation precautions for identified infection/condition  Outcome: Progressing     Problem: SAFETY ADULT  Goal: Patient will remain free of falls  Description: INTERVENTIONS:  - Educate patient/family on patient safety including physical limitations  - Instruct patient to call for assistance with activity   - Consult OT/PT to assist with strengthening/mobility   - Keep Call bell within reach  - Keep bed low and locked with side rails adjusted as appropriate  - Keep care items and personal belongings within reach  - Initiate and maintain comfort rounds  - Make Fall Risk Sign visible to staff  - Offer Toileting every 2 Hours, in advance of need  - Initiate/Maintain alarm  - Obtain necessary fall risk management equipment:   - Apply yellow socks and  bracelet for high fall risk patients  - Consider moving patient to room near nurses station  Outcome: Progressing  Goal: Maintain or return to baseline ADL function  Description: INTERVENTIONS:  -  Assess patient's ability to carry out ADLs; assess patient's baseline for ADL function and identify physical deficits which impact ability to perform ADLs (bathing, care of mouth/teeth, toileting, grooming, dressing, etc.)  - Assess/evaluate cause of self-care deficits   - Assess range of motion  - Assess patient's mobility; develop plan if impaired  - Assess patient's need for assistive devices and provide as appropriate  - Encourage maximum independence but intervene and supervise when necessary  - Involve family in performance of ADLs  - Assess for home care needs following discharge   - Consider OT consult to assist with ADL evaluation and planning for discharge  - Provide patient education as appropriate  Outcome: Progressing  Goal: Maintains/Returns to pre admission functional level  Description: INTERVENTIONS:  - Perform AM-PAC 6 Click Basic Mobility/ Daily Activity assessment daily.  - Set and communicate daily mobility goal to care team and patient/family/caregiver.   - Collaborate with rehabilitation services on mobility goals if consulted  - Perform Range of Motion 4 times a day.  - Reposition patient every 2 hours.  - Dangle patient 3 times a day  - Stand patient 3 times a day  - Ambulate patient 3 times a day  - Out of bed to chair 3 times a day   - Out of bed for meals 3 times a day  - Out of bed for toileting  - Record patient progress and toleration of activity level   Outcome: Progressing     Problem: DISCHARGE PLANNING  Goal: Discharge to home or other facility with appropriate resources  Description: INTERVENTIONS:  - Identify barriers to discharge w/patient and caregiver  - Arrange for needed discharge resources and transportation as appropriate  - Identify discharge learning needs (meds, wound care,  etc.)  - Arrange for interpretive services to assist at discharge as needed  - Refer to Case Management Department for coordinating discharge planning if the patient needs post-hospital services based on physician/advanced practitioner order or complex needs related to functional status, cognitive ability, or social support system  Outcome: Progressing     Problem: Knowledge Deficit  Goal: Patient/family/caregiver demonstrates understanding of disease process, treatment plan, medications, and discharge instructions  Description: Complete learning assessment and assess knowledge base.  Interventions:  - Provide teaching at level of understanding  - Provide teaching via preferred learning methods  Outcome: Progressing     Problem: GASTROINTESTINAL - ADULT  Goal: Minimal or absence of nausea and/or vomiting  Description: INTERVENTIONS:  - Administer IV fluids if ordered to ensure adequate hydration  - Maintain NPO status until nausea and vomiting are resolved  - Nasogastric tube if ordered  - Administer ordered antiemetic medications as needed  - Provide nonpharmacologic comfort measures as appropriate  - Advance diet as tolerated, if ordered  - Consider nutrition services referral to assist patient with adequate nutrition and appropriate food choices  Outcome: Progressing  Goal: Maintains or returns to baseline bowel function  Description: INTERVENTIONS:  - Assess bowel function  - Encourage oral fluids to ensure adequate hydration  - Administer IV fluids if ordered to ensure adequate hydration  - Administer ordered medications as needed  - Encourage mobilization and activity  - Consider nutritional services referral to assist patient with adequate nutrition and appropriate food choices  Outcome: Progressing  Goal: Maintains adequate nutritional intake  Description: INTERVENTIONS:  - Monitor percentage of each meal consumed  - Identify factors contributing to decreased intake, treat as appropriate  - Assist with meals  as needed  - Monitor I&O, weight, and lab values if indicated  - Obtain nutrition services referral as needed  Outcome: Progressing  Goal: Establish and maintain optimal ostomy function  Description: INTERVENTIONS:  - Assess bowel function  - Encourage oral fluids to ensure adequate hydration  - Administer IV fluids if ordered to ensure adequate hydration   - Administer ordered medications as needed  - Encourage mobilization and activity  - Nutrition services referral to assist patient with appropriate food choices  - Assess stoma site  - Consider wound care consult   Outcome: Progressing  Goal: Oral mucous membranes remain intact  Description: INTERVENTIONS  - Assess oral mucosa and hygiene practices  - Implement preventative oral hygiene regimen  - Implement oral medicated treatments as ordered  - Initiate Nutrition services referral as needed  Outcome: Progressing     Problem: Prexisting or High Potential for Compromised Skin Integrity  Goal: Skin integrity is maintained or improved  Description: INTERVENTIONS:  - Identify patients at risk for skin breakdown  - Assess and monitor skin integrity  - Assess and monitor nutrition and hydration status  - Monitor labs   - Assess for incontinence   - Turn and reposition patient  - Assist with mobility/ambulation  - Relieve pressure over bony prominences  - Avoid friction and shearing  - Provide appropriate hygiene as needed including keeping skin clean and dry  - Evaluate need for skin moisturizer/barrier cream  - Collaborate with interdisciplinary team   - Patient/family teaching  - Consider wound care consult   Outcome: Progressing

## 2024-07-29 LAB
ANION GAP SERPL CALCULATED.3IONS-SCNC: 11 MMOL/L (ref 4–13)
BASOPHILS # BLD AUTO: 0.03 THOUSANDS/ÂΜL (ref 0–0.1)
BASOPHILS NFR BLD AUTO: 0 % (ref 0–1)
BUN SERPL-MCNC: 9 MG/DL (ref 5–25)
CALCIUM SERPL-MCNC: 9.7 MG/DL (ref 8.4–10.2)
CHLORIDE SERPL-SCNC: 98 MMOL/L (ref 96–108)
CO2 SERPL-SCNC: 27 MMOL/L (ref 21–32)
CREAT SERPL-MCNC: 0.79 MG/DL (ref 0.6–1.3)
EOSINOPHIL # BLD AUTO: 0.04 THOUSAND/ÂΜL (ref 0–0.61)
EOSINOPHIL NFR BLD AUTO: 0 % (ref 0–6)
ERYTHROCYTE [DISTWIDTH] IN BLOOD BY AUTOMATED COUNT: 14.7 % (ref 11.6–15.1)
GFR SERPL CREATININE-BSD FRML MDRD: 98 ML/MIN/1.73SQ M
GLUCOSE SERPL-MCNC: 91 MG/DL (ref 65–140)
HCT VFR BLD AUTO: 38.7 % (ref 36.5–49.3)
HGB BLD-MCNC: 12.7 G/DL (ref 12–17)
IMM GRANULOCYTES # BLD AUTO: 0.08 THOUSAND/UL (ref 0–0.2)
IMM GRANULOCYTES NFR BLD AUTO: 1 % (ref 0–2)
LYMPHOCYTES # BLD AUTO: 1.71 THOUSANDS/ÂΜL (ref 0.6–4.47)
LYMPHOCYTES NFR BLD AUTO: 12 % (ref 14–44)
MAGNESIUM SERPL-MCNC: 2.1 MG/DL (ref 1.9–2.7)
MCH RBC QN AUTO: 28.9 PG (ref 26.8–34.3)
MCHC RBC AUTO-ENTMCNC: 32.8 G/DL (ref 31.4–37.4)
MCV RBC AUTO: 88 FL (ref 82–98)
MONOCYTES # BLD AUTO: 0.86 THOUSAND/ÂΜL (ref 0.17–1.22)
MONOCYTES NFR BLD AUTO: 6 % (ref 4–12)
NEUTROPHILS # BLD AUTO: 11.97 THOUSANDS/ÂΜL (ref 1.85–7.62)
NEUTS SEG NFR BLD AUTO: 81 % (ref 43–75)
NRBC BLD AUTO-RTO: 0 /100 WBCS
PHOSPHATE SERPL-MCNC: 2.4 MG/DL (ref 2.7–4.5)
PLATELET # BLD AUTO: 282 THOUSANDS/UL (ref 149–390)
PMV BLD AUTO: 9.6 FL (ref 8.9–12.7)
POTASSIUM SERPL-SCNC: 3.8 MMOL/L (ref 3.5–5.3)
PROCALCITONIN SERPL-MCNC: 0.47 NG/ML
RBC # BLD AUTO: 4.39 MILLION/UL (ref 3.88–5.62)
SODIUM SERPL-SCNC: 136 MMOL/L (ref 135–147)
WBC # BLD AUTO: 14.69 THOUSAND/UL (ref 4.31–10.16)

## 2024-07-29 PROCEDURE — 80048 BASIC METABOLIC PNL TOTAL CA: CPT | Performed by: SPECIALIST

## 2024-07-29 PROCEDURE — 84145 PROCALCITONIN (PCT): CPT

## 2024-07-29 PROCEDURE — 99024 POSTOP FOLLOW-UP VISIT: CPT | Performed by: SURGERY

## 2024-07-29 PROCEDURE — 84100 ASSAY OF PHOSPHORUS: CPT | Performed by: SPECIALIST

## 2024-07-29 PROCEDURE — 99232 SBSQ HOSP IP/OBS MODERATE 35: CPT | Performed by: INTERNAL MEDICINE

## 2024-07-29 PROCEDURE — 83735 ASSAY OF MAGNESIUM: CPT | Performed by: SPECIALIST

## 2024-07-29 PROCEDURE — 85025 COMPLETE CBC W/AUTO DIFF WBC: CPT | Performed by: SPECIALIST

## 2024-07-29 RX ORDER — SIMETHICONE 80 MG
80 TABLET,CHEWABLE ORAL EVERY 6 HOURS PRN
Status: DISCONTINUED | OUTPATIENT
Start: 2024-07-29 | End: 2024-07-31 | Stop reason: HOSPADM

## 2024-07-29 RX ORDER — ACETAMINOPHEN 10 MG/ML
1000 INJECTION, SOLUTION INTRAVENOUS EVERY 8 HOURS
Status: DISCONTINUED | OUTPATIENT
Start: 2024-07-29 | End: 2024-07-30

## 2024-07-29 RX ORDER — OXYCODONE HYDROCHLORIDE 5 MG/1
5 TABLET ORAL EVERY 4 HOURS PRN
Status: DISCONTINUED | OUTPATIENT
Start: 2024-07-29 | End: 2024-07-31 | Stop reason: HOSPADM

## 2024-07-29 RX ORDER — OXYCODONE HYDROCHLORIDE 10 MG/1
10 TABLET ORAL EVERY 4 HOURS PRN
Status: DISCONTINUED | OUTPATIENT
Start: 2024-07-29 | End: 2024-07-31 | Stop reason: HOSPADM

## 2024-07-29 RX ADMIN — HYDROMORPHONE HYDROCHLORIDE 0.5 MG: 1 INJECTION, SOLUTION INTRAMUSCULAR; INTRAVENOUS; SUBCUTANEOUS at 04:44

## 2024-07-29 RX ADMIN — NICOTINE 14 MG: 14 PATCH, EXTENDED RELEASE TRANSDERMAL at 08:53

## 2024-07-29 RX ADMIN — SIMETHICONE 80 MG: 80 TABLET, CHEWABLE ORAL at 13:56

## 2024-07-29 RX ADMIN — ACETAMINOPHEN 1000 MG: 10 INJECTION INTRAVENOUS at 08:49

## 2024-07-29 RX ADMIN — PIPERACILLIN AND TAZOBACTAM 4.5 G: 4; .5 INJECTION, POWDER, FOR SOLUTION INTRAVENOUS at 03:15

## 2024-07-29 RX ADMIN — AMLODIPINE BESYLATE 10 MG: 10 TABLET ORAL at 08:47

## 2024-07-29 RX ADMIN — DULOXETINE HYDROCHLORIDE 20 MG: 20 CAPSULE, DELAYED RELEASE ORAL at 08:48

## 2024-07-29 RX ADMIN — ACETAMINOPHEN 1000 MG: 10 INJECTION INTRAVENOUS at 16:01

## 2024-07-29 RX ADMIN — POTASSIUM PHOSPHATE, MONOBASIC AND POTASSIUM PHOSPHATE, DIBASIC 12 MMOL: 224; 236 INJECTION, SOLUTION, CONCENTRATE INTRAVENOUS at 10:52

## 2024-07-29 RX ADMIN — OXYCODONE HYDROCHLORIDE 5 MG: 5 TABLET ORAL at 19:41

## 2024-07-29 RX ADMIN — SODIUM CHLORIDE, SODIUM LACTATE, POTASSIUM CHLORIDE, AND CALCIUM CHLORIDE 50 ML/HR: .6; .31; .03; .02 INJECTION, SOLUTION INTRAVENOUS at 11:38

## 2024-07-29 RX ADMIN — OXYCODONE HYDROCHLORIDE 5 MG: 5 TABLET ORAL at 11:56

## 2024-07-29 RX ADMIN — ENOXAPARIN SODIUM 40 MG: 40 INJECTION SUBCUTANEOUS at 08:47

## 2024-07-29 RX ADMIN — HYDROMORPHONE HYDROCHLORIDE 0.5 MG: 1 INJECTION, SOLUTION INTRAMUSCULAR; INTRAVENOUS; SUBCUTANEOUS at 00:04

## 2024-07-29 NOTE — PLAN OF CARE
Problem: PAIN - ADULT  Goal: Verbalizes/displays adequate comfort level or baseline comfort level  Description: Interventions:  - Encourage patient to monitor pain and request assistance  - Assess pain using appropriate pain scale  - Administer analgesics based on type and severity of pain and evaluate response  - Implement non-pharmacological measures as appropriate and evaluate response  - Consider cultural and social influences on pain and pain management  - Notify physician/advanced practitioner if interventions unsuccessful or patient reports new pain  Outcome: Progressing     Problem: INFECTION - ADULT  Goal: Absence or prevention of progression during hospitalization  Description: INTERVENTIONS:  - Assess and monitor for signs and symptoms of infection  - Monitor lab/diagnostic results  - Monitor all insertion sites, i.e. indwelling lines, tubes, and drains  - Monitor endotracheal if appropriate and nasal secretions for changes in amount and color  - [place] appropriate cooling/warming therapies per order  - Administer medications as ordered  - Instruct and encourage patient and family to use good hand hygiene technique  - Identify and instruct in appropriate isolation precautions for identified infection/condition  Outcome: Progressing     Problem: DISCHARGE PLANNING  Goal: Discharge to home or other facility with appropriate resources  Description: INTERVENTIONS:  - Identify barriers to discharge w/patient and caregiver  - Arrange for needed discharge resources and transportation as appropriate  - Identify discharge learning needs (meds, wound care, etc.)  - Arrange for interpretive services to assist at discharge as needed  - Refer to Case Management Department for coordinating discharge planning if the patient needs post-hospital services based on physician/advanced practitioner order or complex needs related to functional status, cognitive ability, or social support system  Outcome: Progressing      Problem: Knowledge Deficit  Goal: Patient/family/caregiver demonstrates understanding of disease process, treatment plan, medications, and discharge instructions  Description: Complete learning assessment and assess knowledge base.  Interventions:  - Provide teaching at level of understanding  - Provide teaching via preferred learning methods  Outcome: Progressing     Problem: GASTROINTESTINAL - ADULT  Goal: Minimal or absence of nausea and/or vomiting  Description: INTERVENTIONS:  - Administer IV fluids if ordered to ensure adequate hydration  - Maintain NPO status until nausea and vomiting are resolved  - Nasogastric tube if ordered  - Administer ordered antiemetic medications as needed  - Provide nonpharmacologic comfort measures as appropriate  - Advance diet as tolerated, if ordered  - Consider nutrition services referral to assist patient with adequate nutrition and appropriate food choices  Outcome: Progressing

## 2024-07-29 NOTE — PROGRESS NOTES
[facility]  Progress Note  Name: Homero Barney I  MRN: [id number]  Unit/Bed#: [facility]-01 I Date of Admission: 7/26/2024   Date of Service: 7/29/2024 I Hospital Day: 3    Assessment & Plan   Sleep apnea  Assessment & Plan  Since admission his O2 sat has been % on room air  Does not wear CPAP at home  Per chart review noted he visited sleep medicine last January, provider referred him to ambulatory sleep study.  Unfortunately the patient did not follow-up      HTN (hypertension)  Assessment & Plan  BP in the 150s and 130s systolic  On Norvasc daily, continue her regimen  Monitor BP per unit protocol or when needed    Smoking  Assessment & Plan  Smokes 1 pack of cigarettes daily  Education on smoking cessation was provided at bedside greater than 3 minutes  Nicotine patch ordered    Anxiety  Assessment & Plan  On Cymbalta daily, continue with medicine  Follows with PCP as outpatient  Stable per patient report    * Diverticulitis of large intestine with abscess  Assessment & Plan    Chart review done and noted patient was admitted at [facility] on February 2024 due to perforated sigmoid diverticulitis.  At that time it was found to have a pericolonic abscess and a transgluteal drain was placed.  Per attending note the patient has had a sure of Pseudomonas and E. coli.  Also note a colocutaneous fistula.  On 7/25/2024 the patient was a schedule for OR due to diverticulitis of large intestine with abscess without bleeding and cholelithiasis  Admitted under surgery service on 7/26/2024.  The patient underwent sigmoid colectomy, cholecystectomy, creation of loop ileostomy, removal of ureteral stents and removal of left buttocks abscess drain  On a PCA pump and LR continuous infusion  Primary team is surgery  7/29: ng tube remnoved, patient diet advanced                 VTE Pharmacologic Prophylaxis: VTE Score: 5 Moderate Risk (Score 3-4) - Pharmacological DVT Prophylaxis Ordered:  enoxaparin (Lovenox).    Mobility:   Basic Mobility Inpatient Raw Score: 14  JH-HLM Goal: 4: Move to chair/commode  JH-HLM Achieved: 4: Move to chair/commode  JH-HLM Goal achieved. Continue to encourage appropriate mobility.    Patient Centered Rounds: I performed bedside rounds with nursing staff today.   Discussions with Specialists or Other Care Team Provider:     Education and Discussions with Family / Patient: Patient declined call to .     Total Time Spent on Date of Encounter in care of patient:  mins. This time was spent on one or more of the following: performing physical exam; counseling and coordination of care; obtaining or reviewing history; documenting in the medical record; reviewing/ordering tests, medications or procedures; communicating with other healthcare professionals and discussing with patient's family/caregivers.    Current Length of Stay: 3 day(s)  Current Patient Status: Inpatient   Certification Statement: The patient will continue to require additional inpatient hospital stay due to per surgery  Discharge Plan:  per surgery    Code Status: Level 1 - Full Code    Subjective:   Patient advancing diet today, denies major complaints    Objective:     Vitals:   Temp (24hrs), Av.2 °F (36.8 °C), Min:97.6 °F (36.4 °C), Max:98.8 °F (37.1 °C)    Temp:  [97.6 °F (36.4 °C)-98.8 °F (37.1 °C)] 98.1 °F (36.7 °C)  HR:  [79-91] 81  Resp:  [13-18] 17  BP: (131-166)/(79-91) 131/89  SpO2:  [97 %-100 %] 100 %  Body mass index is 23.77 kg/m².     Input and Output Summary (last 24 hours):     Intake/Output Summary (Last 24 hours) at 2024 1114  Last data filed at 2024 0901  Gross per 24 hour   Intake 150 ml   Output 3175 ml   Net -3025 ml       Physical Exam:   Physical Exam  Vitals and nursing note reviewed.   Constitutional:       General: He is not in acute distress.     Appearance: He is well-developed. He is not toxic-appearing or diaphoretic.   HENT:      Head: Normocephalic  and atraumatic.   Eyes:      General: No scleral icterus.     Conjunctiva/sclera: Conjunctivae normal.   Cardiovascular:      Rate and Rhythm: Normal rate and regular rhythm.      Heart sounds: No murmur heard.     No friction rub. No gallop.   Pulmonary:      Effort: Pulmonary effort is normal. No respiratory distress.      Breath sounds: Normal breath sounds. No stridor. No wheezing, rhonchi or rales.   Chest:      Chest wall: No tenderness.   Abdominal:      General: There is no distension.      Palpations: Abdomen is soft. There is no mass.      Tenderness: There is no abdominal tenderness. There is no guarding or rebound.      Hernia: No hernia is present.   Musculoskeletal:         General: No swelling or tenderness.      Cervical back: Neck supple.   Skin:     General: Skin is warm and dry.      Capillary Refill: Capillary refill takes less than 2 seconds.   Neurological:      Mental Status: He is alert and oriented to person, place, and time.   Psychiatric:         Mood and Affect: Mood normal.          Additional Data:     Labs:  Results from last 7 days   Lab Units 07/29/24  0503   WBC Thousand/uL 14.69*   HEMOGLOBIN g/dL 12.7   HEMATOCRIT % 38.7   PLATELETS Thousands/uL 282   SEGS PCT % 81*   LYMPHO PCT % 12*   MONO PCT % 6   EOS PCT % 0     Results from last 7 days   Lab Units 07/29/24  0503 07/28/24  0501 07/27/24  0527   SODIUM mmol/L 136   < > 138   POTASSIUM mmol/L 3.8   < > 4.1   CHLORIDE mmol/L 98   < > 101   CO2 mmol/L 27   < > 26   BUN mg/dL 9   < > 16   CREATININE mg/dL 0.79   < > 0.95   ANION GAP mmol/L 11   < > 11   CALCIUM mg/dL 9.7   < > 9.2   ALBUMIN g/dL  --   --  3.6   TOTAL BILIRUBIN mg/dL  --   --  0.67   ALK PHOS U/L  --   --  44   ALT U/L  --   --  17   AST U/L  --   --  29   GLUCOSE RANDOM mg/dL 91   < > 112    < > = values in this interval not displayed.                 Results from last 7 days   Lab Units 07/29/24  0503 07/28/24  0715 07/28/24  0501   LACTIC ACID mmol/L  --  0.9   --    PROCALCITONIN ng/ml 0.47*  --  0.45*       Lines/Drains:  Invasive Devices       Peripheral Intravenous Line  Duration             Peripheral IV 07/28/24 Proximal;Right;Ventral (anterior) Forearm 1 day              Drain  Duration             Ileostomy Loop RUQ 2 days                          Imaging: No pertinent imaging reviewed.    Recent Cultures (last 7 days):   Results from last 7 days   Lab Units 07/28/24  0713   BLOOD CULTURE  Received in Microbiology Lab. Culture in Progress.  Received in Microbiology Lab. Culture in Progress.       Last 24 Hours Medication List:   Current Facility-Administered Medications   Medication Dose Route Frequency Provider Last Rate    acetaminophen  1,000 mg Intravenous Q8H Garima Vuong PA-C 1,000 mg (07/29/24 0849)    albuterol  1 puff Inhalation Q4H PRN Brissa Rowell PA-C      amLODIPine  10 mg Oral Daily Brissa Rowell PA-C      DULoxetine  20 mg Oral Daily Brissa Rowell PA-C      enoxaparin  40 mg Subcutaneous Daily Brissa Rowell PA-C      hydrALAZINE  10 mg Intravenous Q6H PRN Veda Mauro PA-C      HYDROmorphone  0.5 mg Intravenous Q4H PRN Son Cleestin MD      lactated ringers  50 mL/hr Intravenous Continuous Son Celestin MD 50 mL/hr (07/28/24 1521)    naloxone  0.04 mg Intravenous Q3 min PRN Brissa Rowell PA-C      nicotine  14 mg Transdermal Daily MOON Rosenthal      ondansetron  4 mg Intravenous Q6H PRN Brissa Rowell PA-C      phenol  1 spray Mouth/Throat Q2H PRN MOON Rosenthal      piperacillin-tazobactam  4.5 g Intravenous Q8H MOON Rosenthal 4.5 g (07/29/24 0315)    potassium phosphate  12 mmol Intravenous Once Garima Vuong PA-C 12 mmol (07/29/24 1052)        Today, Patient Was Seen By: Ebenezer Krishnan DO    **Please Note: This note may have been constructed using a voice recognition system.**

## 2024-07-29 NOTE — ASSESSMENT & PLAN NOTE
Since admission his O2 sat has been % on room air  Does not wear CPAP at home  Per chart review noted he visited sleep medicine last January, provider referred him to ambulatory sleep study.  Unfortunately the patient did not follow-up

## 2024-07-29 NOTE — CASE MANAGEMENT
Case Management Assessment & Discharge Planning Note    Patient name Homero Driver Jr.  Location /-01 MRN [id number]  : 1965 Date 2024       Current Admission Date: 2024  Current Admission Diagnosis:Diverticulitis of large intestine with abscess   Patient Active Problem List    Diagnosis Date Noted Date Diagnosed    Cholelithiasis 2024     HTN (hypertension) 2024     Sleep apnea 2024     History of unintended awareness under general anesthesia 2024     Diverticulitis of large intestine with abscess 2024     Smoking 2023     S/P cervical spinal fusion 2018     Anxiety 2016     Cervical stenosis of spine 2016     Pseudoarthrosis of cervical spine (HCC) 2016       LOS (days): 3  Geometric Mean LOS (GMLOS) (days):   Days to GMLOS:     OBJECTIVE:    Risk of Unplanned Readmission Score: 8.15         Current admission status: Inpatient       Preferred Pharmacy:   [facility] #[id number] - [place] - [address]  [address]  [place] [address]  Phone: [phone] Fax: [phone]    Primary Care Provider: Rachelle Tavares MD    Primary Insurance: [facility]  Secondary Insurance:     ASSESSMENT:  Readmission Root Cause  30 Day Readmission: No    Patient Information  Admitted from:: Home  Mental Status: Alert  During Assessment patient was accompanied by: Not accompanied during assessment  Assessment information provided by:: Patient  Primary Caregiver: Self  Support Systems: Self, Spouse/significant other  County of Residence: [place]  What city do you live in?: [place]  Type of Current Residence: Apartment  Living Arrangements: Lives Alone  Is patient a ?: No    Activities of Daily Living Prior to Admission  Functional Status: Independent  Completes ADLs independently?: Yes  Ambulates independently?: Yes  Does patient use assisted devices?: No  Does patient currently own DME?:  No  Does patient have a history of Outpatient Therapy (PT/OT)?: No  Does the patient have a history of Short-Term Rehab?: No  Does patient have a history of HHC?: No (reports was setup with [facility] when discharged from [facility] earlier this year. Reports that he decided to stay with a friend in [place] after discharge and so refused services when called for intake due to [facility] not able to service in [place].)  Does patient currently have HHC?: No    Patient Information Continued  Income Source: SSI/SSD  Does patient have prescription coverage?: Yes  Does patient receive dialysis treatments?: No  Does patient have a history of substance abuse?: No  Does patient have a history of Mental Health Diagnosis?: Yes (Anxiety)  Is patient receiving treatment for mental health?: No. Patient declined treatment information.  Has patient received inpatient treatment related to mental health in the last 2 years?: No    PHQ 2/9 Screening   Reviewed PHQ 2/9 Depression Screening Score?: No    Means of Transportation  Means of Transport to Appts:: Drives Self    Social Determinants of Health (SDOH)      Flowsheet Row Most Recent Value   Housing Stability    In the last 12 months, was there a time when you were not able to pay the mortgage or rent on time? N   In the past 12 months, how many times have you moved where you were living? 0   At any time in the past 12 months, were you homeless or living in a shelter (including now)? N   Transportation Needs    In the past 12 months, has lack of transportation kept you from medical appointments or from getting medications? no   In the past 12 months, has lack of transportation kept you from meetings, work, or from getting things needed for daily living? No   Food Insecurity    Within the past 12 months, you worried that your food would run out before you got the money to buy more. Never true   Within the past 12 months, the food you bought just didn't last and you didn't have money to get more.  Never true   Utilities    In the past 12 months has the electric, gas, oil, or water company threatened to shut off services in your home? No          DISCHARGE DETAILS:    Discharge planning discussed with:: Patient  Freedom of Choice: Yes     CM contacted family/caregiver?: No- see comments (declined call to SO)  Were Treatment Team discharge recommendations reviewed with patient/caregiver?: Yes  Did patient/caregiver verbalize understanding of patient care needs?: Yes  Were patient/caregiver advised of the risks associated with not following Treatment Team discharge recommendations?: Yes    Requested Home Health Care         Is the patient interested in HHC at discharge?: No    DME Referral Provided  Referral made for DME?: No    Other Referral/Resources/Interventions Provided:  Interventions: None Indicated    Would you like to participate in our [facility] service program?  : No - Declined    Treatment Team Recommendation: Home  Discharge Destination Plan:: Home  Transport at Discharge : Ride Share

## 2024-07-29 NOTE — ASSESSMENT & PLAN NOTE
Chart review done and noted patient was admitted at [facility] on February 2024 due to perforated sigmoid diverticulitis.  At that time it was found to have a pericolonic abscess and a transgluteal drain was placed.  Per attending note the patient has had a sure of Pseudomonas and E. coli.  Also note a colocutaneous fistula.  On 7/25/2024 the patient was a schedule for OR due to diverticulitis of large intestine with abscess without bleeding and cholelithiasis  Admitted under surgery service on 7/26/2024.  The patient underwent sigmoid colectomy, cholecystectomy, creation of loop ileostomy, removal of ureteral stents and removal of left buttocks abscess drain  On a PCA pump and LR continuous infusion  Primary team is surgery  7/29: ng tube remnoved, patient diet advanced

## 2024-07-29 NOTE — PROGRESS NOTES
Progress Note - General Surgery   Homero Driver Jr. 59 y.o. male MRN: [id number]  Unit/Bed#: -01 Encounter: [id number]    Assessment:  59 y.o. male with PMH of diverticulitis of sigmoid colon with abscess and cholelithiasis, now POD#3 s/p open sigmoid colectomy, cholecystectomy, creation of loop ileostomy, removal of left buttock drain, flex sig  - AVSS on RA  - leukocytosis downtrending, WBC 14.6 (16.6)  - hemoglobin stable  - BMP unremarkable   - hypophosphatemia, phos 2.4  - UOP 2.8L, no urinary complaints     Plan:  - per patient request, will cont CLD for now, will re-eval later today for diet advancement to full liquids   - wound care RN consulted for ostomy care/teaching  - trend AM labs  - PRN analgesia/antiemetics, IV tyl q8hrs scheduled   - IV abx   - elyte repletion   - IS 10x/hr while awake   - DVT ppx: lovenox + SCDs  - encouraged OOB/ambulation   - SLIM following for assistance with medical management     Subjective/Objective     Subjective: Patient is in good spirits this morning. Pain controlled with PRN analgesia. Tolerating CLD, occasional nausea related with intake of certain clear liquids, advised to avoid these items. Has been OOB to chair. Using IS. +Flatus, ileosomy functioning. +diaphoresis, no documented fever/chills.     Objective:   Blood pressure 142/79, pulse 79, temperature 98.8 °F (37.1 °C), temperature source Oral, resp. rate 16, height 6' (1.829 m), weight 79.5 kg (175 lb 4.3 oz), SpO2 99%.,Body mass index is 23.77 kg/m².      Intake/Output Summary (Last 24 hours) at 7/29/2024 0754  Last data filed at 7/29/2024 0601  Gross per 24 hour   Intake 151.6 ml   Output 2875 ml   Net -2723.4 ml       Invasive Devices       Peripheral Intravenous Line  Duration             Peripheral IV 07/28/24 Proximal;Right;Ventral (anterior) Forearm 1 day              Drain  Duration             Ileostomy Loop RUQ 2 days                    Physical Exam  Vitals and nursing note reviewed.  "  Constitutional:       General: He is not in acute distress.     Appearance: He is not ill-appearing or toxic-appearing.   HENT:      Head: Normocephalic and atraumatic.   Cardiovascular:      Rate and Rhythm: Normal rate.   Pulmonary:      Effort: Pulmonary effort is normal. No respiratory distress.   Abdominal:      General: There is no distension.      Palpations: Abdomen is soft.      Tenderness: There is abdominal tenderness. There is no guarding or rebound.      Comments: Midline incision C/D/I with mepilex in place  RLQ ileostomy with liquid stool in bag, ostomy pink and viable    Musculoskeletal:      Right lower leg: No edema.      Left lower leg: No edema.   Skin:     General: Skin is warm and dry.   Neurological:      Mental Status: He is alert.   Psychiatric:         Mood and Affect: Mood normal.          Lab, Imaging and other studies:I have personally reviewed pertinent lab results.  , CBC:   Lab Results   Component Value Date    WBC 14.69 (H) 07/29/2024    HGB 12.7 07/29/2024    HCT 38.7 07/29/2024    MCV 88 07/29/2024     07/29/2024    RBC 4.39 07/29/2024    MCH 28.9 07/29/2024    MCHC 32.8 07/29/2024    RDW 14.7 07/29/2024    MPV 9.6 07/29/2024    NRBC 0 07/29/2024   , CMP:   Lab Results   Component Value Date    SODIUM 136 07/29/2024    K 3.8 07/29/2024    CL 98 07/29/2024    CO2 27 07/29/2024    BUN 9 07/29/2024    CREATININE 0.79 07/29/2024    CALCIUM 9.7 07/29/2024    EGFR 98 07/29/2024       No results found.    VTE PPX:  VTE Pharmacologic Prophylaxis: Lovenox  VTE Mechanical Prophylaxis: SCDs, ambulation as able     Garima Vuong PA-C  7/29/2024     7:54 AM   **Please Note: Portions of the record may have been created using voice recognition software.  Occasional wrong word or \"sound a like\" substitutions may have occurred due to the inherent limitations of voice recognition software.  Read the chart carefully and recognize, using context, where substitutions have occurred.**    "

## 2024-07-30 ENCOUNTER — HOME HEALTH ADMISSION (OUTPATIENT)
Dept: HOME HEALTH SERVICES | Facility: HOME HEALTHCARE | Age: 59
End: 2024-07-30
Payer: MEDICARE

## 2024-07-30 LAB
ANION GAP SERPL CALCULATED.3IONS-SCNC: 13 MMOL/L (ref 4–13)
BASOPHILS # BLD AUTO: 0.02 THOUSANDS/ÂΜL (ref 0–0.1)
BASOPHILS NFR BLD AUTO: 0 % (ref 0–1)
BUN SERPL-MCNC: 12 MG/DL (ref 5–25)
CALCIUM SERPL-MCNC: 9.8 MG/DL (ref 8.4–10.2)
CHLORIDE SERPL-SCNC: 99 MMOL/L (ref 96–108)
CO2 SERPL-SCNC: 24 MMOL/L (ref 21–32)
CREAT SERPL-MCNC: 0.81 MG/DL (ref 0.6–1.3)
EOSINOPHIL # BLD AUTO: 0.09 THOUSAND/ÂΜL (ref 0–0.61)
EOSINOPHIL NFR BLD AUTO: 1 % (ref 0–6)
ERYTHROCYTE [DISTWIDTH] IN BLOOD BY AUTOMATED COUNT: 14.7 % (ref 11.6–15.1)
GFR SERPL CREATININE-BSD FRML MDRD: 97 ML/MIN/1.73SQ M
GLUCOSE SERPL-MCNC: 105 MG/DL (ref 65–140)
HCT VFR BLD AUTO: 40.5 % (ref 36.5–49.3)
HGB BLD-MCNC: 13.2 G/DL (ref 12–17)
IMM GRANULOCYTES # BLD AUTO: 0.06 THOUSAND/UL (ref 0–0.2)
IMM GRANULOCYTES NFR BLD AUTO: 1 % (ref 0–2)
LYMPHOCYTES # BLD AUTO: 1.7 THOUSANDS/ÂΜL (ref 0.6–4.47)
LYMPHOCYTES NFR BLD AUTO: 13 % (ref 14–44)
MAGNESIUM SERPL-MCNC: 2.1 MG/DL (ref 1.9–2.7)
MCH RBC QN AUTO: 28.8 PG (ref 26.8–34.3)
MCHC RBC AUTO-ENTMCNC: 32.6 G/DL (ref 31.4–37.4)
MCV RBC AUTO: 88 FL (ref 82–98)
MONOCYTES # BLD AUTO: 0.9 THOUSAND/ÂΜL (ref 0.17–1.22)
MONOCYTES NFR BLD AUTO: 7 % (ref 4–12)
NEUTROPHILS # BLD AUTO: 10.46 THOUSANDS/ÂΜL (ref 1.85–7.62)
NEUTS SEG NFR BLD AUTO: 78 % (ref 43–75)
NRBC BLD AUTO-RTO: 0 /100 WBCS
PHOSPHATE SERPL-MCNC: 3.4 MG/DL (ref 2.7–4.5)
PLATELET # BLD AUTO: 368 THOUSANDS/UL (ref 149–390)
PMV BLD AUTO: 9.5 FL (ref 8.9–12.7)
POTASSIUM SERPL-SCNC: 4 MMOL/L (ref 3.5–5.3)
RBC # BLD AUTO: 4.58 MILLION/UL (ref 3.88–5.62)
SODIUM SERPL-SCNC: 136 MMOL/L (ref 135–147)
WBC # BLD AUTO: 13.23 THOUSAND/UL (ref 4.31–10.16)

## 2024-07-30 PROCEDURE — 85025 COMPLETE CBC W/AUTO DIFF WBC: CPT | Performed by: PHYSICIAN ASSISTANT

## 2024-07-30 PROCEDURE — 84100 ASSAY OF PHOSPHORUS: CPT | Performed by: PHYSICIAN ASSISTANT

## 2024-07-30 PROCEDURE — 99232 SBSQ HOSP IP/OBS MODERATE 35: CPT | Performed by: STUDENT IN AN ORGANIZED HEALTH CARE EDUCATION/TRAINING PROGRAM

## 2024-07-30 PROCEDURE — 88304 TISSUE EXAM BY PATHOLOGIST: CPT | Performed by: STUDENT IN AN ORGANIZED HEALTH CARE EDUCATION/TRAINING PROGRAM

## 2024-07-30 PROCEDURE — 80048 BASIC METABOLIC PNL TOTAL CA: CPT | Performed by: PHYSICIAN ASSISTANT

## 2024-07-30 PROCEDURE — 83735 ASSAY OF MAGNESIUM: CPT | Performed by: PHYSICIAN ASSISTANT

## 2024-07-30 PROCEDURE — 99024 POSTOP FOLLOW-UP VISIT: CPT | Performed by: SURGERY

## 2024-07-30 PROCEDURE — 88307 TISSUE EXAM BY PATHOLOGIST: CPT | Performed by: STUDENT IN AN ORGANIZED HEALTH CARE EDUCATION/TRAINING PROGRAM

## 2024-07-30 RX ORDER — PANTOPRAZOLE SODIUM 40 MG/1
40 TABLET, DELAYED RELEASE ORAL
Status: DISCONTINUED | OUTPATIENT
Start: 2024-07-31 | End: 2024-07-31 | Stop reason: HOSPADM

## 2024-07-30 RX ORDER — PANTOPRAZOLE SODIUM 40 MG/1
40 TABLET, DELAYED RELEASE ORAL
Status: DISCONTINUED | OUTPATIENT
Start: 2024-07-31 | End: 2024-07-30 | Stop reason: SDUPTHER

## 2024-07-30 RX ORDER — PANTOPRAZOLE SODIUM 40 MG/10ML
40 INJECTION, POWDER, LYOPHILIZED, FOR SOLUTION INTRAVENOUS ONCE
Status: DISCONTINUED | OUTPATIENT
Start: 2024-07-30 | End: 2024-07-30

## 2024-07-30 RX ORDER — ACETAMINOPHEN 325 MG/1
650 TABLET ORAL EVERY 6 HOURS PRN
Status: DISCONTINUED | OUTPATIENT
Start: 2024-07-30 | End: 2024-07-31 | Stop reason: HOSPADM

## 2024-07-30 RX ADMIN — ACETAMINOPHEN 1000 MG: 10 INJECTION INTRAVENOUS at 00:36

## 2024-07-30 RX ADMIN — SIMETHICONE 80 MG: 80 TABLET, CHEWABLE ORAL at 00:47

## 2024-07-30 RX ADMIN — OXYCODONE HYDROCHLORIDE 5 MG: 5 TABLET ORAL at 18:43

## 2024-07-30 RX ADMIN — OXYCODONE HYDROCHLORIDE 10 MG: 10 TABLET ORAL at 22:45

## 2024-07-30 RX ADMIN — DULOXETINE HYDROCHLORIDE 20 MG: 20 CAPSULE, DELAYED RELEASE ORAL at 08:26

## 2024-07-30 RX ADMIN — OXYCODONE HYDROCHLORIDE 5 MG: 5 TABLET ORAL at 13:18

## 2024-07-30 RX ADMIN — ENOXAPARIN SODIUM 40 MG: 40 INJECTION SUBCUTANEOUS at 08:26

## 2024-07-30 RX ADMIN — AMLODIPINE BESYLATE 10 MG: 10 TABLET ORAL at 08:26

## 2024-07-30 RX ADMIN — ACETAMINOPHEN 1000 MG: 10 INJECTION INTRAVENOUS at 08:26

## 2024-07-30 RX ADMIN — OXYCODONE HYDROCHLORIDE 5 MG: 5 TABLET ORAL at 05:27

## 2024-07-30 RX ADMIN — PANTOPRAZOLE SODIUM 40 MG: 40 INJECTION, POWDER, FOR SOLUTION INTRAVENOUS at 13:04

## 2024-07-30 RX ADMIN — SIMETHICONE 80 MG: 80 TABLET, CHEWABLE ORAL at 05:39

## 2024-07-30 RX ADMIN — NICOTINE 14 MG: 14 PATCH, EXTENDED RELEASE TRANSDERMAL at 08:29

## 2024-07-30 NOTE — DISCHARGE INSTR - OTHER ORDERS
Ostomy Plans:  1-Change ostomy appliance every 3-5 days and as needed for leakage/dislodgement.   2-Remove old appliance gently with push/pull method.  3-Remove adhesive buildup with adhesive remover.  4-Cleanse area around stoma with warm water and pat dry - do not use bath wipes, foaming cleanser or soap as these contain sensitizers which may cause adverse reaction and moisturizers which may prevent appliance from adhering.   5-Apply ostomy ring then appliance - have patient apply pressure for 3-5 minutes to increase adherence. Used Sen[employer]ura Gian one piece flat cut to fit #[id number]  6-Apply 2 hydrocolloid barrier strips around wafer to prevent leakage.  7-Empty appliance when 1/3 - 1/2 full to extend wear time.    If you have any questions or problems with ostomy and wish to make an appointment with ostomy RN, please call Central [phone]     Skin Care Plans:  1-Prevent barrier cream to bilateral sacrum, buttock and heels BID and PRN  2-Float heels on 2 pillows to offload pressure so heels are not in contact with mattress or pillows.  3-Ehob pressure redistribution cushion in chair when out of bed. Avoid prolonged sitting.  4-Moisturize skin daily with skin nourishing cream.  5-Encourage patient to turn/reposition q2h or when medically stable for pressure re-distribution on skin.

## 2024-07-30 NOTE — PLAN OF CARE
Problem: GASTROINTESTINAL - ADULT  Goal: Minimal or absence of nausea and/or vomiting  Description: INTERVENTIONS:  - Administer IV fluids if ordered to ensure adequate hydration  - Maintain NPO status until nausea and vomiting are resolved  - Nasogastric tube if ordered  - Administer ordered antiemetic medications as needed  - Provide nonpharmacologic comfort measures as appropriate  - Advance diet as tolerated, if ordered  - Consider nutrition services referral to assist patient with adequate nutrition and appropriate food choices  Outcome: Progressing  Goal: Establish and maintain optimal ostomy function  Description: INTERVENTIONS:  - Assess bowel function  - Encourage oral fluids to ensure adequate hydration  - Administer IV fluids if ordered to ensure adequate hydration   - Administer ordered medications as needed  - Encourage mobilization and activity  - Nutrition services referral to assist patient with appropriate food choices  - Assess stoma site  - Consider wound care consult   Outcome: Progressing     Problem: SAFETY ADULT  Goal: Patient will remain free of falls  Description: INTERVENTIONS:  - Educate patient/family on patient safety including physical limitations  - Instruct patient to call for assistance with activity   - Consult OT/PT to assist with strengthening/mobility   - Keep Call bell within reach  - Keep bed low and locked with side rails adjusted as appropriate  - Keep care items and personal belongings within reach  - Initiate and maintain comfort rounds  - Offer Toileting every 2 Hours, in advance of need  - Obtain necessary fall risk management equipment: walker  - Apply yellow socks and bracelet for high fall risk patients  - Consider moving patient to room near nurses station  Outcome: Progressing

## 2024-07-30 NOTE — PROGRESS NOTES
Progress Note - General Surgery   Homero Driver Jr. 59 y.o. male MRN: [id number]  Unit/Bed#: -01 Encounter: [id number]    Assessment & Plan    59 y.o. male with PMH of diverticulitis of sigmoid colon with abscess and cholelithiasis, now POD#4 s/p open sigmoid colectomy, cholecystectomy, creation of loop ileostomy, removal of left buttock drain, flex sig   Pt doing well today, reports some N/V after eating green beans and toast last night but pain is better controlled with PO oxycodone  He has decided to back off a bit on his diet and stick to softer foods, has been tolerating well  Ostomy healthy appearing and functioning well, 1.3L liquid stool  Abdomen soft, non-distended, non-tender, active bowel sounds, midline incision with a small amount of SS drainage this morning - dressing changed  AFVSS  WBC 13 (14)  Electrolytes WNL  LR@50cc/hr    Continue diet as tolerated, slowly advance (d/w pt)  D/C IVF  Ostomy nurse to see pt today  Possible discharge tomorrow to home with VNA services for ostomy care  PRN analgesia      /88 (BP Location: Left arm)   Pulse 93   Temp (!) 97.3 °F (36.3 °C) (Tympanic)   Resp 16   Ht 6' (1.829 m)   Wt 79.5 kg (175 lb 4.3 oz)   SpO2 99%   BMI 23.77 kg/m²     Labs in chart were reviewed.  Results from last 7 days   Lab Units 07/30/24  0532   WBC Thousand/uL 13.23*   HEMOGLOBIN g/dL 13.2   HEMATOCRIT % 40.5   PLATELETS Thousands/uL 368     Results from last 7 days   Lab Units 07/30/24  0532   POTASSIUM mmol/L 4.0   CHLORIDE mmol/L 99   CO2 mmol/L 24   BUN mg/dL 12   CREATININE mg/dL 0.81           Intake/Output Summary (Last 24 hours) at 7/30/2024 1051  Last data filed at 7/30/2024 0826  Gross per 24 hour   Intake 5510 ml   Output 1825 ml   Net 3685 ml           Subjective/Objective     Subjective: Pt doing well. Had some N/V after eating last night but no acute complaints at the time of my exam.     Review of Systems   Constitutional:  Negative for chills and fever.    Respiratory:  Negative for chest tightness and shortness of breath.    Cardiovascular:  Negative for chest pain.   Gastrointestinal:  Negative for abdominal distention, abdominal pain, nausea and vomiting.   Genitourinary:  Negative for difficulty urinating and dysuria.   Skin:  Positive for wound (surgical).          Objective:     Physical Exam  Vitals and nursing note reviewed.   Constitutional:       General: He is not in acute distress.     Appearance: He is not toxic-appearing.   HENT:      Head: Normocephalic and atraumatic.   Eyes:      Extraocular Movements: Extraocular movements intact.   Pulmonary:      Effort: Pulmonary effort is normal. No respiratory distress.   Abdominal:      General: Bowel sounds are normal. There is no distension.      Palpations: Abdomen is soft.      Tenderness: There is no abdominal tenderness. There is no guarding.      Comments: Midline incision CDI with some SS drainage, staples in place, new dressing applied. Ostomy functioning; stoma healthy appearing with bridge in place.    Musculoskeletal:         General: Normal range of motion.      Cervical back: Normal range of motion.   Skin:     General: Skin is warm and dry.   Neurological:      Mental Status: He is alert and oriented to person, place, and time.   Psychiatric:         Mood and Affect: Mood normal.         Behavior: Behavior normal.         Thought Content: Thought content normal.            Brissa Rowell PA-C  7/30/2024

## 2024-07-30 NOTE — ASSESSMENT & PLAN NOTE
Currently on room air  Does not wear CPAP at home  Per chart review noted he visited sleep medicine last January, provider referred him to ambulatory sleep study.  Unfortunately the patient did not follow-up

## 2024-07-30 NOTE — PLAN OF CARE
Problem: PAIN - ADULT  Goal: Verbalizes/displays adequate comfort level or baseline comfort level  Description: Interventions:  - Encourage patient to monitor pain and request assistance  - Assess pain using appropriate pain scale  - Administer analgesics based on type and severity of pain and evaluate response  - Implement non-pharmacological measures as appropriate and evaluate response  - Consider cultural and social influences on pain and pain management  - Notify physician/advanced practitioner if interventions unsuccessful or patient reports new pain  Outcome: Progressing     Problem: INFECTION - ADULT  Goal: Absence or prevention of progression during hospitalization  Description: INTERVENTIONS:  - Assess and monitor for signs and symptoms of infection  - Monitor lab/diagnostic results  - Monitor all insertion sites, i.e. indwelling lines, tubes, and drains  - Monitor endotracheal if appropriate and nasal secretions for changes in amount and color  - [place] appropriate cooling/warming therapies per order  - Administer medications as ordered  - Instruct and encourage patient and family to use good hand hygiene technique  - Identify and instruct in appropriate isolation precautions for identified infection/condition  Outcome: Progressing  Goal: Absence of fever/infection during neutropenic period  Description: INTERVENTIONS:  - Monitor WBC    Outcome: Progressing     Problem: SAFETY ADULT  Goal: Patient will remain free of falls  Description: INTERVENTIONS:  - Educate patient/family on patient safety including physical limitations  - Instruct patient to call for assistance with activity   - Consult OT/PT to assist with strengthening/mobility   - Keep Call bell within reach  - Keep bed low and locked with side rails adjusted as appropriate  - Keep care items and personal belongings within reach  - Initiate and maintain comfort rounds  - Make Fall Risk Sign visible to staff  - Offer Toileting every 2 Hours,  in advance of need  - Initiate/Maintain bed alarm  - Obtain necessary fall risk management equipment:   - Apply yellow socks and bracelet for high fall risk patients  - Consider moving patient to room near nurses station  Outcome: Progressing  Goal: Maintain or return to baseline ADL function  Description: INTERVENTIONS:  -  Assess patient's ability to carry out ADLs; assess patient's baseline for ADL function and identify physical deficits which impact ability to perform ADLs (bathing, care of mouth/teeth, toileting, grooming, dressing, etc.)  - Assess/evaluate cause of self-care deficits   - Assess range of motion  - Assess patient's mobility; develop plan if impaired  - Assess patient's need for assistive devices and provide as appropriate  - Encourage maximum independence but intervene and supervise when necessary  - Involve family in performance of ADLs  - Assess for home care needs following discharge   - Consider OT consult to assist with ADL evaluation and planning for discharge  - Provide patient education as appropriate  Outcome: Progressing  Goal: Maintains/Returns to pre admission functional level  Description: INTERVENTIONS:  - Perform AM-PAC 6 Click Basic Mobility/ Daily Activity assessment daily.  - Set and communicate daily mobility goal to care team and patient/family/caregiver.   - Collaborate with rehabilitation services on mobility goals if consulted  - Perform Range of Motion 3 times a day.  - Reposition patient every 2 hours.  - Dangle patient 3 times a day  - Stand patient 3 times a day  - Ambulate patient 3 times a day  - Out of bed to chair 3 times a day   - Out of bed for meals 3 times a day  - Out of bed for toileting  - Record patient progress and toleration of activity level   Outcome: Progressing     Problem: DISCHARGE PLANNING  Goal: Discharge to home or other facility with appropriate resources  Description: INTERVENTIONS:  - Identify barriers to discharge w/patient and caregiver  -  Arrange for needed discharge resources and transportation as appropriate  - Identify discharge learning needs (meds, wound care, etc.)  - Arrange for interpretive services to assist at discharge as needed  - Refer to Case Management Department for coordinating discharge planning if the patient needs post-hospital services based on physician/advanced practitioner order or complex needs related to functional status, cognitive ability, or social support system  Outcome: Progressing     Problem: Knowledge Deficit  Goal: Patient/family/caregiver demonstrates understanding of disease process, treatment plan, medications, and discharge instructions  Description: Complete learning assessment and assess knowledge base.  Interventions:  - Provide teaching at level of understanding  - Provide teaching via preferred learning methods  Outcome: Progressing     Problem: GASTROINTESTINAL - ADULT  Goal: Minimal or absence of nausea and/or vomiting  Description: INTERVENTIONS:  - Administer IV fluids if ordered to ensure adequate hydration  - Maintain NPO status until nausea and vomiting are resolved  - Nasogastric tube if ordered  - Administer ordered antiemetic medications as needed  - Provide nonpharmacologic comfort measures as appropriate  - Advance diet as tolerated, if ordered  - Consider nutrition services referral to assist patient with adequate nutrition and appropriate food choices  Outcome: Progressing  Goal: Maintains or returns to baseline bowel function  Description: INTERVENTIONS:  - Assess bowel function  - Encourage oral fluids to ensure adequate hydration  - Administer IV fluids if ordered to ensure adequate hydration  - Administer ordered medications as needed  - Encourage mobilization and activity  - Consider nutritional services referral to assist patient with adequate nutrition and appropriate food choices  Outcome: Progressing  Goal: Maintains adequate nutritional intake  Description: INTERVENTIONS:  - Monitor  percentage of each meal consumed  - Identify factors contributing to decreased intake, treat as appropriate  - Assist with meals as needed  - Monitor I&O, weight, and lab values if indicated  - Obtain nutrition services referral as needed  Outcome: Progressing  Goal: Establish and maintain optimal ostomy function  Description: INTERVENTIONS:  - Assess bowel function  - Encourage oral fluids to ensure adequate hydration  - Administer IV fluids if ordered to ensure adequate hydration   - Administer ordered medications as needed  - Encourage mobilization and activity  - Nutrition services referral to assist patient with appropriate food choices  - Assess stoma site  - Consider wound care consult   Outcome: Progressing  Goal: Oral mucous membranes remain intact  Description: INTERVENTIONS  - Assess oral mucosa and hygiene practices  - Implement preventative oral hygiene regimen  - Implement oral medicated treatments as ordered  - Initiate Nutrition services referral as needed  Outcome: Progressing     Problem: Prexisting or High Potential for Compromised Skin Integrity  Goal: Skin integrity is maintained or improved  Description: INTERVENTIONS:  - Identify patients at risk for skin breakdown  - Assess and monitor skin integrity  - Assess and monitor nutrition and hydration status  - Monitor labs   - Assess for incontinence   - Turn and reposition patient  - Assist with mobility/ambulation  - Relieve pressure over bony prominences  - Avoid friction and shearing  - Provide appropriate hygiene as needed including keeping skin clean and dry  - Evaluate need for skin moisturizer/barrier cream  - Collaborate with interdisciplinary team   - Patient/family teaching  - Consider wound care consult   Outcome: Progressing     Problem: Nutrition/Hydration-ADULT  Goal: Nutrient/Hydration intake appropriate for improving, restoring or maintaining nutritional needs  Description: Monitor and assess patient's nutrition/hydration status  for malnutrition. Collaborate with interdisciplinary team and initiate plan and interventions as ordered.  Monitor patient's weight and dietary intake as ordered or per policy. Utilize nutrition screening tool and intervene as necessary. Determine patient's food preferences and provide high-protein, high-caloric foods as appropriate.     INTERVENTIONS:  - Monitor oral intake, urinary output, labs, and treatment plans  - Assess nutrition and hydration status and recommend course of action  - Evaluate amount of meals eaten  - Assist patient with eating if necessary   - Allow adequate time for meals  - Recommend/ encourage appropriate diets, oral nutritional supplements, and vitamin/mineral supplements  - Order, calculate, and assess calorie counts as needed  - Recommend, monitor, and adjust tube feedings and TPN/PPN based on assessed needs  - Assess need for intravenous fluids  - Provide specific nutrition/hydration education as appropriate  - Include patient/family/caregiver in decisions related to nutrition  Outcome: Progressing

## 2024-07-30 NOTE — WOUND OSTOMY CARE
"Progress Note - Wound   Homero Driver Jr. 59 y.o. male MRN: [id number]  Unit/Bed#: -01 Encounter: [id number]      Assessment:   This is a 59 year old male patient admitted with diverticulitis of large intestine with abscess. Consult received for ostomy teaching and patient is s/p sigmoid colectomy, cholecystectomy and loop ileostomy creation on 7/26 with removal of ureteral stents and left buttock abscess drain. Patient was AAO x 3 and agreeable to have ostomy teaching done.    Assessment Findings:  1-Right loop ileostomy measuring 1 3/4\" with 150 ml biliary drainage with soft brown unformed BM. Orders in place for ostomy care. Patient placed in [place] flat one piece cut to fit #61494 with 4\" ring and 2 barrier strips to reinforce seal. Patient stated that he is often diaphoretic and has had problems maintaining a good seal as a result. Reviewed with patient entire contents of Ostomy Teaching Guide. Patient emptied pouch independently then observed changing of appliance. Supplies given for patient to take home. Diet and hydration discussed at length with patient and he verbalized good understanding. VNA is in process of being set up as per Case Management.     Right LQ loop ileostomy     1 3/4\"       Plan:   Skin care plans:  1-Prevent barrier cream to bilateral sacrum, buttock and heels BID and PRN  2-Float heels on 2 pillows to offload pressure so heels are not in contact with mattress or pillows.  3-Ehob pressure redistribution cushion in chair when out of bed. Avoid prolonged sitting.  4-Moisturize skin daily with skin nourishing cream.  5-Encourage patient to turn/reposition q2h or when medically stable for pressure re-distribution on skin.     Ostomy Plans:  1-Change ostomy appliance every 3-5 days and as needed for leakage/dislodgement.   2-Remove old appliance gently with push/pull method.  3-Remove adhesive buildup with adhesive remover.  4-Cleanse peristomal site with warm water and pat dry - do " not use bath wipes, foaming cleanser or soap as these contain sensitizers which may cause adverse reaction and moisturizers which may prevent appliance from adhering.   5-Apply ostomy ring then appliance - have patient apply pressure for 3-5 minutes to increase adherence.   6-Apply 2 hydrocolloid barrier strips (located in supply room with ostomy supplies) around wafer to prevent leakage.  7-Empty appliance when 1/3 - 1/2 full to extend wear time.    Discussed assessment findings, and plan of care/recommendations with Dr Kaye and with Laura LOMBARDO.    Wound/ostomy care will follow along with patient throughout admission, please call or tiger text with questions and concerns.    Recommendations written as orders.  Anita Durham MSN, RN, CWON

## 2024-07-30 NOTE — ASSESSMENT & PLAN NOTE
Previously admitted at [facility] on February 2024 due to perforated sigmoid diverticulitis.  At that time it was found to have a pericolonic abscess and a transgluteal drain was placed.  Per attending note the patient has had a sure of Pseudomonas and E. coli.  Also note a colocutaneous fistula.  Admitted under surgery service on 7/26/2024.    07/26 S/p sigmoid colectomy, cholecystectomy, creation of loop ileostomy, removal of ureteral stents and removal of left buttocks abscess drain  NG tube removed, diet advance to surgical soft  Wound care per primary  DVTppx with lovenox  Abx discontinued

## 2024-07-30 NOTE — ASSESSMENT & PLAN NOTE
BP currently controlled  On Norvasc daily, continue her regimen  Monitor BP per unit protocol or when needed

## 2024-07-30 NOTE — PLAN OF CARE
Problem: PAIN - ADULT  Goal: Verbalizes/displays adequate comfort level or baseline comfort level  Description: Interventions:  - Encourage patient to monitor pain and request assistance  - Assess pain using appropriate pain scale  - Administer analgesics based on type and severity of pain and evaluate response  - Implement non-pharmacological measures as appropriate and evaluate response  - Consider cultural and social influences on pain and pain management  - Notify physician/advanced practitioner if interventions unsuccessful or patient reports new pain  7/29/2024 2254 by Rubina Elkins RN  Outcome: Progressing  7/29/2024 2016 by Rubina Elkins RN  Outcome: Progressing     Problem: INFECTION - ADULT  Goal: Absence or prevention of progression during hospitalization  Description: INTERVENTIONS:  - Assess and monitor for signs and symptoms of infection  - Monitor lab/diagnostic results  - Monitor all insertion sites, i.e. indwelling lines, tubes, and drains  - Monitor endotracheal if appropriate and nasal secretions for changes in amount and color  - [place] appropriate cooling/warming therapies per order  - Administer medications as ordered  - Instruct and encourage patient and family to use good hand hygiene technique  - Identify and instruct in appropriate isolation precautions for identified infection/condition  7/29/2024 2254 by Rubina Elkins RN  Outcome: Progressing  7/29/2024 2016 by Rubina Elkins RN  Outcome: Progressing  Goal: Absence of fever/infection during neutropenic period  Description: INTERVENTIONS:  - Monitor WBC    7/29/2024 2254 by Rubina Elkins RN  Outcome: Progressing  7/29/2024 2016 by Rubina Elkins RN  Outcome: Progressing     Problem: SAFETY ADULT  Goal: Patient will remain free of falls  Description: INTERVENTIONS:  - Educate patient/family on patient safety including physical limitations  - Instruct patient to call for assistance with activity   - Consult OT/PT to assist  with strengthening/mobility   - Keep Call bell within reach  - Keep bed low and locked with side rails adjusted as appropriate  - Keep care items and personal belongings within reach  - Initiate and maintain comfort rounds  - Make Fall Risk Sign visible to staff  - Offer Toileting every 2 Hours, in advance of need  - Initiate/Maintain bed alarm  - Obtain necessary fall risk management equipment:   - Apply yellow socks and bracelet for high fall risk patients  - Consider moving patient to room near nurses station  7/29/2024 2254 by Rubina Elkins RN  Outcome: Progressing  7/29/2024 2016 by Rubina Elkins RN  Outcome: Progressing  Goal: Maintain or return to baseline ADL function  Description: INTERVENTIONS:  -  Assess patient's ability to carry out ADLs; assess patient's baseline for ADL function and identify physical deficits which impact ability to perform ADLs (bathing, care of mouth/teeth, toileting, grooming, dressing, etc.)  - Assess/evaluate cause of self-care deficits   - Assess range of motion  - Assess patient's mobility; develop plan if impaired  - Assess patient's need for assistive devices and provide as appropriate  - Encourage maximum independence but intervene and supervise when necessary  - Involve family in performance of ADLs  - Assess for home care needs following discharge   - Consider OT consult to assist with ADL evaluation and planning for discharge  - Provide patient education as appropriate  7/29/2024 2254 by Rubina Elkins RN  Outcome: Progressing  7/29/2024 2016 by Rubina Elkins RN  Outcome: Progressing  Goal: Maintains/Returns to pre admission functional level  Description: INTERVENTIONS:  - Perform AM-PAC 6 Click Basic Mobility/ Daily Activity assessment daily.  - Set and communicate daily mobility goal to care team and patient/family/caregiver.   - Collaborate with rehabilitation services on mobility goals if consulted  - Perform Range of Motion 3 times a day.  - Reposition patient  every 2 hours.  - Dangle patient 3 times a day  - Stand patient 3 times a day  - Ambulate patient 3 times a day  - Out of bed to chair 3 times a day   - Out of bed for meals 3 times a day  - Out of bed for toileting  - Record patient progress and toleration of activity level   7/29/2024 2254 by Rubina Elkins RN  Outcome: Progressing  7/29/2024 2016 by Rubina Elkins RN  Outcome: Progressing     Problem: DISCHARGE PLANNING  Goal: Discharge to home or other facility with appropriate resources  Description: INTERVENTIONS:  - Identify barriers to discharge w/patient and caregiver  - Arrange for needed discharge resources and transportation as appropriate  - Identify discharge learning needs (meds, wound care, etc.)  - Arrange for interpretive services to assist at discharge as needed  - Refer to Case Management Department for coordinating discharge planning if the patient needs post-hospital services based on physician/advanced practitioner order or complex needs related to functional status, cognitive ability, or social support system  7/29/2024 2254 by Rubina Elkins RN  Outcome: Progressing  7/29/2024 2016 by Rubina Elkins RN  Outcome: Progressing     Problem: Knowledge Deficit  Goal: Patient/family/caregiver demonstrates understanding of disease process, treatment plan, medications, and discharge instructions  Description: Complete learning assessment and assess knowledge base.  Interventions:  - Provide teaching at level of understanding  - Provide teaching via preferred learning methods  7/29/2024 2254 by Rubina Elkins RN  Outcome: Progressing  7/29/2024 2016 by Rubina Elkins RN  Outcome: Progressing     Problem: GASTROINTESTINAL - ADULT  Goal: Minimal or absence of nausea and/or vomiting  Description: INTERVENTIONS:  - Administer IV fluids if ordered to ensure adequate hydration  - Maintain NPO status until nausea and vomiting are resolved  - Nasogastric tube if ordered  - Administer ordered antiemetic  medications as needed  - Provide nonpharmacologic comfort measures as appropriate  - Advance diet as tolerated, if ordered  - Consider nutrition services referral to assist patient with adequate nutrition and appropriate food choices  7/29/2024 2254 by Rubina Elkins RN  Outcome: Progressing  7/29/2024 2016 by Rubina Elkins RN  Outcome: Progressing  Goal: Maintains or returns to baseline bowel function  Description: INTERVENTIONS:  - Assess bowel function  - Encourage oral fluids to ensure adequate hydration  - Administer IV fluids if ordered to ensure adequate hydration  - Administer ordered medications as needed  - Encourage mobilization and activity  - Consider nutritional services referral to assist patient with adequate nutrition and appropriate food choices  7/29/2024 2254 by Rubina Elkins RN  Outcome: Progressing  7/29/2024 2016 by Rubina Elkins RN  Outcome: Progressing  Goal: Maintains adequate nutritional intake  Description: INTERVENTIONS:  - Monitor percentage of each meal consumed  - Identify factors contributing to decreased intake, treat as appropriate  - Assist with meals as needed  - Monitor I&O, weight, and lab values if indicated  - Obtain nutrition services referral as needed  7/29/2024 2254 by Rubina Elkins RN  Outcome: Progressing  7/29/2024 2016 by Rubina Elkins RN  Outcome: Progressing  Goal: Establish and maintain optimal ostomy function  Description: INTERVENTIONS:  - Assess bowel function  - Encourage oral fluids to ensure adequate hydration  - Administer IV fluids if ordered to ensure adequate hydration   - Administer ordered medications as needed  - Encourage mobilization and activity  - Nutrition services referral to assist patient with appropriate food choices  - Assess stoma site  - Consider wound care consult   7/29/2024 2254 by Rubina Elkins RN  Outcome: Progressing  7/29/2024 2016 by Rubina Elkins RN  Outcome: Progressing  Goal: Oral mucous membranes remain  intact  Description: INTERVENTIONS  - Assess oral mucosa and hygiene practices  - Implement preventative oral hygiene regimen  - Implement oral medicated treatments as ordered  - Initiate Nutrition services referral as needed  7/29/2024 2254 by Rubina Elkins RN  Outcome: Progressing  7/29/2024 2016 by Rubina Elkins RN  Outcome: Progressing     Problem: Prexisting or High Potential for Compromised Skin Integrity  Goal: Skin integrity is maintained or improved  Description: INTERVENTIONS:  - Identify patients at risk for skin breakdown  - Assess and monitor skin integrity  - Assess and monitor nutrition and hydration status  - Monitor labs   - Assess for incontinence   - Turn and reposition patient  - Assist with mobility/ambulation  - Relieve pressure over bony prominences  - Avoid friction and shearing  - Provide appropriate hygiene as needed including keeping skin clean and dry  - Evaluate need for skin moisturizer/barrier cream  - Collaborate with interdisciplinary team   - Patient/family teaching  - Consider wound care consult   7/29/2024 2254 by Rubina Elkins RN  Outcome: Progressing  7/29/2024 2016 by Rubina Elkins RN  Outcome: Progressing     Problem: Nutrition/Hydration-ADULT  Goal: Nutrient/Hydration intake appropriate for improving, restoring or maintaining nutritional needs  Description: Monitor and assess patient's nutrition/hydration status for malnutrition. Collaborate with interdisciplinary team and initiate plan and interventions as ordered.  Monitor patient's weight and dietary intake as ordered or per policy. Utilize nutrition screening tool and intervene as necessary. Determine patient's food preferences and provide high-protein, high-caloric foods as appropriate.     INTERVENTIONS:  - Monitor oral intake, urinary output, labs, and treatment plans  - Assess nutrition and hydration status and recommend course of action  - Evaluate amount of meals eaten  - Assist patient with eating if  necessary   - Allow adequate time for meals  - Recommend/ encourage appropriate diets, oral nutritional supplements, and vitamin/mineral supplements  - Order, calculate, and assess calorie counts as needed  - Recommend, monitor, and adjust tube feedings and TPN/PPN based on assessed needs  - Assess need for intravenous fluids  - Provide specific nutrition/hydration education as appropriate  - Include patient/family/caregiver in decisions related to nutrition  7/29/2024 2254 by Rubina Elkins RN  Outcome: Progressing  7/29/2024 2016 by Rubina Elkins RN  Outcome: Progressing

## 2024-07-30 NOTE — CASE MANAGEMENT
Case Management Discharge Planning Note    Patient name Homero Driver Jr.  Location /-01 MRN [id number]  : 1965 Date 2024       Current Admission Date: 2024  Current Admission Diagnosis:Diverticulitis of large intestine with abscess   Patient Active Problem List    Diagnosis Date Noted Date Diagnosed    Cholelithiasis 2024     HTN (hypertension) 2024     Sleep apnea 2024     History of unintended awareness under general anesthesia 2024     Diverticulitis of large intestine with abscess 2024     Smoking 2023     S/P cervical spinal fusion 2018     Anxiety 2016     Cervical stenosis of spine 2016     Pseudoarthrosis of cervical spine (HCC) 2016       LOS (days): 4  Geometric Mean LOS (GMLOS) (days): 9.8  Days to GMLOS:5.8     OBJECTIVE:  Risk of Unplanned Readmission Score: 7.91         Current admission status: Inpatient   Preferred Pharmacy:   [facility]/pharmacy #[id number] - [place] - [address]  [address]  [place] [address]  Phone: [phone] Fax: [phone]    Primary Care Provider: Rachelle Tavares MD    Primary Insurance: [employer] [facility]  Secondary Insurance:     DISCHARGE DETAILS:    Discharge planning discussed with:: Patient  Freedom of Choice: Yes  Comments - Freedom of Choice: Choice is for [facility] for HHC provider.  CM contacted family/caregiver?: Yes (VM left for family.)  Were Treatment Team discharge recommendations reviewed with patient/caregiver?: Yes  Did patient/caregiver verbalize understanding of patient care needs?: Yes  Were patient/caregiver advised of the risks associated with not following Treatment Team discharge recommendations?: Yes    Requested Home Health Care         Is the patient interested in HHC at discharge?: Yes  Home Health Discipline requested:: Nursing  Home Health Agency Name:: [facility]  Home Health Follow-Up Provider:: STEPHENIE  Home Health Services  Needed:: Wound/Ostomy Care, Post-Op Care and Assessment  Homebound Criteria Met:: Uses an Assist Device (i.e. cane, walker, etc), Requires the Assistance of Another Person for Safe Ambulation or to Leave the Home  Supporting Clincal Findings:: Limited Endurance, Fatigues Easliy in Short Distances  Other Referral/Resources/Interventions Provided:  Interventions: [facility]    Would you like to participate in our [facility] service program?  : No - Declined    Treatment Team Recommendation: Home with Home Health Care  Discharge Destination Plan:: Home with Home Health Care ([facility])     Additional Comments: Per rounding, recommendation for [facility] for SN visits due to new colostomy. Referral opened in [place], [facility] confirmed able to accept. Patient choice is for [facility] for [facility] provider. AVS updated. Initial supplies for D/C home to be provided by SARAH RN.

## 2024-07-30 NOTE — PROGRESS NOTES
[facility]  Progress Note  Name: Homero Barney I  MRN: [id number]  Unit/Bed#: -01 I Date of Admission: 7/26/2024   Date of Service: 7/30/2024 I Hospital Day: 4    Assessment & Plan   Sleep apnea  Assessment & Plan  Currently on room air  Does not wear CPAP at home  Per chart review noted he visited sleep medicine last January, provider referred him to ambulatory sleep study.  Unfortunately the patient did not follow-up    HTN (hypertension)  Assessment & Plan  BP currently controlled  On Norvasc daily, continue her regimen  Monitor BP per unit protocol or when needed    Smoking  Assessment & Plan  Smokes 1 pack of cigarettes daily  Nicotine patch ordered    Anxiety  Assessment & Plan  Continue cymbalta  Follows with PCP as outpatient    * Diverticulitis of large intestine with abscess  Assessment & Plan  Previously admitted at [facility] on February 2024 due to perforated sigmoid diverticulitis.  At that time it was found to have a pericolonic abscess and a transgluteal drain was placed.  Per attending note the patient has had a sure of Pseudomonas and E. coli.  Also note a colocutaneous fistula.  Admitted under surgery service on 7/26/2024.    07/26 S/p sigmoid colectomy, cholecystectomy, creation of loop ileostomy, removal of ureteral stents and removal of left buttocks abscess drain  NG tube removed, diet advance to surgical soft  Wound care per primary  DVTppx with lovenox  Abx discontinued             VTE Pharmacologic Prophylaxis:   Pharmacologic: Enoxaparin (Lovenox)  Mechanical VTE Prophylaxis in Place: Yes    Current Length of Stay: 4 day(s)    Current Patient Status: Inpatient   Certification Statement: The patient will continue to require additional inpatient hospital stay due to surgery evaluation    Discharge Plan: pending    Code Status: Level 1 - Full Code      Subjective:   Reports feeling well overall.  Did try to tolerate diet this morning but does develop  nausea and subsequent emesis.  No fevers, chills or shortness of breath.  Mild abdominal tenderness at incision site.    Objective:     Vitals:   Temp (24hrs), Av.8 °F (36.6 °C), Min:97.3 °F (36.3 °C), Max:98.4 °F (36.9 °C)    Temp:  [97.3 °F (36.3 °C)-98.4 °F (36.9 °C)] 97.3 °F (36.3 °C)  HR:  [87-93] 93  Resp:  [16] 16  BP: (123-133)/(85-88) 126/88  SpO2:  [95 %-99 %] 99 %  Body mass index is 23.77 kg/m².     Input and Output Summary (last 24 hours):       Intake/Output Summary (Last 24 hours) at 2024 1052  Last data filed at 2024 0826  Gross per 24 hour   Intake 5510 ml   Output 1825 ml   Net 3685 ml       Physical Exam:     Physical Exam  Vitals and nursing note reviewed.   HENT:      Head: Normocephalic.   Eyes:      Conjunctiva/sclera: Conjunctivae normal.   Cardiovascular:      Rate and Rhythm: Normal rate.   Pulmonary:      Breath sounds: Normal breath sounds. No wheezing.   Abdominal:      General: Bowel sounds are normal.      Palpations: Abdomen is soft.      Comments: Ileostomy, staples noted. Incision site CDI   Musculoskeletal:         General: No swelling. Normal range of motion.   Skin:     General: Skin is warm and dry.   Neurological:      General: No focal deficit present.      Mental Status: He is alert. Mental status is at baseline.         Additional Data:     Labs:    Results from last 7 days   Lab Units 24  0532   WBC Thousand/uL 13.23*   HEMOGLOBIN g/dL 13.2   HEMATOCRIT % 40.5   PLATELETS Thousands/uL 368   SEGS PCT % 78*   LYMPHO PCT % 13*   MONO PCT % 7   EOS PCT % 1     Results from last 7 days   Lab Units 24  0532 24  0501 24  0527   SODIUM mmol/L 136   < > 138   POTASSIUM mmol/L 4.0   < > 4.1   CHLORIDE mmol/L 99   < > 101   CO2 mmol/L 24   < > 26   BUN mg/dL 12   < > 16   CREATININE mg/dL 0.81   < > 0.95   ANION GAP mmol/L 13   < > 11   CALCIUM mg/dL 9.8   < > 9.2   ALBUMIN g/dL  --   --  3.6   TOTAL BILIRUBIN mg/dL  --   --  0.67   ALK PHOS U/L   --   --  44   ALT U/L  --   --  17   AST U/L  --   --  29   GLUCOSE RANDOM mg/dL 105   < > 112    < > = values in this interval not displayed.                 Results from last 7 days   Lab Units 07/29/24  0503 07/28/24  0715 07/28/24  0501   LACTIC ACID mmol/L  --  0.9  --    PROCALCITONIN ng/ml 0.47*  --  0.45*           * I Have Reviewed All Lab Data Listed Above.  * Additional Pertinent Lab Tests Reviewed: All Labs For Current Hospital Admission Reviewed    Mobility:  Basic Mobility Inpatient Raw Score: 20  -[facility] Goal: 6: Walk 10 steps or more  -[facility] Achieved: 6: Walk 10 steps or more    Lines:     Invasive Devices       Peripheral Intravenous Line  Duration             Peripheral IV 07/28/24 Proximal;Right;Ventral (anterior) Forearm 2 days              Drain  Duration             Ileostomy Loop RUQ 3 days                       Imaging:    Imaging Reports Reviewed Today Include:     No results found.      Recent Cultures (last 7 days):     Results from last 7 days   Lab Units 07/28/24  0713   BLOOD CULTURE  No Growth at 24 hrs.  No Growth at 24 hrs.       Last 24 Hours Medication List:   Current Facility-Administered Medications   Medication Dose Route Frequency Provider Last Rate    acetaminophen  1,000 mg Intravenous Q8H Garima Vuong PA-C 1,000 mg (07/30/24 0826)    albuterol  1 puff Inhalation Q4H PRN Brsisa Rowell PA-C      amLODIPine  10 mg Oral Daily Brissa Rowell PA-C      DULoxetine  20 mg Oral Daily Brissa Rowell PA-C      enoxaparin  40 mg Subcutaneous Daily Brissa Rowell PA-C      hydrALAZINE  10 mg Intravenous Q6H PRN Veda Mauro PA-C      lactated ringers  50 mL/hr Intravenous Continuous Son Celestin MD 50 mL/hr (07/29/24 1138)    naloxone  0.04 mg Intravenous Q3 min PRN Brissa Rowell PA-C      nicotine  14 mg Transdermal Daily MOON Rosenthal      ondansetron  4 mg Intravenous Q6H PRN Brissa Rowell PA-C      oxyCODONE   10 mg Oral Q4H PRN Adelso Abernathy MD      oxyCODONE  5 mg Oral Q4H PRN Adelso Abernathy MD      [START ON 7/31/2024] pantoprazole  40 mg Oral Early Morning Tyron Kaye MD      pantoprazole  40 mg Intravenous Once Tyron Kaye MD      phenol  1 spray Mouth/Throat Q2H PRN MOON Rosenthal      simethicone  80 mg Oral Q6H PRN Ebenezer Krishnan DO          Today, Patient Was Seen By: Tyron Kaye MD    ** Please Note: Dictation voice to text software may have been used in the creation of this document. **

## 2024-07-31 VITALS
SYSTOLIC BLOOD PRESSURE: 142 MMHG | BODY MASS INDEX: 23.74 KG/M2 | WEIGHT: 175.27 LBS | RESPIRATION RATE: 16 BRPM | HEIGHT: 72 IN | TEMPERATURE: 98.6 F | DIASTOLIC BLOOD PRESSURE: 94 MMHG | HEART RATE: 86 BPM | OXYGEN SATURATION: 95 %

## 2024-07-31 LAB
ANION GAP SERPL CALCULATED.3IONS-SCNC: 12 MMOL/L (ref 4–13)
BUN SERPL-MCNC: 19 MG/DL (ref 5–25)
CALCIUM SERPL-MCNC: 10 MG/DL (ref 8.4–10.2)
CHLORIDE SERPL-SCNC: 98 MMOL/L (ref 96–108)
CO2 SERPL-SCNC: 27 MMOL/L (ref 21–32)
CREAT SERPL-MCNC: 1.01 MG/DL (ref 0.6–1.3)
ERYTHROCYTE [DISTWIDTH] IN BLOOD BY AUTOMATED COUNT: 14.7 % (ref 11.6–15.1)
GFR SERPL CREATININE-BSD FRML MDRD: 81 ML/MIN/1.73SQ M
GLUCOSE SERPL-MCNC: 109 MG/DL (ref 65–140)
HCT VFR BLD AUTO: 41.6 % (ref 36.5–49.3)
HGB BLD-MCNC: 13.1 G/DL (ref 12–17)
MCH RBC QN AUTO: 28.2 PG (ref 26.8–34.3)
MCHC RBC AUTO-ENTMCNC: 31.5 G/DL (ref 31.4–37.4)
MCV RBC AUTO: 90 FL (ref 82–98)
PLATELET # BLD AUTO: 432 THOUSANDS/UL (ref 149–390)
PMV BLD AUTO: 9.6 FL (ref 8.9–12.7)
POTASSIUM SERPL-SCNC: 4.6 MMOL/L (ref 3.5–5.3)
RBC # BLD AUTO: 4.64 MILLION/UL (ref 3.88–5.62)
SODIUM SERPL-SCNC: 137 MMOL/L (ref 135–147)
WBC # BLD AUTO: 10.71 THOUSAND/UL (ref 4.31–10.16)

## 2024-07-31 PROCEDURE — 99024 POSTOP FOLLOW-UP VISIT: CPT | Performed by: PHYSICIAN ASSISTANT

## 2024-07-31 PROCEDURE — 80048 BASIC METABOLIC PNL TOTAL CA: CPT | Performed by: STUDENT IN AN ORGANIZED HEALTH CARE EDUCATION/TRAINING PROGRAM

## 2024-07-31 PROCEDURE — NC001 PR NO CHARGE: Performed by: PHYSICIAN ASSISTANT

## 2024-07-31 PROCEDURE — 85027 COMPLETE CBC AUTOMATED: CPT | Performed by: STUDENT IN AN ORGANIZED HEALTH CARE EDUCATION/TRAINING PROGRAM

## 2024-07-31 RX ORDER — OXYCODONE HYDROCHLORIDE 10 MG/1
10 TABLET ORAL EVERY 4 HOURS PRN
Qty: 12 TABLET | Refills: 0 | Status: SHIPPED | OUTPATIENT
Start: 2024-07-31 | End: 2024-08-05

## 2024-07-31 RX ADMIN — AMLODIPINE BESYLATE 10 MG: 10 TABLET ORAL at 09:27

## 2024-07-31 RX ADMIN — OXYCODONE HYDROCHLORIDE 5 MG: 5 TABLET ORAL at 14:59

## 2024-07-31 RX ADMIN — NICOTINE 14 MG: 14 PATCH, EXTENDED RELEASE TRANSDERMAL at 09:28

## 2024-07-31 RX ADMIN — OXYCODONE HYDROCHLORIDE 5 MG: 5 TABLET ORAL at 05:35

## 2024-07-31 RX ADMIN — ENOXAPARIN SODIUM 40 MG: 40 INJECTION SUBCUTANEOUS at 09:27

## 2024-07-31 RX ADMIN — PANTOPRAZOLE SODIUM 40 MG: 40 TABLET, DELAYED RELEASE ORAL at 05:35

## 2024-07-31 RX ADMIN — DULOXETINE HYDROCHLORIDE 20 MG: 20 CAPSULE, DELAYED RELEASE ORAL at 09:27

## 2024-07-31 NOTE — PROGRESS NOTES
Progress Note - General Surgery   Homero Driver Jr. 59 y.o. male MRN: [id number]  Unit/Bed#: -01 Encounter: [id number]    ASSESSMENT:  59 y.o. male with PMH of diverticulitis of sigmoid colon with abscess and cholelithiasis  POD#5 s/p open sigmoid colectomy, cholecystectomy, creation of loop ileostomy, removal of left buttock drain, flex sig   -AVSS  -WBC 10 (13, 14)  -Hgb, Cr, elytes stable  -Blood cx NGTD  -UOP 200cc + unmeasured occurances  -Ileostomy: pink, moist, viable, 1.35L recorded, output dark green bilious  -Midline incision with staples intact, mild SS fluid on dressing, no active/expressible drainage, no erythema    PLAN:  -Stable for DC home today  -Will d/w CM to ensure VNA is set up for ostomy care at home  -Will remove loop ileostomy bar prior to DC  -DC w/ prn pain meds  -OP FU w/ Dr. Abernathy 1-2 wks  -D/w Dr. Abernathy    SUBJECTIVE:  Doing well. Tolerating diet no n/v. Ostomy fx. Voiding. Oob ambulating. Does endorse some abdominal soreness typical of post op pain, well controlled. Also notes L shoulder pain and R foot pain which have been present this admission and come and go. No calf pain, no f/c, no chest pain or SOB.       OBJECTIVE:   Vitals:  Blood pressure 112/69, pulse 84, temperature (!) 97.4 °F (36.3 °C), temperature source Oral, resp. rate 17, height 6' (1.829 m), weight 79.5 kg (175 lb 4.3 oz), SpO2 98%.,Body mass index is 23.77 kg/m².    I/Os:    Intake/Output Summary (Last 24 hours) at 7/31/2024 0715  Last data filed at 7/31/2024 0601  Gross per 24 hour   Intake 240 ml   Output 1350 ml   Net -1110 ml       Lines/Drains:  Invasive Devices       Peripheral Intravenous Line  Duration             Peripheral IV 07/28/24 Proximal;Right;Ventral (anterior) Forearm 3 days              Drain  Duration             Ileostomy Loop RUQ 4 days                    Physical Exam  Vitals reviewed.   Constitutional:       General: He is not in acute distress.     Appearance: He is not toxic-appearing.    HENT:      Head: Normocephalic and atraumatic.   Cardiovascular:      Rate and Rhythm: Normal rate and regular rhythm.      Heart sounds: No murmur heard.  Pulmonary:      Effort: Pulmonary effort is normal.      Comments: CTA BL anterior lung fields  Abdominal:      General: Bowel sounds are normal. There is no distension.      Palpations: Abdomen is soft.      Tenderness: There is no abdominal tenderness. There is no guarding or rebound.      Comments: RLQ ileostomy viable, moist, with green bilious liquid output.   Midline incision with staples intact, mild incisional tenderness to palpation, no erythema, edema, warmth. Mild SS fluid on dressing, no active or expressible drainage, no fluctuance noted.    Musculoskeletal:         General: No tenderness.      Cervical back: Neck supple.      Right lower leg: No edema.      Left lower leg: No edema.   Skin:     General: Skin is warm and dry.   Neurological:      Mental Status: He is alert.         Diagnostics:  I have personally reviewed pertinent lab results.     No results found.  Recent Results (from the past 36 hour(s))   CBC and differential    Collection Time: 07/30/24  5:32 AM   Result Value Ref Range    WBC 13.23 (H) 4.31 - 10.16 Thousand/uL    RBC 4.58 3.88 - 5.62 Million/uL    Hemoglobin 13.2 12.0 - 17.0 g/dL    Hematocrit 40.5 36.5 - 49.3 %    MCV 88 82 - 98 fL    MCH 28.8 26.8 - 34.3 pg    MCHC 32.6 31.4 - 37.4 g/dL    RDW 14.7 11.6 - 15.1 %    MPV 9.5 8.9 - 12.7 fL    Platelets 368 149 - 390 Thousands/uL    nRBC 0 /100 WBCs    Segmented % 78 (H) 43 - 75 %    Immature Grans % 1 0 - 2 %    Lymphocytes % 13 (L) 14 - 44 %    Monocytes % 7 4 - 12 %    Eosinophils Relative 1 0 - 6 %    Basophils Relative 0 0 - 1 %    Absolute Neutrophils 10.46 (H) 1.85 - 7.62 Thousands/µL    Absolute Immature Grans 0.06 0.00 - 0.20 Thousand/uL    Absolute Lymphocytes 1.70 0.60 - 4.47 Thousands/µL    Absolute Monocytes 0.90 0.17 - 1.22 Thousand/µL    Eosinophils Absolute  0.09 0.00 - 0.61 Thousand/µL    Basophils Absolute 0.02 0.00 - 0.10 Thousands/µL   Basic metabolic panel    Collection Time: 07/30/24  5:32 AM   Result Value Ref Range    Sodium 136 135 - 147 mmol/L    Potassium 4.0 3.5 - 5.3 mmol/L    Chloride 99 96 - 108 mmol/L    CO2 24 21 - 32 mmol/L    ANION GAP 13 4 - 13 mmol/L    BUN 12 5 - 25 mg/dL    Creatinine 0.81 0.60 - 1.30 mg/dL    Glucose 105 65 - 140 mg/dL    Calcium 9.8 8.4 - 10.2 mg/dL    eGFR 97 ml/min/1.73sq m   Phosphorus    Collection Time: 07/30/24  5:32 AM   Result Value Ref Range    Phosphorus 3.4 2.7 - 4.5 mg/dL   Magnesium    Collection Time: 07/30/24  5:32 AM   Result Value Ref Range    Magnesium 2.1 1.9 - 2.7 mg/dL   CBC    Collection Time: 07/31/24  5:13 AM   Result Value Ref Range    WBC 10.71 (H) 4.31 - 10.16 Thousand/uL    RBC 4.64 3.88 - 5.62 Million/uL    Hemoglobin 13.1 12.0 - 17.0 g/dL    Hematocrit 41.6 36.5 - 49.3 %    MCV 90 82 - 98 fL    MCH 28.2 26.8 - 34.3 pg    MCHC 31.5 31.4 - 37.4 g/dL    RDW 14.7 11.6 - 15.1 %    Platelets 432 (H) 149 - 390 Thousands/uL    MPV 9.6 8.9 - 12.7 fL       Current Medications:  Scheduled Meds:  Current Facility-Administered Medications   Medication Dose Route Frequency Provider Last Rate    acetaminophen  650 mg Oral Q6H PRN Adelso Abernathy MD      albuterol  1 puff Inhalation Q4H PRN Brissa Rowell PA-C      amLODIPine  10 mg Oral Daily Brissa Rowell PA-C      DULoxetine  20 mg Oral Daily Brissa Rowell PA-C      enoxaparin  40 mg Subcutaneous Daily Brissa Rowell PA-C      hydrALAZINE  10 mg Intravenous Q6H PRN Veda Mauro PA-C      naloxone  0.04 mg Intravenous Q3 min PRN Brissa Rowell PA-C      nicotine  14 mg Transdermal Daily MOON Rosenthal      ondansetron  4 mg Intravenous Q6H PRN Brissa Rowell PA-C      oxyCODONE  10 mg Oral Q4H PRN Adelso Abernathy MD      oxyCODONE  5 mg Oral Q4H PRN Adelso Abernathy MD       pantoprazole  40 mg Oral Early Morning Tyron Kaye MD      phenol  1 spray Mouth/Throat Q2H PRN MOON Rosenthal      simethicone  80 mg Oral Q6H PRN Ebenezer Krishnan DO       Continuous Infusions:   PRN Meds:    acetaminophen    albuterol    hydrALAZINE    naloxone    ondansetron    oxyCODONE    oxyCODONE    phenol    simethicone      Katerina Deleon PA-C  7/31/2024

## 2024-07-31 NOTE — DISCHARGE INSTR - AVS FIRST PAGE
Oxycodone sent to your pharmacy. Take as needed for severe pain if over the counter tylenol/advil/motrin do not provide relief.  The ileostomy bar will be removed at your post op office visit next week with Dr. Abernathy.  You can leave your midline staples open to air, cover with sterile guaze dress if there is any drainage.   You can shower, let water and soap run down and pat dry. Keep bag dry.       DISCHARGE INSTRUCTIONS:  FOLLOW UP APPOINTMENT: Following discharge from the hospital call the office in 1-2 days to set up a post operative appointment to be seen in 2 weeks by Dr. Abernathy.    AFTER YOU LEAVE: Following discharge from the hospital, you may have some questions about your procedure, your activities or your general condition.  These instructions may answer some of your questions and help you adjust during the first few days following your operation.  You can expect to be sore and tender mostly around the incisions. This pain can last approximally 5 days and should gradually improve daily.          INCISION SITES:  - You may apply ice to the incisions to help with pain. Avoid heat as this may make skin glue tacky.  - It is normal to have some bruising, swelling or mild discoloration around the incision.  If increasing redness, pain, or thick green/yellow discharge develops, call our office immediately.   -Do not apply any creams, lotions, or ointments (including neosporin).  If you have dressings:  - You may remove the gauze dressing from your incisions 48 hours after surgery. Underneath this dressing is a tape like dressing called Steri Strips. Leave the steri strips in place for 5-7 days. They may fall off on their own, this is ok.  If you have surgical adhesive glue:  -The incisions are closed with dissolvable sutures underneath the skin and a surgical adhesive glue overlying the skin.  - Do not pick at the adhesive. It will naturally wear off with time.  -Do not place a heat to the incisions as this can  make the glue tacky.    WOUND CARE:  -If you have steri-strip, leave them on, allow to fall off naturally.    -Avoid tight adhering, irritative clothing.  -You may gently shower, let water and soap run down and pat dry; no scrubbing the incision sites.   -No submersion in water (baths, hot tubs, or swimming) until cleared at follow up appointment.    PAIN CONTROL/MEDICATIONS:  -Recommend taking over the counter pain medication such as acetaminophen (Tylenol), Aleve, OR ibuprofen (Motrin/Advil) first; read and follow labels. You may alternate Tylenol and Ibuprofen every 3 hours.  -Only use prescribed pain medications as needed and try to taper down use overtime. Do not drive or operate heavy machinery while on prescription pain medications. Do not take with alcohol.  - If you were given a prescription for Percocet, Norco, or Vicodin for pain be sure to eat prior to taking as these medications as they may cause nausea and vomiting on an empty stomach.    -DO NOT take Tylenol  (acetaminophen) with prescribed pain medication for a fever or for further pain control as these medications already contain Tylenol in them. Take one or the other.  Do not exceed more than 4000 mg of acetaminophen in 24 hours or 3000 mg if you have liver disease.   -You may apply ice at the incision site as needed for 20 minutes on/off to decrease pain/swelling the first few days.   - If you were given an antibiotic take it until it is finished.    DIET/LIFE HABITS:  -Advance diet as tolerated. Start with bland foods. Once tolerating normal diet, include fiber-rich foods such as fruits and green leafy vegetables to help with bowel movements.  -Stay hydrated. Drink plenty of non-caffienated, non-alcoholic beverages such as water, juices, popsicles, etc.  -Refrain from alcohol consumption the first few weeks as this can impair wound healing and increase the risk of unwanted bleeding.   -No smoking at least 2 weeks post surgery, this can also delay  wound healing. Smoking cessation is encouraged to improve your overall health. We can provide you with options to facilitate cessation.  -If you are having constipation, ensure you are eating a high fiber diet, stay active, and if requiring more, you may take over the counter stool softners as needed.    ACTIVITY/RESTRICTIONS:  -No strenuous exercise and no heavy lifting, pulling, or pushing >10-15 lbs x 4 weeks or until cleared by surgeon.  -The evening following the procedure you should rest as much as possible, sitting, lying or reclining.  You should be sure someone remains with you until the next morning.  Gradually increase your activity daily. Walking 3-4 times daily is good and stairs are ok.  Listen to your body.  If you start to get tired or sore then rest.   -No driving for 5 days or while taking narcotics for pain. You may begin to drive again once you can get in and out of a car comfortably and once off prescribed pain medication x 24 hours.       RETURN TO WORK:  -You may return to work or other activities as soon as your pain is controlled and you feel comfortable. For many people, this is 5 to 7 days after surgery. If your job requires heavy lifting you will need to be on light duty for 2-3 weeks.     CALL THE OFFICE IF/RETURN TO ED:  -Fevers/chills with uncontrolled temperature > 100.4 F.  -Increasing severe pain uncontrolled with pain medicine.  -Incision site is having thick, yellow drainage, increasing redness, is warm to the touch, or becoming increasingly tender.  -Any changes in overall ada such as: nausea/vomitting, fevers/chills, diarrhea/constipation, inability to urinate, chest pains, palpations, trouble breathing, coughing, etc.

## 2024-07-31 NOTE — PLAN OF CARE
Problem: PAIN - ADULT  Goal: Verbalizes/displays adequate comfort level or baseline comfort level  Description: Interventions:  - Encourage patient to monitor pain and request assistance  - Assess pain using appropriate pain scale  - Administer analgesics based on type and severity of pain and evaluate response  - Implement non-pharmacological measures as appropriate and evaluate response  - Consider cultural and social influences on pain and pain management  - Notify physician/advanced practitioner if interventions unsuccessful or patient reports new pain  Outcome: Progressing         Problem: SAFETY ADULT  Goal: Maintain or return to baseline ADL function  Description: INTERVENTIONS:  -  Assess patient's ability to carry out ADLs; assess patient's baseline for ADL function and identify physical deficits which impact ability to perform ADLs (bathing, care of mouth/teeth, toileting, grooming, dressing, etc.)  - Assess/evaluate cause of self-care deficits   - Assess range of motion  - Assess patient's mobility; develop plan if impaired  - Assess patient's need for assistive devices and provide as appropriate  - Encourage maximum independence but intervene and supervise when necessary  - Involve family in performance of ADLs  - Assess for home care needs following discharge   - Consider OT consult to assist with ADL evaluation and planning for discharge  - Provide patient education as appropriate  Outcome: Progressing        Problem: GASTROINTESTINAL - ADULT  Goal: Minimal or absence of nausea and/or vomiting  Description: INTERVENTIONS:  - Administer IV fluids if ordered to ensure adequate hydration  - Maintain NPO status until nausea and vomiting are resolved  - Nasogastric tube if ordered  - Administer ordered antiemetic medications as needed  - Provide nonpharmacologic comfort measures as appropriate  - Advance diet as tolerated, if ordered  - Consider nutrition services referral to assist patient with adequate  nutrition and appropriate food choices  Outcome: Progressing  Goal: Maintains or returns to baseline bowel function  Description: INTERVENTIONS:  - Assess bowel function  - Encourage oral fluids to ensure adequate hydration  - Administer IV fluids if ordered to ensure adequate hydration  - Administer ordered medications as needed  - Encourage mobilization and activity  - Consider nutritional services referral to assist patient with adequate nutrition and appropriate food choices  Outcome: Progressing  Goal: Maintains adequate nutritional intake  Description: INTERVENTIONS:  - Monitor percentage of each meal consumed  - Identify factors contributing to decreased intake, treat as appropriate  - Assist with meals as needed  - Monitor I&O, weight, and lab values if indicated  - Obtain nutrition services referral as needed  Outcome: Progressing  Goal: Establish and maintain optimal ostomy function  Description: INTERVENTIONS:  - Assess bowel function  - Encourage oral fluids to ensure adequate hydration  - Administer IV fluids if ordered to ensure adequate hydration   - Administer ordered medications as needed  - Encourage mobilization and activity  - Nutrition services referral to assist patient with appropriate food choices  - Assess stoma site  - Consider wound care consult   Outcome: Progressing  Goal: Oral mucous membranes remain intact  Description: INTERVENTIONS  - Assess oral mucosa and hygiene practices  - Implement preventative oral hygiene regimen  - Implement oral medicated treatments as ordered  - Initiate Nutrition services referral as needed  Outcome: Progressing        Problem: Prexisting or High Potential for Compromised Skin Integrity  Goal: Skin integrity is maintained or improved  Description: INTERVENTIONS:  - Identify patients at risk for skin breakdown  - Assess and monitor skin integrity  - Assess and monitor nutrition and hydration status  - Monitor labs   - Assess for incontinence   - Turn and  reposition patient  - Assist with mobility/ambulation  - Relieve pressure over bony prominences  - Avoid friction and shearing  - Provide appropriate hygiene as needed including keeping skin clean and dry  - Evaluate need for skin moisturizer/barrier cream  - Collaborate with interdisciplinary team   - Patient/family teaching  - Consider wound care consult   Outcome: Progressing         Problem: Nutrition/Hydration-ADULT  Goal: Nutrient/Hydration intake appropriate for improving, restoring or maintaining nutritional needs  Description: Monitor and assess patient's nutrition/hydration status for malnutrition. Collaborate with interdisciplinary team and initiate plan and interventions as ordered.  Monitor patient's weight and dietary intake as ordered or per policy. Utilize nutrition screening tool and intervene as necessary. Determine patient's food preferences and provide high-protein, high-caloric foods as appropriate.     INTERVENTIONS:  - Monitor oral intake, urinary output, labs, and treatment plans  - Assess nutrition and hydration status and recommend course of action  - Evaluate amount of meals eaten  - Assist patient with eating if necessary   - Allow adequate time for meals  - Recommend/ encourage appropriate diets, oral nutritional supplements, and vitamin/mineral supplements  - Order, calculate, and assess calorie counts as needed  - Recommend, monitor, and adjust tube feedings and TPN/PPN based on assessed needs  - Assess need for intravenous fluids  - Provide specific nutrition/hydration education as appropriate  - Include patient/family/caregiver in decisions related to nutrition  Outcome: Progressing

## 2024-07-31 NOTE — DISCHARGE SUMMARY
Discharge Summary   Homero Driver Jr. 59 y.o. male MRN: [id number]  Unit/Bed#: -01 Encounter: [id number]  7/31/2024    Admission Date: 7/26/2024   Discharge Date: 7/31/2024  Length of Stay: 5 days  Attending: Adelso Abernathy  Primary Service: General Surgery    Admitting Diagnosis:   Diverticulitis of large intestine with abscess without bleeding [K57.20]    Discharge Diagnoses:   Principal Problem:    Diverticulitis of large intestine with abscess  Active Problems:    Anxiety    Smoking    HTN (hypertension)    Sleep apnea  S/p elective open sigmoid colectomy with primary anastomosis, insertion of preop ureteral stents, flex sig, removal of buttock drain, creation of loop ileostomy, and open cholecystectomy     PMH/Secondary Diagnoses:  Past Medical History:   Diagnosis Date    Anesthesia complication     body temp burns off anesthesia more quickly--woke up during surgery    Anxiety     due to medical conditions    Arthritis     Hypertension     Sleep apnea     Tobacco abuse      Past Surgical History:   Procedure Laterality Date    BACK SURGERY      CERVICAL FUSION N/A 04/05/2016    Procedure: FUSION CERVICAL POSTERIOR - revision of instrumentation T1;  Surgeon: Srikanth Siu MD;  [facility];  Service:     COLONOSCOPY      FOOT MASS EXCISION Right     HERNIA REPAIR Right     x2    INCISION AND DRAINAGE POSTERIOR SPINE N/A 04/27/2016    Procedure: INCISION AND DRAINAGE (I&D) SPINE;  Surgeon: Srikanth Siu MD;  Location[facility];  Service:     IR DRAINAGE TUBE CHECK/CHANGE/REPOSITION/REINSERTION/UPSIZE  03/01/2024    IR DRAINAGE TUBE CHECK/CHANGE/REPOSITION/REINSERTION/UPSIZE  03/21/2024    IR DRAINAGE TUBE CHECK/CHANGE/REPOSITION/REINSERTION/UPSIZE  04/05/2024    IR DRAINAGE TUBE CHECK/CHANGE/REPOSITION/REINSERTION/UPSIZE  05/23/2024    IR DRAINAGE TUBE PLACEMENT  02/18/2024    MA ARTHRODESIS PST/PSTLAT TQ 1NTRSPC EA ADDL NTRSPC N/A 04/04/2016    Procedure: C3-C6 POSTERIOR CERVICAL  LAMINECTOMY AND DECOMPRESSION; C3-T2 INSTRUMENTED POSTERIOR CERVICAL SPINAL FUSION WITH POSSIBLE ADDITIONAL LEVELS WITH ALLOGRAFT AND AUTOGRAFT (IMPULSE MONITORING);  Surgeon: Srikanth Siu MD;  Location: [facility];  Service: Orthopedics    VA CYSTO BLADDER W/URETERAL CATHETERIZATION Bilateral 7/26/2024    Procedure: INSERTION URETERAL CATHETERS PREOP;  Surgeon: Vitaliy Leahy MD;  Location: [facility];  Service: Urology    VA LAPAROSCOPY COLECTOMY PARTIAL W/ANASTOMOSIS N/A 7/26/2024    Procedure: SIGMOID COLECTOMY, CHOLECYSTECTOMY, CREATION OF LOOP ILEOSTOMY, REMOVAL OF URETERAL STENTS. REMOVAL OF LEFT BUTTOCK ABSCESS DRAIN.;  Surgeon: Adelso Abernathy MD;  Location: [facility];  Service: General    VA SIGMOIDOSCOPY FLX DX W/COLLJ SPEC BR/WA IF PFRMD N/A 7/26/2024    Procedure: FLEXIBLE SIGMOIDOSCOPY;  Surgeon: Adelso Abernathy MD;  [facility];  Service: General    SHOULDER OPEN ROTATOR CUFF REPAIR Bilateral        Inpatient Medications:  Scheduled Meds:  Current Facility-Administered Medications   Medication Dose Route Frequency Provider Last Rate    acetaminophen  650 mg Oral Q6H PRN Adelso Abernathy MD      albuterol  1 puff Inhalation Q4H PRN Brissa Rowell PA-C      amLODIPine  10 mg Oral Daily Brissa Rowell PA-C      DULoxetine  20 mg Oral Daily Brissa Rowell PA-C      enoxaparin  40 mg Subcutaneous Daily Brissa Rowell PA-C      hydrALAZINE  10 mg Intravenous Q6H PRN Veda Mauro PA-C      naloxone  0.04 mg Intravenous Q3 min PRN Brissa Rowell PA-C      nicotine  14 mg Transdermal Daily MOON Rosenthal      ondansetron  4 mg Intravenous Q6H PRN Brissa Rowell PA-C      oxyCODONE  10 mg Oral Q4H PRN Adelso Abernathy MD      oxyCODONE  5 mg Oral Q4H PRN Adelso Abernathy MD      pantoprazole  40 mg Oral Early Morning Tyron Kaye MD      phenol  1 spray Mouth/Throat Q2H PRN MOON Rosenthal   "    simethicone  80 mg Oral Q6H PRN Ebenezer Krishnan DO       Continuous Infusions:   PRN Meds:  acetaminophen    albuterol    hydrALAZINE    naloxone    ondansetron    oxyCODONE    oxyCODONE    phenol    simethicone    Allergies:  No Known Allergies      Imaging Studies:  No results found.      Procedures/Surgeries Performed:  7/26/24  SIGMOID COLECTOMY, CHOLECYSTECTOMY, CREATION OF LOOP ILEOSTOMY, REMOVAL OF URETERAL STENTS. REMOVAL OF LEFT BUTTOCK ABSCESS DRAIN. (Abdomen)  FLEXIBLE SIGMOIDOSCOPY (Abdomen)  INSERTION URETERAL CATHETERS PREOP (Bilateral: Ureter)  Procedure(s):  SIGMOID COLECTOMY, CHOLECYSTECTOMY, CREATION OF LOOP ILEOSTOMY, REMOVAL OF URETERAL STENTS. REMOVAL OF LEFT BUTTOCK ABSCESS DRAIN.  FLEXIBLE SIGMOIDOSCOPY  INSERTION URETERAL CATHETERS PREOP    Operative Findings:  \"Patient had cholelithiasis.  He had palpable large stones.     To the undersurface of the mesh in the central abdomen where a bowel and omental adhesions.     There was extensive amount of inflammation and scarring in the pelvis and in the pelvic inlet.  It appears that the perforation was very close to the rectosigmoid junction.  Because of the prolonged duration of the inflammation scarring extended below the peritoneal reflection.     Bilateral ureteric stents were placed preoperatively.  It was observed by the urologist that the left stent was difficult to pass which also was probably secondary to the inflammation.\"      Consultants:   Internal Medicine    History of Present Illness: As per Dr. Curiel 7/25/24: \"Patient was admitted in February 2024 at [facility] with perforated sigmoid diverticulitis.  He was found to have a pericolonic abscess for which a transgluteal drain was placed.  This has grown a mixture of anaerobes with Pseudomonas and E. coli.  The catheter is still present as he has a persistent colocutaneous fistula.  Patient was hence scheduled for a sigmoid colectomy at [facility] last month, this " "however could not be performed because of insurance reasons.     This was patient's first episode of diverticulitis.  Patient had undergone a colonoscopy last year which revealed mild sigmoid diverticulosis.\"      Summary of Hospital Course: The pt underwent elective open sigmoid colectomy with primary anastomosis, insertion of preop ureteral stents, flex sig, removal of buttock drain, creation of loop ileostomy, and open cholecystectomy for treatment of perforated sigmoid diverticulitis s/p IR drainage earlier this year. The operation was successful; there was noted to be a lot of inflammation intra-op. Int med team was consulted for assistance of medical management of comorbid conditions. NGT/Richard was kept in 24 hrs post op. His diet was slowly advanced. His ostomy was functioning well. Wound care nursing saw him for ostomy education. Pain was overall well controlled, however he continues to have chronic neck/shoulder/foot pains that will need to be further worked up on a non-urgent outpatient basis.     Complications: None      Plan Upon Discharge:  -FU Dr. Miles next week, ileostomy bar to be removed in the office  -PRN pain medication sent to pharmacy       Condition at Discharge: stable       Discharge instructions/Information to patient and family:   See after visit summary for information provided to patient and family. All instructions were discussed and the patient was understanding. All questions answered.     Provisions for Follow-Up Care:  See after visit summary for information related to follow-up care and any pertinent home health orders.      PCP: Rachelle Tavares MD    Disposition: Home    Planned Readmission: No    Discharge Statement   I spent 20 minutes discharging the patient. This time was spent on the day of discharge. I had direct contact with the patient on the day of discharge. Additional documentation is required if more than 30 minutes were spent on discharge.     Discharge " Medications:  See after visit summary for reconciled discharge medications provided to patient and family.      Katerina Deleon PA-C   7/31/2024

## 2024-08-01 ENCOUNTER — HOME CARE VISIT (OUTPATIENT)
Dept: HOME HEALTH SERVICES | Facility: HOME HEALTHCARE | Age: 59
End: 2024-08-01
Payer: MEDICARE

## 2024-08-01 VITALS
OXYGEN SATURATION: 97 % | SYSTOLIC BLOOD PRESSURE: 124 MMHG | TEMPERATURE: 95.4 F | HEART RATE: 85 BPM | DIASTOLIC BLOOD PRESSURE: 78 MMHG | RESPIRATION RATE: 18 BRPM

## 2024-08-01 PROCEDURE — 400013 VN SOC

## 2024-08-01 PROCEDURE — G0299 HHS/HOSPICE OF RN EA 15 MIN: HCPCS

## 2024-08-01 NOTE — UTILIZATION REVIEW
NOTIFICATION OF ADMISSION DISCHARGE   This is a Notification of Discharge from [facility]. Please be advised that this patient has been discharge from our facility. Below you will find the admission and discharge date and time including the patient’s disposition.   UTILIZATION REVIEW CONTACT:  Zenobia Boyle  Utilization   Network Utilization Review Department  Phone: [phone] x carefully listen to the prompts. All voicemails are confidential.  Email: [email or web address]     ADMISSION INFORMATION  PRESENTATION DATE: 7/26/2024  7:17 AM  OBERVATION ADMISSION DATE: N/A  INPATIENT ADMISSION DATE: 7/26/24  8:20 AM   DISCHARGE DATE: 7/31/2024  3:34 PM   DISPOSITION:Home with Home Health Care    Network Utilization Review Department  ATTENTION: Please call with any questions or concerns to [phone] and carefully listen to the prompts so that you are directed to the right person. All voicemails are confidential.   For Discharge needs, contact Care Management DC Support Team at [phone] opt. 2  Send all requests for admission clinical reviews, approved or denied determinations and any other requests to dedicated fax number below belonging to the campus where the patient is receiving treatment. List of dedicated fax numbers for the Facilities:  FACILITY NAME UR FAX NUMBER   ADMISSION DENIALS (Administrative/Medical Necessity) [phone]   DISCHARGE SUPPORT TEAM ([facility]) [phone]   PARENT CHILD HEALTH (Maternity/NICU/Pediatrics) [phone]   [facility] [phone]   [facility] [phone]   [facility] [phone]   [facility] [phone]   [facility] [phone]   [facility] [phone]   [facility] [phone]   [facility]  [facility] [phone]   [facility] [phone]   [facility] [phone]   [facility] [phone]   [facility] [phone]

## 2024-08-02 LAB
BACTERIA BLD CULT: NORMAL
BACTERIA BLD CULT: NORMAL

## 2024-08-03 ENCOUNTER — HOME CARE VISIT (OUTPATIENT)
Dept: HOME HEALTH SERVICES | Facility: HOME HEALTHCARE | Age: 59
End: 2024-08-03
Payer: MEDICARE

## 2024-08-03 VITALS
HEART RATE: 78 BPM | OXYGEN SATURATION: 99 % | SYSTOLIC BLOOD PRESSURE: 128 MMHG | TEMPERATURE: 97.4 F | DIASTOLIC BLOOD PRESSURE: 78 MMHG | RESPIRATION RATE: 18 BRPM

## 2024-08-03 PROCEDURE — G0299 HHS/HOSPICE OF RN EA 15 MIN: HCPCS

## 2024-08-05 ENCOUNTER — OFFICE VISIT (OUTPATIENT)
Dept: SURGERY | Facility: CLINIC | Age: 59
End: 2024-08-05

## 2024-08-05 VITALS
TEMPERATURE: 98.4 F | OXYGEN SATURATION: 98 % | WEIGHT: 164 LBS | HEIGHT: 72 IN | SYSTOLIC BLOOD PRESSURE: 132 MMHG | HEART RATE: 91 BPM | DIASTOLIC BLOOD PRESSURE: 86 MMHG | BODY MASS INDEX: 22.21 KG/M2

## 2024-08-05 DIAGNOSIS — Z98.890 HISTORY OF OPEN SIGMOIDECTOMY: ICD-10-CM

## 2024-08-05 DIAGNOSIS — Z93.2 ILEOSTOMY STATUS (HCC): Primary | ICD-10-CM

## 2024-08-05 DIAGNOSIS — Z90.49 HISTORY OF OPEN SIGMOIDECTOMY: ICD-10-CM

## 2024-08-05 PROBLEM — K80.20 CHOLELITHIASIS: Status: RESOLVED | Noted: 2024-07-25 | Resolved: 2024-08-05

## 2024-08-05 PROCEDURE — 99024 POSTOP FOLLOW-UP VISIT: CPT | Performed by: SURGERY

## 2024-08-05 RX ORDER — OXYCODONE HYDROCHLORIDE 10 MG/1
10 TABLET ORAL EVERY 4 HOURS PRN
Qty: 25 TABLET | Refills: 0 | Status: SHIPPED | OUTPATIENT
Start: 2024-08-05 | End: 2024-08-14

## 2024-08-05 NOTE — PATIENT INSTRUCTIONS
Daily showers and wash wound with soap and water.  Apply dry gauze dressing.  Change daily and as needed.    Make office appointment once ileostomy supplies are available.

## 2024-08-05 NOTE — PROGRESS NOTES
Assessment/Plan:     Diagnoses and all orders for this visit:    Ileostomy status (HCC)    History of open sigmoidectomy  -     oxyCODONE (ROXICODONE) 10 MG TABS; Take 1 tablet (10 mg total) by mouth every 4 (four) hours as needed for severe pain for up to 7 days Max Daily Amount: 60 mg     All staples removed.    Wound care instructions given.    Prescription given for ileostomy supplies.    Will see in the office in 1 to 2 days to remove the ileostomy bridge.  This could not be done today as we do not have any ileostomy supplies.    Subjective:      Patient ID: Homero Driver Jr. is a 59 y.o. male.  Patient has been evicted from his house and is living with a neighbor.    HPI  Patient is 10 days status post open sigmoidectomy and low colorectal anastomosis for persistent diverticulitis.  Pathology report showed active diverticular abscess with extensive fibrosis.  In view of these findings a de-functioning loop ileostomy was created.  This was done to protect the anastomosis although the leak test was negative.    Patient has been well since discharge.  His incisional pain is controlled.  He denies any fever, nausea or vomiting.  He complains of drainage from the midline incision.    [facility] has been arranged for ileostomy care.    The following portions of the patient's history were reviewed and updated as appropriate: allergies, current medications, past family history, past medical history, past social history, past surgical history, and problem list.    Review of Systems    Essential hypertension  History of smoking  Cervical spine disease    Objective:    /86 (BP Location: Left arm, Patient Position: Sitting, Cuff Size: Standard)   Pulse 91   Temp 98.4 °F (36.9 °C) (Tympanic)   Ht 6' (1.829 m)   Wt 74.4 kg (164 lb)   SpO2 98%   BMI 22.24 kg/m²      Physical Exam    Midline incision is mostly well-healed.  Just below the umbilicus there is an open wound with drainage of pus.  All staples were taken out.   The wound was explored with a Q-tip.  Dry gauze dressing was applied.    Ileostomy bag is on the right side.

## 2024-08-07 ENCOUNTER — HOME CARE VISIT (OUTPATIENT)
Dept: HOME HEALTH SERVICES | Facility: HOME HEALTHCARE | Age: 59
End: 2024-08-07
Payer: MEDICARE

## 2024-08-07 ENCOUNTER — HOME CARE VISIT (OUTPATIENT)
Dept: HOME HEALTH SERVICES | Facility: HOME HEALTHCARE | Age: 59
End: 2024-08-07

## 2024-08-07 PROCEDURE — NC001 PR NO CHARGE: Performed by: SURGERY

## 2024-08-08 ENCOUNTER — HOME CARE VISIT (OUTPATIENT)
Dept: HOME HEALTH SERVICES | Facility: HOME HEALTHCARE | Age: 59
End: 2024-08-08
Payer: MEDICARE

## 2024-08-08 ENCOUNTER — OFFICE VISIT (OUTPATIENT)
Dept: SURGERY | Facility: CLINIC | Age: 59
End: 2024-08-08

## 2024-08-08 VITALS
OXYGEN SATURATION: 94 % | HEART RATE: 82 BPM | HEIGHT: 72 IN | DIASTOLIC BLOOD PRESSURE: 62 MMHG | TEMPERATURE: 97.7 F | SYSTOLIC BLOOD PRESSURE: 100 MMHG | BODY MASS INDEX: 22.21 KG/M2 | WEIGHT: 164 LBS

## 2024-08-08 DIAGNOSIS — Z93.2 ILEOSTOMY STATUS (HCC): Primary | ICD-10-CM

## 2024-08-08 PROCEDURE — 99024 POSTOP FOLLOW-UP VISIT: CPT | Performed by: SURGERY

## 2024-08-08 NOTE — PROGRESS NOTES
Assessment/Plan:     Diagnoses and all orders for this visit:    Ileostomy status (HCC)     Patient is 2 weeks postop.  Ileostomy bridge was removed today.  Ileostomy appliance placed.    Subjective:      Patient ID: Homero Driver Jr. is a 59 y.o. male.    HPI  Patient is 2 weeks status post open sigmoid colectomy for acute on chronic sigmoid diverticulitis with peridiverticular abscess.  Patient had a low colorectal anastomosis performed.  A de-functioning loop ileostomy was done to protect the anastomosis.    Patient is recovering well from the surgery.  He is managing his ileostomy well.  He is changing his dressings on the midline wound.  His pain is controlled.    The following portions of the patient's history were reviewed and updated as appropriate: allergies, current medications, past family history, past medical history, past social history, past surgical history, and problem list.    Review of Systems    No fever    Objective:    /62 (BP Location: Left arm, Patient Position: Sitting, Cuff Size: Standard)   Pulse 82   Temp 97.7 °F (36.5 °C) (Tympanic)   Ht 6' (1.829 m)   Wt 74.4 kg (164 lb)   SpO2 94%   BMI 22.24 kg/m²      Physical Exam    Midline wound has 2 open areas which are draining small amount of pus.  Dry gauze dressing was applied.    Ileostomy appliance was taken off.  The stoma looks healthy.  The ileostomy bridge was removed.  A new ileostomy appliance was placed.

## 2024-08-08 NOTE — CASE COMMUNICATION
Left message yesterday afternoon with sn visit time for todays visit. Arrived at home. TC to patient to access apartment, patient stated he was not at the apartment but in [place] at girlfriends home. He has an appointment this afternoon with Dr Abernathy.

## 2024-08-11 ENCOUNTER — APPOINTMENT (EMERGENCY)
Dept: RADIOLOGY | Facility: HOSPITAL | Age: 59
DRG: 247 | End: 2024-08-11
Payer: MEDICARE

## 2024-08-11 ENCOUNTER — HOME CARE VISIT (OUTPATIENT)
Dept: HOME HEALTH SERVICES | Facility: HOME HEALTHCARE | Age: 59
End: 2024-08-11
Payer: MEDICARE

## 2024-08-11 ENCOUNTER — APPOINTMENT (INPATIENT)
Dept: RADIOLOGY | Facility: HOSPITAL | Age: 59
DRG: 247 | End: 2024-08-11
Payer: MEDICARE

## 2024-08-11 ENCOUNTER — HOSPITAL ENCOUNTER (INPATIENT)
Facility: HOSPITAL | Age: 59
LOS: 3 days | Discharge: HOME HEALTH | DRG: 247 | End: 2024-08-14
Attending: EMERGENCY MEDICINE | Admitting: SURGERY
Payer: MEDICARE

## 2024-08-11 DIAGNOSIS — K56.609 SBO (SMALL BOWEL OBSTRUCTION) (HCC): Primary | ICD-10-CM

## 2024-08-11 DIAGNOSIS — Z90.49 HISTORY OF OPEN SIGMOIDECTOMY: ICD-10-CM

## 2024-08-11 DIAGNOSIS — Z98.890 HISTORY OF OPEN SIGMOIDECTOMY: ICD-10-CM

## 2024-08-11 LAB
ALBUMIN SERPL BCG-MCNC: 3.9 G/DL (ref 3.5–5)
ALP SERPL-CCNC: 54 U/L (ref 34–104)
ALT SERPL W P-5'-P-CCNC: 12 U/L (ref 7–52)
ANION GAP SERPL CALCULATED.3IONS-SCNC: 7 MMOL/L (ref 4–13)
AST SERPL W P-5'-P-CCNC: 12 U/L (ref 13–39)
BASOPHILS # BLD AUTO: 0.05 THOUSANDS/ÂΜL (ref 0–0.1)
BASOPHILS NFR BLD AUTO: 0 % (ref 0–1)
BILIRUB SERPL-MCNC: 0.23 MG/DL (ref 0.2–1)
BUN SERPL-MCNC: 8 MG/DL (ref 5–25)
CALCIUM SERPL-MCNC: 9.4 MG/DL (ref 8.4–10.2)
CHLORIDE SERPL-SCNC: 103 MMOL/L (ref 96–108)
CO2 SERPL-SCNC: 28 MMOL/L (ref 21–32)
CREAT SERPL-MCNC: 0.77 MG/DL (ref 0.6–1.3)
EOSINOPHIL # BLD AUTO: 0.18 THOUSAND/ÂΜL (ref 0–0.61)
EOSINOPHIL NFR BLD AUTO: 1 % (ref 0–6)
ERYTHROCYTE [DISTWIDTH] IN BLOOD BY AUTOMATED COUNT: 15.4 % (ref 11.6–15.1)
GFR SERPL CREATININE-BSD FRML MDRD: 99 ML/MIN/1.73SQ M
GLUCOSE SERPL-MCNC: 100 MG/DL (ref 65–140)
HCT VFR BLD AUTO: 36.6 % (ref 36.5–49.3)
HGB BLD-MCNC: 11.6 G/DL (ref 12–17)
IMM GRANULOCYTES # BLD AUTO: 0.05 THOUSAND/UL (ref 0–0.2)
IMM GRANULOCYTES NFR BLD AUTO: 0 % (ref 0–2)
LACTATE SERPL-SCNC: 1.3 MMOL/L (ref 0.5–2)
LIPASE SERPL-CCNC: 67 U/L (ref 11–82)
LYMPHOCYTES # BLD AUTO: 2.36 THOUSANDS/ÂΜL (ref 0.6–4.47)
LYMPHOCYTES NFR BLD AUTO: 19 % (ref 14–44)
MCH RBC QN AUTO: 28.6 PG (ref 26.8–34.3)
MCHC RBC AUTO-ENTMCNC: 31.7 G/DL (ref 31.4–37.4)
MCV RBC AUTO: 90 FL (ref 82–98)
MONOCYTES # BLD AUTO: 0.69 THOUSAND/ÂΜL (ref 0.17–1.22)
MONOCYTES NFR BLD AUTO: 6 % (ref 4–12)
NEUTROPHILS # BLD AUTO: 9.2 THOUSANDS/ÂΜL (ref 1.85–7.62)
NEUTS SEG NFR BLD AUTO: 74 % (ref 43–75)
NRBC BLD AUTO-RTO: 0 /100 WBCS
PLATELET # BLD AUTO: 673 THOUSANDS/UL (ref 149–390)
PMV BLD AUTO: 8.3 FL (ref 8.9–12.7)
POTASSIUM SERPL-SCNC: 3.9 MMOL/L (ref 3.5–5.3)
PROT SERPL-MCNC: 7.3 G/DL (ref 6.4–8.4)
RBC # BLD AUTO: 4.05 MILLION/UL (ref 3.88–5.62)
SODIUM SERPL-SCNC: 138 MMOL/L (ref 135–147)
WBC # BLD AUTO: 12.53 THOUSAND/UL (ref 4.31–10.16)

## 2024-08-11 PROCEDURE — 96376 TX/PRO/DX INJ SAME DRUG ADON: CPT

## 2024-08-11 PROCEDURE — 83690 ASSAY OF LIPASE: CPT | Performed by: EMERGENCY MEDICINE

## 2024-08-11 PROCEDURE — 36415 COLL VENOUS BLD VENIPUNCTURE: CPT | Performed by: EMERGENCY MEDICINE

## 2024-08-11 PROCEDURE — 74018 RADEX ABDOMEN 1 VIEW: CPT

## 2024-08-11 PROCEDURE — 80053 COMPREHEN METABOLIC PANEL: CPT | Performed by: EMERGENCY MEDICINE

## 2024-08-11 PROCEDURE — 74177 CT ABD & PELVIS W/CONTRAST: CPT

## 2024-08-11 PROCEDURE — 99285 EMERGENCY DEPT VISIT HI MDM: CPT | Performed by: EMERGENCY MEDICINE

## 2024-08-11 PROCEDURE — 96361 HYDRATE IV INFUSION ADD-ON: CPT

## 2024-08-11 PROCEDURE — 85025 COMPLETE CBC W/AUTO DIFF WBC: CPT | Performed by: EMERGENCY MEDICINE

## 2024-08-11 PROCEDURE — 99284 EMERGENCY DEPT VISIT MOD MDM: CPT

## 2024-08-11 PROCEDURE — 71045 X-RAY EXAM CHEST 1 VIEW: CPT

## 2024-08-11 PROCEDURE — 83605 ASSAY OF LACTIC ACID: CPT | Performed by: EMERGENCY MEDICINE

## 2024-08-11 PROCEDURE — 99024 POSTOP FOLLOW-UP VISIT: CPT | Performed by: SURGERY

## 2024-08-11 PROCEDURE — 96375 TX/PRO/DX INJ NEW DRUG ADDON: CPT

## 2024-08-11 PROCEDURE — 96374 THER/PROPH/DIAG INJ IV PUSH: CPT

## 2024-08-11 RX ORDER — MORPHINE SULFATE 4 MG/ML
4 INJECTION, SOLUTION INTRAMUSCULAR; INTRAVENOUS ONCE
Status: COMPLETED | OUTPATIENT
Start: 2024-08-11 | End: 2024-08-11

## 2024-08-11 RX ORDER — ONDANSETRON 2 MG/ML
4 INJECTION INTRAMUSCULAR; INTRAVENOUS ONCE
Status: COMPLETED | OUTPATIENT
Start: 2024-08-11 | End: 2024-08-11

## 2024-08-11 RX ORDER — LIDOCAINE HYDROCHLORIDE 20 MG/ML
1 JELLY TOPICAL ONCE
Status: COMPLETED | OUTPATIENT
Start: 2024-08-11 | End: 2024-08-11

## 2024-08-11 RX ORDER — ACETAMINOPHEN 10 MG/ML
1000 INJECTION, SOLUTION INTRAVENOUS EVERY 6 HOURS PRN
Status: DISCONTINUED | OUTPATIENT
Start: 2024-08-11 | End: 2024-08-14 | Stop reason: HOSPADM

## 2024-08-11 RX ORDER — MIDAZOLAM HYDROCHLORIDE 2 MG/2ML
1 INJECTION, SOLUTION INTRAMUSCULAR; INTRAVENOUS ONCE
Status: COMPLETED | OUTPATIENT
Start: 2024-08-11 | End: 2024-08-11

## 2024-08-11 RX ORDER — DULOXETIN HYDROCHLORIDE 20 MG/1
20 CAPSULE, DELAYED RELEASE ORAL EVERY EVENING
Status: DISCONTINUED | OUTPATIENT
Start: 2024-08-11 | End: 2024-08-14 | Stop reason: HOSPADM

## 2024-08-11 RX ORDER — ALBUTEROL SULFATE 90 UG/1
2 AEROSOL, METERED RESPIRATORY (INHALATION) EVERY 4 HOURS PRN
Status: DISCONTINUED | OUTPATIENT
Start: 2024-08-11 | End: 2024-08-14 | Stop reason: HOSPADM

## 2024-08-11 RX ORDER — HYDROMORPHONE HCL IN WATER/PF 6 MG/30 ML
0.2 PATIENT CONTROLLED ANALGESIA SYRINGE INTRAVENOUS
Status: DISCONTINUED | OUTPATIENT
Start: 2024-08-11 | End: 2024-08-14 | Stop reason: HOSPADM

## 2024-08-11 RX ORDER — ENOXAPARIN SODIUM 100 MG/ML
40 INJECTION SUBCUTANEOUS DAILY
Status: DISCONTINUED | OUTPATIENT
Start: 2024-08-11 | End: 2024-08-14 | Stop reason: HOSPADM

## 2024-08-11 RX ORDER — DEXTROSE MONOHYDRATE, SODIUM CHLORIDE, AND POTASSIUM CHLORIDE 50; 1.49; 9 G/1000ML; G/1000ML; G/1000ML
125 INJECTION, SOLUTION INTRAVENOUS CONTINUOUS
Status: DISCONTINUED | OUTPATIENT
Start: 2024-08-11 | End: 2024-08-12

## 2024-08-11 RX ORDER — MIRTAZAPINE 15 MG/1
15 TABLET, FILM COATED ORAL
Status: DISCONTINUED | OUTPATIENT
Start: 2024-08-11 | End: 2024-08-14 | Stop reason: HOSPADM

## 2024-08-11 RX ORDER — ONDANSETRON 2 MG/ML
4 INJECTION INTRAMUSCULAR; INTRAVENOUS EVERY 6 HOURS PRN
Status: DISCONTINUED | OUTPATIENT
Start: 2024-08-11 | End: 2024-08-14 | Stop reason: HOSPADM

## 2024-08-11 RX ADMIN — SODIUM CHLORIDE 1000 ML: 0.9 INJECTION, SOLUTION INTRAVENOUS at 02:25

## 2024-08-11 RX ADMIN — HYDROMORPHONE HYDROCHLORIDE 0.2 MG: 0.2 INJECTION, SOLUTION INTRAMUSCULAR; INTRAVENOUS; SUBCUTANEOUS at 13:07

## 2024-08-11 RX ADMIN — DEXTROSE, SODIUM CHLORIDE, AND POTASSIUM CHLORIDE 125 ML/HR: 5; .9; .15 INJECTION INTRAVENOUS at 09:09

## 2024-08-11 RX ADMIN — NICOTINE 7 MG/24 HR DAILY TRANSDERMAL PATCH 1 PATCH: at 08:44

## 2024-08-11 RX ADMIN — HYDROMORPHONE HYDROCHLORIDE 0.2 MG: 0.2 INJECTION, SOLUTION INTRAMUSCULAR; INTRAVENOUS; SUBCUTANEOUS at 08:41

## 2024-08-11 RX ADMIN — LIDOCAINE HYDROCHLORIDE 1 APPLICATION: 20 JELLY TOPICAL at 06:27

## 2024-08-11 RX ADMIN — HYDROMORPHONE HYDROCHLORIDE 0.2 MG: 0.2 INJECTION, SOLUTION INTRAMUSCULAR; INTRAVENOUS; SUBCUTANEOUS at 18:31

## 2024-08-11 RX ADMIN — DULOXETINE HYDROCHLORIDE 20 MG: 20 CAPSULE, DELAYED RELEASE ORAL at 18:15

## 2024-08-11 RX ADMIN — HYDROMORPHONE HYDROCHLORIDE 0.2 MG: 0.2 INJECTION, SOLUTION INTRAMUSCULAR; INTRAVENOUS; SUBCUTANEOUS at 22:02

## 2024-08-11 RX ADMIN — DEXTROSE, SODIUM CHLORIDE, AND POTASSIUM CHLORIDE 125 ML/HR: 5; .9; .15 INJECTION INTRAVENOUS at 21:48

## 2024-08-11 RX ADMIN — ENOXAPARIN SODIUM 40 MG: 40 INJECTION SUBCUTANEOUS at 08:42

## 2024-08-11 RX ADMIN — ONDANSETRON 4 MG: 2 INJECTION INTRAMUSCULAR; INTRAVENOUS at 02:21

## 2024-08-11 RX ADMIN — ONDANSETRON 4 MG: 2 INJECTION INTRAMUSCULAR; INTRAVENOUS at 10:05

## 2024-08-11 RX ADMIN — IOHEXOL 50 ML: 240 INJECTION, SOLUTION INTRATHECAL; INTRAVASCULAR; INTRAVENOUS; ORAL at 02:39

## 2024-08-11 RX ADMIN — TOPICAL ANESTHETIC 2 SPRAY: 200 SPRAY DENTAL; PERIODONTAL at 06:26

## 2024-08-11 RX ADMIN — MIRTAZAPINE 15 MG: 15 TABLET, FILM COATED ORAL at 21:48

## 2024-08-11 RX ADMIN — MORPHINE SULFATE 4 MG: 4 INJECTION INTRAVENOUS at 02:24

## 2024-08-11 RX ADMIN — MIDAZOLAM 1 MG: 1 INJECTION INTRAMUSCULAR; INTRAVENOUS at 06:21

## 2024-08-11 RX ADMIN — MORPHINE SULFATE 4 MG: 4 INJECTION INTRAVENOUS at 03:54

## 2024-08-11 RX ADMIN — IOHEXOL 100 ML: 350 INJECTION, SOLUTION INTRAVENOUS at 05:04

## 2024-08-11 NOTE — QUICK NOTE
Patient actively vomiting around NG tube upon entering his room.  CXR reviewed,   NG tube advanced 5cm.  Patient now reports improving abdominal discomfort

## 2024-08-11 NOTE — H&P
H&P Exam - General Surgery   Homero Driver Jr. 59 y.o. male MRN: [id number]  Unit/Bed#: [address] [place] [address] Encounter: [id number]    Assessment & Plan     Assessment:  59-year-old male with history of perforated sigmoid diverticulitis with pericolonic abscess in February 2024 status post elective sigmoid colectomy with cholecystectomy and protective loop ileostomy 7/26/24 by Dr. Abernathy presenting with abdominal pain, vomiting, and decreased ostomy output.        Plan:  Obtain stat CXR and KUB to check NGT position.  Monitor vitals and abdominal exam closely   Continue IV fluid hydration   AM labs ordered       History of Present Illness     HPI:  Homero Driver Jr. is a 59 y.o. male with history of perforated sigmoid diverticulitis with pericolonic abscess in February 2024 status post elective sigmoid colectomy with cholecystectomy and protective loop ileostomy 7/26/24 by Dr. Abernathy presenting with abdominal pain, vomiting, and decreased ostomy output.  Patient reports no ostomy output since 6 AM yesterday morning.  Patient had 2 large bowls of macaroni salad for lunch yesterday and reports progressively worsening abdominal pain since that time.  Patient reports associated abdominal distention and lack of any further output in his ostomy bag. Patient is uncomfortable and states he has been using oxycodone at home for pain.  Patient was seen in the outpatient office a few days ago and was reportedly normal and healing well at that time.              Review of Systems   Constitutional:  Negative for appetite change, chills, fatigue and fever.   HENT:  Negative for congestion, rhinorrhea, sneezing, sore throat and trouble swallowing.    Eyes: Negative.    Respiratory:  Negative for cough, chest tightness, shortness of breath and wheezing.    Cardiovascular:  Negative for chest pain, palpitations and leg swelling.   Gastrointestinal:  Positive for abdominal distention, abdominal pain, constipation and vomiting. Negative for blood in  stool, diarrhea and nausea.   Genitourinary:  Negative for difficulty urinating, dysuria, flank pain and hematuria.   Musculoskeletal: Negative.    Skin:  Negative for rash and wound.   Neurological:  Negative for dizziness, syncope, weakness, light-headedness and headaches.   Hematological: Negative.    Psychiatric/Behavioral: Negative.         Historical Information   Past Medical History:   Diagnosis Date    Anesthesia complication     body temp burns off anesthesia more quickly--woke up during surgery    Anxiety     due to medical conditions    Arthritis     Hypertension     Sleep apnea     Tobacco abuse      Past Surgical History:   Procedure Laterality Date    BACK SURGERY      CERVICAL FUSION N/A 04/05/2016    Procedure: FUSION CERVICAL POSTERIOR - revision of instrumentation T1;  Surgeon: Srikanth Siu MD;  Location: [facility];  Service:     COLONOSCOPY      FOOT MASS EXCISION Right     HERNIA REPAIR Right     x2    INCISION AND DRAINAGE POSTERIOR SPINE N/A 04/27/2016    Procedure: INCISION AND DRAINAGE (I&D) SPINE;  Surgeon: Srikanth Siu MD;  Location: [facility];  Service:     IR DRAINAGE TUBE CHECK/CHANGE/REPOSITION/REINSERTION/UPSIZE  03/01/2024    IR DRAINAGE TUBE CHECK/CHANGE/REPOSITION/REINSERTION/UPSIZE  03/21/2024    IR DRAINAGE TUBE CHECK/CHANGE/REPOSITION/REINSERTION/UPSIZE  04/05/2024    IR DRAINAGE TUBE CHECK/CHANGE/REPOSITION/REINSERTION/UPSIZE  05/23/2024    IR DRAINAGE TUBE PLACEMENT  02/18/2024    DE ARTHRODESIS PST/PSTLAT TQ 1NTRSPC EA ADDL NTRSPC N/A 04/04/2016    Procedure: C3-C6 POSTERIOR CERVICAL LAMINECTOMY AND DECOMPRESSION; C3-T2 INSTRUMENTED POSTERIOR CERVICAL SPINAL FUSION WITH POSSIBLE ADDITIONAL LEVELS WITH ALLOGRAFT AND AUTOGRAFT (IMPULSE MONITORING);  Surgeon: Srikanth Siu MD;  Location: [facility];  Service: Orthopedics    DE CYSTO BLADDER W/URETERAL CATHETERIZATION Bilateral 7/26/2024    Procedure: INSERTION URETERAL CATHETERS PREOP;  Surgeon: Vitaliy Leahy MD;   Location:  [facility] OR;  Service: Urology    HI LAPAROSCOPY COLECTOMY PARTIAL W/ANASTOMOSIS N/A 7/26/2024    Procedure: SIGMOID COLECTOMY, CHOLECYSTECTOMY, CREATION OF LOOP ILEOSTOMY, REMOVAL OF URETERAL STENTS. REMOVAL OF LEFT BUTTOCK ABSCESS DRAIN.;  Surgeon: Adelso Abernathy MD;  Location:  [facility] OR;  Service: General    HI SIGMOIDOSCOPY FLX DX W/COLLJ SPEC BR/WA IF PFRMD N/A 7/26/2024    Procedure: FLEXIBLE SIGMOIDOSCOPY;  Surgeon: Adelso Abernathy MD;  Location:  [facility] OR;  Service: General    SHOULDER OPEN ROTATOR CUFF REPAIR Bilateral      Social History   Social History     Substance and Sexual Activity   Alcohol Use Yes    Alcohol/week: 2.0 standard drinks of alcohol    Types: 2 Shots of liquor per week    Comment: weekends     Social History     Substance and Sexual Activity   Drug Use Yes    Frequency: 7.0 times per week    Types: Marijuana    Comment: one joint daily for pain last 2 days ago     Social History     Tobacco Use   Smoking Status Every Day    Current packs/day: 0.25    Average packs/day: 0.3 packs/day for 15.0 years (3.8 ttl pk-yrs)    Types: Cigarettes    Passive exposure: Never   Smokeless Tobacco Never   Tobacco Comments    2 cigarettes per day    Vapes on weekends     E-Cigarette/Vaping    E-Cigarette Use Current Some Day User      E-Cigarette/Vaping Substances    Nicotine No     THC No     CBD No     Flavoring Yes     Other No     Unknown No      Family History: non-contributory    Meds/Allergies   all medications and allergies reviewed  No Known Allergies    Objective   First Vitals:   Blood Pressure: 156/88 (08/11/24 0159)  Pulse: 74 (08/11/24 0159)  Temperature: 98.3 °F (36.8 °C) (08/11/24 0159)  Temp Source: Oral (08/11/24 0159)  Respirations: 19 (08/11/24 0159)  Height: 6' (182.9 cm) (08/11/24 0740)  Weight - Scale: 82.1 kg (181 lb) (08/11/24 0740)  SpO2: 98 % (08/11/24 0159)    Current Vitals:   Blood Pressure: 153/83 (08/11/24 0740)  Pulse: 76 (08/11/24  0740)  Temperature: 98.1 °F (36.7 °C) (08/11/24 0740)  Temp Source: Oral (08/11/24 0740)  Respirations: 19 (08/11/24 0740)  Height: 6' (182.9 cm) (08/11/24 0740)  Weight - Scale: 82.1 kg (181 lb) (08/11/24 0740)  SpO2: 98 % (08/11/24 0740)      Intake/Output Summary (Last 24 hours) at 8/11/2024 0746  Last data filed at 8/11/2024 0626  Gross per 24 hour   Intake 1000 ml   Output --   Net 1000 ml       Invasive Devices       Peripheral Intravenous Line  Duration             Peripheral IV 08/11/24 Distal;Left;Upper;Ventral (anterior) Arm <1 day              Drain  Duration             Ileostomy Loop RUQ 15 days    NG/OG/Enteral Tube 16 Fr Right nare <1 day                    Physical Exam  Vitals reviewed.   Constitutional:       General: He is awake.      Appearance: Normal appearance. He is well-developed, well-groomed and normal weight. He is ill-appearing. He is not toxic-appearing.      Interventions: He is not intubated.     Comments: Appears uncomfortable   HENT:      Head: Normocephalic and atraumatic. Not macrocephalic and not microcephalic. No raccoon eyes, Caldwell's sign, right periorbital erythema or left periorbital erythema.      Right Ear: Hearing and external ear normal.      Left Ear: Hearing and external ear normal.      Nose: Nose normal.   Eyes:      General: No scleral icterus.        Right eye: No discharge.         Left eye: No discharge.      Conjunctiva/sclera: Conjunctivae normal.      Right eye: Right conjunctiva is not injected. No hemorrhage.     Left eye: Left conjunctiva is not injected. No hemorrhage.     Pupils: Pupils are equal, round, and reactive to light.   Cardiovascular:      Rate and Rhythm: Normal rate and regular rhythm.      Heart sounds: Normal heart sounds.   Pulmonary:      Effort: Pulmonary effort is normal. No tachypnea, bradypnea, respiratory distress or apnea. He is not intubated.      Breath sounds: Normal breath sounds. No stridor. No decreased breath sounds, wheezing  "or rhonchi.   Abdominal:      General: A surgical scar is present. There is distension.      Palpations: Abdomen is not rigid.      Tenderness: There is generalized abdominal tenderness. There is no CVA tenderness, guarding or rebound.      Hernia: There is no hernia in the umbilical area.   Skin:     General: Skin is warm, dry and intact.      Capillary Refill: Capillary refill takes less than 2 seconds.      Coloration: Skin is not cyanotic, jaundiced or mottled.      Findings: No rash.   Neurological:      General: No focal deficit present.      Mental Status: He is alert and oriented to person, place, and time. He is not disoriented.      GCS: GCS eye subscore is 4. GCS verbal subscore is 5. GCS motor subscore is 6.      Cranial Nerves: Cranial nerves 2-12 are intact. No cranial nerve deficit.   Psychiatric:         Mood and Affect: Mood and affect normal.         Speech: Speech normal.         Behavior: Behavior normal. Behavior is cooperative.         Thought Content: Thought content normal.         Cognition and Memory: Cognition normal.         Lab Results: I have personally reviewed pertinent lab results.  , CBC:   Lab Results   Component Value Date    WBC 12.53 (H) 08/11/2024    HGB 11.6 (L) 08/11/2024    HCT 36.6 08/11/2024    MCV 90 08/11/2024     (H) 08/11/2024    RBC 4.05 08/11/2024    MCH 28.6 08/11/2024    MCHC 31.7 08/11/2024    RDW 15.4 (H) 08/11/2024    MPV 8.3 (L) 08/11/2024    NRBC 0 08/11/2024   , CMP:   Lab Results   Component Value Date    SODIUM 138 08/11/2024    K 3.9 08/11/2024     08/11/2024    CO2 28 08/11/2024    BUN 8 08/11/2024    CREATININE 0.77 08/11/2024    CALCIUM 9.4 08/11/2024    AST 12 (L) 08/11/2024    ALT 12 08/11/2024    ALKPHOS 54 08/11/2024    EGFR 99 08/11/2024   , Coagulation: No results found for: \"PT\", \"INR\", \"APTT\", Urinalysis: No results found for: \"COLORU\", \"CLARITYU\", \"SPECGRAV\", \"PHUR\", \"LEUKOCYTESUR\", \"NITRITE\", \"PROTEINUA\", \"GLUCOSEU\", \"KETONESU\", " "\"BILIRUBINUR\", \"BLOODU\", Lipase:   Lab Results   Component Value Date    LIPASE 67 08/11/2024     Imaging: I have personally reviewed pertinent reports.   and I have personally reviewed pertinent films in PACS  EKG, Pathology, and Other Studies: I have personally reviewed pertinent reports.      Code Status: Prior  Advance Directive and Living Will:      Power of :    POLST:      Counseling / Coordination of Care  Total floor / unit time spent today 40 minutes.  Greater than 50% of total time was spent with the patient and / or family counseling and / or coordination of care.  A description of the counseling / coordination of care: Obtaining patient history, performing physical exam, reviewing pertinent labs and imaging, discussed management with attending physician.      "

## 2024-08-11 NOTE — ED PROVIDER NOTES
Final Diagnosis:  1. SBO (small bowel obstruction) ([facility])        Chief Complaint   Patient presents with    Abdominal Pain     Pt had surgery approximately 2 weeks ago where they took his gallbladder and created a colostomy. Tonight after the pt ate he vomited and started with mid abdominal pain, he states he feels like there is a palpable knot in his abdomen that has been causing increasing pain since this evening. Also noted that he has not had output in his colostomy bag since prior to eating last night.        HPI  Pt pres d/c 1.5 weeks ago after elective open sigmoid colectomy with primary anastomosis, insertion of preop ureteral stents, flex sig, removal of buttock drain, creation of loop ileostomy, and open cholecystectomy     Now w/ dec ileostomy output, n/v PO intolerance, epigastric pain and distension    No urinary changes    No chest pain sob    No fever/chills    Tonight after eating two augustine of ziti      EMS additionally reports:     - Previous charting underwent limited review with attention to last ED visits, labs, ekgs, and prior imaging.  Chart review reveals :     No results displayed because visit has over 200 results.          - No language barrier.   - History obtained from patient    - Discuss patient's care, with patient permission or by chart review, with      PMH:   has a past medical history of Anesthesia complication, Anxiety, Arthritis, Hypertension, Sleep apnea, and Tobacco abuse.    PSH:   has a past surgical history that includes Shoulder open rotator cuff repair (Bilateral); Hernia repair (Right); Foot mass excision (Right); Back surgery; Incision and drainage posterior spine (N/A, 04/27/2016); Cervical fusion (N/A, 04/05/2016); pr arthrodesis pst/pstlat tq 1ntrspc ea addl ntrsp (N/A, 04/04/2016); IR drainage tube placement (02/18/2024); IR drainage tube check/change/reposition/reinsertion/upsize (03/01/2024); IR drainage tube check/change/reposition/reinsertion/upsize (03/21/2024);  IR drainage tube check/change/reposition/reinsertion/upsize (04/05/2024); IR drainage tube check/change/reposition/reinsertion/upsize (05/23/2024); Colonoscopy; pr laparoscopy colectomy partial w/anastomosis (N/A, 7/26/2024); pr sigmoidoscopy flx dx w/collj spec br/wa if pfrmd (N/A, 7/26/2024); and pr cysto bladder w/ureteral catheterization (Bilateral, 7/26/2024).     Social History:  Tobacco Use: High Risk (8/11/2024)    Patient History     Smoking Tobacco Use: Every Day     Smokeless Tobacco Use: Never     Passive Exposure: Never     Alcohol Use: Unknown (6/7/2021)    AUDIT-C     Frequency of Alcohol Consumption: 2-3 times a week     Average Number of Drinks: 1 or 2     Frequency of Binge Drinking: Not on file     No illicit use       ROS:  Pertinent positives/negatives: .     Some ROS may be present in the HPI and would take precedent over these standard questions asked below.   Review of Systems   Constitutional:  Negative for chills and fever.   Respiratory:  Negative for cough, shortness of breath, wheezing and stridor.    Cardiovascular:  Negative for chest pain, palpitations and leg swelling.   Gastrointestinal:  Positive for abdominal distention, abdominal pain, nausea and vomiting.   Genitourinary:  Negative for decreased urine volume, difficulty urinating, dysuria and flank pain.        CONSTITUTIONAL:  No lethargy. No unexpected weight loss. No change in behavior.  EYES:  No pain, redness, or discharge. No loss of vision. No orbital trauma or pain.   ENT:  No tinnitus or decreased hearing. No epistaxis/purulent rhinorrhea. No voice change, airway closing, trismus.   CARDIOVASCULAR:  No chest pain. No skin mottling or pallor. No change in exertional capacity  RESPIRATORY:  No hemoptysis. No paroxysmal nocturnal dyspnea. No stridor. No apnea or bluing.   GASTROINTESTINAL:  No vomiting, diarrhea. No distension. No melena. No hematochezia.   GENITOURINARY:  No nocturia. No hematuria or foul smelling or  cloudy urine. No discharge. No sores/adenopathy.   MUSCULOSKELETAL:  No contracture.  No new deformity.   INTEGUMENTARY:  No swelling. No unexpected contusions. No abrasions. No lymphangitis.  NEUROLOGIC:  No meningismus. No new numbness of the extremities. No new focal weakness. No postural instability  PSYCHIATRIC:  No SI HI AVH  HEMATOLOGICAL:  No bleeding. No petechiae. No bruising.  ALLERGIES:  No urticaria. No sudden abd cramping. No stridor.    PE:     Physical exam highlights:   Physical Exam       Vitals:    08/11/24 0159 08/11/24 0330 08/11/24 0530   BP: 156/88 162/99 153/97   BP Location: Right arm     Pulse: 74 76 72   Resp: 19 20 20   Temp: 98.3 °F (36.8 °C)     TempSrc: Oral     SpO2: 98% 100% 100%     Vitals reviewed by me.   Nursing note reviewed  Chaperone present for all sensitive exam.  Const: No acute distress. Alert. Nontoxic. Not diaphoretic.    HEENT: External ears normal. No protrusion drainage swelling. Nose normal. No drainage/traumatic deformity. MM. Mouth with baseline/symmetric movement. No trismus.   Eyes: No squinting. No icterus. No tearing/swelling/drainage. Tracks through the room with normal EOM.   Neck: ROM normal. No rigidity. No meningismus.  Cards: Rate as per vitals Compared to monitor sinus unless documented. Regular Well perfused.  Pulm: Effort and excursion normal. No distress. No audible wheezing/no stridor. Normal resp rate without retraction or change in work of breathing.  Abd: No distension beyond baseline. No fluctuant wave. Patient without peritoneal pain with shifting/bumping the bed.   MSK: ROM normal baseline. No deformity. No contractures from baseline.   Skin: No new rashes visible. Well perfused. No wounds visualized on exposed skin  Neuro: Nonfocal. Baseline. CN grossly intact. Moving all four with coordination.   Psych: Normal behavior and affect.        A:  - Nursing note reviewed.    Ddx and MDM  Considered diagnoses    Sbo seems likely by recent surgery,  "dec output and inability tolerate PO  Oral contrast  CT    Other post surgical complication like abscess?  CT abd pelv  Cbc  Cmp    Biliary leak s/p connor? Other post connor problem?  CMP  CT ab pelv        Dispo decision       My conversation with consultant reveals:        Decision rules:                           My read of the XR/CT scan reveals:     CT abdomen pelvis with contrast   Final Result      Findings most consistent with high-grade proximal small bowel obstruction with transition from distended to collapsed small bowel loops in the proximal jejunum in the left upper abdomen.      Mesenteric edema and small volume of ascites. No intra-abdominal or pelvic abscess.      Gas and fluid in the periumbilical abdominal wall probably postsurgical, superficial to intact ventral hernia repair mesh.         I reported these results to SAMIA JANG via HIPAA compliant secure electronic messaging on 8/11/2024 5:49 AM. This examination was also marked \"immediate notification\" in Epic in order to begin the standard process by which the radiology reading    room liaison alerts the referring practitioner.                     Workstation performed: [id number]             Orders Placed This Encounter   Procedures    CT abdomen pelvis with contrast    CBC and differential    Comprehensive metabolic panel    Lipase    Lactic acid, plasma (w/reflex if result > 2.0)    Nasogastric tube insertion    INPATIENT ADMISSION     Labs Reviewed   CBC AND DIFFERENTIAL - Abnormal       Result Value Ref Range Status    WBC 12.53 (*) 4.31 - 10.16 Thousand/uL Final    RBC 4.05  3.88 - 5.62 Million/uL Final    Hemoglobin 11.6 (*) 12.0 - 17.0 g/dL Final    Hematocrit 36.6  36.5 - 49.3 % Final    MCV 90  82 - 98 fL Final    MCH 28.6  26.8 - 34.3 pg Final    MCHC 31.7  31.4 - 37.4 g/dL Final    RDW 15.4 (*) 11.6 - 15.1 % Final    MPV 8.3 (*) 8.9 - 12.7 fL Final    Platelets 673 (*) 149 - 390 Thousands/uL Final    nRBC 0  /100 WBCs Final    " Segmented % 74  43 - 75 % Final    Immature Grans % 0  0 - 2 % Final    Lymphocytes % 19  14 - 44 % Final    Monocytes % 6  4 - 12 % Final    Eosinophils Relative 1  0 - 6 % Final    Basophils Relative 0  0 - 1 % Final    Absolute Neutrophils 9.20 (*) 1.85 - 7.62 Thousands/µL Final    Absolute Immature Grans 0.05  0.00 - 0.20 Thousand/uL Final    Absolute Lymphocytes 2.36  0.60 - 4.47 Thousands/µL Final    Absolute Monocytes 0.69  0.17 - 1.22 Thousand/µL Final    Eosinophils Absolute 0.18  0.00 - 0.61 Thousand/µL Final    Basophils Absolute 0.05  0.00 - 0.10 Thousands/µL Final   COMPREHENSIVE METABOLIC PANEL - Abnormal    Sodium 138  135 - 147 mmol/L Final    Potassium 3.9  3.5 - 5.3 mmol/L Final    Chloride 103  96 - 108 mmol/L Final    CO2 28  21 - 32 mmol/L Final    ANION GAP 7  4 - 13 mmol/L Final    BUN 8  5 - 25 mg/dL Final    Creatinine 0.77  0.60 - 1.30 mg/dL Final    Comment: Standardized to IDMS reference method    Glucose 100  65 - 140 mg/dL Final    Comment: If the patient is fasting, the ADA then defines impaired fasting glucose as > 100 mg/dL and diabetes as > or equal to 123 mg/dL.    Calcium 9.4  8.4 - 10.2 mg/dL Final    AST 12 (*) 13 - 39 U/L Final    ALT 12  7 - 52 U/L Final    Comment: Specimen collection should occur prior to Sulfasalazine administration due to the potential for falsely depressed results.     Alkaline Phosphatase 54  34 - 104 U/L Final    Total Protein 7.3  6.4 - 8.4 g/dL Final    Albumin 3.9  3.5 - 5.0 g/dL Final    Total Bilirubin 0.23  0.20 - 1.00 mg/dL Final    Comment: Use of this assay is not recommended for patients undergoing treatment with eltrombopag due to the potential for falsely elevated results.  N-acetyl-p-benzoquinone imine (metabolite of Acetaminophen) will generate erroneously low results in samples for patients that have taken an overdose of Acetaminophen.    eGFR 99  ml/min/1.73sq m Final    Narrative:     National Kidney Disease Foundation guidelines for  Chronic Kidney Disease (CKD):     Stage 1 with normal or high GFR (GFR > 90 mL/min/1.73 square meters)    Stage 2 Mild CKD (GFR = 60-89 mL/min/1.73 square meters)    Stage 3A Moderate CKD (GFR = 45-59 mL/min/1.73 square meters)    Stage 3B Moderate CKD (GFR = 30-44 mL/min/1.73 square meters)    Stage 4 Severe CKD (GFR = 15-29 mL/min/1.73 square meters)    Stage 5 End Stage CKD (GFR <15 mL/min/1.73 square meters)  Note: GFR calculation is accurate only with a steady state creatinine   LIPASE - Normal    Lipase 67  11 - 82 u/L Final   LACTIC ACID, PLASMA (W/REFLEX IF RESULT > 2.0) - Normal    LACTIC ACID 1.3  0.5 - 2.0 mmol/L Final    Narrative:     Result may be elevated if tourniquet was used during collection.       *Each of these labs was reviewed. Particular standout labs will be noted in the ED Course above     Final Diagnosis:  1. SBO (small bowel obstruction) ([facility])          P:  - hospital tx includes   Medications   benzocaine (HURRICAINE) 20 % mucosal spray 2 spray (has no administration in time range)   lidocaine (URO-JET) 2 % urethral/mucosal gel 1 Application (has no administration in time range)   midazolam (VERSED) injection 1 mg (has no administration in time range)   morphine injection 4 mg (4 mg Intravenous Given 8/11/24 0224)   ondansetron (ZOFRAN) injection 4 mg (4 mg Intravenous Given 8/11/24 0221)   sodium chloride 0.9 % bolus 1,000 mL (1,000 mL Intravenous New Bag 8/11/24 0225)   iohexol (OMNIPAQUE) 240 MG/ML solution 50 mL (50 mL Oral Given 8/11/24 0239)   morphine injection 4 mg (4 mg Intravenous Given 8/11/24 0354)   iohexol (OMNIPAQUE) 350 MG/ML injection (MULTI-DOSE) 100 mL (100 mL Intravenous Given 8/11/24 0504)         - disposition  Time reflects when diagnosis was documented in both MDM as applicable and the Disposition within this note       Time User Action Codes Description Comment    8/11/2024  6:10 AM Beka Almonte Add [K56.609] SBO (small bowel obstruction) ([facility])           ED  "Disposition       ED Disposition   Admit    Condition   Stable    Date/Time   Sun Aug 11, 2024  6:10 AM    Comment   Case was discussed with gen surg  and the patient's admission status was agreed to be inpat to the service of Dr. Arroyo .               Follow-up Information    None         - patient will call their PCP to let them know they were in the emergency department. We discuss return precautions and patient is agreeable with plan and aformentioned disposition.       - additional treatment intended, if consistent with primary provider:  - patient to follow with :      Patient's Medications   Discharge Prescriptions    No medications on file     No discharge procedures on file.  Prior to Admission Medications   Prescriptions Last Dose Informant Patient Reported? Taking?   DULoxetine (CYMBALTA) 20 mg capsule  Self Yes No   Sig: Take 20 mg by mouth every evening   albuterol (PROVENTIL HFA,VENTOLIN HFA) 90 mcg/act inhaler  Self Yes No   Sig: INHALE 2 PUFFS INTO THE LUNGS EVERY 4 HOURS AS NEEDED FOR 30 DAYS   amLODIPine (NORVASC) 10 mg tablet   Yes No   Sig: Take 10 mg by mouth daily   cyclobenzaprine (FLEXERIL) 10 mg tablet  Self Yes No   Sig: TAKE 1 TABLET BY MOUTH THREE TIMES A DAY AS NEEDED FOR 10 DAYS   gabapentin (NEURONTIN) 300 mg capsule  Self Yes No   Sig: Take 100 mg by mouth daily at bedtime   meloxicam (MOBIC) 15 mg tablet   Yes No   Sig: TAKE 1 TABLET EVERY DAY BY ORAL ROUTE AS NEEDED.   mirtazapine (REMERON) 15 mg tablet  Self Yes No   Sig: Take 15 mg by mouth daily at bedtime     oxyCODONE (ROXICODONE) 10 MG TABS   No No   Sig: Take 1 tablet (10 mg total) by mouth every 4 (four) hours as needed for severe pain for up to 7 days Max Daily Amount: 60 mg      Facility-Administered Medications: None       Portions of the record may have been created with voice recognition software. Occasional wrong word or \"sound a like\" substitutions may have occurred due to the inherent limitations of voice " recognition software. Read the chart carefully and recognize, using context, where substitutions have occurred.    Electronically signed by:  MD Beka Malik MD  08/11/24 5034

## 2024-08-11 NOTE — PLAN OF CARE
Problem: PAIN - ADULT  Goal: Verbalizes/displays adequate comfort level or baseline comfort level  Description: Interventions:  - Encourage patient to monitor pain and request assistance  - Assess pain using appropriate pain scale  - Administer analgesics based on type and severity of pain and evaluate response  - Implement non-pharmacological measures as appropriate and evaluate response  - Consider cultural and social influences on pain and pain management  - Notify physician/advanced practitioner if interventions unsuccessful or patient reports new pain  Outcome: Progressing     Problem: INFECTION - ADULT  Goal: Absence or prevention of progression during hospitalization  Description: INTERVENTIONS:  - Assess and monitor for signs and symptoms of infection  - Monitor lab/diagnostic results  - Monitor all insertion sites, i.e. indwelling lines, tubes, and drains  - Monitor endotracheal if appropriate and nasal secretions for changes in amount and color  - [place] appropriate cooling/warming therapies per order  - Administer medications as ordered  - Instruct and encourage patient and family to use good hand hygiene technique  - Identify and instruct in appropriate isolation precautions for identified infection/condition  Outcome: Progressing     Problem: DISCHARGE PLANNING  Goal: Discharge to home or other facility with appropriate resources  Description: INTERVENTIONS:  - Identify barriers to discharge w/patient and caregiver  - Arrange for needed discharge resources and transportation as appropriate  - Identify discharge learning needs (meds, wound care, etc.)  - Arrange for interpretive services to assist at discharge as needed  - Refer to Case Management Department for coordinating discharge planning if the patient needs post-hospital services based on physician/advanced practitioner order or complex needs related to functional status, cognitive ability, or social support system  Outcome: Progressing      Problem: Knowledge Deficit  Goal: Patient/family/caregiver demonstrates understanding of disease process, treatment plan, medications, and discharge instructions  Description: Complete learning assessment and assess knowledge base.  Interventions:  - Provide teaching at level of understanding  - Provide teaching via preferred learning methods  Outcome: Progressing     Problem: GASTROINTESTINAL - ADULT  Goal: Minimal or absence of nausea and/or vomiting  Description: INTERVENTIONS:  - Administer IV fluids if ordered to ensure adequate hydration  - Maintain NPO status until nausea and vomiting are resolved  - Nasogastric tube if ordered  - Administer ordered antiemetic medications as needed  - Provide nonpharmacologic comfort measures as appropriate  - Advance diet as tolerated, if ordered  - Consider nutrition services referral to assist patient with adequate nutrition and appropriate food choices  Outcome: Progressing  Goal: Maintains or returns to baseline bowel function  Description: INTERVENTIONS:  - Assess bowel function  - Encourage oral fluids to ensure adequate hydration  - Administer IV fluids if ordered to ensure adequate hydration  - Administer ordered medications as needed  - Encourage mobilization and activity  - Consider nutritional services referral to assist patient with adequate nutrition and appropriate food choices  Outcome: Progressing  Goal: Maintains adequate nutritional intake  Description: INTERVENTIONS:  - Monitor percentage of each meal consumed  - Identify factors contributing to decreased intake, treat as appropriate  - Assist with meals as needed  - Monitor I&O, weight, and lab values if indicated  - Obtain nutrition services referral as needed  Outcome: Progressing  Goal: Establish and maintain optimal ostomy function  Description: INTERVENTIONS:  - Assess bowel function  - Encourage oral fluids to ensure adequate hydration  - Administer IV fluids if ordered to ensure adequate  hydration   - Administer ordered medications as needed  - Encourage mobilization and activity  - Nutrition services referral to assist patient with appropriate food choices  - Assess stoma site  - Consider wound care consult   Outcome: Progressing  Goal: Oral mucous membranes remain intact  Description: INTERVENTIONS  - Assess oral mucosa and hygiene practices  - Implement preventative oral hygiene regimen  - Implement oral medicated treatments as ordered  - Initiate Nutrition services referral as needed  Outcome: Progressing

## 2024-08-11 NOTE — ED NOTES
Pt started drinking oral contrast=[;po,kmim. Will be done 4:20  AM     Shayla Hallman RN  08/11/24 0245       Shayla Hallman RN  08/11/24 0246

## 2024-08-12 ENCOUNTER — HOME CARE VISIT (OUTPATIENT)
Dept: HOME HEALTH SERVICES | Facility: HOME HEALTHCARE | Age: 59
End: 2024-08-12
Payer: MEDICARE

## 2024-08-12 LAB
ANION GAP SERPL CALCULATED.3IONS-SCNC: 7 MMOL/L (ref 4–13)
BASOPHILS # BLD AUTO: 0.03 THOUSANDS/ÂΜL (ref 0–0.1)
BASOPHILS NFR BLD AUTO: 0 % (ref 0–1)
BUN SERPL-MCNC: 11 MG/DL (ref 5–25)
CALCIUM SERPL-MCNC: 9.3 MG/DL (ref 8.4–10.2)
CHLORIDE SERPL-SCNC: 104 MMOL/L (ref 96–108)
CO2 SERPL-SCNC: 28 MMOL/L (ref 21–32)
CREAT SERPL-MCNC: 0.77 MG/DL (ref 0.6–1.3)
EOSINOPHIL # BLD AUTO: 0.24 THOUSAND/ÂΜL (ref 0–0.61)
EOSINOPHIL NFR BLD AUTO: 3 % (ref 0–6)
ERYTHROCYTE [DISTWIDTH] IN BLOOD BY AUTOMATED COUNT: 15.4 % (ref 11.6–15.1)
GFR SERPL CREATININE-BSD FRML MDRD: 99 ML/MIN/1.73SQ M
GLUCOSE SERPL-MCNC: 114 MG/DL (ref 65–140)
HCT VFR BLD AUTO: 37.2 % (ref 36.5–49.3)
HGB BLD-MCNC: 12 G/DL (ref 12–17)
IMM GRANULOCYTES # BLD AUTO: 0.02 THOUSAND/UL (ref 0–0.2)
IMM GRANULOCYTES NFR BLD AUTO: 0 % (ref 0–2)
LYMPHOCYTES # BLD AUTO: 1.71 THOUSANDS/ÂΜL (ref 0.6–4.47)
LYMPHOCYTES NFR BLD AUTO: 21 % (ref 14–44)
MAGNESIUM SERPL-MCNC: 1.9 MG/DL (ref 1.9–2.7)
MCH RBC QN AUTO: 28.8 PG (ref 26.8–34.3)
MCHC RBC AUTO-ENTMCNC: 32.3 G/DL (ref 31.4–37.4)
MCV RBC AUTO: 89 FL (ref 82–98)
MONOCYTES # BLD AUTO: 0.45 THOUSAND/ÂΜL (ref 0.17–1.22)
MONOCYTES NFR BLD AUTO: 6 % (ref 4–12)
NEUTROPHILS # BLD AUTO: 5.73 THOUSANDS/ÂΜL (ref 1.85–7.62)
NEUTS SEG NFR BLD AUTO: 70 % (ref 43–75)
NRBC BLD AUTO-RTO: 0 /100 WBCS
PHOSPHATE SERPL-MCNC: 3.4 MG/DL (ref 2.7–4.5)
PLATELET # BLD AUTO: 620 THOUSANDS/UL (ref 149–390)
PMV BLD AUTO: 8.9 FL (ref 8.9–12.7)
POTASSIUM SERPL-SCNC: 3.8 MMOL/L (ref 3.5–5.3)
RBC # BLD AUTO: 4.16 MILLION/UL (ref 3.88–5.62)
SODIUM SERPL-SCNC: 139 MMOL/L (ref 135–147)
WBC # BLD AUTO: 8.18 THOUSAND/UL (ref 4.31–10.16)

## 2024-08-12 PROCEDURE — 84100 ASSAY OF PHOSPHORUS: CPT | Performed by: PHYSICIAN ASSISTANT

## 2024-08-12 PROCEDURE — 80048 BASIC METABOLIC PNL TOTAL CA: CPT | Performed by: PHYSICIAN ASSISTANT

## 2024-08-12 PROCEDURE — 99024 POSTOP FOLLOW-UP VISIT: CPT | Performed by: SURGERY

## 2024-08-12 PROCEDURE — 83735 ASSAY OF MAGNESIUM: CPT | Performed by: PHYSICIAN ASSISTANT

## 2024-08-12 PROCEDURE — 85025 COMPLETE CBC W/AUTO DIFF WBC: CPT | Performed by: PHYSICIAN ASSISTANT

## 2024-08-12 RX ORDER — PANTOPRAZOLE SODIUM 40 MG/10ML
40 INJECTION, POWDER, LYOPHILIZED, FOR SOLUTION INTRAVENOUS
Status: DISCONTINUED | OUTPATIENT
Start: 2024-08-12 | End: 2024-08-14 | Stop reason: HOSPADM

## 2024-08-12 RX ORDER — DEXTROSE MONOHYDRATE, SODIUM CHLORIDE, AND POTASSIUM CHLORIDE 50; 1.49; 4.5 G/1000ML; G/1000ML; G/1000ML
125 INJECTION, SOLUTION INTRAVENOUS CONTINUOUS
Status: DISCONTINUED | OUTPATIENT
Start: 2024-08-12 | End: 2024-08-14

## 2024-08-12 RX ORDER — KETOROLAC TROMETHAMINE 30 MG/ML
15 INJECTION, SOLUTION INTRAMUSCULAR; INTRAVENOUS EVERY 6 HOURS SCHEDULED
Status: DISCONTINUED | OUTPATIENT
Start: 2024-08-12 | End: 2024-08-14 | Stop reason: HOSPADM

## 2024-08-12 RX ADMIN — HYDROMORPHONE HYDROCHLORIDE 0.2 MG: 0.2 INJECTION, SOLUTION INTRAMUSCULAR; INTRAVENOUS; SUBCUTANEOUS at 08:58

## 2024-08-12 RX ADMIN — DEXTROSE, SODIUM CHLORIDE, AND POTASSIUM CHLORIDE 125 ML/HR: 5; .45; .15 INJECTION INTRAVENOUS at 14:11

## 2024-08-12 RX ADMIN — DULOXETINE HYDROCHLORIDE 20 MG: 20 CAPSULE, DELAYED RELEASE ORAL at 18:23

## 2024-08-12 RX ADMIN — ENOXAPARIN SODIUM 40 MG: 40 INJECTION SUBCUTANEOUS at 08:53

## 2024-08-12 RX ADMIN — MIRTAZAPINE 15 MG: 15 TABLET, FILM COATED ORAL at 22:49

## 2024-08-12 RX ADMIN — PANTOPRAZOLE SODIUM 40 MG: 40 INJECTION, POWDER, FOR SOLUTION INTRAVENOUS at 12:08

## 2024-08-12 RX ADMIN — HYDROMORPHONE HYDROCHLORIDE 0.2 MG: 0.2 INJECTION, SOLUTION INTRAMUSCULAR; INTRAVENOUS; SUBCUTANEOUS at 12:21

## 2024-08-12 RX ADMIN — NICOTINE 7 MG/24 HR DAILY TRANSDERMAL PATCH 1 PATCH: at 08:53

## 2024-08-12 RX ADMIN — DEXTROSE, SODIUM CHLORIDE, AND POTASSIUM CHLORIDE 125 ML/HR: 5; .9; .15 INJECTION INTRAVENOUS at 05:57

## 2024-08-12 RX ADMIN — KETOROLAC TROMETHAMINE 15 MG: 30 INJECTION, SOLUTION INTRAMUSCULAR; INTRAVENOUS at 14:11

## 2024-08-12 RX ADMIN — HYDROMORPHONE HYDROCHLORIDE 0.2 MG: 0.2 INJECTION, SOLUTION INTRAMUSCULAR; INTRAVENOUS; SUBCUTANEOUS at 03:46

## 2024-08-12 RX ADMIN — KETOROLAC TROMETHAMINE 15 MG: 30 INJECTION, SOLUTION INTRAMUSCULAR; INTRAVENOUS at 18:22

## 2024-08-12 NOTE — UTILIZATION REVIEW
Initial Clinical Review    Admission: Date/Time/Statement:   Admission Orders (From admission, onward)       Ordered        08/11/24 0610  INPATIENT ADMISSION  Once                          Orders Placed This Encounter   Procedures    INPATIENT ADMISSION     Standing Status:   Standing     Number of Occurrences:   1     Order Specific Question:   Level of Care     Answer:   Med Surg [16]     Order Specific Question:   Estimated length of stay     Answer:   More than 2 Midnights     Order Specific Question:   Certification     Answer:   I certify that inpatient services are medically necessary for this patient for a duration of greater than two midnights. See H&P and MD Progress Notes for additional information about the patient's course of treatment.     ED Arrival Information       Expected   -    Arrival   8/11/2024 01:49    Acuity   Urgent              Means of arrival   Walk-In    Escorted by   Caregiver    Service   Surgery-General    Admission type   Emergency              Arrival complaint   Abdominal pain. Blockage. Cathater             Chief Complaint   Patient presents with    Abdominal Pain     Pt had surgery approximately 2 weeks ago where they took his gallbladder and created a colostomy. Tonight after the pt ate he vomited and started with mid abdominal pain, he states he feels like there is a palpable knot in his abdomen that has been causing increasing pain since this evening. Also noted that he has not had output in his colostomy bag since prior to eating last night.        Initial Presentation:   59 yom to ER from home for decreased ileostomy output, N&V, po intolerance, epigastric pain & abd distention. Hx Anxiety, Arthritis, Hypertension, Sleep apnea, and Tobacco abuse. Recent open sigmoid colectomy with primary anastomosis, insertion of preop ureteral stents, flex sig, removal of buttock drain, creation of loop ileostomy, and open cholecystectomy, approx 10 days ago. Presents with abd pain,  distention, N&V. NGT placed in ER. Admission CT: high-grade proximal small bowel obstruction with transition from distended to collapsed small bowel loops in the proximal jejunum in the left upper abdomen. CXR: NG tube in stomach with partially imaged dilated small bowel. Labs: WBC 12.53, Hgb 11.6, , AST 12.  Admitted to inpatient status for SBO. NGT in place, pain mgt.     Date: 8/12/24   Day 2:   POD#17 status post open sigmoidectomy with a low colorectal anastomosis and incidental cholecystectomy for acute on chronic diverticulitis.   Abdomen:  soft, generalized tenderness, no guarding no rebound, hypoactive bowel sounds, ostomy with air and stool in bag. Pain controlled with IV Dilaudid. Very high NG output, ileostomy has started working. Abd wound is much improved; only one site which is open with minimal drainage.  Dry dressing applied per surgeon. Continue NPO, IVF, NGT to low continous suction.     NGT: 2.3 L  Ostomy 950 mL.   UOP:850 mL    ED Triage Vitals [08/11/24 0159]   Temperature Pulse Respirations Blood Pressure SpO2 Pain Score   98.3 °F (36.8 °C) 74 19 156/88 98 % 10 - Worst Possible Pain     Weight (last 2 days)       Date/Time Weight    08/11/24 07:40:07 82.1 (181)            Vital Signs (last 3 days)       Date/Time Temp Pulse Resp BP MAP (mmHg) SpO2 O2 Device Patient Position - Orthostatic VS Pain    08/12/24 08:16:51 98 °F (36.7 °C) 75 18 121/84 96 100 % -- -- --    08/12/24 0346 -- -- -- -- -- -- -- -- 7 08/11/24 2202 -- -- -- -- -- -- -- -- 7 08/11/24 20:40:12 98.8 °F (37.1 °C) 83 16 104/60 75 96 % None (Room air) Lying 5    08/11/24 15:45:44 98.9 °F (37.2 °C) -- -- 135/83 100 -- -- -- --    08/11/24 1307 -- -- -- -- -- -- -- -- 8    08/11/24 0841 -- -- -- -- -- -- -- -- 8 08/11/24 0743 -- -- -- -- -- -- -- -- 8 08/11/24 07:40:07 98.1 °F (36.7 °C) 76 19 153/83 106 98 % None (Room air) -- --    08/11/24 0530 -- 72 20 153/97 120 100 % None (Room air) -- --    08/11/24 0354 --  "-- -- -- -- -- -- -- 10 - Worst Possible Pain    08/11/24 0330 -- 76 20 162/99 124 100 % -- -- --    08/11/24 0224 -- -- -- -- -- -- -- -- 10 - Worst Possible Pain    08/11/24 0159 98.3 °F (36.8 °C) 74 19 156/88 -- 98 % None (Room air) Sitting 10 - Worst Possible Pain              Pertinent Labs/Diagnostic Test Results:   Radiology:  XR abdomen 1 view kub   Final Interpretation by Jose Loza MD (08/11 1012)      Persistent findings of mechanical small bowel obstruction.               Workstation performed: [id number]         XR chest portable   Final Interpretation by Kanika Le MD (08/11 1014)      No acute cardiopulmonary disease.      NG tube in stomach with partially imaged dilated small bowel.      Workstation performed: [id number]         CT abdomen pelvis with contrast   Final Interpretation by Chu Moran MD (08/11 0549)      Findings most consistent with high-grade proximal small bowel obstruction with transition from distended to collapsed small bowel loops in the proximal jejunum in the left upper abdomen.      Mesenteric edema and small volume of ascites. No intra-abdominal or pelvic abscess.      Gas and fluid in the periumbilical abdominal wall probably postsurgical, superficial to intact ventral hernia repair mesh.         I reported these results to SAMIA JANG via HIPAA compliant secure electronic messaging on 8/11/2024 5:49 AM. This examination was also marked \"immediate notification\" in Epic in order to begin the standard process by which the radiology reading    room liaison alerts the referring practitioner.                     Workstation performed: [id number]           Results from last 7 days   Lab Units 08/12/24  0449 08/11/24 0213   WBC Thousand/uL 8.18 12.53*   HEMOGLOBIN g/dL 12.0 11.6*   HEMATOCRIT % 37.2 36.6   PLATELETS Thousands/uL 620* 673*   TOTAL NEUT ABS Thousands/µL 5.73 9.20*     Results from last 7 days   Lab Units 08/12/24  0449 08/11/24 0213 "   SODIUM mmol/L 139 138   POTASSIUM mmol/L 3.8 3.9   CHLORIDE mmol/L 104 103   CO2 mmol/L 28 28   ANION GAP mmol/L 7 7   BUN mg/dL 11 8   CREATININE mg/dL 0.77 0.77   EGFR ml/min/1.73sq m 99 99   CALCIUM mg/dL 9.3 9.4   MAGNESIUM mg/dL 1.9  --    PHOSPHORUS mg/dL 3.4  --      Results from last 7 days   Lab Units 08/11/24  0213   AST U/L 12*   ALT U/L 12   ALK PHOS U/L 54   TOTAL PROTEIN g/dL 7.3   ALBUMIN g/dL 3.9   TOTAL BILIRUBIN mg/dL 0.23     Results from last 7 days   Lab Units 08/12/24  0449 08/11/24  0213   GLUCOSE RANDOM mg/dL 114 100     Results from last 7 days   Lab Units 08/11/24  0213   LACTIC ACID mmol/L 1.3     Results from last 7 days   Lab Units 08/11/24  0213   LIPASE u/L 67     ED Treatment-Medication Administration from 08/11/2024 0148 to 08/11/2024 0728         Date/Time Order Dose Route Action     08/11/2024 0224 morphine injection 4 mg 4 mg Intravenous Given     08/11/2024 0221 ondansetron (ZOFRAN) injection 4 mg 4 mg Intravenous Given     08/11/2024 0225 sodium chloride 0.9 % bolus 1,000 mL 1,000 mL Intravenous New Bag     08/11/2024 0239 iohexol (OMNIPAQUE) 240 MG/ML solution 50 mL 50 mL Oral Given     08/11/2024 0354 morphine injection 4 mg 4 mg Intravenous Given     08/11/2024 0504 iohexol (OMNIPAQUE) 350 MG/ML injection (MULTI-DOSE) 100 mL 100 mL Intravenous Given     08/11/2024 0626 benzocaine (HURRICAINE) 20 % mucosal spray 2 spray 2 spray Mucosal Given     08/11/2024 0627 lidocaine (URO-JET) 2 % urethral/mucosal gel 1 Application 1 Application Urethral Given     08/11/2024 0621 midazolam (VERSED) injection 1 mg 1 mg Intravenous Given            Past Medical History:   Diagnosis Date    Anesthesia complication     body temp burns off anesthesia more quickly--woke up during surgery    Anxiety     due to medical conditions    Arthritis     Hypertension     Sleep apnea     Tobacco abuse      Present on Admission:   SBO (small bowel obstruction) ([facility])      Admitting Diagnosis: SBO (small  bowel obstruction) ([facility]) [K56.609]  Abdominal pain [R10.9]  Age/Sex: 59 y.o. male  Admission Orders:  NGT  Scd/foot pumps    Scheduled Medications:  DULoxetine, 20 mg, Per NG Tube, QPM  enoxaparin, 40 mg, Subcutaneous, Daily  mirtazapine, 15 mg, Per NG Tube, HS  nicotine, 1 patch, Transdermal, Daily    Continuous IV Infusions:  dextrose 5 % and sodium chloride 0.9 % with KCl 20 mEq/L, 125 mL/hr, Intravenous, Continuous    PRN Meds:  acetaminophen, 1,000 mg, Intravenous, Q6H PRN  albuterol, 2 puff, Inhalation, Q4H PRN  HYDROmorphone, 0.2 mg, Intravenous, Q3H PRN, 8/11 x4, 8/12 x2  ondansetron, 4 mg, Intravenous, Q6H PRN, 8/11 x1    Network Utilization Review Department  ATTENTION: Please call with any questions or concerns to [phone] and carefully listen to the prompts so that you are directed to the right person. All voicemails are confidential.   For Discharge needs, contact Care Management DC Support Team at [phone] opt. [phone]  Send all requests for admission clinical reviews, approved or denied determinations and any other requests to dedicated fax number below belonging to the campus where the patient is receiving treatment. List of dedicated fax numbers for the Facilities:  FACILITY NAME UR FAX NUMBER   ADMISSION DENIALS (Administrative/Medical Necessity) [phone]   DISCHARGE SUPPORT TEAM (NETWORK) [phone]   PARENT CHILD HEALTH (Maternity/NICU/Pediatrics) [phone]   [facility] [phone]   [facility] [phone]   [facility] [phone]   [facility] [phone]   [facility] [phone]   [facility] [phone]   [facility] [phone]   [facility] [phone]   [facility] [phone]   [facility]  [phone]   [facility] [phone]   [facility] [phone]

## 2024-08-12 NOTE — PLAN OF CARE
Problem: PAIN - ADULT  Goal: Verbalizes/displays adequate comfort level or baseline comfort level  Description: Interventions:  - Encourage patient to monitor pain and request assistance  - Assess pain using appropriate pain scale  - Administer analgesics based on type and severity of pain and evaluate response  - Implement non-pharmacological measures as appropriate and evaluate response  - Consider cultural and social influences on pain and pain management  - Notify physician/advanced practitioner if interventions unsuccessful or patient reports new pain  Outcome: Progressing     Problem: INFECTION - ADULT  Goal: Absence or prevention of progression during hospitalization  Description: INTERVENTIONS:  - Assess and monitor for signs and symptoms of infection  - Monitor lab/diagnostic results  - Monitor all insertion sites, i.e. indwelling lines, tubes, and drains  - Monitor endotracheal if appropriate and nasal secretions for changes in amount and color  - [place] appropriate cooling/warming therapies per order  - Administer medications as ordered  - Instruct and encourage patient and family to use good hand hygiene technique  - Identify and instruct in appropriate isolation precautions for identified infection/condition  Outcome: Progressing     Problem: DISCHARGE PLANNING  Goal: Discharge to home or other facility with appropriate resources  Description: INTERVENTIONS:  - Identify barriers to discharge w/patient and caregiver  - Arrange for needed discharge resources and transportation as appropriate  - Identify discharge learning needs (meds, wound care, etc.)  - Arrange for interpretive services to assist at discharge as needed  - Refer to Case Management Department for coordinating discharge planning if the patient needs post-hospital services based on physician/advanced practitioner order or complex needs related to functional status, cognitive ability, or social support system  Outcome: Progressing      Problem: Knowledge Deficit  Goal: Patient/family/caregiver demonstrates understanding of disease process, treatment plan, medications, and discharge instructions  Description: Complete learning assessment and assess knowledge base.  Interventions:  - Provide teaching at level of understanding  - Provide teaching via preferred learning methods  Outcome: Progressing     Problem: GASTROINTESTINAL - ADULT  Goal: Minimal or absence of nausea and/or vomiting  Description: INTERVENTIONS:  - Administer IV fluids if ordered to ensure adequate hydration  - Maintain NPO status until nausea and vomiting are resolved  - Nasogastric tube if ordered  - Administer ordered antiemetic medications as needed  - Provide nonpharmacologic comfort measures as appropriate  - Advance diet as tolerated, if ordered  - Consider nutrition services referral to assist patient with adequate nutrition and appropriate food choices  Outcome: Progressing  Goal: Maintains or returns to baseline bowel function  Description: INTERVENTIONS:  - Assess bowel function  - Encourage oral fluids to ensure adequate hydration  - Administer IV fluids if ordered to ensure adequate hydration  - Administer ordered medications as needed  - Encourage mobilization and activity  - Consider nutritional services referral to assist patient with adequate nutrition and appropriate food choices  Outcome: Progressing  Goal: Maintains adequate nutritional intake  Description: INTERVENTIONS:  - Monitor percentage of each meal consumed  - Identify factors contributing to decreased intake, treat as appropriate  - Assist with meals as needed  - Monitor I&O, weight, and lab values if indicated  - Obtain nutrition services referral as needed  Outcome: Progressing  Goal: Establish and maintain optimal ostomy function  Description: INTERVENTIONS:  - Assess bowel function  - Encourage oral fluids to ensure adequate hydration  - Administer IV fluids if ordered to ensure adequate  hydration   - Administer ordered medications as needed  - Encourage mobilization and activity  - Nutrition services referral to assist patient with appropriate food choices  - Assess stoma site  - Consider wound care consult   Outcome: Progressing  Goal: Oral mucous membranes remain intact  Description: INTERVENTIONS  - Assess oral mucosa and hygiene practices  - Implement preventative oral hygiene regimen  - Implement oral medicated treatments as ordered  - Initiate Nutrition services referral as needed  Outcome: Progressing

## 2024-08-12 NOTE — PROGRESS NOTES
Progress Note - General Surgery   Homero Driver Jr. 59 y.o. male MRN: [id number]  Unit/Bed#: 2 [place] [address] Encounter: [id number]    Assessment:  60 y/o s/p ex lap sigmoidectomy, protective loop ileostomy 07/26/24 Dr. Abernathy for perforated sigmoid diverticulitis. Hospital day #2 SBO.     NGT: 2.3 L  Ostomy 950 mL.   UOP:850 mL.     Plan:  Continue NPO, IVF, NGT to low continous suction.   Ice chips okay.   PRN analgesics antiemetics.   OOB and ambulate.   Incentive spirometry.   IV Protonix 40 mg q24.     Subjective/Objective       Subjective: Patient was seen and examined bedside. Patient reports output from ileostomy through the night. Patient reports the bag was ready to pop twice. Abdominal pain improving.     Objective:     Blood pressure 121/84, pulse 75, temperature 98 °F (36.7 °C), resp. rate 18, height 6' (1.829 m), weight 82.1 kg (181 lb), SpO2 100%.,Body mass index is 24.55 kg/m².      Intake/Output Summary (Last 24 hours) at 8/12/2024 0944  Last data filed at 8/12/2024 0600  Gross per 24 hour   Intake 700 ml   Output 3800 ml   Net -3100 ml       Invasive Devices       Peripheral Intravenous Line  Duration             Peripheral IV 08/11/24 Dorsal (posterior);Left Hand <1 day              Drain  Duration             Ileostomy Loop RUQ 16 days    NG/OG/Enteral Tube 16 Fr Right nare 1 day                    Physical Exam: /84   Pulse 75   Temp 98 °F (36.7 °C)   Resp 18   Ht 6' (1.829 m)   Wt 82.1 kg (181 lb)   SpO2 100%   BMI 24.55 kg/m²   General appearance: alert and oriented, in no acute distress  Head: Normocephalic, without obvious abnormality, atraumatic  Lungs:  Normal effort, no respiratory distress.   Heart:  regular rate.   Abdomen:  soft, generalized tenderness, no guarding no rebound, hypoactive bowel sounds, ostomy with air and stool in bag.     Lab, Imaging and other studies:I have personally reviewed pertinent lab results.  , CBC:   Lab Results   Component Value Date    WBC 8.18  08/12/2024    HGB 12.0 08/12/2024    HCT 37.2 08/12/2024    MCV 89 08/12/2024     (H) 08/12/2024    RBC 4.16 08/12/2024    MCH 28.8 08/12/2024    MCHC 32.3 08/12/2024    RDW 15.4 (H) 08/12/2024    MPV 8.9 08/12/2024    NRBC 0 08/12/2024   , CMP:   Lab Results   Component Value Date    SODIUM 139 08/12/2024    K 3.8 08/12/2024     08/12/2024    CO2 28 08/12/2024    BUN 11 08/12/2024    CREATININE 0.77 08/12/2024    CALCIUM 9.3 08/12/2024    EGFR 99 08/12/2024     VTE Pharmacologic Prophylaxis: Enoxaparin (Lovenox)  VTE Mechanical Prophylaxis: sequential compression device

## 2024-08-12 NOTE — PLAN OF CARE
Problem: PAIN - ADULT  Goal: Verbalizes/displays adequate comfort level or baseline comfort level  Description: Interventions:  - Encourage patient to monitor pain and request assistance  - Assess pain using appropriate pain scale  - Administer analgesics based on type and severity of pain and evaluate response  - Implement non-pharmacological measures as appropriate and evaluate response  - Consider cultural and social influences on pain and pain management  - Notify physician/advanced practitioner if interventions unsuccessful or patient reports new pain  Outcome: Progressing     Problem: INFECTION - ADULT  Goal: Absence or prevention of progression during hospitalization  Description: INTERVENTIONS:  - Assess and monitor for signs and symptoms of infection  - Monitor lab/diagnostic results  - Monitor all insertion sites, i.e. indwelling lines, tubes, and drains  - Monitor endotracheal if appropriate and nasal secretions for changes in amount and color  - [place] appropriate cooling/warming therapies per order  - Administer medications as ordered  - Instruct and encourage patient and family to use good hand hygiene technique  - Identify and instruct in appropriate isolation precautions for identified infection/condition  Outcome: Progressing     Problem: DISCHARGE PLANNING  Goal: Discharge to home or other facility with appropriate resources  Description: INTERVENTIONS:  - Identify barriers to discharge w/patient and caregiver  - Arrange for needed discharge resources and transportation as appropriate  - Identify discharge learning needs (meds, wound care, etc.)  - Arrange for interpretive services to assist at discharge as needed  - Refer to Case Management Department for coordinating discharge planning if the patient needs post-hospital services based on physician/advanced practitioner order or complex needs related to functional status, cognitive ability, or social support system  Outcome: Progressing      Problem: Knowledge Deficit  Goal: Patient/family/caregiver demonstrates understanding of disease process, treatment plan, medications, and discharge instructions  Description: Complete learning assessment and assess knowledge base.  Interventions:  - Provide teaching at level of understanding  - Provide teaching via preferred learning methods  Outcome: Progressing     Problem: GASTROINTESTINAL - ADULT  Goal: Minimal or absence of nausea and/or vomiting  Description: INTERVENTIONS:  - Administer IV fluids if ordered to ensure adequate hydration  - Maintain NPO status until nausea and vomiting are resolved  - Nasogastric tube if ordered  - Administer ordered antiemetic medications as needed  - Provide nonpharmacologic comfort measures as appropriate  - Advance diet as tolerated, if ordered  - Consider nutrition services referral to assist patient with adequate nutrition and appropriate food choices  Outcome: Progressing  Goal: Maintains or returns to baseline bowel function  Description: INTERVENTIONS:  - Assess bowel function  - Encourage oral fluids to ensure adequate hydration  - Administer IV fluids if ordered to ensure adequate hydration  - Administer ordered medications as needed  - Encourage mobilization and activity  - Consider nutritional services referral to assist patient with adequate nutrition and appropriate food choices  Outcome: Progressing  Goal: Maintains adequate nutritional intake  Description: INTERVENTIONS:  - Monitor percentage of each meal consumed  - Identify factors contributing to decreased intake, treat as appropriate  - Assist with meals as needed  - Monitor I&O, weight, and lab values if indicated  - Obtain nutrition services referral as needed  Outcome: Progressing  Goal: Establish and maintain optimal ostomy function  Description: INTERVENTIONS:  - Assess bowel function  - Encourage oral fluids to ensure adequate hydration  - Administer IV fluids if ordered to ensure adequate  hydration   - Administer ordered medications as needed  - Encourage mobilization and activity  - Nutrition services referral to assist patient with appropriate food choices  - Assess stoma site  - Consider wound care consult   Outcome: Progressing  Goal: Oral mucous membranes remain intact  Description: INTERVENTIONS  - Assess oral mucosa and hygiene practices  - Implement preventative oral hygiene regimen  - Implement oral medicated treatments as ordered  - Initiate Nutrition services referral as needed  Outcome: Progressing

## 2024-08-13 LAB
ALBUMIN SERPL BCG-MCNC: 3.5 G/DL (ref 3.5–5)
ALP SERPL-CCNC: 48 U/L (ref 34–104)
ALT SERPL W P-5'-P-CCNC: 17 U/L (ref 7–52)
ANION GAP SERPL CALCULATED.3IONS-SCNC: 6 MMOL/L (ref 4–13)
AST SERPL W P-5'-P-CCNC: 14 U/L (ref 13–39)
BILIRUB SERPL-MCNC: 0.37 MG/DL (ref 0.2–1)
BUN SERPL-MCNC: 13 MG/DL (ref 5–25)
CALCIUM SERPL-MCNC: 9.2 MG/DL (ref 8.4–10.2)
CHLORIDE SERPL-SCNC: 104 MMOL/L (ref 96–108)
CO2 SERPL-SCNC: 29 MMOL/L (ref 21–32)
CREAT SERPL-MCNC: 0.83 MG/DL (ref 0.6–1.3)
ERYTHROCYTE [DISTWIDTH] IN BLOOD BY AUTOMATED COUNT: 14.9 % (ref 11.6–15.1)
GFR SERPL CREATININE-BSD FRML MDRD: 96 ML/MIN/1.73SQ M
GLUCOSE SERPL-MCNC: 101 MG/DL (ref 65–140)
HCT VFR BLD AUTO: 36.4 % (ref 36.5–49.3)
HGB BLD-MCNC: 11.4 G/DL (ref 12–17)
MAGNESIUM SERPL-MCNC: 1.8 MG/DL (ref 1.9–2.7)
MCH RBC QN AUTO: 28.3 PG (ref 26.8–34.3)
MCHC RBC AUTO-ENTMCNC: 31.3 G/DL (ref 31.4–37.4)
MCV RBC AUTO: 90 FL (ref 82–98)
PHOSPHATE SERPL-MCNC: 3.3 MG/DL (ref 2.7–4.5)
PLATELET # BLD AUTO: 542 THOUSANDS/UL (ref 149–390)
PMV BLD AUTO: 8.7 FL (ref 8.9–12.7)
POTASSIUM SERPL-SCNC: 3.9 MMOL/L (ref 3.5–5.3)
PROT SERPL-MCNC: 6.4 G/DL (ref 6.4–8.4)
RBC # BLD AUTO: 4.03 MILLION/UL (ref 3.88–5.62)
SODIUM SERPL-SCNC: 139 MMOL/L (ref 135–147)
WBC # BLD AUTO: 9.22 THOUSAND/UL (ref 4.31–10.16)

## 2024-08-13 PROCEDURE — 83735 ASSAY OF MAGNESIUM: CPT | Performed by: PHYSICIAN ASSISTANT

## 2024-08-13 PROCEDURE — 84100 ASSAY OF PHOSPHORUS: CPT | Performed by: PHYSICIAN ASSISTANT

## 2024-08-13 PROCEDURE — 99024 POSTOP FOLLOW-UP VISIT: CPT | Performed by: SURGERY

## 2024-08-13 PROCEDURE — 85027 COMPLETE CBC AUTOMATED: CPT | Performed by: PHYSICIAN ASSISTANT

## 2024-08-13 PROCEDURE — 80053 COMPREHEN METABOLIC PANEL: CPT | Performed by: PHYSICIAN ASSISTANT

## 2024-08-13 RX ORDER — MAGNESIUM SULFATE HEPTAHYDRATE 40 MG/ML
2 INJECTION, SOLUTION INTRAVENOUS ONCE
Status: COMPLETED | OUTPATIENT
Start: 2024-08-13 | End: 2024-08-13

## 2024-08-13 RX ADMIN — NICOTINE 7 MG/24 HR DAILY TRANSDERMAL PATCH 1 PATCH: at 08:27

## 2024-08-13 RX ADMIN — PANTOPRAZOLE SODIUM 40 MG: 40 INJECTION, POWDER, FOR SOLUTION INTRAVENOUS at 08:28

## 2024-08-13 RX ADMIN — KETOROLAC TROMETHAMINE 15 MG: 30 INJECTION, SOLUTION INTRAMUSCULAR; INTRAVENOUS at 05:27

## 2024-08-13 RX ADMIN — KETOROLAC TROMETHAMINE 15 MG: 30 INJECTION, SOLUTION INTRAMUSCULAR; INTRAVENOUS at 12:55

## 2024-08-13 RX ADMIN — MIRTAZAPINE 15 MG: 15 TABLET, FILM COATED ORAL at 21:51

## 2024-08-13 RX ADMIN — KETOROLAC TROMETHAMINE 15 MG: 30 INJECTION, SOLUTION INTRAMUSCULAR; INTRAVENOUS at 18:15

## 2024-08-13 RX ADMIN — KETOROLAC TROMETHAMINE 15 MG: 30 INJECTION, SOLUTION INTRAMUSCULAR; INTRAVENOUS at 23:29

## 2024-08-13 RX ADMIN — DEXTROSE, SODIUM CHLORIDE, AND POTASSIUM CHLORIDE 125 ML/HR: 5; .45; .15 INJECTION INTRAVENOUS at 13:04

## 2024-08-13 RX ADMIN — MAGNESIUM SULFATE HEPTAHYDRATE 2 G: 40 INJECTION, SOLUTION INTRAVENOUS at 08:27

## 2024-08-13 RX ADMIN — DEXTROSE, SODIUM CHLORIDE, AND POTASSIUM CHLORIDE 125 ML/HR: 5; .45; .15 INJECTION INTRAVENOUS at 02:11

## 2024-08-13 RX ADMIN — ENOXAPARIN SODIUM 40 MG: 40 INJECTION SUBCUTANEOUS at 08:27

## 2024-08-13 RX ADMIN — DULOXETINE HYDROCHLORIDE 20 MG: 20 CAPSULE, DELAYED RELEASE ORAL at 18:15

## 2024-08-13 NOTE — PROGRESS NOTES
Progress Note - General Surgery   Homero Driver Jr. 59 y.o. male MRN: [id number]  Unit/Bed#: 2 [place] [address] Encounter: [id number]    Assessment:  58 y/o s/p ex lap sigmoidectomy, protective loop ileostomy 07/26/24 Dr. Abernathy for perforated sigmoid diverticulitis. Hospital day #3 SBO.     NGT:2200 mL clear.   Ostomy patient reports ostomy bag filled up and burst twice through the night.     Mag 1.8    Plan:  NGT discontinued.   Advance to clear liquid diet.   PRN analgesics antiemetics.   OOB and ambulate.   Incentive spirometry.   IV Protonix 40 mg q24.   Electrolyte repletion.     Subjective/Objective       Subjective: Patient was seen and examined bedside. Patient reports output from ileostomy through the night. Patient reports bag leaked twice through the night because it was so full. Abdominal pain improving.     Objective:     Blood pressure 124/99, pulse 66, temperature 98.4 °F (36.9 °C), resp. rate 18, height 6' (1.829 m), weight 82.1 kg (181 lb), SpO2 98%.,Body mass index is 24.55 kg/m².      Intake/Output Summary (Last 24 hours) at 8/13/2024 0813  Last data filed at 8/12/2024 1845  Gross per 24 hour   Intake --   Output 2400 ml   Net -2400 ml       Invasive Devices       Peripheral Intravenous Line  Duration             Peripheral IV 08/11/24 Dorsal (posterior);Left Hand 1 day              Drain  Duration             Ileostomy Loop RUQ 17 days    NG/OG/Enteral Tube 16 Fr Right nare 2 days                    Physical Exam: /99   Pulse 66   Temp 98.4 °F (36.9 °C)   Resp 18   Ht 6' (1.829 m)   Wt 82.1 kg (181 lb)   SpO2 98%   BMI 24.55 kg/m²   General appearance: alert and oriented, in no acute distress  Head: Normocephalic, without obvious abnormality, atraumatic  Lungs:  Normal effort, no respiratory distress.   Heart:  regular rate.   Abdomen:  soft, no guarding no rebound, ostomy with air and stool in bag.     Lab, Imaging and other studies:I have personally reviewed pertinent lab results.  , CBC:    Lab Results   Component Value Date    WBC 9.22 08/13/2024    HGB 11.4 (L) 08/13/2024    HCT 36.4 (L) 08/13/2024    MCV 90 08/13/2024     (H) 08/13/2024    RBC 4.03 08/13/2024    MCH 28.3 08/13/2024    MCHC 31.3 (L) 08/13/2024    RDW 14.9 08/13/2024    MPV 8.7 (L) 08/13/2024   , CMP:   Lab Results   Component Value Date    SODIUM 139 08/13/2024    K 3.9 08/13/2024     08/13/2024    CO2 29 08/13/2024    BUN 13 08/13/2024    CREATININE 0.83 08/13/2024    CALCIUM 9.2 08/13/2024    AST 14 08/13/2024    ALT 17 08/13/2024    ALKPHOS 48 08/13/2024    EGFR 96 08/13/2024     VTE Pharmacologic Prophylaxis: Enoxaparin (Lovenox)  VTE Mechanical Prophylaxis: sequential compression device

## 2024-08-13 NOTE — PLAN OF CARE
Problem: PAIN - ADULT  Goal: Verbalizes/displays adequate comfort level or baseline comfort level  Description: Interventions:  - Encourage patient to monitor pain and request assistance  - Assess pain using appropriate pain scale  - Administer analgesics based on type and severity of pain and evaluate response  - Implement non-pharmacological measures as appropriate and evaluate response  - Consider cultural and social influences on pain and pain management  - Notify physician/advanced practitioner if interventions unsuccessful or patient reports new pain  Outcome: Progressing     Problem: INFECTION - ADULT  Goal: Absence or prevention of progression during hospitalization  Description: INTERVENTIONS:  - Assess and monitor for signs and symptoms of infection  - Monitor lab/diagnostic results  - Monitor all insertion sites, i.e. indwelling lines, tubes, and drains  - Monitor endotracheal if appropriate and nasal secretions for changes in amount and color  - [place] appropriate cooling/warming therapies per order  - Administer medications as ordered  - Instruct and encourage patient and family to use good hand hygiene technique  - Identify and instruct in appropriate isolation precautions for identified infection/condition  Outcome: Progressing     Problem: DISCHARGE PLANNING  Goal: Discharge to home or other facility with appropriate resources  Description: INTERVENTIONS:  - Identify barriers to discharge w/patient and caregiver  - Arrange for needed discharge resources and transportation as appropriate  - Identify discharge learning needs (meds, wound care, etc.)  - Arrange for interpretive services to assist at discharge as needed  - Refer to Case Management Department for coordinating discharge planning if the patient needs post-hospital services based on physician/advanced practitioner order or complex needs related to functional status, cognitive ability, or social support system  Outcome: Progressing      Problem: Knowledge Deficit  Goal: Patient/family/caregiver demonstrates understanding of disease process, treatment plan, medications, and discharge instructions  Description: Complete learning assessment and assess knowledge base.  Interventions:  - Provide teaching at level of understanding  - Provide teaching via preferred learning methods  Outcome: Progressing     Problem: GASTROINTESTINAL - ADULT  Goal: Minimal or absence of nausea and/or vomiting  Description: INTERVENTIONS:  - Administer IV fluids if ordered to ensure adequate hydration  - Maintain NPO status until nausea and vomiting are resolved  - Nasogastric tube if ordered  - Administer ordered antiemetic medications as needed  - Provide nonpharmacologic comfort measures as appropriate  - Advance diet as tolerated, if ordered  - Consider nutrition services referral to assist patient with adequate nutrition and appropriate food choices  Outcome: Progressing  Goal: Maintains or returns to baseline bowel function  Description: INTERVENTIONS:  - Assess bowel function  - Encourage oral fluids to ensure adequate hydration  - Administer IV fluids if ordered to ensure adequate hydration  - Administer ordered medications as needed  - Encourage mobilization and activity  - Consider nutritional services referral to assist patient with adequate nutrition and appropriate food choices  Outcome: Progressing  Goal: Maintains adequate nutritional intake  Description: INTERVENTIONS:  - Monitor percentage of each meal consumed  - Identify factors contributing to decreased intake, treat as appropriate  - Assist with meals as needed  - Monitor I&O, weight, and lab values if indicated  - Obtain nutrition services referral as needed  Outcome: Progressing  Goal: Establish and maintain optimal ostomy function  Description: INTERVENTIONS:  - Assess bowel function  - Encourage oral fluids to ensure adequate hydration  - Administer IV fluids if ordered to ensure adequate  hydration   - Administer ordered medications as needed  - Encourage mobilization and activity  - Nutrition services referral to assist patient with appropriate food choices  - Assess stoma site  - Consider wound care consult   Outcome: Progressing  Goal: Oral mucous membranes remain intact  Description: INTERVENTIONS  - Assess oral mucosa and hygiene practices  - Implement preventative oral hygiene regimen  - Implement oral medicated treatments as ordered  - Initiate Nutrition services referral as needed  Outcome: Progressing

## 2024-08-13 NOTE — PLAN OF CARE
Problem: PAIN - ADULT  Goal: Verbalizes/displays adequate comfort level or baseline comfort level  Description: Interventions:  - Encourage patient to monitor pain and request assistance  - Assess pain using appropriate pain scale  - Administer analgesics based on type and severity of pain and evaluate response  - Implement non-pharmacological measures as appropriate and evaluate response  - Consider cultural and social influences on pain and pain management  - Notify physician/advanced practitioner if interventions unsuccessful or patient reports new pain  Outcome: Progressing     Problem: INFECTION - ADULT  Goal: Absence or prevention of progression during hospitalization  Description: INTERVENTIONS:  - Assess and monitor for signs and symptoms of infection  - Monitor lab/diagnostic results  - Monitor all insertion sites, i.e. indwelling lines, tubes, and drains  - Monitor endotracheal if appropriate and nasal secretions for changes in amount and color  - [place] appropriate cooling/warming therapies per order  - Administer medications as ordered  - Instruct and encourage patient and family to use good hand hygiene technique  - Identify and instruct in appropriate isolation precautions for identified infection/condition  Outcome: Progressing     Problem: DISCHARGE PLANNING  Goal: Discharge to home or other facility with appropriate resources  Description: INTERVENTIONS:  - Identify barriers to discharge w/patient and caregiver  - Arrange for needed discharge resources and transportation as appropriate  - Identify discharge learning needs (meds, wound care, etc.)  - Arrange for interpretive services to assist at discharge as needed  - Refer to Case Management Department for coordinating discharge planning if the patient needs post-hospital services based on physician/advanced practitioner order or complex needs related to functional status, cognitive ability, or social support system  Outcome: Progressing      Problem: Knowledge Deficit  Goal: Patient/family/caregiver demonstrates understanding of disease process, treatment plan, medications, and discharge instructions  Description: Complete learning assessment and assess knowledge base.  Interventions:  - Provide teaching at level of understanding  - Provide teaching via preferred learning methods  Outcome: Progressing     Problem: GASTROINTESTINAL - ADULT  Goal: Minimal or absence of nausea and/or vomiting  Description: INTERVENTIONS:  - Administer IV fluids if ordered to ensure adequate hydration  - Maintain NPO status until nausea and vomiting are resolved  - Nasogastric tube if ordered  - Administer ordered antiemetic medications as needed  - Provide nonpharmacologic comfort measures as appropriate  - Advance diet as tolerated, if ordered  - Consider nutrition services referral to assist patient with adequate nutrition and appropriate food choices  Outcome: Progressing  Goal: Maintains or returns to baseline bowel function  Description: INTERVENTIONS:  - Assess bowel function  - Encourage oral fluids to ensure adequate hydration  - Administer IV fluids if ordered to ensure adequate hydration  - Administer ordered medications as needed  - Encourage mobilization and activity  - Consider nutritional services referral to assist patient with adequate nutrition and appropriate food choices  Outcome: Progressing  Goal: Maintains adequate nutritional intake  Description: INTERVENTIONS:  - Monitor percentage of each meal consumed  - Identify factors contributing to decreased intake, treat as appropriate  - Assist with meals as needed  - Monitor I&O, weight, and lab values if indicated  - Obtain nutrition services referral as needed  Outcome: Progressing  Goal: Establish and maintain optimal ostomy function  Description: INTERVENTIONS:  - Assess bowel function  - Encourage oral fluids to ensure adequate hydration  - Administer IV fluids if ordered to ensure adequate  hydration   - Administer ordered medications as needed  - Encourage mobilization and activity  - Nutrition services referral to assist patient with appropriate food choices  - Assess stoma site  - Consider wound care consult   Outcome: Progressing  Goal: Oral mucous membranes remain intact  Description: INTERVENTIONS  - Assess oral mucosa and hygiene practices  - Implement preventative oral hygiene regimen  - Implement oral medicated treatments as ordered  - Initiate Nutrition services referral as needed  Outcome: Progressing

## 2024-08-14 VITALS
DIASTOLIC BLOOD PRESSURE: 88 MMHG | WEIGHT: 181 LBS | BODY MASS INDEX: 24.52 KG/M2 | SYSTOLIC BLOOD PRESSURE: 135 MMHG | HEIGHT: 72 IN | HEART RATE: 65 BPM | OXYGEN SATURATION: 96 % | RESPIRATION RATE: 16 BRPM | TEMPERATURE: 98.4 F

## 2024-08-14 LAB
ANION GAP SERPL CALCULATED.3IONS-SCNC: 8 MMOL/L (ref 4–13)
BUN SERPL-MCNC: 11 MG/DL (ref 5–25)
CALCIUM SERPL-MCNC: 9.5 MG/DL (ref 8.4–10.2)
CHLORIDE SERPL-SCNC: 100 MMOL/L (ref 96–108)
CO2 SERPL-SCNC: 28 MMOL/L (ref 21–32)
CREAT SERPL-MCNC: 0.92 MG/DL (ref 0.6–1.3)
GFR SERPL CREATININE-BSD FRML MDRD: 90 ML/MIN/1.73SQ M
GLUCOSE SERPL-MCNC: 96 MG/DL (ref 65–140)
MAGNESIUM SERPL-MCNC: 2 MG/DL (ref 1.9–2.7)
PHOSPHATE SERPL-MCNC: 3.2 MG/DL (ref 2.7–4.5)
POTASSIUM SERPL-SCNC: 4 MMOL/L (ref 3.5–5.3)
SODIUM SERPL-SCNC: 136 MMOL/L (ref 135–147)

## 2024-08-14 PROCEDURE — 83735 ASSAY OF MAGNESIUM: CPT | Performed by: PHYSICIAN ASSISTANT

## 2024-08-14 PROCEDURE — 80048 BASIC METABOLIC PNL TOTAL CA: CPT | Performed by: PHYSICIAN ASSISTANT

## 2024-08-14 PROCEDURE — 99024 POSTOP FOLLOW-UP VISIT: CPT | Performed by: PHYSICIAN ASSISTANT

## 2024-08-14 PROCEDURE — 99024 POSTOP FOLLOW-UP VISIT: CPT | Performed by: SURGERY

## 2024-08-14 PROCEDURE — 84100 ASSAY OF PHOSPHORUS: CPT | Performed by: PHYSICIAN ASSISTANT

## 2024-08-14 RX ADMIN — ENOXAPARIN SODIUM 40 MG: 40 INJECTION SUBCUTANEOUS at 09:17

## 2024-08-14 RX ADMIN — NICOTINE 7 MG/24 HR DAILY TRANSDERMAL PATCH 1 PATCH: at 09:16

## 2024-08-14 RX ADMIN — PANTOPRAZOLE SODIUM 40 MG: 40 INJECTION, POWDER, FOR SOLUTION INTRAVENOUS at 09:16

## 2024-08-14 RX ADMIN — KETOROLAC TROMETHAMINE 15 MG: 30 INJECTION, SOLUTION INTRAMUSCULAR; INTRAVENOUS at 05:07

## 2024-08-14 NOTE — NURSING NOTE
Pt d/c to home. Pt ambulated to lobby, declined needing staff escort. IV removed per policy and procedure, occlusive dressing in place. AVS reviewed with patient, no questions at this time. Patient left with all belongings.

## 2024-08-14 NOTE — WOUND OSTOMY CARE
Progress Note - Wound   Homero Driver Jr. 59 y.o. male MRN: [id number]  Unit/Bed#: [address] [place] [address] Encounter: [id number]      Assessment:   This is a 59 year old male patient admitted on 8/11 with small bowel obstruction. Patient AAO x 3 and agreeable to have ostomy visit done. He was seen by this wound/ostomy RN on 7/30 at [facility] for ostomy teaching. Patient is continent of urine and ambulates to bathroom independently. Patient was discharged at time of this note.    Assessment Findings:  1-Right lower quadrant ileostomy with 1 piece flat cut to fit appliance in place. There was 200 ml of bright red drainage in the pouch which was emptied - Surgical Team notified. Patient stated that he ate red jello last night - H&H was down slightly from previous. Patient also stated that he is having difficulty keeping the appliance adhered to his skin and is using up most of his supplies at home due to frequent changes. He is using soap to peristomal site as well as alcohol. He was instructed on 7/30 to use warm water only, no soap or anything else to peristomal site. He was told on 7/30 that if he did, it would most likely prevent the appliance from adhering to his skin. Information reviewed with patient again today and he verbalized good understanding. Patient given 4 one piece cut to fit appliances, adhesive remover, rings and canister. It was explained to him that this ostomy RN cannot order supplies for him since VNA is in place.  2-Sacrobuttocks and heels intact - patient given Prevent barrier cream and was instructed on how to apply it.    Plan: (same as on prior visit - reviewed with patient):  Ostomy Plans:  1-Change ostomy appliance every 3-5 days and as needed for leakage/dislodgement.   2-Remove old appliance gently with push/pull method.  3-Remove adhesive buildup with adhesive remover.  4-Cleanse peristomal site with warm water and pat dry - do not use bath wipes, foaming cleanser or soap as these contain sensitizers which  may cause adverse reaction and moisturizers which may prevent appliance from adhering.   5-Apply ostomy ring then appliance - have patient apply pressure for 3-5 minutes to increase adherence.   6-Apply 2 hydrocolloid barrier strips (located in supply room with ostomy supplies) around wafer to prevent leakage.  7-Empty appliance when 1/3 - 1/2 full to extend wear time.    Discussed assessment findings, and plan of care/recommendations with Aida LOMBARDO & Jasmin RN.    Please call or text with questions and concerns.     Recommendations written as orders.  Anita Durham MSN, RN, CWON

## 2024-08-14 NOTE — DISCHARGE INSTR - OTHER ORDERS
Plan: (same as on prior visit - reviewed with patient):  Ostomy Plans:    1-Change ostomy appliance every 3-5 days and as needed for leakage/dislodgement.   2-Remove old appliance gently with push/pull method.  3-Remove adhesive buildup with adhesive remover.  4-Cleanse peristomal site with warm water and pat dry - do not use bath wipes, foaming cleanser or soap as these contain sensitizers which may cause adverse reaction and moisturizers which may prevent appliance from adhering.   5-Apply ostomy ring then appliance - have patient apply pressure for 3-5 minutes to increase adherence.   6-Apply 2 hydrocolloid barrier strips (located in supply room with ostomy supplies) around wafer to prevent leakage.  7-Empty appliance when 1/3 - 1/2 full to extend wear time.

## 2024-08-14 NOTE — PROGRESS NOTES
"Progress Note - General Surgery   Homero Driver Jr. 59 y.o. male MRN: [id number]  Unit/Bed#: [address] [place] [address] Encounter: [id number]    Assessment:  60 y/o s/p ex lap sigmoidectomy, protective loop ileostomy 07/26/24 Dr. Abernathy for perforated sigmoid diverticulitis. Hospital day #4 SBO resolved.     Tolerating clear liquid diet no difficulties.  Continue to air and stool into ostomy bag.    Plan:  Advance to regular diet.  Okay to discharge once tolerating diet.  OOB and ambulate.  Follow up with Dr. Abernathy.       Subjective/Objective       Subjective: Patient was seen and examined bedside. Patient reports output from ileostomy through the night. Patient reports bag leaked twice through the night because it was so full. Abdominal pain improving.     Objective:     Blood pressure 122/86, pulse 70, temperature 98.2 °F (36.8 °C), temperature source Oral, resp. rate 17, height 6' (1.829 m), weight 82.1 kg (181 lb), SpO2 97%.,Body mass index is 24.55 kg/m².      Intake/Output Summary (Last 24 hours) at 8/14/2024 0804  Last data filed at 8/13/2024 1000  Gross per 24 hour   Intake --   Output 900 ml   Net -900 ml       Invasive Devices       Peripheral Intravenous Line  Duration             Peripheral IV Proximal;Ventral (anterior);Left Forearm -- days              Drain  Duration             Ileostomy Loop RUQ 18 days                    Physical Exam: /86 (BP Location: Left arm)   Pulse 70   Temp 98.2 °F (36.8 °C) (Oral)   Resp 17   Ht 6' (1.829 m)   Wt 82.1 kg (181 lb)   SpO2 97%   BMI 24.55 kg/m²   General appearance: alert and oriented, in no acute distress  Head: Normocephalic, without obvious abnormality, atraumatic  Lungs:  Normal effort, no respiratory distress.   Heart:  regular rate.   Abdomen:  soft, no guarding no rebound, ostomy with air and stool in bag.     Lab, Imaging and other studies:I have personally reviewed pertinent lab results.  , CBC:   No results found for: \"WBC\", \"HGB\", \"HCT\", \"MCV\", \"PLT\", " "\"ADJUSTEDWBC\", \"RBC\", \"MCH\", \"MCHC\", \"RDW\", \"MPV\", \"NRBC\"  , CMP:   Lab Results   Component Value Date    SODIUM 136 08/14/2024    K 4.0 08/14/2024     08/14/2024    CO2 28 08/14/2024    BUN 11 08/14/2024    CREATININE 0.92 08/14/2024    CALCIUM 9.5 08/14/2024    EGFR 90 08/14/2024     VTE Pharmacologic Prophylaxis: Enoxaparin (Lovenox)  VTE Mechanical Prophylaxis: sequential compression device  "

## 2024-08-14 NOTE — CASE MANAGEMENT
Case Management Assessment & Discharge Planning Note    Patient name Homero Driver Jr.  Location 2 [facility] [address] [place] [address] MRN [id number]  : 1965 Date 2024       Current Admission Date: 2024  Current Admission Diagnosis:SBO (small bowel obstruction) (HCC)   Patient Active Problem List    Diagnosis Date Noted Date Diagnosed    SBO (small bowel obstruction) (HCC) 2024     HTN (hypertension) 2024     Sleep apnea 2024     History of unintended awareness under general anesthesia 2024     Diverticulitis of large intestine with abscess 2024     Smoking 2023     S/P cervical spinal fusion 2018     Anxiety 2016     Cervical stenosis of spine 2016     Pseudoarthrosis of cervical spine (HCC) 2016       LOS (days): 3  Geometric Mean LOS (GMLOS) (days):   Days to GMLOS:     OBJECTIVE:  PATIENT READMITTED TO HOSPITAL  Risk of Unplanned Readmission Score: 9.59         Current admission status: Inpatient     Preferred Pharmacy:   [facility]/pharm[facility] #[id number] - [place] - [address]  [address]  [place] [address]  Phone: [phone] Fax: [phone]    Primary Care Provider: Rachelle Tavares MD    Primary Insurance: Curbside [facility]  Secondary Insurance:     ASSESSMENT:  Active Health Care Proxies    There are no active Health Care Proxies on file.       Readmission Root Cause  30 Day Readmission: Yes  Who directed you to return to the hospital?: Self  Did you understand whom to contact if you had questions or problems?: Yes  Did you get your prescriptions before you left the hospital?: No  Reason:: Preference for own pharmacy  Were you able to get your prescriptions filled when you left the hospital?: Yes  Did you take your medications as prescribed?: Yes  Were you able to get to your follow-up appointments?: Yes  During previous admission, was a post-acute recommendation made?: Yes  What post-acute resources were  offered?: HHC  Patient was readmitted due to: SBO    Patient Information  Admitted from:: Home  Mental Status: Alert  During Assessment patient was accompanied by: Not accompanied during assessment  Assessment information provided by:: Patient  Primary Caregiver: Family  Caregiver's Name:: Laura Meera (Significant Other)  Caregiver's Relationship to Patient:: Significant Other  Caregiver's Telephone Number:: [phone]  Support Systems: Spouse/significant other  County of Residence: [place]  [facility] you [facility] [place]  Living Arrangements: Lives w/ Spouse/significant other    Activities of Daily Living Prior to Admission  Does patient currently have HHC?: Yes ([facility])    Current Home Health Care  Type of Current Home Care Services: Nurse visit  Current Home Health Agency:: [facility]  Current Home Health Follow-Up Provider:: STEPHENIE    Patient Information Continued  Does patient have prescription coverage?: Yes  Does patient receive dialysis treatments?: No     Means of Transportation  Means of Transport to Appts:: Family transport      DISCHARGE DETAILS:    Discharge planning discussed with:: Patient  Freedom of Choice: Yes  Comments - Freedom of Choice: D/C home with JUANHCO with [facility]     Contacts  Patient Contacts: Laura Estes (Significant Other)  Relationship to Patient:: Family    Requested Home Health Care         Is the patient interested in HHC at discharge?: Yes  Home Health Discipline requested:: Nursing  Home Health Agency Name:: [facility]  Home Health Follow-Up Provider:: STEPHENIE  Home Health Services Needed:: Wound/Ostomy Care  Homebound Criteria Met:: Requires the Assistance of Another Person for Safe Ambulation or to Leave the Home  Supporting Clincal Findings:: Limited Endurance    DME Referral Provided  Referral made for DME?: No    Other Referral/Resources/Interventions Provided:  Interventions: HHC  Referral Comments: RN CM spoke with patient at bedside to introduce self and role and  screen for dcp. Patient informed RN CM about visiting nurses and requested extra supplies. WOJES Howard made aware and to provide patient with extra supplies. Family to provide transport at d/c.     Treatment Team Recommendation: Home with Home Health Care  Discharge Destination Plan:: Home with Home Health Care  Transport at Discharge : Family

## 2024-08-14 NOTE — PLAN OF CARE
Problem: PAIN - ADULT  Goal: Verbalizes/displays adequate comfort level or baseline comfort level  Description: Interventions:  - Encourage patient to monitor pain and request assistance  - Assess pain using appropriate pain scale  - Administer analgesics based on type and severity of pain and evaluate response  - Implement non-pharmacological measures as appropriate and evaluate response  - Consider cultural and social influences on pain and pain management  - Notify physician/advanced practitioner if interventions unsuccessful or patient reports new pain  Outcome: Progressing     Problem: INFECTION - ADULT  Goal: Absence or prevention of progression during hospitalization  Description: INTERVENTIONS:  - Assess and monitor for signs and symptoms of infection  - Monitor lab/diagnostic results  - Monitor all insertion sites, i.e. indwelling lines, tubes, and drains  - Monitor endotracheal if appropriate and nasal secretions for changes in amount and color  - [place] appropriate cooling/warming therapies per order  - Administer medications as ordered  - Instruct and encourage patient and family to use good hand hygiene technique  - Identify and instruct in appropriate isolation precautions for identified infection/condition  Outcome: Progressing     Problem: DISCHARGE PLANNING  Goal: Discharge to home or other facility with appropriate resources  Description: INTERVENTIONS:  - Identify barriers to discharge w/patient and caregiver  - Arrange for needed discharge resources and transportation as appropriate  - Identify discharge learning needs (meds, wound care, etc.)  - Arrange for interpretive services to assist at discharge as needed  - Refer to Case Management Department for coordinating discharge planning if the patient needs post-hospital services based on physician/advanced practitioner order or complex needs related to functional status, cognitive ability, or social support system  Outcome: Progressing      Problem: Knowledge Deficit  Goal: Patient/family/caregiver demonstrates understanding of disease process, treatment plan, medications, and discharge instructions  Description: Complete learning assessment and assess knowledge base.  Interventions:  - Provide teaching at level of understanding  - Provide teaching via preferred learning methods  Outcome: Progressing     Problem: GASTROINTESTINAL - ADULT  Goal: Minimal or absence of nausea and/or vomiting  Description: INTERVENTIONS:  - Administer IV fluids if ordered to ensure adequate hydration  - Maintain NPO status until nausea and vomiting are resolved  - Nasogastric tube if ordered  - Administer ordered antiemetic medications as needed  - Provide nonpharmacologic comfort measures as appropriate  - Advance diet as tolerated, if ordered  - Consider nutrition services referral to assist patient with adequate nutrition and appropriate food choices  Outcome: Progressing  Goal: Maintains or returns to baseline bowel function  Description: INTERVENTIONS:  - Assess bowel function  - Encourage oral fluids to ensure adequate hydration  - Administer IV fluids if ordered to ensure adequate hydration  - Administer ordered medications as needed  - Encourage mobilization and activity  - Consider nutritional services referral to assist patient with adequate nutrition and appropriate food choices  Outcome: Progressing  Goal: Maintains adequate nutritional intake  Description: INTERVENTIONS:  - Monitor percentage of each meal consumed  - Identify factors contributing to decreased intake, treat as appropriate  - Assist with meals as needed  - Monitor I&O, weight, and lab values if indicated  - Obtain nutrition services referral as needed  Outcome: Progressing  Goal: Establish and maintain optimal ostomy function  Description: INTERVENTIONS:  - Assess bowel function  - Encourage oral fluids to ensure adequate hydration  - Administer IV fluids if ordered to ensure adequate  hydration   - Administer ordered medications as needed  - Encourage mobilization and activity  - Nutrition services referral to assist patient with appropriate food choices  - Assess stoma site  - Consider wound care consult   Outcome: Progressing  Goal: Oral mucous membranes remain intact  Description: INTERVENTIONS  - Assess oral mucosa and hygiene practices  - Implement preventative oral hygiene regimen  - Implement oral medicated treatments as ordered  - Initiate Nutrition services referral as needed  Outcome: Progressing     Problem: Nutrition/Hydration-ADULT  Goal: Nutrient/Hydration intake appropriate for improving, restoring or maintaining nutritional needs  Description: Monitor and assess patient's nutrition/hydration status for malnutrition. Collaborate with interdisciplinary team and initiate plan and interventions as ordered.  Monitor patient's weight and dietary intake as ordered or per policy. Utilize nutrition screening tool and intervene as necessary. Determine patient's food preferences and provide high-protein, high-caloric foods as appropriate.     INTERVENTIONS:  - Monitor oral intake, urinary output, labs, and treatment plans  - Assess nutrition and hydration status and recommend course of action  - Evaluate amount of meals eaten  - Assist patient with eating if necessary   - Allow adequate time for meals  - Recommend/ encourage appropriate diets, oral nutritional supplements, and vitamin/mineral supplements  - Order, calculate, and assess calorie counts as needed  - Recommend, monitor, and adjust tube feedings and TPN/PPN based on assessed needs  - Assess need for intravenous fluids  - Provide specific nutrition/hydration education as appropriate  - Include patient/family/caregiver in decisions related to nutrition  Outcome: Progressing

## 2024-08-14 NOTE — DISCHARGE SUMMARY
Discharge Summary - Homero Driver Jr. 59 y.o. male MRN: [id number]    Unit/Bed#: [address] [place] [address] Encounter: [id number]    Admission Date: 8/11/2024   Discharge Date: 8/14/2024    Admitting Diagnosis:   SBO (small bowel obstruction) ([facility]) [K56.609]  Abdominal pain [R10.9]    Discharge Diagnoses: Principal Problem:    SBO (small bowel obstruction) ([facility])      Consultations:  ostomy nurse     Procedures Performed:  n/a    HPI per admission H&P:  Homero Driver Jr. is a 59 y.o. male with history of perforated sigmoid diverticulitis with pericolonic abscess in February 2024 status post elective sigmoid colectomy with cholecystectomy and protective loop ileostomy 7/26/24 by Dr. Abernathy presenting with abdominal pain, vomiting, and decreased ostomy output.  Patient reports no ostomy output since 6 AM yesterday morning.  Patient had 2 large bowls of macaroni salad for lunch yesterday and reports progressively worsening abdominal pain since that time.  Patient reports associated abdominal distention and lack of any further output in his ostomy bag. Patient is uncomfortable and states he has been using oxycodone at home for pain.  Patient was seen in the outpatient office a few days ago and was reportedly normal and healing well at that time.        Hospital Course: Homero Driver Jr. is a 59 y.o. male admitted for small bowel obstruction occurring 2 weeks after elective sigmoid colon resection.  NG tube was placed with a large amount of output. Patient's abdominal distention and discomfort resolved by hospital day 1.  NG tube was removed hospital day 2 after patient had return of bowel function as evidenced by normal ileostomy output.  Diet was advanced as tolerated.  Patient discharged home hospital day 3 with plan to follow-up with his surgeon, Dr. Abernathy, in the outpatient office          Condition at Discharge: good     Discharge instructions/Information to patient and family:   See after visit summary for information provided to  patient and family.      Provisions for Follow-Up Care:  See after visit summary for information related to follow-up care and any pertinent home health orders.      Disposition: Home    Planned Readmission: No    Discharge Statement   I spent 20 minutes discharging the patient. This time was spent on the day of discharge. I had direct contact with the patient on the day of discharge. Additional documentation is required if more than 30 minutes were spent on discharge.     Discharge Medications:  See after visit summary for reconciled discharge medications provided to patient and family.

## 2024-08-15 ENCOUNTER — HOME CARE VISIT (OUTPATIENT)
Dept: HOME HEALTH SERVICES | Facility: HOME HEALTHCARE | Age: 59
End: 2024-08-15
Payer: MEDICARE

## 2024-08-17 ENCOUNTER — HOME CARE VISIT (OUTPATIENT)
Dept: HOME HEALTH SERVICES | Facility: HOME HEALTHCARE | Age: 59
End: 2024-08-17
Payer: MEDICARE

## 2024-08-17 VITALS
HEART RATE: 76 BPM | RESPIRATION RATE: 18 BRPM | OXYGEN SATURATION: 99 % | SYSTOLIC BLOOD PRESSURE: 128 MMHG | DIASTOLIC BLOOD PRESSURE: 68 MMHG | TEMPERATURE: 98.2 F

## 2024-08-17 PROCEDURE — G0299 HHS/HOSPICE OF RN EA 15 MIN: HCPCS

## 2024-08-19 ENCOUNTER — HOME CARE VISIT (OUTPATIENT)
Dept: HOME HEALTH SERVICES | Facility: HOME HEALTHCARE | Age: 59
End: 2024-08-19
Payer: MEDICARE

## 2024-08-20 NOTE — CASE COMMUNICATION
TC to schedule routine visit patient reports being out of town tomorrow and would like SN visit on Wednesday 8/21/24.

## 2024-08-21 ENCOUNTER — HOME CARE VISIT (OUTPATIENT)
Dept: HOME HEALTH SERVICES | Facility: HOME HEALTHCARE | Age: 59
End: 2024-08-21
Payer: MEDICARE

## 2024-08-21 VITALS
OXYGEN SATURATION: 96 % | DIASTOLIC BLOOD PRESSURE: 74 MMHG | SYSTOLIC BLOOD PRESSURE: 120 MMHG | HEART RATE: 62 BPM | TEMPERATURE: 96.2 F | RESPIRATION RATE: 16 BRPM

## 2024-08-21 PROCEDURE — G0299 HHS/HOSPICE OF RN EA 15 MIN: HCPCS

## 2024-08-21 NOTE — CASE COMMUNICATION
Ship to Pt. Home  Insurance - [employer] MA   Ordering MD - Dr Adelso Abernathy     Stomahesive powder  NOT STOCKED [id number]

## 2024-08-23 NOTE — UTILIZATION REVIEW
NOTIFICATION OF ADMISSION DISCHARGE   This is a Notification of Discharge from [facility]. Please be advised that this patient has been discharge from our facility. Below you will find the admission and discharge date and time including the patient’s disposition.   UTILIZATION REVIEW CONTACT:  Pam Jay  Utilization   Network Utilization Review Department  Phone: [phone] x carefully listen to the prompts. All voicemails are confidential.  Email: [email or web address]     ADMISSION INFORMATION  PRESENTATION DATE: 8/11/2024  1:53 AM  OBERVATION ADMISSION DATE: N/A  INPATIENT ADMISSION DATE: 8/11/24  6:10 AM   DISCHARGE DATE: 8/14/2024 12:25 PM   DISPOSITION:Home with Home Health Care    Network Utilization Review Department  ATTENTION: Please call with any questions or concerns to [phone] and carefully listen to the prompts so that you are directed to the right person. All voicemails are confidential.   For Discharge needs, contact Care Management DC Support Team at [phone] opt. 2  Send all requests for admission clinical reviews, approved or denied determinations and any other requests to dedicated fax number below belonging to the campus where the patient is receiving treatment. List of dedicated fax numbers for the Facilities:  FACILITY NAME UR FAX NUMBER   ADMISSION DENIALS (Administrative/Medical Necessity) [phone]   DISCHARGE SUPPORT TEAM ([facility]) [phone]   PARENT CHILD HEALTH (Maternity/NICU/Pediatrics) [phone]   [facility] [phone]   [facility] [phone]   [facility] [phone]   [facility] [phone]   [facility] [phone]   [facility] [phone]   General acute hospital [phone]   [facility]  [phone]   [facility] [phone]   [facility] [phone]   [facility] [phone]   [facility] [phone]

## 2024-08-27 ENCOUNTER — OFFICE VISIT (OUTPATIENT)
Dept: SURGERY | Facility: CLINIC | Age: 59
End: 2024-08-27

## 2024-08-27 VITALS
OXYGEN SATURATION: 99 % | TEMPERATURE: 97.1 F | DIASTOLIC BLOOD PRESSURE: 76 MMHG | HEART RATE: 82 BPM | HEIGHT: 72 IN | BODY MASS INDEX: 22.81 KG/M2 | SYSTOLIC BLOOD PRESSURE: 140 MMHG | WEIGHT: 168.4 LBS

## 2024-08-27 DIAGNOSIS — Z93.2 ILEOSTOMY STATUS (HCC): Primary | ICD-10-CM

## 2024-08-27 PROCEDURE — 99024 POSTOP FOLLOW-UP VISIT: CPT | Performed by: SURGERY

## 2024-08-27 NOTE — PROGRESS NOTES
Assessment/Plan:     Diagnoses and all orders for this visit:    Ileostomy status (HCC)     Patient is being scheduled for a barium enema next week to rule out a leak at the colorectal anastomosis.  He will be seen in the office after the barium enema for reversal of ileostomy.    Subjective:      Patient ID: Homero Driver Jr. is a 59 y.o. male.    HPI  Patient is approximately 5 weeks status post open sigmoid colectomy with a protective loop ileostomy performed for acute on chronic diverticulitis with abscess.    Patient was recently hospitalized at [facility] with an episode of small bowel obstruction which resolved with conservative treatment.    Patient has been well since discharge.  He has no further nausea, vomiting or abdominal pain.  His ileostomy is working satisfactorily.    The following portions of the patient's history were reviewed and updated as appropriate: allergies, current medications, past family history, past medical history, past social history, past surgical history, and problem list.    Review of Systems    Noncontributory    Objective:    /76 (BP Location: Right arm, Patient Position: Sitting, Cuff Size: Standard)   Pulse 82   Temp (!) 97.1 °F (36.2 °C) (Tympanic)   Ht 6' (1.829 m)   Wt 76.4 kg (168 lb 6.4 oz)   SpO2 99%   BMI 22.84 kg/m²      Physical Exam    The midline incision has healed satisfactorily  The ileostomy on the right side is healthy

## 2024-08-30 ENCOUNTER — HOME CARE VISIT (OUTPATIENT)
Dept: HOME HEALTH SERVICES | Facility: HOME HEALTHCARE | Age: 59
End: 2024-08-30
Payer: MEDICARE

## 2024-09-05 ENCOUNTER — TELEPHONE (OUTPATIENT)
Age: 59
End: 2024-09-05

## 2024-09-05 ENCOUNTER — HOME CARE VISIT (OUTPATIENT)
Dept: HOME HEALTH SERVICES | Facility: HOME HEALTHCARE | Age: 59
End: 2024-09-05
Payer: MEDICARE

## 2024-09-05 NOTE — TELEPHONE ENCOUNTER
Pt called other PCP office in [facility]. Pt looking to receive a referral to [facility] regarding his ostomy bag since [facility] is not able to do the blue-dye process. Please route to correct PCP office, I apologize I do not have the right information for the correct office.

## 2024-09-10 ENCOUNTER — TELEPHONE (OUTPATIENT)
Age: 59
End: 2024-09-10

## 2024-09-12 ENCOUNTER — HOME CARE VISIT (OUTPATIENT)
Dept: HOME HEALTH SERVICES | Facility: HOME HEALTHCARE | Age: 59
End: 2024-09-12
Payer: MEDICARE

## 2024-09-13 ENCOUNTER — NURSE TRIAGE (OUTPATIENT)
Age: 59
End: 2024-09-13

## 2024-09-23 ENCOUNTER — HOME CARE VISIT (OUTPATIENT)
Dept: HOME HEALTH SERVICES | Facility: HOME HEALTHCARE | Age: 59
End: 2024-09-23
Payer: MEDICARE

## 2024-09-24 NOTE — CASE COMMUNICATION
Back office please fax to PCP Rachelle Tavares MD  Fax Fax: [phone]   Patient [place] .  1965  Discharge [facility] patient refused further care.

## 2024-10-09 ENCOUNTER — TELEPHONE (OUTPATIENT)
Age: 59
End: 2024-10-09

## 2024-10-09 NOTE — TELEPHONE ENCOUNTER
Patient of . Is scheduled to have Barium Enema tomorrow, 10/11/24. Calling today to go over prep instructions. All questions answered to patient's satisfaction.

## 2024-10-10 ENCOUNTER — TELEPHONE (OUTPATIENT)
Age: 59
End: 2024-10-10

## 2024-10-10 ENCOUNTER — HOSPITAL ENCOUNTER (OUTPATIENT)
Dept: RADIOLOGY | Facility: HOSPITAL | Age: 59
Discharge: HOME | End: 2024-10-10
Attending: SURGERY
Payer: MEDICARE

## 2024-10-10 DIAGNOSIS — Z93.2 ILEOSTOMY STATUS (HCC): ICD-10-CM

## 2024-10-10 PROCEDURE — 74270 X-RAY XM COLON 1CNTRST STD: CPT

## 2024-10-10 RX ORDER — DIATRIZOATE MEGLUMINE AND DIATRIZOATE SODIUM 660; 100 MG/ML; MG/ML
480 SOLUTION ORAL; RECTAL
Status: COMPLETED | OUTPATIENT
Start: 2024-10-10 | End: 2024-10-10

## 2024-10-10 RX ADMIN — DIATRIZOATE MEGLUMINE AND DIATRIZOATE SODIUM 480 ML: 660; 100 LIQUID ORAL; RECTAL at 09:00

## 2024-10-10 NOTE — TELEPHONE ENCOUNTER
Patient of . Calling to inform his surgeon that he had barium enema study today. Patient is asking when he need to return to office to discuss results and set up reversal surgery.

## 2024-10-14 ENCOUNTER — APPOINTMENT (EMERGENCY)
Dept: RADIOLOGY | Facility: HOSPITAL | Age: 59
DRG: 247 | End: 2024-10-14
Payer: MEDICARE

## 2024-10-14 ENCOUNTER — HOSPITAL ENCOUNTER (INPATIENT)
Facility: HOSPITAL | Age: 59
LOS: 3 days | Discharge: HOME | DRG: 247 | End: 2024-10-17
Attending: EMERGENCY MEDICINE | Admitting: INTERNAL MEDICINE
Payer: MEDICARE

## 2024-10-14 DIAGNOSIS — K56.609 SBO (SMALL BOWEL OBSTRUCTION) (HCC): ICD-10-CM

## 2024-10-14 DIAGNOSIS — R10.9 ABDOMINAL PAIN: Primary | ICD-10-CM

## 2024-10-14 DIAGNOSIS — F17.200 SMOKING: ICD-10-CM

## 2024-10-14 LAB
ALBUMIN SERPL BCG-MCNC: 4.6 G/DL (ref 3.5–5)
ALP SERPL-CCNC: 59 U/L (ref 34–104)
ALT SERPL W P-5'-P-CCNC: 7 U/L (ref 7–52)
ANION GAP SERPL CALCULATED.3IONS-SCNC: 7 MMOL/L (ref 4–13)
AST SERPL W P-5'-P-CCNC: 14 U/L (ref 13–39)
BASOPHILS # BLD AUTO: 0.03 THOUSANDS/ΜL (ref 0–0.1)
BASOPHILS NFR BLD AUTO: 0 % (ref 0–1)
BILIRUB SERPL-MCNC: 0.41 MG/DL (ref 0.2–1)
BUN SERPL-MCNC: 10 MG/DL (ref 5–25)
CALCIUM SERPL-MCNC: 9.9 MG/DL (ref 8.4–10.2)
CHLORIDE SERPL-SCNC: 102 MMOL/L (ref 96–108)
CO2 SERPL-SCNC: 25 MMOL/L (ref 21–32)
CREAT SERPL-MCNC: 0.95 MG/DL (ref 0.6–1.3)
EOSINOPHIL # BLD AUTO: 0.05 THOUSAND/ΜL (ref 0–0.61)
EOSINOPHIL NFR BLD AUTO: 1 % (ref 0–6)
ERYTHROCYTE [DISTWIDTH] IN BLOOD BY AUTOMATED COUNT: 16.9 % (ref 11.6–15.1)
GFR SERPL CREATININE-BSD FRML MDRD: 87 ML/MIN/1.73SQ M
GLUCOSE SERPL-MCNC: 120 MG/DL (ref 65–140)
HCT VFR BLD AUTO: 45.7 % (ref 36.5–49.3)
HGB BLD-MCNC: 14.2 G/DL (ref 12–17)
IMM GRANULOCYTES # BLD AUTO: 0.02 THOUSAND/UL (ref 0–0.2)
IMM GRANULOCYTES NFR BLD AUTO: 0 % (ref 0–2)
LYMPHOCYTES # BLD AUTO: 1.79 THOUSANDS/ΜL (ref 0.6–4.47)
LYMPHOCYTES NFR BLD AUTO: 23 % (ref 14–44)
MCH RBC QN AUTO: 28.6 PG (ref 26.8–34.3)
MCHC RBC AUTO-ENTMCNC: 31.1 G/DL (ref 31.4–37.4)
MCV RBC AUTO: 92 FL (ref 82–98)
MONOCYTES # BLD AUTO: 0.51 THOUSAND/ΜL (ref 0.17–1.22)
MONOCYTES NFR BLD AUTO: 7 % (ref 4–12)
NEUTROPHILS # BLD AUTO: 5.45 THOUSANDS/ΜL (ref 1.85–7.62)
NEUTS SEG NFR BLD AUTO: 69 % (ref 43–75)
NRBC BLD AUTO-RTO: 0 /100 WBCS
PLATELET # BLD AUTO: 318 THOUSANDS/UL (ref 149–390)
PMV BLD AUTO: 8.5 FL (ref 8.9–12.7)
POTASSIUM SERPL-SCNC: 4.1 MMOL/L (ref 3.5–5.3)
PROT SERPL-MCNC: 8.4 G/DL (ref 6.4–8.4)
RBC # BLD AUTO: 4.96 MILLION/UL (ref 3.88–5.62)
SODIUM SERPL-SCNC: 134 MMOL/L (ref 135–147)
WBC # BLD AUTO: 7.85 THOUSAND/UL (ref 4.31–10.16)

## 2024-10-14 PROCEDURE — 80053 COMPREHEN METABOLIC PANEL: CPT | Performed by: EMERGENCY MEDICINE

## 2024-10-14 PROCEDURE — 36415 COLL VENOUS BLD VENIPUNCTURE: CPT | Performed by: EMERGENCY MEDICINE

## 2024-10-14 PROCEDURE — 85025 COMPLETE CBC W/AUTO DIFF WBC: CPT | Performed by: EMERGENCY MEDICINE

## 2024-10-14 PROCEDURE — 96361 HYDRATE IV INFUSION ADD-ON: CPT

## 2024-10-14 PROCEDURE — 0D9670Z DRAINAGE OF STOMACH WITH DRAINAGE DEVICE, VIA NATURAL OR ARTIFICIAL OPENING: ICD-10-PCS | Performed by: INTERNAL MEDICINE

## 2024-10-14 PROCEDURE — 96374 THER/PROPH/DIAG INJ IV PUSH: CPT

## 2024-10-14 PROCEDURE — 99285 EMERGENCY DEPT VISIT HI MDM: CPT | Performed by: EMERGENCY MEDICINE

## 2024-10-14 PROCEDURE — 99284 EMERGENCY DEPT VISIT MOD MDM: CPT

## 2024-10-14 PROCEDURE — 74177 CT ABD & PELVIS W/CONTRAST: CPT

## 2024-10-14 RX ORDER — NICOTINE 21 MG/24HR
1 PATCH, TRANSDERMAL 24 HOURS TRANSDERMAL DAILY
Status: DISCONTINUED | OUTPATIENT
Start: 2024-10-15 | End: 2024-10-17 | Stop reason: HOSPADM

## 2024-10-14 RX ORDER — SODIUM CHLORIDE, SODIUM LACTATE, POTASSIUM CHLORIDE, CALCIUM CHLORIDE 600; 310; 30; 20 MG/100ML; MG/100ML; MG/100ML; MG/100ML
75 INJECTION, SOLUTION INTRAVENOUS CONTINUOUS
Status: DISCONTINUED | OUTPATIENT
Start: 2024-10-14 | End: 2024-10-17

## 2024-10-14 RX ORDER — HYDROMORPHONE HCL/PF 1 MG/ML
1 SYRINGE (ML) INJECTION ONCE
Status: COMPLETED | OUTPATIENT
Start: 2024-10-14 | End: 2024-10-14

## 2024-10-14 RX ORDER — HYDROMORPHONE HCL/PF 1 MG/ML
0.5 SYRINGE (ML) INJECTION ONCE
Status: COMPLETED | OUTPATIENT
Start: 2024-10-14 | End: 2024-10-14

## 2024-10-14 RX ORDER — ONDANSETRON 2 MG/ML
4 INJECTION INTRAMUSCULAR; INTRAVENOUS EVERY 6 HOURS PRN
Status: DISCONTINUED | OUTPATIENT
Start: 2024-10-14 | End: 2024-10-17 | Stop reason: HOSPADM

## 2024-10-14 RX ORDER — PANTOPRAZOLE SODIUM 40 MG/10ML
40 INJECTION, POWDER, LYOPHILIZED, FOR SOLUTION INTRAVENOUS
Status: DISCONTINUED | OUTPATIENT
Start: 2024-10-15 | End: 2024-10-17 | Stop reason: HOSPADM

## 2024-10-14 RX ORDER — HYDROMORPHONE HCL IN WATER/PF 6 MG/30 ML
0.2 PATIENT CONTROLLED ANALGESIA SYRINGE INTRAVENOUS EVERY 4 HOURS PRN
Status: DISCONTINUED | OUTPATIENT
Start: 2024-10-14 | End: 2024-10-17 | Stop reason: HOSPADM

## 2024-10-14 RX ORDER — ONDANSETRON 2 MG/ML
4 INJECTION INTRAMUSCULAR; INTRAVENOUS ONCE
Status: DISCONTINUED | OUTPATIENT
Start: 2024-10-14 | End: 2024-10-17 | Stop reason: HOSPADM

## 2024-10-14 RX ADMIN — IOHEXOL 100 ML: 350 INJECTION, SOLUTION INTRAVENOUS at 20:08

## 2024-10-14 RX ADMIN — SODIUM CHLORIDE, SODIUM LACTATE, POTASSIUM CHLORIDE, AND CALCIUM CHLORIDE 75 ML/HR: .6; .31; .03; .02 INJECTION, SOLUTION INTRAVENOUS at 23:50

## 2024-10-14 RX ADMIN — HYDROMORPHONE HYDROCHLORIDE 0.5 MG: 1 INJECTION, SOLUTION INTRAMUSCULAR; INTRAVENOUS; SUBCUTANEOUS at 18:51

## 2024-10-14 RX ADMIN — SODIUM CHLORIDE 500 ML: 0.9 INJECTION, SOLUTION INTRAVENOUS at 18:49

## 2024-10-14 RX ADMIN — HYDROMORPHONE HYDROCHLORIDE 1 MG: 1 INJECTION, SOLUTION INTRAMUSCULAR; INTRAVENOUS; SUBCUTANEOUS at 21:39

## 2024-10-14 NOTE — ED PROVIDER NOTES
Time reflects when diagnosis was documented in both MDM as applicable and the Disposition within this note       Time User Action Codes Description Comment    10/14/2024  9:16 PM Gregkp Kaleigh A Add [R10.9] Abdominal pain     10/14/2024  9:16 PM Kaleigh Gandhi Add [K56.609] SBO (small bowel obstruction) (HCC)           ED Disposition       ED Disposition   Admit    Condition   Stable    Date/Time   Mon Oct 14, 2024  9:16 PM    Comment   Case was discussed with NICO Faith and the patient's admission status was agreed to be Admission Status: inpatient status to the service of Dr. HECK:IM .               Assessment & Plan       Medical Decision Making  Prior records reviewed.  Pt. Admitted with SBO 8/11/2024, resolved with NGT decompression.  CT scan tonight confirms partial SBO.  Discussed with Dr. Celestin who wants NGT and SLIM to admit.  Pt. Getting IVF and medicated for pain and nausea.    Amount and/or Complexity of Data Reviewed  Labs: ordered.  Radiology: ordered.    Risk  Prescription drug management.  Decision regarding hospitalization.             Medications   ondansetron (ZOFRAN) injection 4 mg (0 mg Intravenous Hold 10/14/24 1853)   HYDROmorphone (DILAUDID) injection 1 mg (has no administration in time range)   sodium chloride 0.9 % bolus 500 mL (0 mL Intravenous Stopped 10/14/24 2009)   HYDROmorphone (DILAUDID) injection 0.5 mg (0.5 mg Intravenous Given 10/14/24 1851)   iohexol (OMNIPAQUE) 350 MG/ML injection (MULTI-DOSE) 100 mL (100 mL Intravenous Given 10/14/24 2008)       ED Risk Strat Scores                           SBIRT 22yo+      Flowsheet Row Most Recent Value   Initial Alcohol Screen: US AUDIT-C     1. How often do you have a drink containing alcohol? 0 Filed at: 10/14/2024 1804   2. How many drinks containing alcohol do you have on a typical day you are drinking?  0 Filed at: 10/14/2024 1804   3a. Male UNDER 65: How often do you have five or more drinks on one occasion? 0 Filed at:  "10/14/2024 1804   3b. FEMALE Any Age, or MALE 65+: How often do you have 4 or more drinks on one occassion? 0 Filed at: 10/14/2024 1804   Audit-C Score 0 Filed at: 10/14/2024 1804   TAYA: How many times in the past year have you...    Used an illegal drug or used a prescription medication for non-medical reasons? Never Filed at: 10/14/2024 1804                            History of Present Illness       Chief Complaint   Patient presents with    Abdominal Pain     States center abd pain since yesterday and thinks \"it's not chewed meat like last time\" has ostomy from black mold eating lining of stomach, but bag has not been active since yesterday       Past Medical History:   Diagnosis Date    Anesthesia complication     body temp burns off anesthesia more quickly--woke up during surgery    Anxiety     due to medical conditions    Arthritis     Hypertension     Sleep apnea     Tobacco abuse       Past Surgical History:   Procedure Laterality Date    BACK SURGERY      CERVICAL FUSION N/A 04/05/2016    Procedure: FUSION CERVICAL POSTERIOR - revision of instrumentation T1;  Surgeon: Srikanth Siu MD;  [facility];  Service:     COLONOSCOPY      FOOT MASS EXCISION Right     HERNIA REPAIR Right     x2    INCISION AND DRAINAGE POSTERIOR SPINE N/A 04/27/2016    Procedure: INCISION AND DRAINAGE (I&D) SPINE;  Surgeon: Srikanth Siu MD;  [facility];  Service:     IR DRAINAGE TUBE CHECK/CHANGE/REPOSITION/REINSERTION/UPSIZE  03/01/2024    IR DRAINAGE TUBE CHECK/CHANGE/REPOSITION/REINSERTION/UPSIZE  03/21/2024    IR DRAINAGE TUBE CHECK/CHANGE/REPOSITION/REINSERTION/UPSIZE  04/05/2024    IR DRAINAGE TUBE CHECK/CHANGE/REPOSITION/REINSERTION/UPSIZE  05/23/2024    IR DRAINAGE TUBE PLACEMENT  02/18/2024    CO ARTHRODESIS PST/PSTLAT TQ 1NTRSPC EA ADDL NTRSPC N/A 04/04/2016    Procedure: C3-C6 POSTERIOR CERVICAL LAMINECTOMY AND DECOMPRESSION; C3-T2 INSTRUMENTED POSTERIOR CERVICAL SPINAL FUSION WITH POSSIBLE " ADDITIONAL LEVELS WITH ALLOGRAFT AND AUTOGRAFT (IMPULSE MONITORING);  Surgeon: Srikanth Siu MD;  Location: [facility];  Service: Orthopedics    NE CYSTO BLADDER W/URETERAL CATHETERIZATION Bilateral 7/26/2024    Procedure: INSERTION URETERAL CATHETERS PREOP;  Surgeon: Vitaliy Leahy MD;  Location: [facility];  Service: Urology    NE LAPAROSCOPY COLECTOMY PARTIAL W/ANASTOMOSIS N/A 7/26/2024    Procedure: SIGMOID COLECTOMY, CHOLECYSTECTOMY, CREATION OF LOOP ILEOSTOMY, REMOVAL OF URETERAL STENTS. REMOVAL OF LEFT BUTTOCK ABSCESS DRAIN.;  Surgeon: Adelso Abernathy MD;  Location: [facility];  Service: General    NE SIGMOIDOSCOPY FLX DX W/COLLJ SPEC BR/WA IF PFRMD N/A 7/26/2024    Procedure: FLEXIBLE SIGMOIDOSCOPY;  Surgeon: Adelso Abernathy MD;  Location: [facility];  Service: General    SHOULDER OPEN ROTATOR CUFF REPAIR Bilateral       Family History   Problem Relation Age of Onset    Cancer Mother     Cancer Father     Cancer Family       Social History     Tobacco Use    Smoking status: Every Day     Current packs/day: 0.25     Average packs/day: 0.3 packs/day for 15.0 years (3.8 ttl pk-yrs)     Types: Cigarettes     Passive exposure: Never    Smokeless tobacco: Never    Tobacco comments:     2 cigarettes per day     Vapes on weekends   Vaping Use    Vaping status: Some Days    Substances: Flavoring   Substance Use Topics    Alcohol use: Yes     Alcohol/week: 2.0 standard drinks of alcohol     Types: 2 Shots of liquor per week     Comment: weekends    Drug use: Yes     Frequency: 7.0 times per week     Types: Marijuana     Comment: one joint daily for pain last 2 days ago      E-Cigarette/Vaping    E-Cigarette Use Current Some Day User       E-Cigarette/Vaping Substances    Nicotine No     THC No     CBD No     Flavoring Yes     Other No     Unknown No       I have reviewed and agree with the history as documented.     60 yo male with ostomy bag c/o worsening left sided abdominal pain.  + associated  decreased output from ostomy bag and one episode of nausea and vomiting.  He has h/o SBO and feels this is similar.  No fever, cough.      History provided by:  Patient   used: No    Abdominal Pain      Review of Systems   Gastrointestinal:  Positive for abdominal pain.           Objective       ED Triage Vitals   Temperature Pulse Blood Pressure Respirations SpO2 Patient Position - Orthostatic VS   10/14/24 1804 10/14/24 1804 10/14/24 1807 10/14/24 1804 10/14/24 1804 10/14/24 1824   (!) 97.3 °F (36.3 °C) 87 113/71 18 100 % Lying      Temp Source Heart Rate Source BP Location FiO2 (%) Pain Score    10/14/24 1824 10/14/24 2100 10/14/24 1824 -- 10/14/24 1804    Oral Monitor Right arm  10 - Worst Possible Pain      Vitals      Date and Time Temp Pulse SpO2 Resp BP Pain Score FACES Pain Rating User   10/14/24 2115 -- 72 98 % -- -- -- -- CS   10/14/24 2100 -- 66 98 % 18 123/63 -- -- AM   10/14/24 1930 -- 62 98 % -- 116/80 -- -- SG   10/14/24 1920 -- -- -- -- -- No Pain -- KECIA   10/14/24 1851 -- -- -- -- -- 6 -- KR   10/14/24 1824 98.8 °F (37.1 °C) 73 100 % 18 144/92 -- -- PK   10/14/24 1807 -- -- -- -- 113/71 -- -- JOYCE   10/14/24 1804 97.3 °F (36.3 °C) 87 100 % 18 -- 10 - Worst Possible Pain -- JOYCE            Physical Exam    Results Reviewed       Procedure Component Value Units Date/Time    Comprehensive metabolic panel [[id number]]  (Abnormal) Collected: 10/14/24 1848    Lab Status: Final result Specimen: Blood from Arm, Left Updated: 10/14/24 1913     Sodium 134 mmol/L      Potassium 4.1 mmol/L      Chloride 102 mmol/L      CO2 25 mmol/L      ANION GAP 7 mmol/L      BUN 10 mg/dL      Creatinine 0.95 mg/dL      Glucose 120 mg/dL      Calcium 9.9 mg/dL      AST 14 U/L      ALT 7 U/L      Alkaline Phosphatase 59 U/L      Total Protein 8.4 g/dL      Albumin 4.6 g/dL      Total Bilirubin 0.41 mg/dL      eGFR 87 ml/min/1.73sq m     Narrative:      National Kidney Disease Foundation guidelines for Chronic  Kidney Disease (CKD):     Stage 1 with normal or high GFR (GFR > 90 mL/min/1.73 square meters)    Stage 2 Mild CKD (GFR = 60-89 mL/min/1.73 square meters)    Stage 3A Moderate CKD (GFR = 45-59 mL/min/1.73 square meters)    Stage 3B Moderate CKD (GFR = 30-44 mL/min/1.73 square meters)    Stage 4 Severe CKD (GFR = 15-29 mL/min/1.73 square meters)    Stage 5 End Stage CKD (GFR <15 mL/min/1.73 square meters)  Note: GFR calculation is accurate only with a steady state creatinine    CBC and differential [[id number]]  (Abnormal) Collected: 10/14/24 1848    Lab Status: Final result Specimen: Blood from Arm, Left Updated: 10/14/24 1853     WBC 7.85 Thousand/uL      RBC 4.96 Million/uL      Hemoglobin 14.2 g/dL      Hematocrit 45.7 %      MCV 92 fL      MCH 28.6 pg      MCHC 31.1 g/dL      RDW 16.9 %      MPV 8.5 fL      Platelets 318 Thousands/uL      nRBC 0 /100 WBCs      Segmented % 69 %      Immature Grans % 0 %      Lymphocytes % 23 %      Monocytes % 7 %      Eosinophils Relative 1 %      Basophils Relative 0 %      Absolute Neutrophils 5.45 Thousands/µL      Absolute Immature Grans 0.02 Thousand/uL      Absolute Lymphocytes 1.79 Thousands/µL      Absolute Monocytes 0.51 Thousand/µL      Eosinophils Absolute 0.05 Thousand/µL      Basophils Absolute 0.03 Thousands/µL             CT abdomen pelvis with contrast   Final Interpretation by Ivelisse Parks MD (10/14 2055)         TECHNIQUE: CT examination of the abdomen and pelvis was performed. Multiplanar 2D reformatted images were created from the source data.      This examination, like all CT scans performed in the [facility], was performed utilizing techniques to minimize radiation dose exposure, including the use of iterative reconstruction and automated exposure control. Radiation dose length    product (DLP) for this visit: 477 mGy-cm      IV Contrast: 100 mL of iohexol (OMNIPAQUE)   Enteric Contrast: Not administered.      FINDINGS:      ABDOMEN       LOWER CHEST: No clinically significant abnormality in the visualized lower chest.      LIVER/BILIARY TREE: Unremarkable.      GALLBLADDER: Status post cholecystectomy.      SPLEEN: Unremarkable.      PANCREAS: Unremarkable.      ADRENAL GLANDS: Unremarkable.      KIDNEYS/URETERS: Unremarkable. No hydronephrosis.      STOMACH AND BOWEL: Sigmoid anastomosis appears to be intact with no adjacent fluid collection. The proximal colon is opacified with contrast presumably from recent barium enema.      Dilated fluid-filled loops of small bowel primarily in the pelvis proximal to the right upper quadrant ileostomy which is collapsed compatible with small bowel obstruction. There is a potential caliber change against the hernia mesh in the anterior    abdominal wall. No pneumatosis or free air.      Mild rectal wall thickening unchanged.124      APPENDIX: No findings to suggest appendicitis.      ABDOMINOPELVIC CAVITY: No ascites. No pneumoperitoneum. No lymphadenopathy.      VESSELS: Unremarkable for patient's age.      PELVIS      REPRODUCTIVE ORGANS: Unremarkable for patient's age.      URINARY BLADDER: Unremarkable.      ABDOMINAL WALL/INGUINAL REGIONS: Changes from prior ventral hernia repair with mesh in place. Previously noted gas and fluid in the anterior abdominal wall has resolved.      BONES: No acute fracture or suspicious osseous lesion. Spinal degenerative changes.      IMPRESSION:      Findings compatible with partial small bowel obstruction proximal to the right upper quadrant ileostomy, possibly involving adhesive disease to the anterior abdominal wall in the region of the mesh. No loculated fluid collection or free air.      The study was marked in EPIC for immediate notification.         Workstation performed: [id number]             Procedures    ED Medication and Procedure Management   Prior to Admission Medications   Prescriptions Last Dose Informant Patient Reported? Taking?   DULoxetine (CYMBALTA)  20 mg capsule  Self Yes No   Sig: Take 20 mg by mouth every evening   albuterol (PROVENTIL HFA,VENTOLIN HFA) 90 mcg/act inhaler  Self Yes No   Sig: INHALE 2 PUFFS INTO THE LUNGS EVERY 4 HOURS AS NEEDED FOR 30 DAYS   amLODIPine (NORVASC) 10 mg tablet   Yes No   Sig: Take 10 mg by mouth daily   cyclobenzaprine (FLEXERIL) 10 mg tablet  Self Yes No   Sig: TAKE 1 TABLET BY MOUTH THREE TIMES A DAY AS NEEDED FOR 10 DAYS   gabapentin (NEURONTIN) 300 mg capsule  Self Yes No   Sig: Take 100 mg by mouth daily at bedtime   meloxicam (MOBIC) 15 mg tablet   Yes No   Sig: TAKE 1 TABLET EVERY DAY BY ORAL ROUTE AS NEEDED.   mirtazapine (REMERON) 15 mg tablet  Self Yes No   Sig: Take 15 mg by mouth daily at bedtime        Facility-Administered Medications: None     Patient's Medications   Discharge Prescriptions    No medications on file     No discharge procedures on file.  ED SEPSIS DOCUMENTATION   Time reflects when diagnosis was documented in both MDM as applicable and the Disposition within this note       Time User Action Codes Description Comment    10/14/2024  9:16 PM Kaleigh Gandhi Add [R10.9] Abdominal pain     10/14/2024  9:16 PM Kaleigh Gandhi Add [K56.609] SBO (small bowel obstruction) ([facility])                  Kaleigh Gandhi MD  10/14/24 2120

## 2024-10-15 ENCOUNTER — APPOINTMENT (INPATIENT)
Dept: RADIOLOGY | Facility: HOSPITAL | Age: 59
DRG: 247 | End: 2024-10-15
Payer: MEDICARE

## 2024-10-15 LAB
ALBUMIN SERPL BCG-MCNC: 4.1 G/DL (ref 3.5–5)
ALP SERPL-CCNC: 54 U/L (ref 34–104)
ALT SERPL W P-5'-P-CCNC: 7 U/L (ref 7–52)
ANION GAP SERPL CALCULATED.3IONS-SCNC: 7 MMOL/L (ref 4–13)
AST SERPL W P-5'-P-CCNC: 12 U/L (ref 13–39)
BASOPHILS # BLD AUTO: 0.03 THOUSANDS/ΜL (ref 0–0.1)
BASOPHILS NFR BLD AUTO: 1 % (ref 0–1)
BILIRUB SERPL-MCNC: 0.36 MG/DL (ref 0.2–1)
BUN SERPL-MCNC: 10 MG/DL (ref 5–25)
CALCIUM SERPL-MCNC: 9.2 MG/DL (ref 8.4–10.2)
CHLORIDE SERPL-SCNC: 102 MMOL/L (ref 96–108)
CO2 SERPL-SCNC: 23 MMOL/L (ref 21–32)
CREAT SERPL-MCNC: 0.8 MG/DL (ref 0.6–1.3)
EOSINOPHIL # BLD AUTO: 0.02 THOUSAND/ΜL (ref 0–0.61)
EOSINOPHIL NFR BLD AUTO: 0 % (ref 0–6)
ERYTHROCYTE [DISTWIDTH] IN BLOOD BY AUTOMATED COUNT: 16.8 % (ref 11.6–15.1)
GFR SERPL CREATININE-BSD FRML MDRD: 97 ML/MIN/1.73SQ M
GLUCOSE SERPL-MCNC: 100 MG/DL (ref 65–140)
GLUCOSE SERPL-MCNC: 102 MG/DL (ref 65–140)
GLUCOSE SERPL-MCNC: 102 MG/DL (ref 65–140)
GLUCOSE SERPL-MCNC: 105 MG/DL (ref 65–140)
GLUCOSE SERPL-MCNC: 80 MG/DL (ref 65–140)
GLUCOSE SERPL-MCNC: 91 MG/DL (ref 65–140)
HCT VFR BLD AUTO: 40.9 % (ref 36.5–49.3)
HGB BLD-MCNC: 12.8 G/DL (ref 12–17)
IMM GRANULOCYTES # BLD AUTO: 0.03 THOUSAND/UL (ref 0–0.2)
IMM GRANULOCYTES NFR BLD AUTO: 1 % (ref 0–2)
LYMPHOCYTES # BLD AUTO: 2.06 THOUSANDS/ΜL (ref 0.6–4.47)
LYMPHOCYTES NFR BLD AUTO: 34 % (ref 14–44)
MAGNESIUM SERPL-MCNC: 2 MG/DL (ref 1.9–2.7)
MCH RBC QN AUTO: 28.8 PG (ref 26.8–34.3)
MCHC RBC AUTO-ENTMCNC: 31.3 G/DL (ref 31.4–37.4)
MCV RBC AUTO: 92 FL (ref 82–98)
MONOCYTES # BLD AUTO: 0.67 THOUSAND/ΜL (ref 0.17–1.22)
MONOCYTES NFR BLD AUTO: 11 % (ref 4–12)
NEUTROPHILS # BLD AUTO: 3.33 THOUSANDS/ΜL (ref 1.85–7.62)
NEUTS SEG NFR BLD AUTO: 53 % (ref 43–75)
NRBC BLD AUTO-RTO: 0 /100 WBCS
PLATELET # BLD AUTO: 294 THOUSANDS/UL (ref 149–390)
PMV BLD AUTO: 9 FL (ref 8.9–12.7)
POTASSIUM SERPL-SCNC: 4 MMOL/L (ref 3.5–5.3)
PROT SERPL-MCNC: 7.4 G/DL (ref 6.4–8.4)
RBC # BLD AUTO: 4.45 MILLION/UL (ref 3.88–5.62)
SODIUM SERPL-SCNC: 132 MMOL/L (ref 135–147)
WBC # BLD AUTO: 6.14 THOUSAND/UL (ref 4.31–10.16)

## 2024-10-15 PROCEDURE — 82948 REAGENT STRIP/BLOOD GLUCOSE: CPT

## 2024-10-15 PROCEDURE — 99254 IP/OBS CNSLTJ NEW/EST MOD 60: CPT | Performed by: PHYSICIAN ASSISTANT

## 2024-10-15 PROCEDURE — 74018 RADEX ABDOMEN 1 VIEW: CPT

## 2024-10-15 PROCEDURE — 99232 SBSQ HOSP IP/OBS MODERATE 35: CPT | Performed by: INTERNAL MEDICINE

## 2024-10-15 PROCEDURE — 71045 X-RAY EXAM CHEST 1 VIEW: CPT

## 2024-10-15 PROCEDURE — 83735 ASSAY OF MAGNESIUM: CPT | Performed by: HOSPITALIST

## 2024-10-15 PROCEDURE — 85025 COMPLETE CBC W/AUTO DIFF WBC: CPT | Performed by: HOSPITALIST

## 2024-10-15 PROCEDURE — 80053 COMPREHEN METABOLIC PANEL: CPT | Performed by: HOSPITALIST

## 2024-10-15 RX ORDER — ENOXAPARIN SODIUM 100 MG/ML
40 INJECTION SUBCUTANEOUS
Status: DISCONTINUED | OUTPATIENT
Start: 2024-10-16 | End: 2024-10-17 | Stop reason: HOSPADM

## 2024-10-15 RX ADMIN — SODIUM CHLORIDE, SODIUM LACTATE, POTASSIUM CHLORIDE, AND CALCIUM CHLORIDE 125 ML/HR: .6; .31; .03; .02 INJECTION, SOLUTION INTRAVENOUS at 19:57

## 2024-10-15 RX ADMIN — HYDROMORPHONE HYDROCHLORIDE 0.2 MG: 0.2 INJECTION, SOLUTION INTRAMUSCULAR; INTRAVENOUS; SUBCUTANEOUS at 13:20

## 2024-10-15 RX ADMIN — HYDROMORPHONE HYDROCHLORIDE 0.2 MG: 0.2 INJECTION, SOLUTION INTRAMUSCULAR; INTRAVENOUS; SUBCUTANEOUS at 18:23

## 2024-10-15 RX ADMIN — PANTOPRAZOLE SODIUM 40 MG: 40 INJECTION, POWDER, FOR SOLUTION INTRAVENOUS at 05:25

## 2024-10-15 RX ADMIN — SODIUM CHLORIDE, SODIUM LACTATE, POTASSIUM CHLORIDE, AND CALCIUM CHLORIDE 125 ML/HR: .6; .31; .03; .02 INJECTION, SOLUTION INTRAVENOUS at 12:26

## 2024-10-15 RX ADMIN — NICOTINE 1 PATCH: 14 PATCH, EXTENDED RELEASE TRANSDERMAL at 09:32

## 2024-10-15 NOTE — NURSING NOTE
Requested a soft cervical collar for patient per surgery order. Requested from central supply, AM nursing supervisor and PM nursing supervisor. Patient upset that surgery told him they ordered it and he was not able to get it. Passed it along to [employer] to try and get for patient.

## 2024-10-15 NOTE — ASSESSMENT & PLAN NOTE
Patient with a history of perforated sigmoid diverticulitis with pericolonic abscess s/p elective sigmoid colectomy with cholecystectomy and protective loop ileostomy.  Recent admission for SBO in August 2024.  Continue NG tube with low intermittent suction  Monitor output from NG tube  Repeat chest x-ray in a.m.  IV fluid  N.p.o; hold his usual meds.  Abdominal x-ray in a.m.  Surgical follow-up

## 2024-10-15 NOTE — PLAN OF CARE
Problem: PAIN - ADULT  Goal: Verbalizes/displays adequate comfort level or baseline comfort level  Description: Interventions:  - Encourage patient to monitor pain and request assistance  - Assess pain using appropriate pain scale  - Administer analgesics based on type and severity of pain and evaluate response  - Implement non-pharmacological measures as appropriate and evaluate response  - Consider cultural and social influences on pain and pain management  - Notify physician/advanced practitioner if interventions unsuccessful or patient reports new pain  Outcome: Progressing     Problem: INFECTION - ADULT  Goal: Absence or prevention of progression during hospitalization  Description: INTERVENTIONS:  - Assess and monitor for signs and symptoms of infection  - Monitor lab/diagnostic results  - Monitor all insertion sites, i.e. indwelling lines, tubes, and drains  - Monitor endotracheal if appropriate and nasal secretions for changes in amount and color  - [place] appropriate cooling/warming therapies per order  - Administer medications as ordered  - Instruct and encourage patient and family to use good hand hygiene technique  - Identify and instruct in appropriate isolation precautions for identified infection/condition  Outcome: Progressing  Goal: Absence of fever/infection during neutropenic period  Description: INTERVENTIONS:  - Monitor WBC    Outcome: Progressing     Problem: SAFETY ADULT  Goal: Patient will remain free of falls  Description: INTERVENTIONS:  - Educate patient/family on patient safety including physical limitations  - Instruct patient to call for assistance with activity   - Consult OT/PT to assist with strengthening/mobility   - Keep Call bell within reach  - Keep bed low and locked with side rails adjusted as appropriate  - Keep care items and personal belongings within reach  - Initiate and maintain comfort rounds  - Make Fall Risk Sign visible to staff  - Offer Toileting every 2 Hours,  in advance of need  - Obtain necessary fall risk management equipment: yellow socks  - Apply yellow socks and bracelet for high fall risk patients  - Consider moving patient to room near nurses station  Outcome: Progressing  Goal: Maintain or return to baseline ADL function  Description: INTERVENTIONS:  -  Assess patient's ability to carry out ADLs; assess patient's baseline for ADL function and identify physical deficits which impact ability to perform ADLs (bathing, care of mouth/teeth, toileting, grooming, dressing, etc.)  - Assess/evaluate cause of self-care deficits   - Assess range of motion  - Assess patient's mobility; develop plan if impaired  - Assess patient's need for assistive devices and provide as appropriate  - Encourage maximum independence but intervene and supervise when necessary  - Involve family in performance of ADLs  - Assess for home care needs following discharge   - Consider OT consult to assist with ADL evaluation and planning for discharge  - Provide patient education as appropriate  Outcome: Progressing  Goal: Maintains/Returns to pre admission functional level  Description: INTERVENTIONS:  - Perform AM-PAC 6 Click Basic Mobility/ Daily Activity assessment daily.  - Set and communicate daily mobility goal to care team and patient/family/caregiver.   - Collaborate with rehabilitation services on mobility goals if consulted  - Perform Range of Motion 2 times a day.  - Reposition patient every 2 hours.  - Dangle patient 3 times a day  - Stand patient 3 times a day  - Ambulate patient 2 times a day  - Out of bed to chair 3 times a day   - Out of bed for meals 3 times a day  - Out of bed for toileting  - Record patient progress and toleration of activity level   Outcome: Progressing     Problem: DISCHARGE PLANNING  Goal: Discharge to home or other facility with appropriate resources  Description: INTERVENTIONS:  - Identify barriers to discharge w/patient and caregiver  - Arrange for needed  discharge resources and transportation as appropriate  - Identify discharge learning needs (meds, wound care, etc.)  - Arrange for interpretive services to assist at discharge as needed  - Refer to Case Management Department for coordinating discharge planning if the patient needs post-hospital services based on physician/advanced practitioner order or complex needs related to functional status, cognitive ability, or social support system  Outcome: Progressing     Problem: Knowledge Deficit  Goal: Patient/family/caregiver demonstrates understanding of disease process, treatment plan, medications, and discharge instructions  Description: Complete learning assessment and assess knowledge base.  Interventions:  - Provide teaching at level of understanding  - Provide teaching via preferred learning methods  Outcome: Progressing     Problem: Nutrition/Hydration-ADULT  Goal: Nutrient/Hydration intake appropriate for improving, restoring or maintaining nutritional needs  Description: Monitor and assess patient's nutrition/hydration status for malnutrition. Collaborate with interdisciplinary team and initiate plan and interventions as ordered.  Monitor patient's weight and dietary intake as ordered or per policy. Utilize nutrition screening tool and intervene as necessary. Determine patient's food preferences and provide high-protein, high-caloric foods as appropriate.     INTERVENTIONS:  - Monitor oral intake, urinary output, labs, and treatment plans  - Assess nutrition and hydration status and recommend course of action  - Evaluate amount of meals eaten  - Assist patient with eating if necessary   - Allow adequate time for meals  - Recommend/ encourage appropriate diets, oral nutritional supplements, and vitamin/mineral supplements  - Order, calculate, and assess calorie counts as needed  - Recommend, monitor, and adjust tube feedings and TPN/PPN based on assessed needs  - Assess need for intravenous fluids  - Provide  specific nutrition/hydration education as appropriate  - Include patient/family/caregiver in decisions related to nutrition  Outcome: Progressing     Problem: GASTROINTESTINAL - ADULT  Goal: Minimal or absence of nausea and/or vomiting  Description: INTERVENTIONS:  - Administer IV fluids if ordered to ensure adequate hydration  - Maintain NPO status until nausea and vomiting are resolved  - Nasogastric tube if ordered  - Administer ordered antiemetic medications as needed  - Provide nonpharmacologic comfort measures as appropriate  - Advance diet as tolerated, if ordered  - Consider nutrition services referral to assist patient with adequate nutrition and appropriate food choices  Outcome: Progressing  Goal: Maintains or returns to baseline bowel function  Description: INTERVENTIONS:  - Assess bowel function  - Encourage oral fluids to ensure adequate hydration  - Administer IV fluids if ordered to ensure adequate hydration  - Administer ordered medications as needed  - Encourage mobilization and activity  - Consider nutritional services referral to assist patient with adequate nutrition and appropriate food choices  Outcome: Progressing  Goal: Maintains adequate nutritional intake  Description: INTERVENTIONS:  - Monitor percentage of each meal consumed  - Identify factors contributing to decreased intake, treat as appropriate  - Assist with meals as needed  - Monitor I&O, weight, and lab values if indicated  - Obtain nutrition services referral as needed  Outcome: Progressing  Goal: Establish and maintain optimal ostomy function  Description: INTERVENTIONS:  - Assess bowel function  - Encourage oral fluids to ensure adequate hydration  - Administer IV fluids if ordered to ensure adequate hydration   - Administer ordered medications as needed  - Encourage mobilization and activity  - Nutrition services referral to assist patient with appropriate food choices  - Assess stoma site  - Consider wound care consult    Outcome: Progressing  Goal: Oral mucous membranes remain intact  Description: INTERVENTIONS  - Assess oral mucosa and hygiene practices  - Implement preventative oral hygiene regimen  - Implement oral medicated treatments as ordered  - Initiate Nutrition services referral as needed  Outcome: Progressing     Problem: METABOLIC, FLUID AND ELECTROLYTES - ADULT  Goal: Electrolytes maintained within normal limits  Description: INTERVENTIONS:  - Monitor labs and assess patient for signs and symptoms of electrolyte imbalances  - Administer electrolyte replacement as ordered  - Monitor response to electrolyte replacements, including repeat lab results as appropriate  - Instruct patient on fluid and nutrition as appropriate  Outcome: Progressing  Goal: Fluid balance maintained  Description: INTERVENTIONS:  - Monitor labs   - Monitor I/O and WT  - Instruct patient on fluid and nutrition as appropriate  - Assess for signs & symptoms of volume excess or deficit  Outcome: Progressing  Goal: Glucose maintained within target range  Description: INTERVENTIONS:  - Monitor Blood Glucose as ordered  - Assess for signs and symptoms of hyperglycemia and hypoglycemia  - Administer ordered medications to maintain glucose within target range  - Assess nutritional intake and initiate nutrition service referral as needed  Outcome: Progressing

## 2024-10-15 NOTE — ASSESSMENT & PLAN NOTE
Stable, and relatively well-controlled, passing gas and small amounts of liquid into ileostomy pouch, does appear to have diffusely distended small bowel, has not had robust amount of ileostomy output since Saturday morning, has not been eating puréed diet recently which she attributes to some of his obstructive qualities, nausea but no vomiting, generalized mild abdominal tenderness without any guarding, labs within normal limits, CT exam reviewed no obvious transition point noted but caliber of bowel does appear distended diffusely, exit point of stoma does appear close to lobe of liver, patient historically did have dense adhesions on last surgery adhesions may also be a cause, of note the patient does not necessarily have wound dehiscence based on CT scan, patient does have draining wounds from prior surgery that may represent sinus tract versus development of small fistula, historically patient did have mesh placement    NG tube on low continuous suction  -Ordered neck brace  -As needed analgesia  -N.p.o.  -IV fluids bumped up to 125 cc/h  -Patient education  -Will digitalize stoma  -Repeat POCT study through the NG tube tomorrow  -Portable chest x-ray to confirm NG tube placement  -Continue Mepilex over midline  -Reviewed CT scan  -Repeat CBC, BMP, mag, Phos with a.m. labs

## 2024-10-15 NOTE — H&P
H&P - Hospitalist   Name: Homero Driver Jr. 59 y.o. male I MRN: [id number]  Unit/Bed#: 4 [address] I Date of Admission: 10/14/2024   Date of Service: 10/14/2024 I Hospital Day: 0     Assessment & Plan  SBO (small bowel obstruction) (HCC)  Patient with a history of perforated sigmoid diverticulitis with pericolonic abscess s/p elective sigmoid colectomy with cholecystectomy and protective loop ileostomy.  Recent admission for SBO in August 2024.  Continue NG tube with low intermittent suction  Monitor output from NG tube  Repeat chest x-ray in a.m.  IV fluid  N.p.o; hold his usual meds.  Abdominal x-ray in a.m.  Surgical follow-up  Surgical wound dehiscence  Patient has some dehiscence of his surgical incision with some drainage.  Surgical follow-up.  Smoking  Nicotine patch.  HTN (hypertension)  Monitor blood pressures expectantly.      VTE Pharmacologic Prophylaxis:   Moderate Risk (Score 3-4) - Pharmacological DVT Prophylaxis Ordered: none as may need prcoedures.  Code Status: Level 1 - Full Code   Discussion with family:  none at bedside.     Anticipated Length of Stay: Patient will be admitted on an inpatient basis with an anticipated length of stay of greater than 2 midnights secondary to SBO.    History of Present Illness   Chief Complaint: Abdominal pain    Homero Driver Jr. is a 59 y.o. male with a PMH of perforated sigmoid diverticulitis with pericolonic abscess and febrile 2024 s/p elective sigmoid colectomy with cholecystectomy and protective loop ileostomy July 26, 2024 by Dr. West who presents with abdominal pain decreased ostomy output.  Patient was admitted to the surgical service in August 2024 during which hospitalization patient was treated for SBO that was treated conservatively with NG tube.  Patient reports that yesterday he started developing intermittent abdominal cramps that were worse with movement.  No nausea vomiting.  He decreased his p.o. intake.  His stool from the colostomy was less  than usual and he did not have out of the colostomy.  He will come into the ER.  CT of the abdomen shows partial small bowel obstruction proximal to the right upper quadrant ileostomy possibly involving adhesive disease to the anterior abdominal wall in the region of the mesh no loculated fluid collection or free air.  ER spoke to general surgery NG tube has been placed and at the time of evaluation had 300 cc output    At the time of evaluation patient reports that she feels better with less abdominal bloating and pressure.  He just complains of NG tube discomfort.  Offers no other complaints.  Historical Information   Past Medical History:   Diagnosis Date    Anesthesia complication     body temp burns off anesthesia more quickly--woke up during surgery    Anxiety     due to medical conditions    Arthritis     Hypertension     Sleep apnea     Tobacco abuse      Past Surgical History:   Procedure Laterality Date    BACK SURGERY      CERVICAL FUSION N/A 04/05/2016    Procedure: FUSION CERVICAL POSTERIOR - revision of instrumentation T1;  Surgeon: Srikanth Siu MD;  [facility];  Service:     COLONOSCOPY      FOOT MASS EXCISION Right     HERNIA REPAIR Right     x2    INCISION AND DRAINAGE POSTERIOR SPINE N/A 04/27/2016    Procedure: INCISION AND DRAINAGE (I&D) SPINE;  Surgeon: Srikanth Siu MD;  [facility];  Service:     IR DRAINAGE TUBE CHECK/CHANGE/REPOSITION/REINSERTION/UPSIZE  03/01/2024    IR DRAINAGE TUBE CHECK/CHANGE/REPOSITION/REINSERTION/UPSIZE  03/21/2024    IR DRAINAGE TUBE CHECK/CHANGE/REPOSITION/REINSERTION/UPSIZE  04/05/2024    IR DRAINAGE TUBE CHECK/CHANGE/REPOSITION/REINSERTION/UPSIZE  05/23/2024    IR DRAINAGE TUBE PLACEMENT  02/18/2024    MS ARTHRODESIS PST/PSTLAT TQ 1NTRSPC EA ADDL NTRSPC N/A 04/04/2016    Procedure: C3-C6 POSTERIOR CERVICAL LAMINECTOMY AND DECOMPRESSION; C3-T2 INSTRUMENTED POSTERIOR CERVICAL SPINAL FUSION WITH POSSIBLE ADDITIONAL LEVELS WITH ALLOGRAFT AND  AUTOGRAFT (IMPULSE MONITORING);  Surgeon: Srikanth Siu MD;  Location: [facility];  Service: Orthopedics    WY CYSTO BLADDER W/URETERAL CATHETERIZATION Bilateral 7/26/2024    Procedure: INSERTION URETERAL CATHETERS PREOP;  Surgeon: Vitaliy Leahy MD;  Location: [facility];  Service: Urology    WY LAPAROSCOPY COLECTOMY PARTIAL W/ANASTOMOSIS N/A 7/26/2024    Procedure: SIGMOID COLECTOMY, CHOLECYSTECTOMY, CREATION OF LOOP ILEOSTOMY, REMOVAL OF URETERAL STENTS. REMOVAL OF LEFT BUTTOCK ABSCESS DRAIN.;  Surgeon: Adelso Abernathy MD;  Location: [facility];  Service: General    WY SIGMOIDOSCOPY FLX DX W/COLLJ SPEC BR/WA IF PFRMD N/A 7/26/2024    Procedure: FLEXIBLE SIGMOIDOSCOPY;  Surgeon: Adelso Abernathy MD;  [facility];  Service: General    SHOULDER OPEN ROTATOR CUFF REPAIR Bilateral      Social History     Tobacco Use    Smoking status: Every Day     Current packs/day: 0.25     Average packs/day: 0.3 packs/day for 15.0 years (3.8 ttl pk-yrs)     Types: Cigarettes     Passive exposure: Never    Smokeless tobacco: Never    Tobacco comments:     2 cigarettes per day     Vapes on weekends   Vaping Use    Vaping status: Some Days    Substances: Flavoring   Substance and Sexual Activity    Alcohol use: Yes     Alcohol/week: 2.0 standard drinks of alcohol     Types: 2 Shots of liquor per week     Comment: weekends    Drug use: Yes     Frequency: 7.0 times per week     Types: Marijuana     Comment: one joint daily for pain last 2 days ago    Sexual activity: Not on file     E-Cigarette/Vaping    E-Cigarette Use Current Some Day User      E-Cigarette/Vaping Substances    Nicotine No     THC No     CBD No     Flavoring Yes     Other No     Unknown No        Social History:  Marital Status: /Civil Union   Occupation:   Patient Pre-hospital Living Situation: Home  Patient Pre-hospital Level of Mobility: walks  Patient Pre-hospital Diet Restrictions:     Meds/Allergies   I have reviewed home  medications with patient personally.  Prior to Admission medications    Medication Sig Start Date End Date Taking? Authorizing Provider   albuterol (PROVENTIL HFA,VENTOLIN HFA) 90 mcg/act inhaler INHALE 2 PUFFS INTO THE LUNGS EVERY 4 HOURS AS NEEDED FOR 30 DAYS 5/12/22  Yes Historical Provider, MD   amLODIPine (NORVASC) 10 mg tablet Take 10 mg by mouth daily 5/6/24  Yes Historical Provider, MD   cyclobenzaprine (FLEXERIL) 10 mg tablet TAKE 1 TABLET BY MOUTH THREE TIMES A DAY AS NEEDED FOR 10 DAYS 2/28/24  Yes Historical Provider, MD   DULoxetine (CYMBALTA) 20 mg capsule Take 20 mg by mouth every evening 4/20/24  Yes Historical Provider, MD   gabapentin (NEURONTIN) 300 mg capsule Take 100 mg by mouth daily at bedtime   Yes Historical Provider, MD   meloxicam (MOBIC) 15 mg tablet TAKE 1 TABLET EVERY DAY BY ORAL ROUTE AS NEEDED. 5/20/24  Yes Historical Provider, MD   mirtazapine (REMERON) 15 mg tablet Take 15 mg by mouth daily at bedtime   7/19/22  Yes Historical Provider, MD     No Known Allergies    Objective :  Temp:  [97.3 °F (36.3 °C)-98.8 °F (37.1 °C)] 97.4 °F (36.3 °C)  HR:  [62-87] 64  BP: (111-144)/(63-92) 111/73  Resp:  [16-18] 18  SpO2:  [95 %-100 %] 95 %  O2 Device: None (Room air)    Physical Exam  Constitutional:       Appearance: Normal appearance.   HENT:      Head: Normocephalic and atraumatic.      Nose:      Comments: NG tube in place  Eyes:      Extraocular Movements: Extraocular movements intact.      Pupils: Pupils are equal, round, and reactive to light.   Cardiovascular:      Rate and Rhythm: Normal rate and regular rhythm.      Pulses: Normal pulses.      Heart sounds: Normal heart sounds.   Pulmonary:      Effort: Pulmonary effort is normal.      Breath sounds: Normal breath sounds.   Abdominal:      Comments: Soft not distended positive bowel sounds mild tenderness epigastrium no rebound  Midline surgical incision noted has some wound dehiscence with some drainage   Musculoskeletal:          General: Normal range of motion.      Cervical back: Normal range of motion.   Skin:     General: Skin is warm and dry.   Neurological:      General: No focal deficit present.      Mental Status: He is alert.   Psychiatric:         Mood and Affect: Mood normal.         Behavior: Behavior normal.          Lines/Drains:  Lines/Drains/Airways       Active Status       Name Placement date Placement time Site Days    NG/OG/Enteral Tube Nasogastric 16 Fr Right nare 10/14/24  2146  Right nare  less than 1                          Lab Results: I have reviewed the following results:  Results from last 7 days   Lab Units 10/14/24  1848   WBC Thousand/uL 7.85   HEMOGLOBIN g/dL 14.2   HEMATOCRIT % 45.7   PLATELETS Thousands/uL 318   SEGS PCT % 69   LYMPHO PCT % 23   MONO PCT % 7   EOS PCT % 1     Results from last 7 days   Lab Units 10/14/24  1848   SODIUM mmol/L 134*   POTASSIUM mmol/L 4.1   CHLORIDE mmol/L 102   CO2 mmol/L 25   BUN mg/dL 10   CREATININE mg/dL 0.95   ANION GAP mmol/L 7   CALCIUM mg/dL 9.9   ALBUMIN g/dL 4.6   TOTAL BILIRUBIN mg/dL 0.41   ALK PHOS U/L 59   ALT U/L 7   AST U/L 14   GLUCOSE RANDOM mg/dL 120             Lab Results   Component Value Date    HGBA1C 5.6 12/11/2021                 Administrative Statements       ** Please Note: This note has been constructed using a voice recognition system. **

## 2024-10-15 NOTE — UTILIZATION REVIEW
"Initial Clinical Review    Admission: Date/Time/Statement:   Admission Orders (From admission, onward)       Ordered        10/14/24 2122  INPATIENT ADMISSION  Once                          Orders Placed This Encounter   Procedures    INPATIENT ADMISSION     Standing Status:   Standing     Number of Occurrences:   1     Order Specific Question:   Level of Care     Answer:   Med Surg [16]     Order Specific Question:   Estimated length of stay     Answer:   More than 2 Midnights     Order Specific Question:   Certification     Answer:   I certify that inpatient services are medically necessary for this patient for a duration of greater than two midnights. See H&P and MD Progress Notes for additional information about the patient's course of treatment.     ED Arrival Information       Expected   -    Arrival   10/14/2024 18:02    Acuity   Emergent              Means of arrival   Walk-In    Escorted by   Self    Service   Hospitalist    Admission type   Emergency              Arrival complaint   Abdominal Pain             Chief Complaint   Patient presents with    Abdominal Pain     States center abd pain since yesterday and thinks \"it's not chewed meat like last time\" has ostomy from black mold eating lining of stomach, but bag has not been active since yesterday       Initial Presentation:   59 yom to ER from home c/o worsening left sided abdominal pain. + associated decreased output from ostomy bag and one episode of nausea and vomiting. Hx perforated sigmoid diverticulitis with pericolonic abscess s/p elective sigmoid colectomy with cholecystectomy and protective loop ileostomy 7/24 , SBO 8/24. Presents with decreased ostomy output. Abd soft not distended positive bowel sounds mild tenderness epigastrium no rebound. Midline surgical incision noted has some wound dehiscence with some drainage . Admission CT of the abdomen shows partial small bowel obstruction proximal to the right upper quadrant ileostomy possibly " involving adhesive disease to the anterior abdominal wall in the region of the mesh no loculated fluid collection or free air. Labs: Na 134.  Admitted to inpatient status for SBO. NGT>LIS placed, surgery consulted, placed on aspiration precautions.    Anticipated Length of Stay/Certification Statement:   Patient will be admitted on an inpatient basis with an anticipated length of stay of greater than 2 midnights secondary to SBO.     Date: 10/15/24   Day 2:   SBO POA. NGT remains in place to suction. Abd with generalized tenderness. Ileostomy with minimal output liquid & some gas. Midline incision healed with 2 moist spots; may be sinus track draining on dressing. Surgery consulted & following. IVF maintained.     Per surgery: SBO  Stable, and relatively well-controlled, passing gas and small amounts of liquid into ileostomy pouch, does appear to have diffusely distended small bowel, has not had robust amount of ileostomy output since Saturday morning, has not been eating puréed diet recently which she attributes to some of his obstructive qualities, nausea but no vomiting, generalized mild abdominal tenderness without any guarding, labs within normal limits, CT exam reviewed no obvious transition point noted but caliber of bowel does appear distended diffusely, exit point of stoma does appear close to lobe of liver, patient historically did have dense adhesions on last surgery adhesions may also be a cause, of note the patient does not necessarily have wound dehiscence based on CT scan, patient does have draining wounds from prior surgery that may represent sinus tract versus development of small fistula, historically patient did have mesh placement.  Plan: continue NGT to suction, IVF, continue Mediplex over midline, follow labs.      ED Treatment-Medication Administration from 10/14/2024 1802 to 10/14/2024 2224         Date/Time Order Dose Route Action     10/14/2024 7791 sodium chloride 0.9 % bolus 500 mL 500 mL  Intravenous New Bag     10/14/2024 1851 HYDROmorphone (DILAUDID) injection 0.5 mg 0.5 mg Intravenous Given     10/14/2024 2008 iohexol (OMNIPAQUE) 350 MG/ML injection (MULTI-DOSE) 100 mL 100 mL Intravenous Given     10/14/2024 2139 HYDROmorphone (DILAUDID) injection 1 mg 1 mg Intravenous Given            Scheduled Medications:  nicotine, 1 patch, Transdermal, Daily  ondansetron, 4 mg, Intravenous, Once  pantoprazole, 40 mg, Intravenous, Q24H BERYL      Continuous IV Infusions:  lactated ringers, 125 mL/hr, Intravenous, Continuous      PRN Meds:  HYDROmorphone, 0.2 mg, Intravenous, Q4H PRN  ondansetron, 4 mg, Intravenous, Q6H PRN  phenol, 1 spray, Mouth/Throat, Q2H PRN      ED Triage Vitals   Temperature Pulse Respirations Blood Pressure SpO2 Pain Score   10/14/24 1804 10/14/24 1804 10/14/24 1804 10/14/24 1807 10/14/24 1804 10/14/24 1804   (!) 97.3 °F (36.3 °C) 87 18 113/71 100 % 10 - Worst Possible Pain     Weight (last 2 days)       Date/Time Weight    10/14/24 1804 77.1 (170)            Vital Signs (last 3 days)       Date/Time Temp Pulse Resp BP MAP (mmHg) SpO2 O2 Device Patient Position - Orthostatic VS Pam Coma Scale Score Pain    10/15/24 09:15:12 98 °F (36.7 °C) 62 17 105/66 79 100 % None (Room air) Lying -- 6    10/14/24 2300 -- -- -- -- -- -- -- -- 15 --    10/14/24 2245 -- -- -- -- -- -- -- -- -- 7    10/14/24 22:31:06 97.4 °F (36.3 °C) 64 18 111/73 86 95 % -- -- -- --    10/14/24 2200 -- 62 16 124/75 94 98 % None (Room air) Lying -- 2    10/14/24 2139 -- -- -- -- -- -- -- -- -- 7    10/14/24 2115 -- 72 -- -- -- 98 % -- -- -- --    10/14/24 2100 -- 66 18 123/63 85 98 % None (Room air) Lying -- --    10/14/24 1930 -- 62 -- 116/80 93 98 % -- -- -- --    10/14/24 1926 -- -- -- -- -- -- None (Room air) -- -- --    10/14/24 1920 -- -- -- -- -- -- -- -- -- No Pain    10/14/24 1851 -- -- -- -- -- -- -- -- -- 6    10/14/24 1824 98.8 °F (37.1 °C) 73 18 144/92 -- 100 % None (Room air) Lying -- --    10/14/24 1807  -- -- -- 113/71 -- -- -- -- -- --    10/14/24 1804 97.3 °F (36.3 °C) 87 18 -- -- 100 % -- -- -- 10 - Worst Possible Pain              Pertinent Labs/Diagnostic Test Results:   Radiology:  CT abdomen pelvis with contrast   Final Interpretation by Ivelisse Parks MD (10/14 2055)         TECHNIQUE: CT examination of the abdomen and pelvis was performed. Multiplanar 2D reformatted images were created from the source data.      This examination, like all CT scans performed in the [facility], was performed utilizing techniques to minimize radiation dose exposure, including the use of iterative reconstruction and automated exposure control. Radiation dose length    product (DLP) for this visit: 477 mGy-cm      IV Contrast: 100 mL of iohexol (OMNIPAQUE)   Enteric Contrast: Not administered.      FINDINGS:      ABDOMEN      LOWER CHEST: No clinically significant abnormality in the visualized lower chest.      LIVER/BILIARY TREE: Unremarkable.      GALLBLADDER: Status post cholecystectomy.      SPLEEN: Unremarkable.      PANCREAS: Unremarkable.      ADRENAL GLANDS: Unremarkable.      KIDNEYS/URETERS: Unremarkable. No hydronephrosis.      STOMACH AND BOWEL: Sigmoid anastomosis appears to be intact with no adjacent fluid collection. The proximal colon is opacified with contrast presumably from recent barium enema.      Dilated fluid-filled loops of small bowel primarily in the pelvis proximal to the right upper quadrant ileostomy which is collapsed compatible with small bowel obstruction. There is a potential caliber change against the hernia mesh in the anterior    abdominal wall. No pneumatosis or free air.      Mild rectal wall thickening unchanged.124      APPENDIX: No findings to suggest appendicitis.      ABDOMINOPELVIC CAVITY: No ascites. No pneumoperitoneum. No lymphadenopathy.      VESSELS: Unremarkable for patient's age.      PELVIS      REPRODUCTIVE ORGANS: Unremarkable for patient's age.     "  URINARY BLADDER: Unremarkable.      ABDOMINAL WALL/INGUINAL REGIONS: Changes from prior ventral hernia repair with mesh in place. Previously noted gas and fluid in the anterior abdominal wall has resolved.      BONES: No acute fracture or suspicious osseous lesion. Spinal degenerative changes.      IMPRESSION:      Findings compatible with partial small bowel obstruction proximal to the right upper quadrant ileostomy, possibly involving adhesive disease to the anterior abdominal wall in the region of the mesh. No loculated fluid collection or free air.      The study was marked in EPIC for immediate notification.         Workstation performed: [id number]         XR abdomen 1 vw portable    (Results Pending)   XR chest portable    (Results Pending)     Cardiology:  No orders to display     GI:  No orders to display           Results from last 7 days   Lab Units 10/15/24  0529 10/14/24  1848   WBC Thousand/uL 6.14 7.85   HEMOGLOBIN g/dL 12.8 14.2   HEMATOCRIT % 40.9 45.7   PLATELETS Thousands/uL 294 318   TOTAL NEUT ABS Thousands/µL 3.33 5.45         Results from last 7 days   Lab Units 10/15/24  0529 10/14/24  1848   SODIUM mmol/L 132* 134*   POTASSIUM mmol/L 4.0 4.1   CHLORIDE mmol/L 102 102   CO2 mmol/L 23 25   ANION GAP mmol/L 7 7   BUN mg/dL 10 10   CREATININE mg/dL 0.80 0.95   EGFR ml/min/1.73sq m 97 87   CALCIUM mg/dL 9.2 9.9   MAGNESIUM mg/dL 2.0  --      Results from last 7 days   Lab Units 10/15/24  0529 10/14/24  1848   AST U/L 12* 14   ALT U/L 7 7   ALK PHOS U/L 54 59   TOTAL PROTEIN g/dL 7.4 8.4   ALBUMIN g/dL 4.1 4.6   TOTAL BILIRUBIN mg/dL 0.36 0.41     Results from last 7 days   Lab Units 10/15/24  0713 10/15/24  0607 10/15/24  0032   POC GLUCOSE mg/dl 105 102 91     Results from last 7 days   Lab Units 10/15/24  0529 10/14/24  1848   GLUCOSE RANDOM mg/dL 100 120             No results found for: \"BETA-HYDROXYBUTYRATE\"                                                                                     "                                                     Past Medical History:   Diagnosis Date    Anesthesia complication     body temp burns off anesthesia more quickly--woke up during surgery    Anxiety     due to medical conditions    Arthritis     Hypertension     Sleep apnea     Tobacco abuse      Present on Admission:   SBO (small bowel obstruction) (HCC)   HTN (hypertension)   Smoking   Surgical wound dehiscence      Admitting Diagnosis: SBO (small bowel obstruction) (HCC) [K56.609]  Abdominal pain [R10.9]  Age/Sex: 59 y.o. male    Network Utilization Review Department  ATTENTION: Please call with any questions or concerns to [phone] and carefully listen to the prompts so that you are directed to the right person. All voicemails are confidential.   For Discharge needs, contact Care Management DC Support Team at [phone] opt. 2  Send all requests for admission clinical reviews, approved or denied determinations and any other requests to dedicated fax number below belonging to the campus where the patient is receiving treatment. List of dedicated fax numbers for the Facilities:  FACILITY NAME UR FAX NUMBER   ADMISSION DENIALS (Administrative/Medical Necessity) [phone]   DISCHARGE SUPPORT TEAM (NETWORK) [phone]   PARENT CHILD HEALTH (Maternity/NICU/Pediatrics) [phone]   [facility] [phone]   [facility] [phone]   [facility] [phone]   [facility] [phone]   [facility] [phone]   [facility] [phone]   [facility] [phone]   [facility] [phone]   [facility] [phone]   [facility] [phone]   [facility] [phone]   [facility]  [facility] [phone]

## 2024-10-15 NOTE — PROGRESS NOTES
Progress Note - Hospitalist   Name: Homero Driver Jr. 59 y.o. male I MRN: [id number]  Unit/Bed#: [address] [place], [place] [address] I Date of Admission: 10/14/2024   Date of Service: 10/15/2024 I Hospital Day: 1    Assessment & Plan  SBO (small bowel obstruction) ([facility])  Patient with a history of perforated sigmoid diverticulitis with pericolonic abscess s/p elective sigmoid colectomy with cholecystectomy and protective loop ileostomy.  Recent admission for SBO in August 2024.  Presented with abdominal pain and decreased output from ileostomy  CT scan of the abdomen on admission-Findings compatible with partial small bowel obstruction proximal to the right upper quadrant ileostomy, possibly involving adhesive disease to the anterior abdominal wall in the region of the mesh. No loculated fluid collection or free air.   Chest x-ray with NG tube in the region of gastric fundus.  Repeat abdominal x-ray with nonobstructive bowel gas pattern.  Oral contrast in colon  Appears to be clinically improving  Surgical team following, input appreciated  Continue NG tube with low intermittent suction  Monitor output from NG tube  IV fluid  N.p.o; hold his usual meds.  Monitor abdominal exam, follow-up labs  Surgical follow-up  Surgical wound dehiscence  Patient has some dehiscence of his surgical incision with some drainage.  Surgical follow-up.  Smoking  Nicotine patch.  HTN (hypertension)  Stable  Monitor blood pressures expectantly.    Hyponatremia-mild, monitor    VTE Pharmacologic Prophylaxis:   Moderate Risk (Score 3-4) - Pharmacological DVT Prophylaxis Ordered: enoxaparin (Lovenox).    Mobility:   Basic Mobility Inpatient Raw Score: 24  JH-HLM Goal: 8: Walk 250 feet or more  JH-HLM Achieved: 5: Stand (1 or more minutes)  JH-HLM Goal achieved. Continue to encourage appropriate mobility.    Patient Centered Rounds: I performed bedside rounds with nursing staff today.   Discussions with Specialists or Other Care Team Provider: Surgery    Education  and Discussions with Family / Patient:  Discussed with the patient.     Current Length of Stay: 1 day(s)  Current Patient Status: Inpatient   Certification Statement: The patient will continue to require additional inpatient hospital stay due to small bowel obstruction  Discharge Plan: Anticipate discharge in 24-48 hrs to home.    Code Status: Level 1 - Full Code    Subjective   Reports improvement in abdominal discomfort and colostomy output  Denies any nausea vomiting  No other complaints except mild discomfort associated with NG tube    Objective :  Temp:  [97.3 °F (36.3 °C)-98.8 °F (37.1 °C)] 97.4 °F (36.3 °C)  HR:  [62-87] 64  BP: (111-144)/(63-92) 111/73  Resp:  [16-18] 18  SpO2:  [95 %-100 %] 95 %  O2 Device: None (Room air)    Body mass index is 23.06 kg/m².     Input and Output Summary (last 24 hours):     Intake/Output Summary (Last 24 hours) at 10/15/2024 0826  Last data filed at 10/14/2024 2152  Gross per 24 hour   Intake --   Output 275 ml   Net -275 ml       Physical Exam  Constitutional:       General: He is not in acute distress.  HENT:      Head: Normocephalic and atraumatic.   Cardiovascular:      Rate and Rhythm: Normal rate.   Pulmonary:      Effort: Pulmonary effort is normal. No respiratory distress.      Breath sounds: No rhonchi.   Abdominal:      General: Bowel sounds are normal. There is no distension.      Palpations: Abdomen is soft.      Tenderness: There is no abdominal tenderness. There is no guarding or rebound.      Comments: Colostomy with greenish-yellow stool   Musculoskeletal:      Right lower leg: No edema.      Left lower leg: No edema.   Skin:     General: Skin is warm and dry.      Findings: No rash.   Neurological:      Mental Status: He is alert. Mental status is at baseline.      Cranial Nerves: No cranial nerve deficit.         Lines/Drains:  Lines/Drains/Airways       Active Status       Name Placement date Placement time Site Days    NG/OG/Enteral Tube Nasogastric 16 Fr  Right nare 10/14/24  2146  Right nare  less than 1                            Lab Results: I have reviewed the following results:   Results from last 7 days   Lab Units 10/15/24  0529   WBC Thousand/uL 6.14   HEMOGLOBIN g/dL 12.8   HEMATOCRIT % 40.9   PLATELETS Thousands/uL 294   SEGS PCT % 53   LYMPHO PCT % 34   MONO PCT % 11   EOS PCT % 0     Results from last 7 days   Lab Units 10/15/24  0529   SODIUM mmol/L 132*   POTASSIUM mmol/L 4.0   CHLORIDE mmol/L 102   CO2 mmol/L 23   BUN mg/dL 10   CREATININE mg/dL 0.80   ANION GAP mmol/L 7   CALCIUM mg/dL 9.2   ALBUMIN g/dL 4.1   TOTAL BILIRUBIN mg/dL 0.36   ALK PHOS U/L 54   ALT U/L 7   AST U/L 12*   GLUCOSE RANDOM mg/dL 100         Results from last 7 days   Lab Units 10/15/24  0713 10/15/24  0607 10/15/24  0032   POC GLUCOSE mg/dl 105 102 91               Recent Cultures (last 7 days):               Last 24 Hours Medication List:     Current Facility-Administered Medications:     HYDROmorphone HCl (DILAUDID) injection 0.2 mg, Q4H PRN    lactated ringers infusion, Continuous, Last Rate: 75 mL/hr (10/14/24 2350)    nicotine (NICODERM CQ) 14 mg/24hr TD 24 hr patch 1 patch, Daily    ondansetron (ZOFRAN) injection 4 mg, Once    ondansetron (ZOFRAN) injection 4 mg, Q6H PRN    pantoprazole (PROTONIX) injection 40 mg, Q24H BERYL    phenol (CHLORASEPTIC) 1.4 % mucosal liquid 1 spray, Q2H PRN    Administrative Statements   Today, Patient Was Seen By: Scarlett Phipps MD  I have spent a total time of 35 minutes in caring for this patient on the day of the visit/encounter including Documenting in the medical record, Reviewing / ordering tests, medicine, procedures  , Obtaining or reviewing history  , and Communicating with other healthcare professionals .    **Please Note: This note may have been constructed using a voice recognition system.**

## 2024-10-15 NOTE — ASSESSMENT & PLAN NOTE
Patient with a history of perforated sigmoid diverticulitis with pericolonic abscess s/p elective sigmoid colectomy with cholecystectomy and protective loop ileostomy.  Recent admission for SBO in August 2024.  Presented with abdominal pain and decreased output from ileostomy  CT scan of the abdomen on admission-Findings compatible with partial small bowel obstruction proximal to the right upper quadrant ileostomy, possibly involving adhesive disease to the anterior abdominal wall in the region of the mesh. No loculated fluid collection or free air.   Chest x-ray with NG tube in the region of gastric fundus.  Repeat abdominal x-ray with nonobstructive bowel gas pattern.  Oral contrast in colon  Appears to be clinically improving  Surgical team following, input appreciated  Continue NG tube with low intermittent suction  Monitor output from NG tube  IV fluid  N.p.o; hold his usual meds.  Monitor abdominal exam, follow-up labs  Surgical follow-up

## 2024-10-15 NOTE — PLAN OF CARE
Problem: PAIN - ADULT  Goal: Verbalizes/displays adequate comfort level or baseline comfort level  Description: Interventions:  - Encourage patient to monitor pain and request assistance  - Assess pain using appropriate pain scale  - Administer analgesics based on type and severity of pain and evaluate response  - Implement non-pharmacological measures as appropriate and evaluate response  - Consider cultural and social influences on pain and pain management  - Notify physician/advanced practitioner if interventions unsuccessful or patient reports new pain  Outcome: Progressing     Problem: INFECTION - ADULT  Goal: Absence or prevention of progression during hospitalization  Description: INTERVENTIONS:  - Assess and monitor for signs and symptoms of infection  - Monitor lab/diagnostic results  - Monitor all insertion sites, i.e. indwelling lines, tubes, and drains  - Monitor endotracheal if appropriate and nasal secretions for changes in amount and color  - [place] appropriate cooling/warming therapies per order  - Administer medications as ordered  - Instruct and encourage patient and family to use good hand hygiene technique  - Identify and instruct in appropriate isolation precautions for identified infection/condition  Outcome: Progressing  Goal: Absence of fever/infection during neutropenic period  Description: INTERVENTIONS:  - Monitor WBC    Outcome: Progressing     Problem: SAFETY ADULT  Goal: Patient will remain free of falls  Description: INTERVENTIONS:  - Educate patient/family on patient safety including physical limitations  - Instruct patient to call for assistance with activity   - Consult OT/PT to assist with strengthening/mobility   - Keep Call bell within reach  - Keep bed low and locked with side rails adjusted as appropriate  - Keep care items and personal belongings within reach  - Initiate and maintain comfort rounds  - Make Fall Risk Sign visible to staff  - Offer Toileting every 2 Hours,  in advance of need  - Obtain necessary fall risk management equipment: yellow socks  - Apply yellow socks and bracelet for high fall risk patients  - Consider moving patient to room near nurses station  Outcome: Progressing  Goal: Maintain or return to baseline ADL function  Description: INTERVENTIONS:  -  Assess patient's ability to carry out ADLs; assess patient's baseline for ADL function and identify physical deficits which impact ability to perform ADLs (bathing, care of mouth/teeth, toileting, grooming, dressing, etc.)  - Assess/evaluate cause of self-care deficits   - Assess range of motion  - Assess patient's mobility; develop plan if impaired  - Assess patient's need for assistive devices and provide as appropriate  - Encourage maximum independence but intervene and supervise when necessary  - Involve family in performance of ADLs  - Assess for home care needs following discharge   - Consider OT consult to assist with ADL evaluation and planning for discharge  - Provide patient education as appropriate  Outcome: Progressing  Goal: Maintains/Returns to pre admission functional level  Description: INTERVENTIONS:  - Perform AM-PAC 6 Click Basic Mobility/ Daily Activity assessment daily.  - Set and communicate daily mobility goal to care team and patient/family/caregiver.   - Collaborate with rehabilitation services on mobility goals if consulted  - Perform Range of Motion 2 times a day.  - Reposition patient every 2 hours.  - Dangle patient 3 times a day  - Stand patient 3 times a day  - Ambulate patient 2 times a day  - Out of bed to chair 3 times a day   - Out of bed for meals 3 times a day  - Out of bed for toileting  - Record patient progress and toleration of activity level   Outcome: Progressing     Problem: DISCHARGE PLANNING  Goal: Discharge to home or other facility with appropriate resources  Description: INTERVENTIONS:  - Identify barriers to discharge w/patient and caregiver  - Arrange for needed  discharge resources and transportation as appropriate  - Identify discharge learning needs (meds, wound care, etc.)  - Arrange for interpretive services to assist at discharge as needed  - Refer to Case Management Department for coordinating discharge planning if the patient needs post-hospital services based on physician/advanced practitioner order or complex needs related to functional status, cognitive ability, or social support system  Outcome: Progressing     Problem: Knowledge Deficit  Goal: Patient/family/caregiver demonstrates understanding of disease process, treatment plan, medications, and discharge instructions  Description: Complete learning assessment and assess knowledge base.  Interventions:  - Provide teaching at level of understanding  - Provide teaching via preferred learning methods  Outcome: Progressing     Problem: Nutrition/Hydration-ADULT  Goal: Nutrient/Hydration intake appropriate for improving, restoring or maintaining nutritional needs  Description: Monitor and assess patient's nutrition/hydration status for malnutrition. Collaborate with interdisciplinary team and initiate plan and interventions as ordered.  Monitor patient's weight and dietary intake as ordered or per policy. Utilize nutrition screening tool and intervene as necessary. Determine patient's food preferences and provide high-protein, high-caloric foods as appropriate.     INTERVENTIONS:  - Monitor oral intake, urinary output, labs, and treatment plans  - Assess nutrition and hydration status and recommend course of action  - Evaluate amount of meals eaten  - Assist patient with eating if necessary   - Allow adequate time for meals  - Recommend/ encourage appropriate diets, oral nutritional supplements, and vitamin/mineral supplements  - Order, calculate, and assess calorie counts as needed  - Recommend, monitor, and adjust tube feedings and TPN/PPN based on assessed needs  - Assess need for intravenous fluids  - Provide  specific nutrition/hydration education as appropriate  - Include patient/family/caregiver in decisions related to nutrition  Outcome: Progressing     Problem: GASTROINTESTINAL - ADULT  Goal: Minimal or absence of nausea and/or vomiting  Description: INTERVENTIONS:  - Administer IV fluids if ordered to ensure adequate hydration  - Maintain NPO status until nausea and vomiting are resolved  - Nasogastric tube if ordered  - Administer ordered antiemetic medications as needed  - Provide nonpharmacologic comfort measures as appropriate  - Advance diet as tolerated, if ordered  - Consider nutrition services referral to assist patient with adequate nutrition and appropriate food choices  Outcome: Progressing  Goal: Maintains or returns to baseline bowel function  Description: INTERVENTIONS:  - Assess bowel function  - Encourage oral fluids to ensure adequate hydration  - Administer IV fluids if ordered to ensure adequate hydration  - Administer ordered medications as needed  - Encourage mobilization and activity  - Consider nutritional services referral to assist patient with adequate nutrition and appropriate food choices  Outcome: Progressing  Goal: Maintains adequate nutritional intake  Description: INTERVENTIONS:  - Monitor percentage of each meal consumed  - Identify factors contributing to decreased intake, treat as appropriate  - Assist with meals as needed  - Monitor I&O, weight, and lab values if indicated  - Obtain nutrition services referral as needed  Outcome: Progressing  Goal: Establish and maintain optimal ostomy function  Description: INTERVENTIONS:  - Assess bowel function  - Encourage oral fluids to ensure adequate hydration  - Administer IV fluids if ordered to ensure adequate hydration   - Administer ordered medications as needed  - Encourage mobilization and activity  - Nutrition services referral to assist patient with appropriate food choices  - Assess stoma site  - Consider wound care consult    Outcome: Progressing  Goal: Oral mucous membranes remain intact  Description: INTERVENTIONS  - Assess oral mucosa and hygiene practices  - Implement preventative oral hygiene regimen  - Implement oral medicated treatments as ordered  - Initiate Nutrition services referral as needed  Outcome: Progressing     Problem: METABOLIC, FLUID AND ELECTROLYTES - ADULT  Goal: Electrolytes maintained within normal limits  Description: INTERVENTIONS:  - Monitor labs and assess patient for signs and symptoms of electrolyte imbalances  - Administer electrolyte replacement as ordered  - Monitor response to electrolyte replacements, including repeat lab results as appropriate  - Instruct patient on fluid and nutrition as appropriate  Outcome: Progressing  Goal: Fluid balance maintained  Description: INTERVENTIONS:  - Monitor labs   - Monitor I/O and WT  - Instruct patient on fluid and nutrition as appropriate  - Assess for signs & symptoms of volume excess or deficit  Outcome: Progressing  Goal: Glucose maintained within target range  Description: INTERVENTIONS:  - Monitor Blood Glucose as ordered  - Assess for signs and symptoms of hyperglycemia and hypoglycemia  - Administer ordered medications to maintain glucose within target range  - Assess nutritional intake and initiate nutrition service referral as needed  Outcome: Progressing     Problem: Prexisting or High Potential for Compromised Skin Integrity  Goal: Skin integrity is maintained or improved  Description: INTERVENTIONS:  - Identify patients at risk for skin breakdown  - Assess and monitor skin integrity  - Assess and monitor nutrition and hydration status  - Monitor labs   - Assess for incontinence   - Turn and reposition patient  - Assist with mobility/ambulation  - Relieve pressure over bony prominences  - Avoid friction and shearing  - Provide appropriate hygiene as needed including keeping skin clean and dry  - Evaluate need for skin moisturizer/barrier cream  -  Collaborate with interdisciplinary team   - Patient/family teaching  - Consider wound care consult   Outcome: Progressing      13-Sep-2023

## 2024-10-15 NOTE — CONSULTS
Consultation - Surgery-General   Name: Homero Driver Jr. 59 y.o. male I MRN: [id number]  Unit/Bed#: 4 [address] I Date of Admission: 10/14/2024   Date of Service: 10/15/2024 I Hospital Day: 1   Inpatient consult to Acute Care Surgery  Consult performed by: Juan Alberto Church PA-C  Consult ordered by: Scarlett Phipps MD        Physician Requesting Evaluation: Scarlett Phipps MD   Reason for Evaluation / Principal Problem: SBO     Assessment & Plan  SBO (small bowel obstruction) (HCC)  Stable, and relatively well-controlled, passing gas and small amounts of liquid into ileostomy pouch, does appear to have diffusely distended small bowel, has not had robust amount of ileostomy output since Saturday morning, has not been eating puréed diet recently which she attributes to some of his obstructive qualities, nausea but no vomiting, generalized mild abdominal tenderness without any guarding, labs within normal limits, CT exam reviewed no obvious transition point noted but caliber of bowel does appear distended diffusely, exit point of stoma does appear close to lobe of liver, patient historically did have dense adhesions on last surgery adhesions may also be a cause, of note the patient does not necessarily have wound dehiscence based on CT scan, patient does have draining wounds from prior surgery that may represent sinus tract versus development of small fistula, historically patient did have mesh placement    NG tube on low continuous suction  -Ordered neck brace  -As needed analgesia  -N.p.o.  -IV fluids bumped up to 125 cc/h  -Patient education  -Will digitalize stoma  -Repeat POCT study through the NG tube tomorrow  -Portable chest x-ray to confirm NG tube placement  -Continue Mepilex over midline  -Reviewed CT scan  -Repeat CBC, BMP, mag, Phos with a.m. labs  Surgery-General service will follow.    History of Present Illness   HPI: Homero Driver Jr. is a 59 y.o. year old male 6 with history of anxiety,  hypertension, tobacco abuse, diverticular abscess with need for surgery, open sigmoid colon resection with end-to-end anastomosis and diverting loop ileostomy 7/26/2024, previous small bowel obstruction presenting to the acute care surgery team secondary to the fact that since Saturday he has began to have a gradual worsening in onset of abdominal pain.  Patient reports that since his ileostomy has had a whole host of problems.  Patient reports that currently his major issue is that he is having neck pain from his spinal surgery history.  Patient is asking for a neck brace.  Patient also reports that he cannot go out in public anymore due to his ileostomy.  Patient reports that he also has started to have random onset of paralysis of all of his body lower than his neck and seizures.  Patient reports that he knows he has a pretty large amount of anxiety at times but that this is not psychosomatic.  Patient reports that he can wake up with full body paralysis without any warning but that it will resolve over time.  Patient gets very scared by the fact that any moment in time he can be paralyzed due to his ileostomy.  Patient also references seizures as per witnessed by his family but cannot explain the exact account of them.  All that being said patient does focus and then on current position wellbeing.  Patient reports that since Saturday he noticed that he had a bowel movement in the morning but was not having any output of items that he normally eats that he would see in his stoma such as popcorn shells.  Patient normally purées his food so that way it is easier for him to digest but did not on Saturday.  Patient was doing meal prep for his family at which point throughout the day he was snacking on the food that he was prepping.  Patient reports that he did not purée at and started to feel worse throughout the day.  Patient continued to eat regular diet into Sunday but was not having any output.  Patient still  having small amounts of gas passing some liquid stool by Monday morning but was very minimal in comparison to his normal amount of output.  Patient did have worsening nausea from Monday into Tuesday but no vomiting.  Patient still has had very minimal output but does occasionally pass gas and small amounts of liquid stool.  Patient has not had any sort of routine bowel movements from down below at the anus despite having only a diverting loop ileostomy most of his output comes from the ileostomy itself.  Patient does apparently have tenesmus at times where he feels like the need to defecate but cannot.  Patient's pain is controlled at rest currently but does report an increase in discomfort with movement specifically with sitting up and standing up.  Patient is still urinating without any issues.  Patient reports high amount of anxiousness currently but otherwise denies any other problems.    Review of Systems   Constitutional:  Positive for appetite change. Negative for chills, fatigue and fever.   HENT:  Negative for congestion and rhinorrhea.    Respiratory:  Negative for cough, chest tightness, shortness of breath and wheezing.    Cardiovascular:  Negative for chest pain and palpitations.   Gastrointestinal:  Positive for abdominal pain and nausea. Negative for abdominal distention, blood in stool, constipation, diarrhea and vomiting.   Genitourinary:  Negative for difficulty urinating, dysuria and flank pain.   Musculoskeletal:  Positive for neck pain. Negative for arthralgias and gait problem.   Skin:  Positive for wound. Negative for color change.   Neurological:  Positive for weakness. Negative for dizziness, syncope and numbness.   Psychiatric/Behavioral:  Negative for agitation and confusion. The patient is nervous/anxious.     significant for findings described in the HPI.  I have reviewed the patient's PMH, PSH, Social History, Family History, Meds, and Allergies  Historical Information   Past Medical  History:   Diagnosis Date    Anesthesia complication     body temp burns off anesthesia more quickly--woke up during surgery    Anxiety     due to medical conditions    Arthritis     Hypertension     Sleep apnea     Tobacco abuse      Past Surgical History:   Procedure Laterality Date    BACK SURGERY      CERVICAL FUSION N/A 04/05/2016    Procedure: FUSION CERVICAL POSTERIOR - revision of instrumentation T1;  Surgeon: Srikanth Siu MD;  Location: [facility];  Service:     COLONOSCOPY      FOOT MASS EXCISION Right     HERNIA REPAIR Right     x2    INCISION AND DRAINAGE POSTERIOR SPINE N/A 04/27/2016    Procedure: INCISION AND DRAINAGE (I&D) SPINE;  Surgeon: Srikanth Siu MD;  Location: [facility];  Service:     IR DRAINAGE TUBE CHECK/CHANGE/REPOSITION/REINSERTION/UPSIZE  03/01/2024    IR DRAINAGE TUBE CHECK/CHANGE/REPOSITION/REINSERTION/UPSIZE  03/21/2024    IR DRAINAGE TUBE CHECK/CHANGE/REPOSITION/REINSERTION/UPSIZE  04/05/2024    IR DRAINAGE TUBE CHECK/CHANGE/REPOSITION/REINSERTION/UPSIZE  05/23/2024    IR DRAINAGE TUBE PLACEMENT  02/18/2024    WA ARTHRODESIS PST/PSTLAT TQ 1NTRSPC EA ADDL NTRSPC N/A 04/04/2016    Procedure: C3-C6 POSTERIOR CERVICAL LAMINECTOMY AND DECOMPRESSION; C3-T2 INSTRUMENTED POSTERIOR CERVICAL SPINAL FUSION WITH POSSIBLE ADDITIONAL LEVELS WITH ALLOGRAFT AND AUTOGRAFT (IMPULSE MONITORING);  Surgeon: Srikanth Siu MD;  Location: [facility];  Service: Orthopedics    WA CYSTO BLADDER W/URETERAL CATHETERIZATION Bilateral 7/26/2024    Procedure: INSERTION URETERAL CATHETERS PREOP;  Surgeon: Vitaliy Leahy MD;  Location:  [facility] OR;  Service: Urology    WA LAPAROSCOPY COLECTOMY PARTIAL W/ANASTOMOSIS N/A 7/26/2024    Procedure: SIGMOID COLECTOMY, CHOLECYSTECTOMY, CREATION OF LOOP ILEOSTOMY, REMOVAL OF URETERAL STENTS. REMOVAL OF LEFT BUTTOCK ABSCESS DRAIN.;  Surgeon: Adelso Abernathy MD;  Location:  MAIN OR;  Service: General    WA SIGMOIDOSCOPY FLX DX W/COLLJ SPEC BR/WA IF PFRMD  N/A 7/26/2024    Procedure: FLEXIBLE SIGMOIDOSCOPY;  Surgeon: Adelso Abernathy MD;  Location:  [facility] OR;  Service: General    SHOULDER OPEN ROTATOR CUFF REPAIR Bilateral      Social History     Tobacco Use    Smoking status: Every Day     Current packs/day: 0.25     Average packs/day: 0.3 packs/day for 15.0 years (3.8 ttl pk-yrs)     Types: Cigarettes     Passive exposure: Never    Smokeless tobacco: Never    Tobacco comments:     2 cigarettes per day     Vapes on weekends   Vaping Use    Vaping status: Some Days    Substances: Flavoring   Substance and Sexual Activity    Alcohol use: Yes     Alcohol/week: 2.0 standard drinks of alcohol     Types: 2 Shots of liquor per week     Comment: weekends    Drug use: Yes     Frequency: 7.0 times per week     Types: Marijuana     Comment: one joint daily for pain last 2 days ago    Sexual activity: Not on file     E-Cigarette/Vaping    E-Cigarette Use Current Some Day User      E-Cigarette/Vaping Substances    Nicotine No     THC No     CBD No     Flavoring Yes     Other No     Unknown No      Family history non-contributory  Social History     Tobacco Use    Smoking status: Every Day     Current packs/day: 0.25     Average packs/day: 0.3 packs/day for 15.0 years (3.8 ttl pk-yrs)     Types: Cigarettes     Passive exposure: Never    Smokeless tobacco: Never    Tobacco comments:     2 cigarettes per day     Vapes on weekends   Vaping Use    Vaping status: Some Days    Substances: Flavoring   Substance and Sexual Activity    Alcohol use: Yes     Alcohol/week: 2.0 standard drinks of alcohol     Types: 2 Shots of liquor per week     Comment: weekends    Drug use: Yes     Frequency: 7.0 times per week     Types: Marijuana     Comment: one joint daily for pain last 2 days ago    Sexual activity: Not on file       Current Facility-Administered Medications:     HYDROmorphone HCl (DILAUDID) injection 0.2 mg, Q4H PRN    lactated ringers infusion, Continuous, Last Rate: 75 mL/hr  (10/14/24 9764)    nicotine (NICODERM CQ) 14 mg/24hr TD 24 hr patch 1 patch, Daily    ondansetron (ZOFRAN) injection 4 mg, Once    ondansetron (ZOFRAN) injection 4 mg, Q6H PRN    pantoprazole (PROTONIX) injection 40 mg, Q24H BERYL    phenol (CHLORASEPTIC) 1.4 % mucosal liquid 1 spray, Q2H PRN  Prior to Admission Medications   Prescriptions Last Dose Informant Patient Reported? Taking?   DULoxetine (CYMBALTA) 20 mg capsule Past Week Self Yes Yes   Sig: Take 20 mg by mouth every evening   albuterol (PROVENTIL HFA,VENTOLIN HFA) 90 mcg/act inhaler Past Week Self Yes Yes   Sig: INHALE 2 PUFFS INTO THE LUNGS EVERY 4 HOURS AS NEEDED FOR 30 DAYS   amLODIPine (NORVASC) 10 mg tablet 10/13/2024  Yes Yes   Sig: Take 10 mg by mouth daily   cyclobenzaprine (FLEXERIL) 10 mg tablet Past Week Self Yes Yes   Sig: TAKE 1 TABLET BY MOUTH THREE TIMES A DAY AS NEEDED FOR 10 DAYS   gabapentin (NEURONTIN) 300 mg capsule Past Week Self Yes Yes   Sig: Take 100 mg by mouth daily at bedtime   meloxicam (MOBIC) 15 mg tablet Past Week  Yes Yes   Sig: TAKE 1 TABLET EVERY DAY BY ORAL ROUTE AS NEEDED.   mirtazapine (REMERON) 15 mg tablet Past Month Self Yes Yes   Sig: Take 15 mg by mouth daily at bedtime        Facility-Administered Medications: None     Patient has no known allergies.    Objective :  Temp:  [97.3 °F (36.3 °C)-98.8 °F (37.1 °C)] 97.4 °F (36.3 °C)  HR:  [62-87] 64  BP: (111-144)/(63-92) 111/73  Resp:  [16-18] 18  SpO2:  [95 %-100 %] 95 %  O2 Device: None (Room air)  Physical Exam  Constitutional:       General: He is awake. He is not in acute distress.     Appearance: Normal appearance. He is normal weight. He is not ill-appearing, toxic-appearing or diaphoretic.      Interventions: He is not intubated.  HENT:      Head: Normocephalic and atraumatic.      Right Ear: Hearing and external ear normal.      Left Ear: Hearing and external ear normal.      Nose: Nose normal.   Cardiovascular:      Rate and Rhythm: Normal rate and regular  "rhythm.      Heart sounds: Normal heart sounds, S1 normal and S2 normal. Heart sounds not distant. No murmur heard.  Pulmonary:      Effort: Pulmonary effort is normal. No tachypnea, bradypnea, accessory muscle usage, prolonged expiration, respiratory distress or retractions. He is not intubated.      Breath sounds: Normal breath sounds. No stridor, decreased air movement or transmitted upper airway sounds. No decreased breath sounds, wheezing, rhonchi or rales.   Abdominal:      General: Abdomen is flat. Bowel sounds are normal.      Palpations: Abdomen is soft.      Tenderness: There is generalized abdominal tenderness. There is no right CVA tenderness, left CVA tenderness, guarding or rebound.      Hernia: No hernia is present.   Skin:     General: Skin is warm and dry.      Capillary Refill: Capillary refill takes less than 2 seconds.          Neurological:      Mental Status: He is alert and oriented to person, place, and time.      Sensory: Sensation is intact.      Motor: Motor function is intact.   Psychiatric:         Mood and Affect: Mood is anxious.         Behavior: Behavior is cooperative.     Ortho Exam       Lab Results: I have reviewed the following results:   Recent Labs     10/14/24  1848 10/15/24  0529   WBC 7.85 6.14   HGB 14.2 12.8   HCT 45.7 40.9    294   BUN 10 10   CREATININE 0.95 0.80     Blood Culture:   Lab Results   Component Value Date    BLOODCX No Growth After 5 Days. 07/28/2024    BLOODCX No Growth After 5 Days. 07/28/2024     Wound Culture: No results found for: \"WOUNDCULT\"    Imaging Results Review: I reviewed radiology reports from this admission including: CT abdomen/pelvis.  Other Study Results Review: No additional pertinent studies reviewed.  "

## 2024-10-16 ENCOUNTER — APPOINTMENT (INPATIENT)
Dept: RADIOLOGY | Facility: HOSPITAL | Age: 59
DRG: 247 | End: 2024-10-16
Payer: MEDICARE

## 2024-10-16 PROBLEM — L98.8 DRAINING CUTANEOUS SINUS TRACT: Status: ACTIVE | Noted: 2024-10-16

## 2024-10-16 LAB
ANION GAP SERPL CALCULATED.3IONS-SCNC: 8 MMOL/L (ref 4–13)
BASOPHILS # BLD AUTO: 0.02 THOUSANDS/ΜL (ref 0–0.1)
BASOPHILS NFR BLD AUTO: 0 % (ref 0–1)
BUN SERPL-MCNC: 10 MG/DL (ref 5–25)
CALCIUM SERPL-MCNC: 9.1 MG/DL (ref 8.4–10.2)
CHLORIDE SERPL-SCNC: 100 MMOL/L (ref 96–108)
CO2 SERPL-SCNC: 26 MMOL/L (ref 21–32)
CREAT SERPL-MCNC: 0.87 MG/DL (ref 0.6–1.3)
EOSINOPHIL # BLD AUTO: 0.03 THOUSAND/ΜL (ref 0–0.61)
EOSINOPHIL NFR BLD AUTO: 0 % (ref 0–6)
ERYTHROCYTE [DISTWIDTH] IN BLOOD BY AUTOMATED COUNT: 16.4 % (ref 11.6–15.1)
GFR SERPL CREATININE-BSD FRML MDRD: 94 ML/MIN/1.73SQ M
GLUCOSE SERPL-MCNC: 109 MG/DL (ref 65–140)
GLUCOSE SERPL-MCNC: 113 MG/DL (ref 65–140)
GLUCOSE SERPL-MCNC: 114 MG/DL (ref 65–140)
GLUCOSE SERPL-MCNC: 97 MG/DL (ref 65–140)
GLUCOSE SERPL-MCNC: 97 MG/DL (ref 65–140)
HCT VFR BLD AUTO: 41.3 % (ref 36.5–49.3)
HGB BLD-MCNC: 13 G/DL (ref 12–17)
IMM GRANULOCYTES # BLD AUTO: 0.02 THOUSAND/UL (ref 0–0.2)
IMM GRANULOCYTES NFR BLD AUTO: 0 % (ref 0–2)
LYMPHOCYTES # BLD AUTO: 2.29 THOUSANDS/ΜL (ref 0.6–4.47)
LYMPHOCYTES NFR BLD AUTO: 33 % (ref 14–44)
MAGNESIUM SERPL-MCNC: 1.9 MG/DL (ref 1.9–2.7)
MCH RBC QN AUTO: 28.4 PG (ref 26.8–34.3)
MCHC RBC AUTO-ENTMCNC: 31.5 G/DL (ref 31.4–37.4)
MCV RBC AUTO: 90 FL (ref 82–98)
MONOCYTES # BLD AUTO: 0.64 THOUSAND/ΜL (ref 0.17–1.22)
MONOCYTES NFR BLD AUTO: 9 % (ref 4–12)
NEUTROPHILS # BLD AUTO: 4.05 THOUSANDS/ΜL (ref 1.85–7.62)
NEUTS SEG NFR BLD AUTO: 58 % (ref 43–75)
NRBC BLD AUTO-RTO: 0 /100 WBCS
PHOSPHATE SERPL-MCNC: 3 MG/DL (ref 2.7–4.5)
PLATELET # BLD AUTO: 314 THOUSANDS/UL (ref 149–390)
PMV BLD AUTO: 8.8 FL (ref 8.9–12.7)
POTASSIUM SERPL-SCNC: 3.8 MMOL/L (ref 3.5–5.3)
RBC # BLD AUTO: 4.58 MILLION/UL (ref 3.88–5.62)
SODIUM SERPL-SCNC: 134 MMOL/L (ref 135–147)
WBC # BLD AUTO: 7.05 THOUSAND/UL (ref 4.31–10.16)

## 2024-10-16 PROCEDURE — 83735 ASSAY OF MAGNESIUM: CPT | Performed by: PHYSICIAN ASSISTANT

## 2024-10-16 PROCEDURE — 99232 SBSQ HOSP IP/OBS MODERATE 35: CPT | Performed by: SURGERY

## 2024-10-16 PROCEDURE — 82948 REAGENT STRIP/BLOOD GLUCOSE: CPT

## 2024-10-16 PROCEDURE — 80048 BASIC METABOLIC PNL TOTAL CA: CPT | Performed by: PHYSICIAN ASSISTANT

## 2024-10-16 PROCEDURE — 85025 COMPLETE CBC W/AUTO DIFF WBC: CPT | Performed by: PHYSICIAN ASSISTANT

## 2024-10-16 PROCEDURE — 84100 ASSAY OF PHOSPHORUS: CPT | Performed by: PHYSICIAN ASSISTANT

## 2024-10-16 PROCEDURE — 74019 RADEX ABDOMEN 2 VIEWS: CPT

## 2024-10-16 PROCEDURE — 99232 SBSQ HOSP IP/OBS MODERATE 35: CPT | Performed by: INTERNAL MEDICINE

## 2024-10-16 RX ORDER — DIATRIZOATE MEGLUMINE AND DIATRIZOATE SODIUM 660; 100 MG/ML; MG/ML
120 SOLUTION ORAL; RECTAL
Status: COMPLETED | OUTPATIENT
Start: 2024-10-16 | End: 2024-10-16

## 2024-10-16 RX ORDER — DIATRIZOATE MEGLUMINE AND DIATRIZOATE SODIUM 660; 100 MG/ML; MG/ML
120 SOLUTION ORAL; RECTAL
Status: DISCONTINUED | OUTPATIENT
Start: 2024-10-16 | End: 2024-10-17 | Stop reason: HOSPADM

## 2024-10-16 RX ADMIN — NICOTINE 1 PATCH: 14 PATCH, EXTENDED RELEASE TRANSDERMAL at 08:44

## 2024-10-16 RX ADMIN — ENOXAPARIN SODIUM 40 MG: 40 INJECTION SUBCUTANEOUS at 08:44

## 2024-10-16 RX ADMIN — SODIUM CHLORIDE, SODIUM LACTATE, POTASSIUM CHLORIDE, AND CALCIUM CHLORIDE 125 ML/HR: .6; .31; .03; .02 INJECTION, SOLUTION INTRAVENOUS at 13:36

## 2024-10-16 RX ADMIN — PANTOPRAZOLE SODIUM 40 MG: 40 INJECTION, POWDER, FOR SOLUTION INTRAVENOUS at 05:02

## 2024-10-16 RX ADMIN — SODIUM CHLORIDE, SODIUM LACTATE, POTASSIUM CHLORIDE, AND CALCIUM CHLORIDE 125 ML/HR: .6; .31; .03; .02 INJECTION, SOLUTION INTRAVENOUS at 04:32

## 2024-10-16 RX ADMIN — DIATRIZOATE MEGLUMINE AND DIATRIZOATE SODIUM 120 ML: 660; 100 LIQUID ORAL; RECTAL at 08:49

## 2024-10-16 NOTE — PLAN OF CARE
Problem: PAIN - ADULT  Goal: Verbalizes/displays adequate comfort level or baseline comfort level  Description: Interventions:  - Encourage patient to monitor pain and request assistance  - Assess pain using appropriate pain scale  - Administer analgesics based on type and severity of pain and evaluate response  - Implement non-pharmacological measures as appropriate and evaluate response  - Consider cultural and social influences on pain and pain management  - Notify physician/advanced practitioner if interventions unsuccessful or patient reports new pain  Outcome: Progressing     Problem: INFECTION - ADULT  Goal: Absence or prevention of progression during hospitalization  Description: INTERVENTIONS:  - Assess and monitor for signs and symptoms of infection  - Monitor lab/diagnostic results  - Monitor all insertion sites, i.e. indwelling lines, tubes, and drains  - Monitor endotracheal if appropriate and nasal secretions for changes in amount and color  - [place] appropriate cooling/warming therapies per order  - Administer medications as ordered  - Instruct and encourage patient and family to use good hand hygiene technique  - Identify and instruct in appropriate isolation precautions for identified infection/condition  Outcome: Progressing  Goal: Absence of fever/infection during neutropenic period  Description: INTERVENTIONS:  - Monitor WBC    Outcome: Progressing     Problem: SAFETY ADULT  Goal: Patient will remain free of falls  Description: INTERVENTIONS:  - Educate patient/family on patient safety including physical limitations  - Instruct patient to call for assistance with activity   - Consult OT/PT to assist with strengthening/mobility   - Keep Call bell within reach  - Keep bed low and locked with side rails adjusted as appropriate  - Keep care items and personal belongings within reach  - Initiate and maintain comfort rounds  - Make Fall Risk Sign visible to staff  - Offer Toileting every 2 Hours,  in advance of need  - Obtain necessary fall risk management equipment: yellow socks  - Apply yellow socks and bracelet for high fall risk patients  - Consider moving patient to room near nurses station  Outcome: Progressing  Goal: Maintain or return to baseline ADL function  Description: INTERVENTIONS:  -  Assess patient's ability to carry out ADLs; assess patient's baseline for ADL function and identify physical deficits which impact ability to perform ADLs (bathing, care of mouth/teeth, toileting, grooming, dressing, etc.)  - Assess/evaluate cause of self-care deficits   - Assess range of motion  - Assess patient's mobility; develop plan if impaired  - Assess patient's need for assistive devices and provide as appropriate  - Encourage maximum independence but intervene and supervise when necessary  - Involve family in performance of ADLs  - Assess for home care needs following discharge   - Consider OT consult to assist with ADL evaluation and planning for discharge  - Provide patient education as appropriate  Outcome: Progressing  Goal: Maintains/Returns to pre admission functional level  Description: INTERVENTIONS:  - Perform AM-PAC 6 Click Basic Mobility/ Daily Activity assessment daily.  - Set and communicate daily mobility goal to care team and patient/family/caregiver.   - Collaborate with rehabilitation services on mobility goals if consulted  - Perform Range of Motion 2 times a day.  - Reposition patient every 2 hours.  - Dangle patient 3 times a day  - Stand patient 3 times a day  - Ambulate patient 2 times a day  - Out of bed to chair 3 times a day   - Out of bed for meals 3 times a day  - Out of bed for toileting  - Record patient progress and toleration of activity level   Outcome: Progressing     Problem: DISCHARGE PLANNING  Goal: Discharge to home or other facility with appropriate resources  Description: INTERVENTIONS:  - Identify barriers to discharge w/patient and caregiver  - Arrange for needed  discharge resources and transportation as appropriate  - Identify discharge learning needs (meds, wound care, etc.)  - Arrange for interpretive services to assist at discharge as needed  - Refer to Case Management Department for coordinating discharge planning if the patient needs post-hospital services based on physician/advanced practitioner order or complex needs related to functional status, cognitive ability, or social support system  Outcome: Progressing     Problem: Knowledge Deficit  Goal: Patient/family/caregiver demonstrates understanding of disease process, treatment plan, medications, and discharge instructions  Description: Complete learning assessment and assess knowledge base.  Interventions:  - Provide teaching at level of understanding  - Provide teaching via preferred learning methods  Outcome: Progressing     Problem: Nutrition/Hydration-ADULT  Goal: Nutrient/Hydration intake appropriate for improving, restoring or maintaining nutritional needs  Description: Monitor and assess patient's nutrition/hydration status for malnutrition. Collaborate with interdisciplinary team and initiate plan and interventions as ordered.  Monitor patient's weight and dietary intake as ordered or per policy. Utilize nutrition screening tool and intervene as necessary. Determine patient's food preferences and provide high-protein, high-caloric foods as appropriate.     INTERVENTIONS:  - Monitor oral intake, urinary output, labs, and treatment plans  - Assess nutrition and hydration status and recommend course of action  - Evaluate amount of meals eaten  - Assist patient with eating if necessary   - Allow adequate time for meals  - Recommend/ encourage appropriate diets, oral nutritional supplements, and vitamin/mineral supplements  - Order, calculate, and assess calorie counts as needed  - Recommend, monitor, and adjust tube feedings and TPN/PPN based on assessed needs  - Assess need for intravenous fluids  - Provide  specific nutrition/hydration education as appropriate  - Include patient/family/caregiver in decisions related to nutrition  Outcome: Progressing     Problem: GASTROINTESTINAL - ADULT  Goal: Minimal or absence of nausea and/or vomiting  Description: INTERVENTIONS:  - Administer IV fluids if ordered to ensure adequate hydration  - Maintain NPO status until nausea and vomiting are resolved  - Nasogastric tube if ordered  - Administer ordered antiemetic medications as needed  - Provide nonpharmacologic comfort measures as appropriate  - Advance diet as tolerated, if ordered  - Consider nutrition services referral to assist patient with adequate nutrition and appropriate food choices  Outcome: Progressing  Goal: Maintains or returns to baseline bowel function  Description: INTERVENTIONS:  - Assess bowel function  - Encourage oral fluids to ensure adequate hydration  - Administer IV fluids if ordered to ensure adequate hydration  - Administer ordered medications as needed  - Encourage mobilization and activity  - Consider nutritional services referral to assist patient with adequate nutrition and appropriate food choices  Outcome: Progressing  Goal: Maintains adequate nutritional intake  Description: INTERVENTIONS:  - Monitor percentage of each meal consumed  - Identify factors contributing to decreased intake, treat as appropriate  - Assist with meals as needed  - Monitor I&O, weight, and lab values if indicated  - Obtain nutrition services referral as needed  Outcome: Progressing  Goal: Establish and maintain optimal ostomy function  Description: INTERVENTIONS:  - Assess bowel function  - Encourage oral fluids to ensure adequate hydration  - Administer IV fluids if ordered to ensure adequate hydration   - Administer ordered medications as needed  - Encourage mobilization and activity  - Nutrition services referral to assist patient with appropriate food choices  - Assess stoma site  - Consider wound care consult    Outcome: Progressing  Goal: Oral mucous membranes remain intact  Description: INTERVENTIONS  - Assess oral mucosa and hygiene practices  - Implement preventative oral hygiene regimen  - Implement oral medicated treatments as ordered  - Initiate Nutrition services referral as needed  Outcome: Progressing     Problem: METABOLIC, FLUID AND ELECTROLYTES - ADULT  Goal: Electrolytes maintained within normal limits  Description: INTERVENTIONS:  - Monitor labs and assess patient for signs and symptoms of electrolyte imbalances  - Administer electrolyte replacement as ordered  - Monitor response to electrolyte replacements, including repeat lab results as appropriate  - Instruct patient on fluid and nutrition as appropriate  Outcome: Progressing  Goal: Fluid balance maintained  Description: INTERVENTIONS:  - Monitor labs   - Monitor I/O and WT  - Instruct patient on fluid and nutrition as appropriate  - Assess for signs & symptoms of volume excess or deficit  Outcome: Progressing  Goal: Glucose maintained within target range  Description: INTERVENTIONS:  - Monitor Blood Glucose as ordered  - Assess for signs and symptoms of hyperglycemia and hypoglycemia  - Administer ordered medications to maintain glucose within target range  - Assess nutritional intake and initiate nutrition service referral as needed  Outcome: Progressing     Problem: Prexisting or High Potential for Compromised Skin Integrity  Goal: Skin integrity is maintained or improved  Description: INTERVENTIONS:  - Identify patients at risk for skin breakdown  - Assess and monitor skin integrity  - Assess and monitor nutrition and hydration status  - Monitor labs   - Assess for incontinence   - Turn and reposition patient  - Assist with mobility/ambulation  - Relieve pressure over bony prominences  - Avoid friction and shearing  - Provide appropriate hygiene as needed including keeping skin clean and dry  - Evaluate need for skin moisturizer/barrier cream  -  Collaborate with interdisciplinary team   - Patient/family teaching  - Consider wound care consult   Outcome: Progressing

## 2024-10-16 NOTE — PROGRESS NOTES
Progress Note - Hospitalist   Name: Homero Driver Jr. 59 y.o. male I MRN: [id number]  Unit/Bed#: [address] [place] [address] I Date of Admission: 10/14/2024   Date of Service: 10/16/2024 I Hospital Day: 2    Assessment & Plan  SBO (small bowel obstruction) (HCC)  Patient with a history of perforated sigmoid diverticulitis with pericolonic abscess s/p elective sigmoid colectomy with cholecystectomy and protective loop ileostomy.  Recent admission for SBO in August 2024.  Presented with abdominal pain and decreased output from ileostomy  CT scan of the abdomen on admission-Findings compatible with partial small bowel obstruction proximal to the right upper quadrant ileostomy, possibly involving adhesive disease to the anterior abdominal wall in the region of the mesh. No loculated fluid collection or free air.   Chest x-ray with NG tube in the region of gastric fundus.  Repeat abdominal x-ray with nonobstructive bowel gas pattern.  Oral contrast in colon  Appears to be clinically improving  Surgical team following, input appreciated  Repeat imaging with Gastrografin study today  Monitor intake output  IV fluid while n.p.o.  Follow-up labs  Monitor abdominal exam, follow-up labs  Surgical follow-up  Surgical wound dehiscence  Patient has some dehiscence of his surgical incision with some drainage.  Continue wound care as per surgery  Surgical follow-up.  Smoking  Nicotine patch.  HTN (hypertension)  Stable currently  Monitor blood pressures     Hyponatremia-mild, monitor    VTE Pharmacologic Prophylaxis:   Moderate Risk (Score 3-4) - Pharmacological DVT Prophylaxis Ordered: enoxaparin (Lovenox).    Mobility:   Basic Mobility Inpatient Raw Score: 24  JH-HLM Goal: 8: Walk 250 feet or more  JH-HLM Achieved: 7: Walk 25 feet or more  JH-HLM Goal achieved. Continue to encourage appropriate mobility.    Patient Centered Rounds: I performed bedside rounds with nursing staff today.   Discussions with Specialists or Other Care Team Provider:  Surgery    Education and Discussions with Family / Patient:  Discussed with the patient.     Current Length of Stay: 2 day(s)  Current Patient Status: Inpatient   Certification Statement: The patient will continue to require additional inpatient hospital stay due to small bowel obstruction  Discharge Plan: Anticipate discharge in 24-48 hrs to home.    Code Status: Level 1 - Full Code    Subjective     Reports improvement in abdominal discomfort.  Ostomy output noted  Patient was seen by surgery and undergoing Gastrografin study    Reports mild nausea, no vomiting  Denies any chest pain shortness of breath    Requesting ice chips for dry mouth    Objective :  Temp:  [98.1 °F (36.7 °C)-98.4 °F (36.9 °C)] 98.2 °F (36.8 °C)  HR:  [73-81] 73  BP: (115-127)/(70-99) 115/99  Resp:  [16-18] 16  SpO2:  [98 %] 98 %  O2 Device: None (Room air)    Body mass index is 23.46 kg/m².     Input and Output Summary (last 24 hours):     Intake/Output Summary (Last 24 hours) at 10/16/2024 0916  Last data filed at 10/16/2024 0801  Gross per 24 hour   Intake 0 ml   Output 1350 ml   Net -1350 ml       Physical Exam  Constitutional:       General: He is not in acute distress.  HENT:      Head: Normocephalic and atraumatic.      Nose:      Comments: NG tube in place  Cardiovascular:      Rate and Rhythm: Normal rate.   Pulmonary:      Effort: Pulmonary effort is normal. No respiratory distress.      Breath sounds: No rhonchi.   Abdominal:      General: Bowel sounds are normal. There is no distension.      Palpations: Abdomen is soft.      Tenderness: There is no abdominal tenderness. There is no guarding or rebound.      Comments: Ileostomy in place   Musculoskeletal:      Cervical back: Neck supple.   Skin:     General: Skin is warm and dry.      Findings: No rash.   Neurological:      Mental Status: He is alert. Mental status is at baseline.      Cranial Nerves: No cranial nerve deficit.         Lines/Drains:  Lines/Drains/Airways        Active Status       Name Placement date Placement time Site Days    NG/OG/Enteral Tube Nasogastric 16 Fr Right nare 10/14/24  2146  Right nare  1    Ileostomy Loop RUQ 07/26/24  1506  RUQ  81                            Lab Results: I have reviewed the following results:   Results from last 7 days   Lab Units 10/16/24  0512   WBC Thousand/uL 7.05   HEMOGLOBIN g/dL 13.0   HEMATOCRIT % 41.3   PLATELETS Thousands/uL 314   SEGS PCT % 58   LYMPHO PCT % 33   MONO PCT % 9   EOS PCT % 0     Results from last 7 days   Lab Units 10/16/24  0512 10/15/24  0529   SODIUM mmol/L 134* 132*   POTASSIUM mmol/L 3.8 4.0   CHLORIDE mmol/L 100 102   CO2 mmol/L 26 23   BUN mg/dL 10 10   CREATININE mg/dL 0.87 0.80   ANION GAP mmol/L 8 7   CALCIUM mg/dL 9.1 9.2   ALBUMIN g/dL  --  4.1   TOTAL BILIRUBIN mg/dL  --  0.36   ALK PHOS U/L  --  54   ALT U/L  --  7   AST U/L  --  12*   GLUCOSE RANDOM mg/dL 97 100         Results from last 7 days   Lab Units 10/16/24  0726 10/16/24  0558 10/16/24  0008 10/15/24  1603 10/15/24  1153 10/15/24  0713 10/15/24  0607 10/15/24  0032   POC GLUCOSE mg/dl 113 109 114 102 80 105 102 91               Recent Cultures (last 7 days):               Last 24 Hours Medication List:     Current Facility-Administered Medications:     diatrizoate meglumine-sodium (GASTROGRAFIN) solution 120 mL, Once in imaging    enoxaparin (LOVENOX) subcutaneous injection 40 mg, Q24H BERYL    HYDROmorphone HCl (DILAUDID) injection 0.2 mg, Q4H PRN    lactated ringers infusion, Continuous, Last Rate: 125 mL/hr (10/16/24 0432)    nicotine (NICODERM CQ) 14 mg/24hr TD 24 hr patch 1 patch, Daily    ondansetron (ZOFRAN) injection 4 mg, Once    ondansetron (ZOFRAN) injection 4 mg, Q6H PRN    pantoprazole (PROTONIX) injection 40 mg, Q24H BERYL    phenol (CHLORASEPTIC) 1.4 % mucosal liquid 1 spray, Q2H PRN    Administrative Statements   Today, Patient Was Seen By: Scarlett Phipps MD  I have spent a total time of 35 minutes in caring for this patient  on the day of the visit/encounter including Documenting in the medical record, Reviewing / ordering tests, medicine, procedures  , Obtaining or reviewing history  , and Communicating with other healthcare professionals .    **Please Note: This note may have been constructed using a voice recognition system.**

## 2024-10-16 NOTE — ASSESSMENT & PLAN NOTE
Patient has some dehiscence of his surgical incision with some drainage.  Continue wound care as per surgery  Surgical follow-up.

## 2024-10-16 NOTE — ASSESSMENT & PLAN NOTE
Patient with a history of perforated sigmoid diverticulitis with pericolonic abscess s/p elective sigmoid colectomy with cholecystectomy and protective loop ileostomy.  Recent admission for SBO in August 2024.  Presented with abdominal pain and decreased output from ileostomy  CT scan of the abdomen on admission-Findings compatible with partial small bowel obstruction proximal to the right upper quadrant ileostomy, possibly involving adhesive disease to the anterior abdominal wall in the region of the mesh. No loculated fluid collection or free air.   Chest x-ray with NG tube in the region of gastric fundus.  Repeat abdominal x-ray with nonobstructive bowel gas pattern.  Oral contrast in colon  Appears to be clinically improving  Surgical team following, input appreciated  Repeat imaging with Gastrografin study today  Monitor intake output  IV fluid while n.p.o.  Follow-up labs  Monitor abdominal exam, follow-up labs  Surgical follow-up

## 2024-10-16 NOTE — UTILIZATION REVIEW
Continued Stay Review    Date: 10/16/24                          Current Patient Class: Inpatient    Current Level of Care: med surg  HPI:59 y.o. male initially admitted on 10/14/24     Assessment/Plan:   SBO POA. NGT remains in place to LCS. 250cc output from stoma. Abd with decreased tenderness, +flatus, liquid stools. Plan repeat imaging with Gastrografin study to follow contrast through to the stoma. Maxsorb AG & Mepilex dressing to draining cutaneous sinus tract; mat need debridement, monitor. IVF maintained.     Medications:   Scheduled Medications:  enoxaparin, 40 mg, Subcutaneous, Q24H BERYL  nicotine, 1 patch, Transdermal, Daily  ondansetron, 4 mg, Intravenous, Once  pantoprazole, 40 mg, Intravenous, Q24H BERYL      Continuous IV Infusions:  lactated ringers, 125 mL/hr, Intravenous, Continuous      PRN Meds:  diatrizoate meglumine-sodium, 120 mL, Oral, Once in imaging  HYDROmorphone, 0.2 mg, Intravenous, Q4H PRN  ondansetron, 4 mg, Intravenous, Q6H PRN  phenol, 1 spray, Mouth/Throat, Q2H PRN      Discharge Plan: tbd    Vital Signs (last 3 days)       Date/Time Temp Pulse Resp BP MAP (mmHg) SpO2 O2 Device Patient Position - Orthostatic VS Pam Coma Scale Score Pain    10/16/24 07:50:57 98.2 °F (36.8 °C) 73 16 115/99 104 98 % -- -- -- --    10/15/24 23:14:01 98.4 °F (36.9 °C) 81 16 116/70 85 98 % -- -- -- --    10/15/24 2100 -- -- -- -- -- -- None (Room air) -- 15 No Pain    10/15/24 18:50:46 98.1 °F (36.7 °C) 74 18 127/83 98 98 % -- -- -- --    10/15/24 1823 -- -- -- -- -- -- -- -- -- 7    10/15/24 1320 -- -- -- -- -- -- -- -- -- 7    10/15/24 0930 -- -- -- -- -- -- -- -- 15 --    10/15/24 09:15:12 98 °F (36.7 °C) 62 17 105/66 79 100 % None (Room air) Lying -- 6    10/14/24 2300 -- -- -- -- -- -- -- -- 15 --    10/14/24 2245 -- -- -- -- -- -- -- -- -- 7    10/14/24 22:31:06 97.4 °F (36.3 °C) 64 18 111/73 86 95 % -- -- -- --    10/14/24 2200 -- 62 16 124/75 94 98 % None (Room air) Lying -- 2    10/14/24 2139  -- -- -- -- -- -- -- -- -- 7    10/14/24 2115 -- 72 -- -- -- 98 % -- -- -- --    10/14/24 2100 -- 66 18 123/63 85 98 % None (Room air) Lying -- --    10/14/24 1930 -- 62 -- 116/80 93 98 % -- -- -- --    10/14/24 1926 -- -- -- -- -- -- None (Room air) -- -- --    10/14/24 1920 -- -- -- -- -- -- -- -- -- No Pain    10/14/24 1851 -- -- -- -- -- -- -- -- -- 6    10/14/24 1824 98.8 °F (37.1 °C) 73 18 144/92 -- 100 % None (Room air) Lying -- --    10/14/24 1807 -- -- -- 113/71 -- -- -- -- -- --    10/14/24 1804 97.3 °F (36.3 °C) 87 18 -- -- 100 % -- -- -- 10 - Worst Possible Pain          Weight (last 2 days)       Date/Time Weight    10/15/24 09:15:12 78.5 (173)    10/14/24 1804 77.1 (170)            Pertinent Labs/Diagnostic Results:   Radiology:  XR abdomen 1 vw portable   Final Interpretation by Mirna Chan MD (10/15 1055)   Nonobstructive bowel gas pattern. Oral contrast in the colon.               Workstation performed: [id number]         XR chest portable   Final Interpretation by Mirna Chan MD (10/15 1056)   No acute cardiopulmonary disease.            Workstation performed: [id number]         CT abdomen pelvis with contrast   Final Interpretation by Ivelisse Parks MD (10/14 2055)         TECHNIQUE: CT examination of the abdomen and pelvis was performed. Multiplanar 2D reformatted images were created from the source data.      This examination, like all CT scans performed in the [facility], was performed utilizing techniques to minimize radiation dose exposure, including the use of iterative reconstruction and automated exposure control. Radiation dose length    product (DLP) for this visit: 477 mGy-cm      IV Contrast: 100 mL of iohexol (OMNIPAQUE)   Enteric Contrast: Not administered.      FINDINGS:      ABDOMEN      LOWER CHEST: No clinically significant abnormality in the visualized lower chest.      LIVER/BILIARY TREE: Unremarkable.      GALLBLADDER: Status post cholecystectomy.       SPLEEN: Unremarkable.      PANCREAS: Unremarkable.      ADRENAL GLANDS: Unremarkable.      KIDNEYS/URETERS: Unremarkable. No hydronephrosis.      STOMACH AND BOWEL: Sigmoid anastomosis appears to be intact with no adjacent fluid collection. The proximal colon is opacified with contrast presumably from recent barium enema.      Dilated fluid-filled loops of small bowel primarily in the pelvis proximal to the right upper quadrant ileostomy which is collapsed compatible with small bowel obstruction. There is a potential caliber change against the hernia mesh in the anterior    abdominal wall. No pneumatosis or free air.      Mild rectal wall thickening unchanged.124      APPENDIX: No findings to suggest appendicitis.      ABDOMINOPELVIC CAVITY: No ascites. No pneumoperitoneum. No lymphadenopathy.      VESSELS: Unremarkable for patient's age.      PELVIS      REPRODUCTIVE ORGANS: Unremarkable for patient's age.      URINARY BLADDER: Unremarkable.      ABDOMINAL WALL/INGUINAL REGIONS: Changes from prior ventral hernia repair with mesh in place. Previously noted gas and fluid in the anterior abdominal wall has resolved.      BONES: No acute fracture or suspicious osseous lesion. Spinal degenerative changes.      IMPRESSION:      Findings compatible with partial small bowel obstruction proximal to the right upper quadrant ileostomy, possibly involving adhesive disease to the anterior abdominal wall in the region of the mesh. No loculated fluid collection or free air.      The study was marked in EPIC for immediate notification.         Workstation performed: [id number]         XR abdomen complete inc upright and/or decubitus    (Results Pending)     Cardiology:  No orders to display     GI:  No orders to display           Results from last 7 days   Lab Units 10/16/24  0512 10/15/24  0529 10/14/24  1848   WBC Thousand/uL 7.05 6.14 7.85   HEMOGLOBIN g/dL 13.0 12.8 14.2   HEMATOCRIT % 41.3 40.9 45.7   PLATELETS  "Thousands/uL 314 294 318   TOTAL NEUT ABS Thousands/µL 4.05 3.33 5.45         Results from last 7 days   Lab Units 10/16/24  0512 10/15/24  0529 10/14/24  1848   SODIUM mmol/L 134* 132* 134*   POTASSIUM mmol/L 3.8 4.0 4.1   CHLORIDE mmol/L 100 102 102   CO2 mmol/L 26 23 25   ANION GAP mmol/L 8 7 7   BUN mg/dL 10 10 10   CREATININE mg/dL 0.87 0.80 0.95   EGFR ml/min/1.73sq m 94 97 87   CALCIUM mg/dL 9.1 9.2 9.9   MAGNESIUM mg/dL 1.9 2.0  --    PHOSPHORUS mg/dL 3.0  --   --      Results from last 7 days   Lab Units 10/15/24  0529 10/14/24  1848   AST U/L 12* 14   ALT U/L 7 7   ALK PHOS U/L 54 59   TOTAL PROTEIN g/dL 7.4 8.4   ALBUMIN g/dL 4.1 4.6   TOTAL BILIRUBIN mg/dL 0.36 0.41     Results from last 7 days   Lab Units 10/16/24  1140 10/16/24  0726 10/16/24  0558 10/16/24  0008 10/15/24  1603 10/15/24  1153 10/15/24  0713 10/15/24  0607 10/15/24  0032   POC GLUCOSE mg/dl 97 113 109 114 102 80 105 102 91     Results from last 7 days   Lab Units 10/16/24  0512 10/15/24  0529 10/14/24  1848   GLUCOSE RANDOM mg/dL 97 100 120             No results found for: \"BETA-HYDROXYBUTYRATE\"                                                                                                                                         Network Utilization Review Department  ATTENTION: Please call with any questions or concerns to [phone] and carefully listen to the prompts so that you are directed to the right person. All voicemails are confidential.   For Discharge needs, contact Care Management DC Support Team at [phone] opt. 2  Send all requests for admission clinical reviews, approved or denied determinations and any other requests to dedicated fax number below belonging to the campus where the patient is receiving treatment. List of dedicated fax numbers for the Facilities:  FACILITY NAME UR FAX NUMBER   ADMISSION DENIALS (Administrative/Medical Necessity) [phone]   DISCHARGE SUPPORT TEAM (NETWORK) [phone]   PARENT " CHILD HEALTH (Maternity/NICU/Pediatrics) [phone]   [facility] [phone]   [facility] [phone]   [facility] [phone]   [facility] [phone]   [facility] [phone]   [facility] [phone]   [facility] [phone]   [facility] [phone]   [facility] [phone]   [facility] [phone]   [facility] [phone]   [facility] [phone]

## 2024-10-16 NOTE — PROGRESS NOTES
Progress Note - Surgery-General   Name: Homero Driver Jr. 59 y.o. male I MRN: [id number]  Unit/Bed#: [address] [place] [address] I Date of Admission: 10/14/2024   Date of Service: 10/16/2024 I Hospital Day: 2     Assessment & Plan  SBO (small bowel obstruction) (HCC)  Improving, having some loop ileostomy output of approximately 400 cc, AVSS, abdominal pain reduced, nontender, labs within normal limits, has appetite, pending follow-through study    NG tube on low continuous suction  -Continue neck brace  -As needed analgesia  -N.p.o.  - continue IV fluids until diet is advanced   -Stoma is patent down to subfascial plane  -Repeat imaging with Gastrografin study to follow contrast through to the stoma  -NG tube confirmed  -Reviewed labs  Draining cutaneous sinus tract  Suspected periumbilical sinus track in alignment with the incision from historical surgery 7/26, waxing and waning appearance with appearance of attempts at healing with granulation tissue, small punctate area that is open and velvety, appears to possibly be a chronic sinus tract that is epithelialized, patient has a history of cutdown through mesh with reapproximation of fascial plane after sigmoid resection, lower index of suspicion for fistula bearing in mind that the outputs are not fecal or succus contents    -Maxorb AG  -Mepilex over top  -Possibly needs debridement of sinus tract  -Continue to observe and manage as an outpatient    Surgery-General service will follow.    Subjective   Patient was seen and examined at bedside.  Patient denies any acute events overnight.  Patient denies any nausea or vomiting.  Patient had approximately 250 cc of output from the stoma.  Patient does feel better with the reduction in abdominal tenderness.  Patient as noted before has been passing gas and having liquid stool from the stoma.  Patient has an appetite and a urine S2 relief.  Patient was educated on sinus track and fistula tract formation.    Objective :  Temp:  [98 °F  (36.7 °C)-98.4 °F (36.9 °C)] 98.2 °F (36.8 °C)  HR:  [62-81] 73  BP: (105-127)/(66-99) 115/99  Resp:  [16-18] 16  SpO2:  [98 %-100 %] 98 %  O2 Device: None (Room air)    I/O         10/14 0701  10/15 0700 10/15 0701  10/16 0700 10/16 0701  10/17 0700    P.O.  0     Total Intake(mL/kg)  0 (0)     Emesis/NG output 275 700     Stool  400     Total Output 275 1100     Net -275 -1100                  Lines/Drains/Airways       Active Status       Name Placement date Placement time Site Days    NG/OG/Enteral Tube Nasogastric 16 Fr Right nare 10/14/24  2146  Right nare  1    Ileostomy Loop RUQ 07/26/24  1506  RUQ  81                  Physical Exam  Constitutional:       General: He is awake. He is not in acute distress.     Appearance: Normal appearance. He is normal weight. He is not ill-appearing, toxic-appearing or diaphoretic.      Interventions: He is not intubated.  HENT:      Head: Normocephalic and atraumatic.      Right Ear: Hearing and external ear normal.      Left Ear: Hearing and external ear normal.      Nose: Nose normal.   Cardiovascular:      Rate and Rhythm: Normal rate.   Pulmonary:      Effort: Pulmonary effort is normal. No tachypnea, bradypnea, accessory muscle usage, prolonged expiration, respiratory distress or retractions. He is not intubated.   Abdominal:      General: Abdomen is flat.      Palpations: Abdomen is soft.      Tenderness: There is no abdominal tenderness. There is no guarding.       Skin:     General: Skin is warm and dry.      Capillary Refill: Capillary refill takes less than 2 seconds.   Neurological:      General: No focal deficit present.      Mental Status: He is alert and oriented to person, place, and time.   Psychiatric:         Behavior: Behavior is cooperative.           Lab Results: I have reviewed the following results:  Recent Labs     10/15/24  0529 10/16/24  0512   WBC 6.14 7.05   HGB 12.8 13.0   HCT 40.9 41.3    314   SODIUM 132* 134*   K 4.0 3.8    100    CO2 23 26   BUN 10 10   CREATININE 0.80 0.87   GLUC 100 97   MG 2.0 1.9   PHOS  --  3.0   AST 12*  --    ALT 7  --    ALB 4.1  --    TBILI 0.36  --    ALKPHOS 54  --        Imaging Results Review: No pertinent imaging studies reviewed.  Other Study Results Review: No additional pertinent studies reviewed.    VTE Pharmacologic Prophylaxis: Enoxaparin (Lovenox)  VTE Mechanical Prophylaxis: sequential compression device

## 2024-10-16 NOTE — ASSESSMENT & PLAN NOTE
Suspected periumbilical sinus track in alignment with the incision from historical surgery 7/26, waxing and waning appearance with appearance of attempts at healing with granulation tissue, small punctate area that is open and velvety, appears to possibly be a chronic sinus tract that is epithelialized, patient has a history of cutdown through mesh with reapproximation of fascial plane after sigmoid resection, lower index of suspicion for fistula bearing in mind that the outputs are not fecal or succus contents    -Maxorb AG  -Mepilex over top  -Possibly needs debridement of sinus tract  -Continue to observe and manage as an outpatient

## 2024-10-16 NOTE — ASSESSMENT & PLAN NOTE
Improving, having some loop ileostomy output of approximately 400 cc, AVSS, abdominal pain reduced, nontender, labs within normal limits, has appetite, pending follow-through study    NG tube on low continuous suction  -Continue neck brace  -As needed analgesia  -N.p.o.  - continue IV fluids until diet is advanced   -Stoma is patent down to subfascial plane  -Repeat imaging with Gastrografin study to follow contrast through to the stoma  -NG tube confirmed  -Reviewed labs   Patient arrived to treatment suite for pre-medications and chemotherapy infusion. Right chest mediport accessed and good blood return noted. No further questions or concerns for the doctor at this time. Treatment approved and given. Patient tolerated well. Left treatment suite ambulatory with walker and family. Discharge instructions provided. Patient's status assessed and documented appropriately. All labs and required results were also reviewed today. Treatment parameters have been reviewed. Today's treatment has been approved by the provider. Treatment orders and medication sequencing (when applicable) was verified by 2 registered nurses. The treatment plan was confirmed with the patient prior to administration, and the patient understands the need to report any treatment-related symptoms. Prior to administration, when applicable, the following 8 elements of medication administration were reviewed with 2nd Registered Nurse prior to dosing: drug name, drug dose, infusion volume when prepared in a syringe, rate of administration, expiration dates and/or times, appearance and integrity of drug(s), and rate of pump for infusion. The 5 rights of medication administration have been verified.

## 2024-10-17 VITALS
BODY MASS INDEX: 23.43 KG/M2 | TEMPERATURE: 98.2 F | RESPIRATION RATE: 17 BRPM | HEART RATE: 77 BPM | WEIGHT: 173 LBS | HEIGHT: 72 IN | OXYGEN SATURATION: 95 % | SYSTOLIC BLOOD PRESSURE: 143 MMHG | DIASTOLIC BLOOD PRESSURE: 89 MMHG

## 2024-10-17 PROBLEM — K56.609 SBO (SMALL BOWEL OBSTRUCTION) (HCC): Status: RESOLVED | Noted: 2024-08-11 | Resolved: 2024-10-17

## 2024-10-17 LAB
ANION GAP SERPL CALCULATED.3IONS-SCNC: 11 MMOL/L (ref 4–13)
BUN SERPL-MCNC: 9 MG/DL (ref 5–25)
CALCIUM SERPL-MCNC: 9.6 MG/DL (ref 8.4–10.2)
CHLORIDE SERPL-SCNC: 99 MMOL/L (ref 96–108)
CO2 SERPL-SCNC: 24 MMOL/L (ref 21–32)
CREAT SERPL-MCNC: 0.94 MG/DL (ref 0.6–1.3)
GFR SERPL CREATININE-BSD FRML MDRD: 88 ML/MIN/1.73SQ M
GLUCOSE SERPL-MCNC: 141 MG/DL (ref 65–140)
MAGNESIUM SERPL-MCNC: 1.9 MG/DL (ref 1.9–2.7)
PHOSPHATE SERPL-MCNC: 2.9 MG/DL (ref 2.7–4.5)
POTASSIUM SERPL-SCNC: 3.9 MMOL/L (ref 3.5–5.3)
SODIUM SERPL-SCNC: 134 MMOL/L (ref 135–147)

## 2024-10-17 PROCEDURE — 99024 POSTOP FOLLOW-UP VISIT: CPT | Performed by: SPECIALIST

## 2024-10-17 PROCEDURE — 84100 ASSAY OF PHOSPHORUS: CPT | Performed by: INTERNAL MEDICINE

## 2024-10-17 PROCEDURE — 99239 HOSP IP/OBS DSCHRG MGMT >30: CPT | Performed by: INTERNAL MEDICINE

## 2024-10-17 PROCEDURE — 83735 ASSAY OF MAGNESIUM: CPT | Performed by: INTERNAL MEDICINE

## 2024-10-17 PROCEDURE — 80048 BASIC METABOLIC PNL TOTAL CA: CPT | Performed by: INTERNAL MEDICINE

## 2024-10-17 RX ORDER — NICOTINE 21 MG/24HR
1 PATCH, TRANSDERMAL 24 HOURS TRANSDERMAL DAILY
Qty: 28 PATCH | Refills: 0 | Status: SHIPPED | OUTPATIENT
Start: 2024-10-18

## 2024-10-17 RX ADMIN — NICOTINE 1 PATCH: 14 PATCH, EXTENDED RELEASE TRANSDERMAL at 08:49

## 2024-10-17 RX ADMIN — PANTOPRAZOLE SODIUM 40 MG: 40 INJECTION, POWDER, FOR SOLUTION INTRAVENOUS at 05:12

## 2024-10-17 RX ADMIN — ENOXAPARIN SODIUM 40 MG: 40 INJECTION SUBCUTANEOUS at 08:51

## 2024-10-17 RX ADMIN — SODIUM CHLORIDE, SODIUM LACTATE, POTASSIUM CHLORIDE, AND CALCIUM CHLORIDE 75 ML/HR: .6; .31; .03; .02 INJECTION, SOLUTION INTRAVENOUS at 05:16

## 2024-10-17 NOTE — DISCHARGE SUMMARY
Discharge Summary - Hospitalist   Name: Homero Driver Jr. 59 y.o. male I MRN: [id number]  Unit/Bed#: [address] [place], [place] [id number] I Date of Admission: 10/14/2024   Date of Service: 10/17/2024 I Hospital Day: 3     Assessment & Plan  SBO (small bowel obstruction) (HCC) (Resolved: 10/17/2024)  Patient with a history of perforated sigmoid diverticulitis with pericolonic abscess s/p elective sigmoid colectomy with cholecystectomy and protective loop ileostomy.  Recent admission for SBO in August 2024.  Presented with abdominal pain and decreased output from ileostomy  CT scan of the abdomen on admission-Findings compatible with partial small bowel obstruction proximal to the right upper quadrant ileostomy, possibly involving adhesive disease to the anterior abdominal wall in the region of the mesh. No loculated fluid collection or free air.   Chest x-ray on admission with NG tube in the region of gastric fundus.  Repeat abdominal x-ray with nonobstructive bowel gas pattern.  Oral contrast in colon  Seen by surgical team.  Improved with conservative management  Underwent repeat imaging with Gastrografin study which revealed contrast in ascending and transverse colon.  No evidence of obstruction noted  Patient tolerated advancement of the diet without any difficulties.  Stoma is appropriately functioning with good stool output.  Patient denies any nausea vomiting or abdominal pain.  Advance to regular diet  Follow-up with surgery as outpatient  Draining cutaneous sinus tract  Seen by surgery for anterior abdominal wound, input appreciated  Likely sinus tach versus small fistula  Recommended continue wound dressing and follow-up with surgery/wound care as outpatient  Continue wound care as per surgical team  Smoking  Nicotine patch.  HTN (hypertension)  Stable currently  Continue home meds  Monitor blood pressures     Hyponatremia-mild, anticipate improvement after diet advancement  Anxiety  Resume home meds     Medical Problems        Resolved Problems  Date Reviewed: 8/27/2024   None       Discharging Physician / Practitioner: Scarlett Phipps MD  PCP: Rachelle Tavares MD  Admission Date:   Admission Orders (From admission, onward)       Ordered        10/14/24 2122  INPATIENT ADMISSION  Once                          Discharge Date: 10/17/24    Consultations During Hospital Stay:  Surgical team    Procedures Performed:   None    Significant Findings / Test Results:   None    Incidental Findings:   None    Test Results Pending at Discharge (will require follow up):   None     Outpatient Tests Requested:  None    Complications: None    Reason for Admission: Abdominal pain    Hospital Course:   As per HPI  Homero Driver Jr. is a 59 y.o. male patient with history of perforated sigmoid diverticulitis with pericolonic abscess status post elective sigmoid colectomy with cholecystectomy and loop ileostomy July 2024 who originally presented to the hospital on 10/14/2024 due to abdominal pain and decreased ostomy output. Patient was admitted to the surgical service in August 2024 during which hospitalization patient was treated for SBO that was treated conservatively with NG tube.  Patient reported thathe started developing intermittent abdominal cramps that were worse with movement day prior to presentation.  No nausea vomiting.  He decreased his p.o. intake.  His stool from the colostomy was less than usual and he did not have out of the colostomy.  in ER.  CT of the abdomen shows partial small bowel obstruction proximal to the right upper quadrant ileostomy possibly involving adhesive disease to the anterior abdominal wall in the region of the mesh no loculated fluid collection or free air.  ER spoke to general surgery NG tube has been placed and at the time of evaluation had 300 cc output.  Patient was subsequently admitted.  Patient was seen by surgical team and patient was partial bowel obstruction, recommended to continue conservative  management with NG tube, bowel rest IV hydration and monitoring of symptoms.  Patient was also noted to have a midline wound with some drainage from prior incision which was evaluated by surgical team thought to be secondary to sinus tract versus small fistula recommended conservative management with wound care.  Patient gradually improved with conservative management.  Noted to have improvement in colostomy output.  Patient underwent repeat imaging x-ray revealed nonobstructive bowel gas pattern subsequently patient had a Gastrografin challenge which revealed nonobstructive pattern with contrast in ascending and transverse colon.  NG tube was discontinued and patient was started on diet which was advanced gradually which patient tolerated without any limiting symptoms.  Patient clinically improved tolerated advancement of diet without any abdominal pain, nausea.  Patient was followed by surgical team and cleared for discharge with close follow-up with Dr. Abernathy as outpatient to discuss plan for reversal of loop ileostomy and evaluation of the abdominal wound.  Plan of care was explained to the patient including follow-up recommendation instruction.     At the time of evaluation patient reports that she feels better with less abdominal bloating and pressure.  He just complains of NG tube discomfort.  Offers no other complaints.          Please see above list of diagnoses and related plan for additional information.     Condition at Discharge: stable    Discharge Day Visit / Exam:   Subjective:      Tolerated diet well  Denies any nausea vomiting or abdominal pain  Stoma is appropriately functioning  Denies any other symptoms, looking forward to go home    Vitals: Blood Pressure: 117/79 (10/17/24 0748)  Pulse: 77 (10/17/24 0748)  Temperature: 98.2 °F (36.8 °C) (10/17/24 0748)  Temp Source: Oral (10/15/24 0915)  Respirations: 17 (10/17/24 0748)  Height: 6' (182.9 cm) (10/15/24 0915)  Weight - Scale: 78.5 kg (173 lb)  (10/15/24 1023)  SpO2: 95 % (10/17/24 7142)  Physical Exam  Constitutional:       General: He is not in acute distress.  HENT:      Head: Normocephalic and atraumatic.   Cardiovascular:      Rate and Rhythm: Normal rate.   Pulmonary:      Effort: Pulmonary effort is normal. No respiratory distress.      Breath sounds: No rhonchi.   Abdominal:      General: Bowel sounds are normal. There is no distension.      Palpations: Abdomen is soft.      Tenderness: There is no abdominal tenderness. There is no guarding or rebound.      Comments: Stoma right lower quadrant with brown stool  Midline dressing C/D/I   Skin:     General: Skin is warm and dry.      Findings: No rash.   Neurological:      Mental Status: He is alert. Mental status is at baseline.      Cranial Nerves: No cranial nerve deficit.          Discussion with Family:  Discussed with the patient.     Discharge instructions/Information to patient and family:   See after visit summary for information provided to patient and family.      Provisions for Follow-Up Care:  See after visit summary for information related to follow-up care and any pertinent home health orders.      Mobility at time of Discharge:   Basic Mobility Inpatient Raw Score: 24  JH-HLM Goal: 8: Walk 250 feet or more  JH-HLM Achieved: 7: Walk 25 feet or more  HLM Goal achieved. Continue to encourage appropriate mobility.     Disposition:   Home    Planned Readmission: no    Discharge Medications:  See after visit summary for reconciled discharge medications provided to patient and/or family.      Administrative Statements   Discharge Statement:  I have spent a total time of 35 minutes in caring for this patient on the day of the visit/encounter. >30 minutes of time was spent on: Instructions for management, Patient and family education, Counseling / Coordination of care, and Documenting in the medical record.    **Please Note: This note may have been constructed using a voice recognition  system**

## 2024-10-17 NOTE — ASSESSMENT & PLAN NOTE
Stable currently  Continue home meds  Monitor blood pressures     Hyponatremia-mild, anticipate improvement after diet advancement

## 2024-10-17 NOTE — ASSESSMENT & PLAN NOTE
Patient with a history of perforated sigmoid diverticulitis with pericolonic abscess s/p elective sigmoid colectomy with cholecystectomy and protective loop ileostomy.  Recent admission for SBO in August 2024.  Presented with abdominal pain and decreased output from ileostomy  CT scan of the abdomen on admission-Findings compatible with partial small bowel obstruction proximal to the right upper quadrant ileostomy, possibly involving adhesive disease to the anterior abdominal wall in the region of the mesh. No loculated fluid collection or free air.   Chest x-ray on admission with NG tube in the region of gastric fundus.  Repeat abdominal x-ray with nonobstructive bowel gas pattern.  Oral contrast in colon  Seen by surgical team.  Improved with conservative management  Underwent repeat imaging with Gastrografin study which revealed contrast in ascending and transverse colon.  No evidence of obstruction noted  Patient tolerated advancement of the diet without any difficulties.  Stoma is appropriately functioning with good stool output.  Patient denies any nausea vomiting or abdominal pain.  Advance to regular diet  Follow-up with surgery as outpatient

## 2024-10-17 NOTE — ASSESSMENT & PLAN NOTE
Suspected periumbilical sinus track in alignment with the incision from historical surgery 7/26, waxing and waning appearance with appearance of attempts at healing with granulation tissue, small punctate area that is open and velvety, appears to possibly be a chronic sinus tract that is epithelialized, patient has a history of cutdown through mesh with reapproximation of fascial plane after sigmoid resection, lower index of suspicion for fistula bearing in mind that the outputs are not fecal or succus contents    -Maxorb AG  -Mepilex over top  -Possibly needs debridement of sinus tract as an outpatient with Dr. Abernathy  -Continue to observe and manage as an outpatient

## 2024-10-17 NOTE — PROGRESS NOTES
Progress Note - Surgery-General   Name: Homero Driver Jr. 59 y.o. male I MRN: [id number]  Unit/Bed#: [address] [place] [address] I Date of Admission: 10/14/2024   Date of Service: 10/17/2024 I Hospital Day: 3     Assessment & Plan  SBO (small bowel obstruction) (HCC)  Continuing to improve, has 1.25 L output in the last 24 hours, tolerating full liquids, has liquid stool currently in ostomy bag    -Discontinued IV fluids  -Advance to regular diet  -Appropriate for discharge from surgical standpoint  -Follow-up with Dr. Abernathy in the office for plan for reversal of loop ileostomy  Draining cutaneous sinus tract  Suspected periumbilical sinus track in alignment with the incision from historical surgery 7/26, waxing and waning appearance with appearance of attempts at healing with granulation tissue, small punctate area that is open and velvety, appears to possibly be a chronic sinus tract that is epithelialized, patient has a history of cutdown through mesh with reapproximation of fascial plane after sigmoid resection, lower index of suspicion for fistula bearing in mind that the outputs are not fecal or succus contents    -Maxorb AG  -Mepilex over top  -Possibly needs debridement of sinus tract as an outpatient with Dr. Abernathy  -Continue to observe and manage as an outpatient    Surgery-General service will follow.    Subjective   Patient was seen and examined at bedside.  Patient denies acute events overnight.  Patient denies any nausea or vomiting.  Patient had full liquids without any issues.  Patient is passing liquid stool into his stoma bag.  Patient feels quite well.  Patient feels that he is ready to go home.    Objective :  Temp:  [98.2 °F (36.8 °C)-98.9 °F (37.2 °C)] 98.2 °F (36.8 °C)  HR:  [74-91] 77  BP: (108-121)/(74-89) 117/79  Resp:  [17-18] 17  SpO2:  [95 %-98 %] 95 %    I/O         10/15 0701  10/16 0700 10/16 0701  10/17 0700 10/17 0701  10/18 0700    P.O. 0      I.V. (mL/kg)  2012.5 (25.6)     Total Intake(mL/kg) 0  (0) 2012.5 (25.6)     Emesis/NG output 700      Stool 400 1575     Total Output 1100 1575     Net -1100 +437.5                  Lines/Drains/Airways       Active Status       Name Placement date Placement time Site Days    Ileostomy Loop RUQ 07/26/24  1506  RUQ  82                  Physical Exam  Constitutional:       General: He is awake. He is not in acute distress.     Appearance: Normal appearance. He is normal weight. He is not ill-appearing, toxic-appearing or diaphoretic.   HENT:      Head: Normocephalic and atraumatic.      Right Ear: Hearing and external ear normal.      Left Ear: Hearing and external ear normal.      Nose: Nose normal.   Cardiovascular:      Rate and Rhythm: Normal rate.   Pulmonary:      Effort: Pulmonary effort is normal.   Abdominal:      General: Abdomen is flat. There is no distension.      Palpations: Abdomen is soft.      Tenderness: There is no abdominal tenderness.   Skin:     General: Skin is warm and dry.      Capillary Refill: Capillary refill takes less than 2 seconds.          Psychiatric:         Behavior: Behavior is cooperative.           Lab Results: I have reviewed the following results:  Recent Labs     10/15/24  0529 10/16/24  0512   WBC 6.14 7.05   HGB 12.8 13.0   HCT 40.9 41.3    314   SODIUM 132* 134*   K 4.0 3.8    100   CO2 23 26   BUN 10 10   CREATININE 0.80 0.87   GLUC 100 97   MG 2.0 1.9   PHOS  --  3.0   AST 12*  --    ALT 7  --    ALB 4.1  --    TBILI 0.36  --    ALKPHOS 54  --        Imaging Results Review: No pertinent imaging studies reviewed.  Other Study Results Review: No additional pertinent studies reviewed.    VTE Pharmacologic Prophylaxis: Enoxaparin (Lovenox)  VTE Mechanical Prophylaxis: sequential compression device

## 2024-10-17 NOTE — DISCHARGE INSTR - AVS FIRST PAGE
Continue diet as per surgical team    Continue wound dressing  -Maxorb AG  -Mepilex over top  -Follow-up outpatient with Dr. Abernathy      Follow-up with wound care as needed

## 2024-10-17 NOTE — ASSESSMENT & PLAN NOTE
Seen by surgery for anterior abdominal wound, input appreciated  Likely sinus tach versus small fistula  Recommended continue wound dressing and follow-up with surgery/wound care as outpatient  Continue wound care as per surgical team

## 2024-10-17 NOTE — ASSESSMENT & PLAN NOTE
Continuing to improve, has 1.25 L output in the last 24 hours, tolerating full liquids, has liquid stool currently in ostomy bag    -Discontinued IV fluids  -Advance to regular diet  -Appropriate for discharge from surgical standpoint  -Follow-up with Dr. Abernathy in the office for plan for reversal of loop ileostomy

## 2024-10-17 NOTE — PLAN OF CARE
Problem: PAIN - ADULT  Goal: Verbalizes/displays adequate comfort level or baseline comfort level  Description: Interventions:  - Encourage patient to monitor pain and request assistance  - Assess pain using appropriate pain scale  - Administer analgesics based on type and severity of pain and evaluate response  - Implement non-pharmacological measures as appropriate and evaluate response  - Consider cultural and social influences on pain and pain management  - Notify physician/advanced practitioner if interventions unsuccessful or patient reports new pain  Outcome: Progressing     Problem: INFECTION - ADULT  Goal: Absence or prevention of progression during hospitalization  Description: INTERVENTIONS:  - Assess and monitor for signs and symptoms of infection  - Monitor lab/diagnostic results  - Monitor all insertion sites, i.e. indwelling lines, tubes, and drains  - Monitor endotracheal if appropriate and nasal secretions for changes in amount and color  - [place] appropriate cooling/warming therapies per order  - Administer medications as ordered  - Instruct and encourage patient and family to use good hand hygiene technique  - Identify and instruct in appropriate isolation precautions for identified infection/condition  Outcome: Progressing  Goal: Absence of fever/infection during neutropenic period  Description: INTERVENTIONS:  - Monitor WBC    Outcome: Progressing     Problem: SAFETY ADULT  Goal: Patient will remain free of falls  Description: INTERVENTIONS:  - Educate patient/family on patient safety including physical limitations  - Instruct patient to call for assistance with activity   - Consult OT/PT to assist with strengthening/mobility   - Keep Call bell within reach  - Keep bed low and locked with side rails adjusted as appropriate  - Keep care items and personal belongings within reach  - Initiate and maintain comfort rounds  - Make Fall Risk Sign visible to staff  - Offer Toileting every 2 Hours,  in advance of need  - Obtain necessary fall risk management equipment: yellow socks  - Apply yellow socks and bracelet for high fall risk patients  - Consider moving patient to room near nurses station  Outcome: Progressing  Goal: Maintain or return to baseline ADL function  Description: INTERVENTIONS:  -  Assess patient's ability to carry out ADLs; assess patient's baseline for ADL function and identify physical deficits which impact ability to perform ADLs (bathing, care of mouth/teeth, toileting, grooming, dressing, etc.)  - Assess/evaluate cause of self-care deficits   - Assess range of motion  - Assess patient's mobility; develop plan if impaired  - Assess patient's need for assistive devices and provide as appropriate  - Encourage maximum independence but intervene and supervise when necessary  - Involve family in performance of ADLs  - Assess for home care needs following discharge   - Consider OT consult to assist with ADL evaluation and planning for discharge  - Provide patient education as appropriate  Outcome: Progressing  Goal: Maintains/Returns to pre admission functional level  Description: INTERVENTIONS:  - Perform AM-PAC 6 Click Basic Mobility/ Daily Activity assessment daily.  - Set and communicate daily mobility goal to care team and patient/family/caregiver.   - Collaborate with rehabilitation services on mobility goals if consulted  - Perform Range of Motion 2 times a day.  - Reposition patient every 2 hours.  - Dangle patient 3 times a day  - Stand patient 3 times a day  - Ambulate patient 2 times a day  - Out of bed to chair 3 times a day   - Out of bed for meals 3 times a day  - Out of bed for toileting  - Record patient progress and toleration of activity level   Outcome: Progressing     Problem: DISCHARGE PLANNING  Goal: Discharge to home or other facility with appropriate resources  Description: INTERVENTIONS:  - Identify barriers to discharge w/patient and caregiver  - Arrange for needed  discharge resources and transportation as appropriate  - Identify discharge learning needs (meds, wound care, etc.)  - Arrange for interpretive services to assist at discharge as needed  - Refer to Case Management Department for coordinating discharge planning if the patient needs post-hospital services based on physician/advanced practitioner order or complex needs related to functional status, cognitive ability, or social support system  Outcome: Progressing     Problem: Knowledge Deficit  Goal: Patient/family/caregiver demonstrates understanding of disease process, treatment plan, medications, and discharge instructions  Description: Complete learning assessment and assess knowledge base.  Interventions:  - Provide teaching at level of understanding  - Provide teaching via preferred learning methods  Outcome: Progressing     Problem: Nutrition/Hydration-ADULT  Goal: Nutrient/Hydration intake appropriate for improving, restoring or maintaining nutritional needs  Description: Monitor and assess patient's nutrition/hydration status for malnutrition. Collaborate with interdisciplinary team and initiate plan and interventions as ordered.  Monitor patient's weight and dietary intake as ordered or per policy. Utilize nutrition screening tool and intervene as necessary. Determine patient's food preferences and provide high-protein, high-caloric foods as appropriate.     INTERVENTIONS:  - Monitor oral intake, urinary output, labs, and treatment plans  - Assess nutrition and hydration status and recommend course of action  - Evaluate amount of meals eaten  - Assist patient with eating if necessary   - Allow adequate time for meals  - Recommend/ encourage appropriate diets, oral nutritional supplements, and vitamin/mineral supplements  - Order, calculate, and assess calorie counts as needed  - Recommend, monitor, and adjust tube feedings and TPN/PPN based on assessed needs  - Assess need for intravenous fluids  - Provide  specific nutrition/hydration education as appropriate  - Include patient/family/caregiver in decisions related to nutrition  Outcome: Progressing     Problem: GASTROINTESTINAL - ADULT  Goal: Minimal or absence of nausea and/or vomiting  Description: INTERVENTIONS:  - Administer IV fluids if ordered to ensure adequate hydration  - Maintain NPO status until nausea and vomiting are resolved  - Nasogastric tube if ordered  - Administer ordered antiemetic medications as needed  - Provide nonpharmacologic comfort measures as appropriate  - Advance diet as tolerated, if ordered  - Consider nutrition services referral to assist patient with adequate nutrition and appropriate food choices  Outcome: Progressing  Goal: Maintains or returns to baseline bowel function  Description: INTERVENTIONS:  - Assess bowel function  - Encourage oral fluids to ensure adequate hydration  - Administer IV fluids if ordered to ensure adequate hydration  - Administer ordered medications as needed  - Encourage mobilization and activity  - Consider nutritional services referral to assist patient with adequate nutrition and appropriate food choices  Outcome: Progressing  Goal: Maintains adequate nutritional intake  Description: INTERVENTIONS:  - Monitor percentage of each meal consumed  - Identify factors contributing to decreased intake, treat as appropriate  - Assist with meals as needed  - Monitor I&O, weight, and lab values if indicated  - Obtain nutrition services referral as needed  Outcome: Progressing  Goal: Establish and maintain optimal ostomy function  Description: INTERVENTIONS:  - Assess bowel function  - Encourage oral fluids to ensure adequate hydration  - Administer IV fluids if ordered to ensure adequate hydration   - Administer ordered medications as needed  - Encourage mobilization and activity  - Nutrition services referral to assist patient with appropriate food choices  - Assess stoma site  - Consider wound care consult    Outcome: Progressing  Goal: Oral mucous membranes remain intact  Description: INTERVENTIONS  - Assess oral mucosa and hygiene practices  - Implement preventative oral hygiene regimen  - Implement oral medicated treatments as ordered  - Initiate Nutrition services referral as needed  Outcome: Progressing     Problem: METABOLIC, FLUID AND ELECTROLYTES - ADULT  Goal: Electrolytes maintained within normal limits  Description: INTERVENTIONS:  - Monitor labs and assess patient for signs and symptoms of electrolyte imbalances  - Administer electrolyte replacement as ordered  - Monitor response to electrolyte replacements, including repeat lab results as appropriate  - Instruct patient on fluid and nutrition as appropriate  Outcome: Progressing  Goal: Fluid balance maintained  Description: INTERVENTIONS:  - Monitor labs   - Monitor I/O and WT  - Instruct patient on fluid and nutrition as appropriate  - Assess for signs & symptoms of volume excess or deficit  Outcome: Progressing  Goal: Glucose maintained within target range  Description: INTERVENTIONS:  - Monitor Blood Glucose as ordered  - Assess for signs and symptoms of hyperglycemia and hypoglycemia  - Administer ordered medications to maintain glucose within target range  - Assess nutritional intake and initiate nutrition service referral as needed  Outcome: Progressing     Problem: Prexisting or High Potential for Compromised Skin Integrity  Goal: Skin integrity is maintained or improved  Description: INTERVENTIONS:  - Identify patients at risk for skin breakdown  - Assess and monitor skin integrity  - Assess and monitor nutrition and hydration status  - Monitor labs   - Assess for incontinence   - Turn and reposition patient  - Assist with mobility/ambulation  - Relieve pressure over bony prominences  - Avoid friction and shearing  - Provide appropriate hygiene as needed including keeping skin clean and dry  - Evaluate need for skin moisturizer/barrier cream  -  Collaborate with interdisciplinary team   - Patient/family teaching  - Consider wound care consult   Outcome: Progressing

## 2024-10-22 NOTE — UTILIZATION REVIEW
NOTIFICATION OF ADMISSION DISCHARGE   This is a Notification of Discharge from [facility]. Please be advised that this patient has been discharge from our facility. Below you will find the admission and discharge date and time including the patient’s disposition.   UTILIZATION REVIEW CONTACT:  Pam Jay  Utilization   Network Utilization Review Department  Phone: [phone] x carefully listen to the prompts. All voicemails are confidential.  Email: [email or web address]     ADMISSION INFORMATION  PRESENTATION DATE: 10/14/2024  6:19 PM  OBERVATION ADMISSION DATE: N/A  INPATIENT ADMISSION DATE: 10/14/24  9:22 PM   DISCHARGE DATE: 10/17/2024  5:55 PM   DISPOSITION:Home/Self Care    Network Utilization Review Department  ATTENTION: Please call with any questions or concerns to [phone] and carefully listen to the prompts so that you are directed to the right person. All voicemails are confidential.   For Discharge needs, contact Care Management DC Support Team at [phone] opt. 2  Send all requests for admission clinical reviews, approved or denied determinations and any other requests to dedicated fax number below belonging to the campus where the patient is receiving treatment. List of dedicated fax numbers for the Facilities:  FACILITY NAME UR FAX NUMBER   ADMISSION DENIALS (Administrative/Medical Necessity) [phone]   DISCHARGE SUPPORT TEAM ([facility]) [phone]   PARENT CHILD HEALTH (Maternity/NICU/Pediatrics) [phone]   [facility] [phone]   [facility] [phone]   [facility] [phone]   [facility] [phone]   [facility] [phone]   [facility] [phone]   [facility] [phone]   [facility]  [phone]   [facility] [phone]   [facility] [phone]   [facility] [phone]   [facility] [phone]

## 2024-10-28 ENCOUNTER — PREP FOR PROCEDURE (OUTPATIENT)
Dept: SURGERY | Facility: CLINIC | Age: 59
End: 2024-10-28

## 2024-10-28 ENCOUNTER — OFFICE VISIT (OUTPATIENT)
Dept: SURGERY | Facility: CLINIC | Age: 59
End: 2024-10-28
Payer: MEDICARE

## 2024-10-28 VITALS
DIASTOLIC BLOOD PRESSURE: 70 MMHG | BODY MASS INDEX: 24.71 KG/M2 | HEIGHT: 72 IN | TEMPERATURE: 96.8 F | OXYGEN SATURATION: 95 % | SYSTOLIC BLOOD PRESSURE: 102 MMHG | HEART RATE: 80 BPM | WEIGHT: 182.4 LBS

## 2024-10-28 DIAGNOSIS — L02.211 ABDOMINAL WALL ABSCESS: Primary | ICD-10-CM

## 2024-10-28 DIAGNOSIS — Z93.2 ILEOSTOMY STATUS (HCC): Primary | ICD-10-CM

## 2024-10-28 PROCEDURE — 10060 I&D ABSCESS SIMPLE/SINGLE: CPT | Performed by: SURGERY

## 2024-10-28 PROCEDURE — 87070 CULTURE OTHR SPECIMN AEROBIC: CPT | Performed by: SURGERY

## 2024-10-28 PROCEDURE — 87205 SMEAR GRAM STAIN: CPT | Performed by: SURGERY

## 2024-10-28 PROCEDURE — 87077 CULTURE AEROBIC IDENTIFY: CPT | Performed by: SURGERY

## 2024-10-28 RX ORDER — ALPRAZOLAM 0.5 MG
TABLET ORAL
Status: ON HOLD | COMMUNITY
Start: 2024-10-22

## 2024-10-28 NOTE — PROGRESS NOTES
"Incision and Drainage    Date/Time: 10/28/2024 2:15 PM    Performed by: Adelso Abernathy MD  Authorized by: Adelso Abernathy MD  Universal Protocol:  procedure performed by consultantConsent: Written consent obtained.  Risks and benefits: risks, benefits and alternatives were discussed  Consent given by: patient  Time out: Immediately prior to procedure a \"time out\" was called to verify the correct patient, procedure, equipment, support staff and site/side marked as required.  Timeout called at: 10/28/2024 2:45 PM.  Patient understanding: patient states understanding of the procedure being performed    Patient location:  Clinic  Location:     Type:  Abscess    Location:  Trunk (Site of laparotomy scar, 3 different sites)  Pre-procedure details:     Skin preparation:  Betadine  Anesthesia (see MAR for exact dosages):     Anesthesia method:  Local infiltration    Local anesthetic:  Lidocaine 1% WITH epi (18 mL)  Procedure details:     Complexity:  Simple    Incision types:  Stab incision    Scalpel blade:  11    Approach:  Open    Incision depth:  Subcutaneous    Wound management:  Probed and deloculated    Drainage:  Purulent    Drainage amount:  Moderate    Wound treatment:  Packing placed    Packing materials:  1/4 in iodoform gauze    Amount 1/4\" iodoform:  6 inches  Post-procedure details:     Patient tolerance of procedure:  Tolerated well, no immediate complications  Comments:      Wound cultures taken    Dry gauze dressing applied     "

## 2024-10-31 ENCOUNTER — OFFICE VISIT (OUTPATIENT)
Dept: SURGERY | Facility: CLINIC | Age: 59
End: 2024-10-31
Payer: MEDICARE

## 2024-10-31 VITALS — WEIGHT: 179.6 LBS | OXYGEN SATURATION: 99 % | HEIGHT: 72 IN | BODY MASS INDEX: 24.33 KG/M2 | RESPIRATION RATE: 18 BRPM

## 2024-10-31 DIAGNOSIS — M48.02 CERVICAL STENOSIS OF SPINE: ICD-10-CM

## 2024-10-31 DIAGNOSIS — Z93.2 ILEOSTOMY STATUS (HCC): Primary | ICD-10-CM

## 2024-10-31 LAB
BACTERIA WND AEROBE CULT: ABNORMAL
BACTERIA WND AEROBE CULT: ABNORMAL
GRAM STN SPEC: ABNORMAL
GRAM STN SPEC: ABNORMAL

## 2024-10-31 PROCEDURE — 99213 OFFICE O/P EST LOW 20 MIN: CPT | Performed by: SURGERY

## 2024-10-31 NOTE — PROGRESS NOTES
Assessment/Plan:     Diagnoses and all orders for this visit:    Ileostomy status (HCC)    Cervical stenosis of spine  -     Ambulatory Referral to Orthopedic Surgery; Future     Patient is being scheduled for reversal of ileostomy at [facility] next week.    Procedure was discussed with patient and informed consent was obtained.    Risks include bleeding, infection, risk of anastomotic leak.    Preadmission testing is complete    Patient is requesting a referral with orthopedic spine surgery for his cervical stenosis.    Subjective:      Patient ID: Homero Driver Jr. is a 59 y.o. male.    HPI  Patient is 3 months status post low anterior resection for Hinchey class II sigmoid diverticulitis which was treated with prolonged IR drainage catheter.  Patient had developed a fecal fistula.  Surgical exploration revealed extensive scarring due to the abscess and the prolonged catheter presence.  Patient had a low colorectal anastomosis performed which was technically difficult.  A defunctioning loop ileostomy was hence given.  Patient subsequently has had a barium enema performed which reveals that there is no leak at the anastomotic site.  Patient is now ready for reversal of his ileostomy.    Patient was seen in the office last week with painful pustular sites on his midline laparotomy incision.  These were drained under local anesthesia.  Cultures were negative.  These wounds are healing satisfactorily.    Patient had a recent hospitalization at [facility] for partial small bowel obstruction.    The following portions of the patient's history were reviewed and updated as appropriate: allergies, current medications, past family history, past medical history, past social history, past surgical history, and problem list.    Review of Systems    Essential hypertension  Long-term smoker with mild COPD  History of wakefulness during general anesthesia  Incisional herniorrhaphy with mesh performed in the remote  past  Cholecystectomy performed for gallstones during the colonic surgery    Objective:    Resp 18   Ht 6' (1.829 m)   Wt 81.5 kg (179 lb 9.6 oz)   SpO2 99%   BMI 24.36 kg/m²      Physical Exam    No pallor or icterus    Heart sounds are normal  Chest is clear to auscultation    Abdominal exam reveals healed laparotomy scar with 3 open sites which are clean now.  The ileostomy is on the right side and is functional    Extremity exam is normal

## 2024-11-04 NOTE — PRE-PROCEDURE INSTRUCTIONS
Pre-Surgery Instructions:   Medication Instructions    albuterol (PROVENTIL HFA,VENTOLIN HFA) 90 mcg/act inhaler Uses PRN- OK to take day of surgery    ALPRAZolam (XANAX) 0.5 mg tablet Uses PRN- OK to take day of surgery    amLODIPine (NORVASC) 10 mg tablet Take day of surgery.    cyclobenzaprine (FLEXERIL) 10 mg tablet Uses PRN- OK to take day of surgery    DULoxetine (CYMBALTA) 20 mg capsule Take night before surgery    gabapentin (NEURONTIN) 100 mg capsule Take night before surgery    mirtazapine (REMERON) 15 mg tablet Take night before surgery   Medication instructions for day surgery reviewed. Please use only a sip of water to take your instructed medications. Avoid all over the counter vitamins, supplements and NSAIDS for one week prior to surgery per anesthesia guidelines. Tylenol is ok to take as needed.     You will receive a call one business day prior to surgery with an arrival time and hospital directions. If your surgery is scheduled on a Monday, the hospital will be calling you on the Friday prior to your surgery. If you have not heard from anyone by 8pm, please call the hospital supervisor through the hospital  at [phone]. ([place] [phone] or [place] [phone]).    Do not eat or drink anything after midnight the night before your surgery, including candy, mints, lifesavers, or chewing gum. Do not drink alcohol 24hrs before your surgery. Try not to smoke at least 24hrs before your surgery.       Follow the pre surgery showering instructions as listed in the “My Surgical Experience Booklet” or otherwise provided by your surgeon's office. Do not use a blade to shave the surgical area 1 week before surgery. It is okay to use a clean electric clippers up to 24 hours before surgery. Do not apply any lotions, creams, including makeup, cologne, deodorant, or perfumes after showering on the day of your surgery. Do not use dry shampoo, hair spray, hair gel, or any type of hair products.      No contact lenses, eye make-up, or artificial eyelashes. Remove nail polish, including gel polish, and any artificial, gel, or acrylic nails if possible. Remove all jewelry including rings and body piercing jewelry.     Wear causal clothing that is easy to take on and off. Consider your type of surgery.    Keep any valuables, jewelry, piercings at home. Please bring any specially ordered equipment (sling, braces) if indicated.    Arrange for a responsible person to drive you to and from the hospital on the day of your surgery. Please confirm the visitor policy for the day of your procedure when you receive your phone call with an arrival time.     Call the surgeon's office with any new illnesses, exposures, or additional questions prior to surgery.    Please reference your “My Surgical Experience Booklet” for additional information to prepare for your upcoming surgery.

## 2024-11-08 ENCOUNTER — HOSPITAL ENCOUNTER (INPATIENT)
Facility: HOSPITAL | Age: 59
LOS: 3 days | Discharge: HOME | DRG: 230 | End: 2024-11-11
Attending: SURGERY | Admitting: SURGERY
Payer: MEDICARE

## 2024-11-08 ENCOUNTER — ANESTHESIA (OUTPATIENT)
Dept: PERIOP | Facility: HOSPITAL | Age: 59
DRG: 230 | End: 2024-11-08
Payer: MEDICARE

## 2024-11-08 ENCOUNTER — ANESTHESIA EVENT (OUTPATIENT)
Dept: PERIOP | Facility: HOSPITAL | Age: 59
DRG: 230 | End: 2024-11-08
Payer: MEDICARE

## 2024-11-08 DIAGNOSIS — M48.02 CERVICAL STENOSIS OF SPINE: ICD-10-CM

## 2024-11-08 DIAGNOSIS — Z93.2 ILEOSTOMY STATUS (HCC): ICD-10-CM

## 2024-11-08 DIAGNOSIS — Z98.890 STATUS POST CLOSURE OF ILEOSTOMY: ICD-10-CM

## 2024-11-08 DIAGNOSIS — I10 PRIMARY HYPERTENSION: Primary | ICD-10-CM

## 2024-11-08 DIAGNOSIS — G47.30 SLEEP APNEA, UNSPECIFIED TYPE: ICD-10-CM

## 2024-11-08 DIAGNOSIS — F41.9 ANXIETY: ICD-10-CM

## 2024-11-08 PROCEDURE — 88304 TISSUE EXAM BY PATHOLOGIST: CPT | Performed by: STUDENT IN AN ORGANIZED HEALTH CARE EDUCATION/TRAINING PROGRAM

## 2024-11-08 PROCEDURE — 99254 IP/OBS CNSLTJ NEW/EST MOD 60: CPT | Performed by: INTERNAL MEDICINE

## 2024-11-08 PROCEDURE — 0DBB0ZZ EXCISION OF ILEUM, OPEN APPROACH: ICD-10-PCS | Performed by: SURGERY

## 2024-11-08 PROCEDURE — 44620 REPAIR BOWEL OPENING: CPT | Performed by: PHYSICIAN ASSISTANT

## 2024-11-08 PROCEDURE — 44620 REPAIR BOWEL OPENING: CPT | Performed by: SURGERY

## 2024-11-08 RX ORDER — NICOTINE 21 MG/24HR
1 PATCH, TRANSDERMAL 24 HOURS TRANSDERMAL DAILY
Status: DISCONTINUED | OUTPATIENT
Start: 2024-11-08 | End: 2024-11-11 | Stop reason: HOSPADM

## 2024-11-08 RX ORDER — PHENYLEPHRINE HCL IN 0.9% NACL 1 MG/10 ML
SYRINGE (ML) INTRAVENOUS AS NEEDED
Status: DISCONTINUED | OUTPATIENT
Start: 2024-11-08 | End: 2024-11-08

## 2024-11-08 RX ORDER — BUPIVACAINE HYDROCHLORIDE AND EPINEPHRINE 2.5; 5 MG/ML; UG/ML
INJECTION, SOLUTION EPIDURAL; INFILTRATION; INTRACAUDAL; PERINEURAL AS NEEDED
Status: DISCONTINUED | OUTPATIENT
Start: 2024-11-08 | End: 2024-11-08 | Stop reason: HOSPADM

## 2024-11-08 RX ORDER — FENTANYL CITRATE 50 UG/ML
INJECTION, SOLUTION INTRAMUSCULAR; INTRAVENOUS AS NEEDED
Status: DISCONTINUED | OUTPATIENT
Start: 2024-11-08 | End: 2024-11-08

## 2024-11-08 RX ORDER — FENTANYL CITRATE/PF 50 MCG/ML
25 SYRINGE (ML) INJECTION
Status: DISCONTINUED | OUTPATIENT
Start: 2024-11-08 | End: 2024-11-08 | Stop reason: HOSPADM

## 2024-11-08 RX ORDER — PROPOFOL 10 MG/ML
INJECTION, EMULSION INTRAVENOUS AS NEEDED
Status: DISCONTINUED | OUTPATIENT
Start: 2024-11-08 | End: 2024-11-08

## 2024-11-08 RX ORDER — ALPRAZOLAM 0.5 MG
0.5 TABLET ORAL 3 TIMES DAILY PRN
Status: DISCONTINUED | OUTPATIENT
Start: 2024-11-08 | End: 2024-11-11 | Stop reason: HOSPADM

## 2024-11-08 RX ORDER — GABAPENTIN 100 MG/1
100 CAPSULE ORAL
Status: DISCONTINUED | OUTPATIENT
Start: 2024-11-08 | End: 2024-11-11 | Stop reason: HOSPADM

## 2024-11-08 RX ORDER — HYDROMORPHONE HCL/PF 1 MG/ML
0.4 SYRINGE (ML) INJECTION
Status: DISCONTINUED | OUTPATIENT
Start: 2024-11-08 | End: 2024-11-08 | Stop reason: HOSPADM

## 2024-11-08 RX ORDER — CEFAZOLIN SODIUM 2 G/50ML
2000 SOLUTION INTRAVENOUS ONCE
Status: COMPLETED | OUTPATIENT
Start: 2024-11-08 | End: 2024-11-08

## 2024-11-08 RX ORDER — SODIUM CHLORIDE, SODIUM LACTATE, POTASSIUM CHLORIDE, CALCIUM CHLORIDE 600; 310; 30; 20 MG/100ML; MG/100ML; MG/100ML; MG/100ML
100 INJECTION, SOLUTION INTRAVENOUS CONTINUOUS
Status: DISCONTINUED | OUTPATIENT
Start: 2024-11-08 | End: 2024-11-09

## 2024-11-08 RX ORDER — HEPARIN SODIUM 5000 [USP'U]/ML
5000 INJECTION, SOLUTION INTRAVENOUS; SUBCUTANEOUS EVERY 8 HOURS SCHEDULED
Status: DISCONTINUED | OUTPATIENT
Start: 2024-11-08 | End: 2024-11-11 | Stop reason: HOSPADM

## 2024-11-08 RX ORDER — AMLODIPINE BESYLATE 10 MG/1
10 TABLET ORAL DAILY
Status: DISCONTINUED | OUTPATIENT
Start: 2024-11-08 | End: 2024-11-11 | Stop reason: HOSPADM

## 2024-11-08 RX ORDER — HYDROMORPHONE HCL/PF 1 MG/ML
SYRINGE (ML) INJECTION AS NEEDED
Status: DISCONTINUED | OUTPATIENT
Start: 2024-11-08 | End: 2024-11-08

## 2024-11-08 RX ORDER — ONDANSETRON 2 MG/ML
4 INJECTION INTRAMUSCULAR; INTRAVENOUS ONCE AS NEEDED
Status: DISCONTINUED | OUTPATIENT
Start: 2024-11-08 | End: 2024-11-08 | Stop reason: HOSPADM

## 2024-11-08 RX ORDER — SODIUM CHLORIDE, SODIUM LACTATE, POTASSIUM CHLORIDE, CALCIUM CHLORIDE 600; 310; 30; 20 MG/100ML; MG/100ML; MG/100ML; MG/100ML
100 INJECTION, SOLUTION INTRAVENOUS CONTINUOUS
Status: DISCONTINUED | OUTPATIENT
Start: 2024-11-08 | End: 2024-11-08

## 2024-11-08 RX ORDER — OXYCODONE HYDROCHLORIDE 10 MG/1
10 TABLET ORAL EVERY 6 HOURS PRN
Status: DISCONTINUED | OUTPATIENT
Start: 2024-11-08 | End: 2024-11-11 | Stop reason: HOSPADM

## 2024-11-08 RX ORDER — ROCURONIUM BROMIDE 10 MG/ML
INJECTION, SOLUTION INTRAVENOUS AS NEEDED
Status: DISCONTINUED | OUTPATIENT
Start: 2024-11-08 | End: 2024-11-08

## 2024-11-08 RX ORDER — MAGNESIUM HYDROXIDE 1200 MG/15ML
LIQUID ORAL AS NEEDED
Status: DISCONTINUED | OUTPATIENT
Start: 2024-11-08 | End: 2024-11-08 | Stop reason: HOSPADM

## 2024-11-08 RX ORDER — ONDANSETRON 2 MG/ML
4 INJECTION INTRAMUSCULAR; INTRAVENOUS EVERY 6 HOURS PRN
Status: DISCONTINUED | OUTPATIENT
Start: 2024-11-08 | End: 2024-11-11 | Stop reason: HOSPADM

## 2024-11-08 RX ORDER — SODIUM CHLORIDE, SODIUM LACTATE, POTASSIUM CHLORIDE, CALCIUM CHLORIDE 600; 310; 30; 20 MG/100ML; MG/100ML; MG/100ML; MG/100ML
125 INJECTION, SOLUTION INTRAVENOUS CONTINUOUS
Status: DISCONTINUED | OUTPATIENT
Start: 2024-11-08 | End: 2024-11-08

## 2024-11-08 RX ORDER — DEXAMETHASONE SODIUM PHOSPHATE 10 MG/ML
INJECTION, SOLUTION INTRAMUSCULAR; INTRAVENOUS AS NEEDED
Status: DISCONTINUED | OUTPATIENT
Start: 2024-11-08 | End: 2024-11-08

## 2024-11-08 RX ORDER — DULOXETIN HYDROCHLORIDE 20 MG/1
20 CAPSULE, DELAYED RELEASE ORAL EVERY EVENING
Status: DISCONTINUED | OUTPATIENT
Start: 2024-11-08 | End: 2024-11-11 | Stop reason: HOSPADM

## 2024-11-08 RX ORDER — MIRTAZAPINE 15 MG/1
15 TABLET, FILM COATED ORAL
Status: DISCONTINUED | OUTPATIENT
Start: 2024-11-08 | End: 2024-11-11 | Stop reason: HOSPADM

## 2024-11-08 RX ORDER — ACETAMINOPHEN 325 MG/1
650 TABLET ORAL EVERY 6 HOURS PRN
Status: DISCONTINUED | OUTPATIENT
Start: 2024-11-08 | End: 2024-11-11 | Stop reason: HOSPADM

## 2024-11-08 RX ORDER — METRONIDAZOLE 500 MG/100ML
500 INJECTION, SOLUTION INTRAVENOUS ONCE
Status: DISCONTINUED | OUTPATIENT
Start: 2024-11-08 | End: 2024-11-08

## 2024-11-08 RX ORDER — ONDANSETRON 2 MG/ML
INJECTION INTRAMUSCULAR; INTRAVENOUS AS NEEDED
Status: DISCONTINUED | OUTPATIENT
Start: 2024-11-08 | End: 2024-11-08

## 2024-11-08 RX ORDER — OXYCODONE HYDROCHLORIDE 5 MG/1
5 TABLET ORAL EVERY 6 HOURS PRN
Status: DISCONTINUED | OUTPATIENT
Start: 2024-11-08 | End: 2024-11-11 | Stop reason: HOSPADM

## 2024-11-08 RX ORDER — HYDROMORPHONE HCL/PF 1 MG/ML
0.5 SYRINGE (ML) INJECTION EVERY 4 HOURS PRN
Status: DISCONTINUED | OUTPATIENT
Start: 2024-11-08 | End: 2024-11-11 | Stop reason: HOSPADM

## 2024-11-08 RX ORDER — ALBUTEROL SULFATE 90 UG/1
1 INHALANT RESPIRATORY (INHALATION) EVERY 6 HOURS PRN
Status: DISCONTINUED | OUTPATIENT
Start: 2024-11-08 | End: 2024-11-11 | Stop reason: HOSPADM

## 2024-11-08 RX ORDER — MIDAZOLAM HYDROCHLORIDE 2 MG/2ML
INJECTION, SOLUTION INTRAMUSCULAR; INTRAVENOUS AS NEEDED
Status: DISCONTINUED | OUTPATIENT
Start: 2024-11-08 | End: 2024-11-08

## 2024-11-08 RX ORDER — LIDOCAINE HYDROCHLORIDE 20 MG/ML
INJECTION, SOLUTION EPIDURAL; INFILTRATION; INTRACAUDAL; PERINEURAL AS NEEDED
Status: DISCONTINUED | OUTPATIENT
Start: 2024-11-08 | End: 2024-11-08

## 2024-11-08 RX ORDER — SODIUM CHLORIDE, SODIUM LACTATE, POTASSIUM CHLORIDE, CALCIUM CHLORIDE 600; 310; 30; 20 MG/100ML; MG/100ML; MG/100ML; MG/100ML
INJECTION, SOLUTION INTRAVENOUS CONTINUOUS PRN
Status: DISCONTINUED | OUTPATIENT
Start: 2024-11-08 | End: 2024-11-08

## 2024-11-08 RX ORDER — HEPARIN SODIUM 5000 [USP'U]/ML
5000 INJECTION, SOLUTION INTRAVENOUS; SUBCUTANEOUS ONCE
Status: COMPLETED | OUTPATIENT
Start: 2024-11-08 | End: 2024-11-08

## 2024-11-08 RX ADMIN — OXYCODONE HYDROCHLORIDE 10 MG: 10 TABLET ORAL at 17:49

## 2024-11-08 RX ADMIN — HEPARIN SODIUM 5000 UNITS: 5000 INJECTION, SOLUTION INTRAVENOUS; SUBCUTANEOUS at 07:03

## 2024-11-08 RX ADMIN — SUGAMMADEX 200 MG: 100 INJECTION, SOLUTION INTRAVENOUS at 08:38

## 2024-11-08 RX ADMIN — ROCURONIUM BROMIDE 50 MG: 10 INJECTION, SOLUTION INTRAVENOUS at 07:32

## 2024-11-08 RX ADMIN — SODIUM CHLORIDE, SODIUM LACTATE, POTASSIUM CHLORIDE, AND CALCIUM CHLORIDE 125 ML/HR: .6; .31; .03; .02 INJECTION, SOLUTION INTRAVENOUS at 13:38

## 2024-11-08 RX ADMIN — FENTANYL CITRATE 25 MCG: 50 INJECTION INTRAMUSCULAR; INTRAVENOUS at 09:25

## 2024-11-08 RX ADMIN — DEXMEDETOMIDINE HYDROCHLORIDE 8 MCG: 100 INJECTION, SOLUTION INTRAVENOUS at 07:46

## 2024-11-08 RX ADMIN — Medication 100 MCG: at 08:10

## 2024-11-08 RX ADMIN — HEPARIN SODIUM 5000 UNITS: 5000 INJECTION INTRAVENOUS; SUBCUTANEOUS at 13:39

## 2024-11-08 RX ADMIN — PROPOFOL 30 MG: 10 INJECTION, EMULSION INTRAVENOUS at 07:33

## 2024-11-08 RX ADMIN — ROCURONIUM BROMIDE 50 MG: 10 INJECTION, SOLUTION INTRAVENOUS at 07:33

## 2024-11-08 RX ADMIN — HYDROMORPHONE HYDROCHLORIDE 0.5 MG: 1 INJECTION, SOLUTION INTRAMUSCULAR; INTRAVENOUS; SUBCUTANEOUS at 21:56

## 2024-11-08 RX ADMIN — PROPOFOL 50 MG: 10 INJECTION, EMULSION INTRAVENOUS at 08:25

## 2024-11-08 RX ADMIN — DEXMEDETOMIDINE HYDROCHLORIDE 8 MCG: 100 INJECTION, SOLUTION INTRAVENOUS at 08:21

## 2024-11-08 RX ADMIN — SODIUM CHLORIDE, SODIUM LACTATE, POTASSIUM CHLORIDE, AND CALCIUM CHLORIDE: .6; .31; .03; .02 INJECTION, SOLUTION INTRAVENOUS at 08:39

## 2024-11-08 RX ADMIN — NICOTINE 1 PATCH: 14 PATCH, EXTENDED RELEASE TRANSDERMAL at 13:39

## 2024-11-08 RX ADMIN — GABAPENTIN 100 MG: 100 CAPSULE ORAL at 21:56

## 2024-11-08 RX ADMIN — OXYCODONE HYDROCHLORIDE 10 MG: 10 TABLET ORAL at 11:17

## 2024-11-08 RX ADMIN — SODIUM CHLORIDE, SODIUM LACTATE, POTASSIUM CHLORIDE, AND CALCIUM CHLORIDE 100 ML/HR: .6; .31; .03; .02 INJECTION, SOLUTION INTRAVENOUS at 14:17

## 2024-11-08 RX ADMIN — HEPARIN SODIUM 5000 UNITS: 5000 INJECTION INTRAVENOUS; SUBCUTANEOUS at 21:56

## 2024-11-08 RX ADMIN — HYDROMORPHONE HYDROCHLORIDE 0.5 MG: 1 INJECTION, SOLUTION INTRAMUSCULAR; INTRAVENOUS; SUBCUTANEOUS at 08:41

## 2024-11-08 RX ADMIN — HYDROMORPHONE HYDROCHLORIDE 0.5 MG: 1 INJECTION, SOLUTION INTRAMUSCULAR; INTRAVENOUS; SUBCUTANEOUS at 14:01

## 2024-11-08 RX ADMIN — DEXMEDETOMIDINE HYDROCHLORIDE 8 MCG: 100 INJECTION, SOLUTION INTRAVENOUS at 07:38

## 2024-11-08 RX ADMIN — SODIUM CHLORIDE, SODIUM LACTATE, POTASSIUM CHLORIDE, AND CALCIUM CHLORIDE: .6; .31; .03; .02 INJECTION, SOLUTION INTRAVENOUS at 07:00

## 2024-11-08 RX ADMIN — LIDOCAINE HYDROCHLORIDE 100 MG: 20 INJECTION, SOLUTION EPIDURAL; INFILTRATION; INTRACAUDAL; PERINEURAL at 07:32

## 2024-11-08 RX ADMIN — DEXAMETHASONE SODIUM PHOSPHATE 10 MG: 10 INJECTION, SOLUTION INTRAMUSCULAR; INTRAVENOUS at 07:45

## 2024-11-08 RX ADMIN — Medication 100 MCG: at 08:20

## 2024-11-08 RX ADMIN — FENTANYL CITRATE 50 MCG: 50 INJECTION INTRAMUSCULAR; INTRAVENOUS at 07:32

## 2024-11-08 RX ADMIN — HYDROMORPHONE HYDROCHLORIDE 0.5 MG: 1 INJECTION, SOLUTION INTRAMUSCULAR; INTRAVENOUS; SUBCUTANEOUS at 08:26

## 2024-11-08 RX ADMIN — PROPOFOL 50 MG: 10 INJECTION, EMULSION INTRAVENOUS at 08:24

## 2024-11-08 RX ADMIN — ONDANSETRON 4 MG: 2 INJECTION INTRAMUSCULAR; INTRAVENOUS at 08:26

## 2024-11-08 RX ADMIN — METRONIDAZOLE: 500 INJECTION, SOLUTION INTRAVENOUS at 07:38

## 2024-11-08 RX ADMIN — DEXMEDETOMIDINE HYDROCHLORIDE 8 MCG: 100 INJECTION, SOLUTION INTRAVENOUS at 08:36

## 2024-11-08 RX ADMIN — AMLODIPINE BESYLATE 10 MG: 10 TABLET ORAL at 13:39

## 2024-11-08 RX ADMIN — CEFAZOLIN SODIUM 2000 MG: 2 SOLUTION INTRAVENOUS at 07:42

## 2024-11-08 RX ADMIN — MIDAZOLAM 2 MG: 1 INJECTION INTRAMUSCULAR; INTRAVENOUS at 07:26

## 2024-11-08 RX ADMIN — DEXMEDETOMIDINE HYDROCHLORIDE 8 MCG: 100 INJECTION, SOLUTION INTRAVENOUS at 08:00

## 2024-11-08 RX ADMIN — DULOXETINE HYDROCHLORIDE 20 MG: 20 CAPSULE, DELAYED RELEASE ORAL at 17:45

## 2024-11-08 RX ADMIN — PROPOFOL 50 MG: 10 INJECTION, EMULSION INTRAVENOUS at 08:40

## 2024-11-08 RX ADMIN — PROPOFOL 150 MG: 10 INJECTION, EMULSION INTRAVENOUS at 07:32

## 2024-11-08 RX ADMIN — FENTANYL CITRATE 50 MCG: 50 INJECTION INTRAMUSCULAR; INTRAVENOUS at 07:49

## 2024-11-08 RX ADMIN — MIRTAZAPINE 15 MG: 15 TABLET, FILM COATED ORAL at 21:57

## 2024-11-08 NOTE — OP NOTE
OPERATIVE REPORT  PATIENT NAME: Homero Driver Jr.    :  1965  MRN: [id number]  Pt Location: [facility] OR ROOM 02    SURGERY DATE: 2024    Surgeons and Role:     * Adelso Abernathy MD - Primary     * Brissa Rowell PA-C - Assisting    Preop Diagnosis:  Ileostomy status (HCC) [Z93.2]    Post-Op Diagnosis Codes:     * Ileostomy status (HCC) [Z93.2]    Procedure(s):  REVERSAL OF LOOP ILEOSTOMY    Specimen(s):  ID Type Source Tests Collected by Time Destination   1 : ILeostomy Stoma Tissue Ileum, ileostomy stoma TISSUE EXAM Adelso Abernathy MD 2024 0823        Estimated Blood Loss:   20 ml    Drains:  Ileostomy Loop RUQ (Active)   Number of days: 105       Anesthesia Type:   General endotracheal anesthesia and local anesthesia used is 30 mL of 0.25% Marcaine with 1 in 200,000 epinephrine    Operative Indications:  Ileostomy status (HCC) [Z93.2]  This is a 59-year-old male who is 3 months status post sigmoid colectomy performed for complicated diverticular disease.  Patient had a low [place] rectal anastomosis performed which was technically difficult.  Although the leak test was negative, in view of the difficulty we chose to do a defunctioning loop ileostomy.  Patient has been well since the last surgery.  He underwent a barium enema which shows that there is no leak at the colorectal anastomosis hence he was brought in for reversal.    Operative Findings:  Patient had paucity of adhesions under the ileostomy, hence we were able to deliver the ileum for a stapled anastomosis.  We also found a suture granuloma in his midline incision which was the PDS knot.      Complications:   None    Procedure and Technique:  Patient was brought to the operating room suite.  He was identified by me.  He was laid supine on the operating table.  General endotracheal anesthesia was given.  Patient was given preoperative dose of antibiotics and subcutaneous heparin.    Abdominal wall was cleaned with Betadine  and patient was draped.  Local infiltration anesthesia was given at the operative site.  We started the reversal of the ileostomy by making a incision at the mucocutaneous junction and then dissecting sharply down to the fascia to free the afferent and the efferent loop of the ileum.  This was then completely freed from the surrounding fascial edges.  After satisfactory length of the afferent and then the efferent loops were pulled out, hemostasis was obtained.  A side-to-side stapled anastomosis was performed.  We excised the ileostomy stoma and then used a KEYSHA 55 stapler.  The enterotomies were closed with a TA 60 stapler.  The staple line was oversewn with a continuous 3-0 silk suture.  After ensuring that the hemostasis was adequate and the anastomosis was adequate, the ileum was pushed back into the abdominal cavity.    The fascia was then closed in a single layer with continuous 0 PDS.  Marcaine was injected for postop pain relief.  Skin was closed with interrupted vertical mattress 3-0 nylon sutures.  Surgical glue was applied.    In the patient's midline laparotomy incision, we explored the two open areas with a hemostat clamp.  In the periumbilical site, I was able to find the knot of the PDS suture which was then removed.  The wound was irrigated and dressing was applied.    Patient tolerated procedure well.    I was present for the entire procedure. A qualified resident physician was not available. A physician assistant was required during the procedure for retraction, tissue handling, dissection and suturing.    Patient Disposition:  PACU     This procedure was not performed to treat colon cancer through resection           SIGNATURE:  Adelso Abernathy MD  DATE: November 8, 2024  TIME: 8:50 AM

## 2024-11-08 NOTE — ANESTHESIA PREPROCEDURE EVALUATION
Procedure:  REVERSAL OF ILEOSTOMY (Abdomen)    Relevant Problems   ANESTHESIA   (+) History of unintended awareness under general anesthesia      CARDIO   (+) HTN (hypertension)      NEURO/PSYCH   (+) Anxiety      PULMONARY   (+) Sleep apnea   (+) Smoking        Physical Exam    Airway    Mallampati score: II  TM Distance: >3 FB  Neck ROM: full     Dental       Cardiovascular  Rhythm: regular, Rate: normal, No weak pulses    Pulmonary   No stridor    Other Findings        Anesthesia Plan  ASA Score- 2     Anesthesia Type- general with ASA Monitors.         Additional Monitors:     Airway Plan:            Plan Factors-    Chart reviewed. EKG reviewed.  Existing labs reviewed. Patient summary reviewed.          Obstructive sleep apnea risk education given perioperatively.        Induction- intravenous.    Postoperative Plan- Plan for postoperative opioid use. Planned trial extubation    Perioperative Resuscitation Plan - Level 1 - Full Code.       Informed Consent- Anesthetic plan and risks discussed with patient.  I personally reviewed this patient with the CRNA. Discussed and agreed on the Anesthesia Plan with the CRNA..

## 2024-11-08 NOTE — CONSULTS
Consultation - Hospitalist   Name: Homero Driver Jr. 59 y.o. male I MRN: [id number]  Unit/Bed#: -01 I Date of Admission: 11/8/2024   Date of Service: 11/8/2024 I Hospital Day: 0   Inpatient consult to Internal Medicine  Consult performed by: Ebenezer Krishnan DO  Consult ordered by: Brissa Rowell PA-C        Physician Requesting Evaluation: Adelso Abernathy, *   Reason for Evaluation / Principal Problem: medical management    Assessment & Plan  Status post closure of ileostomy  Patient sp closure of ileostomy  Management per surgery  Anxiety    HTN (hypertension)  Continue home amlodpine        VTE Pharmacologic Prophylaxis: VTE Score: 5 Moderate Risk (Score 3-4) - Pharmacological DVT Prophylaxis Ordered: heparin.  Code Status: Level 1 - Full Code   Discussion with family: Patient declined call to .     Anticipated Length of Stay: Patient will be admitted on an inpatient basis with an anticipated length of stay of greater than 2 midnights secondary to post procedure.    History of Present Illness   Chief Complaint:     Homero Driver Jr. is a 59 y.o. male with a PMH of cervical stenosis, htn, anxiety , sigmoid diverticultis complicated by a feca fistula found to have abscess requiring ileostomy who presents for reversal of ileostomy.  Medicine is consulted for general medical management, patient has no complaints during my evaluation other than pain.     Review of Systems   Constitutional:  Positive for fatigue. Negative for chills and fever.   HENT:  Negative for ear pain and sore throat.    Eyes:  Negative for pain and visual disturbance.   Respiratory:  Negative for cough, shortness of breath, wheezing and stridor.    Cardiovascular:  Negative for chest pain, palpitations and leg swelling.   Gastrointestinal:  Positive for abdominal pain. Negative for abdominal distention, diarrhea, nausea and vomiting.   Genitourinary:  Negative for dysuria and hematuria.   Musculoskeletal:  Negative  for arthralgias and back pain.   Skin:  Negative for color change and rash.   Neurological:  Negative for dizziness, seizures, syncope, weakness, light-headedness and headaches.   All other systems reviewed and are negative.      Historical Information   Past Medical History:   Diagnosis Date    Anesthesia complication     body temp burns off anesthesia more quickly--woke up during surgery    Anxiety     due to medical conditions    Arthritis     Hypertension     Sleep apnea     Tobacco abuse      Past Surgical History:   Procedure Laterality Date    BACK SURGERY      CERVICAL FUSION N/A 04/05/2016    Procedure: FUSION CERVICAL POSTERIOR - revision of instrumentation T1;  Surgeon: Srikanth Siu MD;  Location: [facility];  Service:     COLONOSCOPY      FOOT MASS EXCISION Right     HERNIA REPAIR Right     x2    INCISION AND DRAINAGE POSTERIOR SPINE N/A 04/27/2016    Procedure: INCISION AND DRAINAGE (I&D) SPINE;  Surgeon: Srikanth Sui MD;  Location: [facility];  Service:     IR DRAINAGE TUBE CHECK/CHANGE/REPOSITION/REINSERTION/UPSIZE  03/01/2024    IR DRAINAGE TUBE CHECK/CHANGE/REPOSITION/REINSERTION/UPSIZE  03/21/2024    IR DRAINAGE TUBE CHECK/CHANGE/REPOSITION/REINSERTION/UPSIZE  04/05/2024    IR DRAINAGE TUBE CHECK/CHANGE/REPOSITION/REINSERTION/UPSIZE  05/23/2024    IR DRAINAGE TUBE PLACEMENT  02/18/2024    AK ARTHRODESIS PST/PSTLAT TQ 1NTRSPC EA ADDL NTRSPC N/A 04/04/2016    Procedure: C3-C6 POSTERIOR CERVICAL LAMINECTOMY AND DECOMPRESSION; C3-T2 INSTRUMENTED POSTERIOR CERVICAL SPINAL FUSION WITH POSSIBLE ADDITIONAL LEVELS WITH ALLOGRAFT AND AUTOGRAFT (IMPULSE MONITORING);  Surgeon: Srikanth Siu MD;  Location: [facility];  Service: Orthopedics    AK CYSTO BLADDER W/URETERAL CATHETERIZATION Bilateral 7/26/2024    Procedure: INSERTION URETERAL CATHETERS PREOP;  Surgeon: Vitaliy Leahy MD;  Location:  MAIN OR;  Service: Urology    AK LAPAROSCOPY COLECTOMY PARTIAL W/ANASTOMOSIS N/A 7/26/2024    Procedure:  SIGMOID COLECTOMY, CHOLECYSTECTOMY, CREATION OF LOOP ILEOSTOMY, REMOVAL OF URETERAL STENTS. REMOVAL OF LEFT BUTTOCK ABSCESS DRAIN.;  Surgeon: Adelso Abernathy MD;  Location: [facility];  Service: General    MD SIGMOIDOSCOPY FLX DX W/COLLJ SPEC BR/WA IF PFRMD N/A 7/26/2024    Procedure: FLEXIBLE SIGMOIDOSCOPY;  Surgeon: Adelso Abernathy MD;  Location:  MAIN OR;  Service: General    SHOULDER OPEN ROTATOR CUFF REPAIR Bilateral      Social History     Tobacco Use    Smoking status: Every Day     Current packs/day: 0.25     Average packs/day: 0.3 packs/day for 15.0 years (3.8 ttl pk-yrs)     Types: Cigarettes     Passive exposure: Never    Smokeless tobacco: Never    Tobacco comments:     2 cigarettes per day     Vapes on weekends   Vaping Use    Vaping status: Some Days    Substances: Flavoring   Substance and Sexual Activity    Alcohol use: Not Currently    Drug use: Yes     Frequency: 14.0 times per week     Types: Marijuana    Sexual activity: Not on file     E-Cigarette/Vaping    E-Cigarette Use Current Some Day User      E-Cigarette/Vaping Substances    Nicotine No     THC No     CBD No     Flavoring Yes     Other No     Unknown No      Family History   Problem Relation Age of Onset    Cancer Mother     Cancer Father     Cancer Family      Social History:  Marital Status: /Civil Union   Occupation:   Patient Pre-hospital Living Situation: Home  Patient Pre-hospital Level of Mobility: walks  Patient Pre-hospital Diet Restrictions:     Meds/Allergies   I have reviewed home medications with patient personally.  Prior to Admission medications    Medication Sig Start Date End Date Taking? Authorizing Provider   albuterol (PROVENTIL HFA,VENTOLIN HFA) 90 mcg/act inhaler INHALE 2 PUFFS INTO THE LUNGS EVERY 4 HOURS AS NEEDED FOR 30 DAYS 5/12/22  Yes Historical Provider, MD   ALPRAZolam (XANAX) 0.5 mg tablet take 1 tablet by mouth three times a day for 30 days 10/22/24  Yes Historical Provider, MD    amLODIPine (NORVASC) 10 mg tablet Take 10 mg by mouth daily 5/6/24  Yes Historical Provider, MD   cyclobenzaprine (FLEXERIL) 10 mg tablet TAKE 1 TABLET BY MOUTH THREE TIMES A DAY AS NEEDED FOR 10 DAYS 2/28/24  Yes Historical Provider, MD   DULoxetine (CYMBALTA) 20 mg capsule Take 20 mg by mouth every evening 4/20/24  Yes Historical Provider, MD   gabapentin (NEURONTIN) 100 mg capsule Take 100 mg by mouth daily at bedtime   Yes Historical Provider, MD   mirtazapine (REMERON) 15 mg tablet Take 15 mg by mouth daily at bedtime   7/19/22  Yes Historical Provider, MD   nicotine (NICODERM CQ) 14 mg/24hr TD 24 hr patch Place 1 patch on the skin over 24 hours daily 10/18/24   Scarlett Phipps MD     Allergies   Allergen Reactions    Morphine Hives     Morphine Patch       Objective :  Temp:  [97.3 °F (36.3 °C)-97.9 °F (36.6 °C)] 97.3 °F (36.3 °C)  HR:  [55-72] 65  BP: (104-154)/(70-99) 154/96  Resp:  [1-28] 18  SpO2:  [93 %-100 %] 96 %  O2 Device: None (Room air)    Physical Exam  Vitals and nursing note reviewed.   Constitutional:       General: He is not in acute distress.     Appearance: He is well-developed. He is not toxic-appearing or diaphoretic.   HENT:      Head: Normocephalic and atraumatic.   Eyes:      General: No scleral icterus.     Conjunctiva/sclera: Conjunctivae normal.   Cardiovascular:      Rate and Rhythm: Normal rate and regular rhythm.      Heart sounds: No murmur heard.     No friction rub. No gallop.   Pulmonary:      Effort: Pulmonary effort is normal. No respiratory distress.      Breath sounds: Normal breath sounds. No stridor. No wheezing, rhonchi or rales.   Chest:      Chest wall: No tenderness.   Abdominal:      General: There is no distension.      Palpations: Abdomen is soft. There is no mass.      Tenderness: There is no abdominal tenderness. There is no guarding or rebound.      Hernia: No hernia is present.   Musculoskeletal:         General: No swelling or tenderness.      Cervical  back: Neck supple.   Skin:     General: Skin is warm and dry.      Capillary Refill: Capillary refill takes less than 2 seconds.   Neurological:      Mental Status: He is alert and oriented to person, place, and time.   Psychiatric:         Mood and Affect: Mood normal.          Lines/Drains:            Lab Results: I have reviewed the following results:                  Lab Results   Component Value Date    HGBA1C 5.6 12/11/2021           Imaging Results Review: No pertinent imaging studies reviewed.  Other Study Results Review: No additional pertinent studies reviewed.    Administrative Statements       ** Please Note: This note has been constructed using a voice recognition system. **

## 2024-11-08 NOTE — INTERVAL H&P NOTE
H&P reviewed. After examining the patient I find no changes in the patients condition since the H&P had been written.    Vitals:    11/08/24 0701   BP: 145/90   Pulse: 70   Resp: 16   Temp: (!) 97.3 °F (36.3 °C)   SpO2: 100%

## 2024-11-08 NOTE — ANESTHESIA POSTPROCEDURE EVALUATION
Post-Op Assessment Note    CV Status:  Stable    Pain management: adequate       Mental Status:  Awake and sleepy   Hydration Status:  Euvolemic   PONV Controlled:  Controlled   Airway Patency:  Patent     Post Op Vitals Reviewed: Yes    No anethesia notable event occurred.    Staff: CRNA, Anesthesiologist           Last Filed PACU Vitals:  Vitals Value Taken Time   Temp 97.8    Pulse 59 11/08/24 0902   /72 11/08/24 0901   Resp 14 11/08/24 0902   SpO2 96 % 11/08/24 0902   Vitals shown include unfiled device data.    Modified Jeanna:  No data recorded

## 2024-11-09 LAB
ALBUMIN SERPL BCG-MCNC: 3.9 G/DL (ref 3.5–5)
ALP SERPL-CCNC: 58 U/L (ref 34–104)
ALT SERPL W P-5'-P-CCNC: 11 U/L (ref 7–52)
ANION GAP SERPL CALCULATED.3IONS-SCNC: 10 MMOL/L (ref 4–13)
AST SERPL W P-5'-P-CCNC: 19 U/L (ref 13–39)
BASOPHILS # BLD AUTO: 0.03 THOUSANDS/ÂΜL (ref 0–0.1)
BASOPHILS NFR BLD AUTO: 0 % (ref 0–1)
BILIRUB SERPL-MCNC: 0.51 MG/DL (ref 0.2–1)
BUN SERPL-MCNC: 8 MG/DL (ref 5–25)
CALCIUM SERPL-MCNC: 9.5 MG/DL (ref 8.4–10.2)
CHLORIDE SERPL-SCNC: 102 MMOL/L (ref 96–108)
CO2 SERPL-SCNC: 27 MMOL/L (ref 21–32)
CREAT SERPL-MCNC: 0.84 MG/DL (ref 0.6–1.3)
EOSINOPHIL # BLD AUTO: 0 THOUSAND/ÂΜL (ref 0–0.61)
EOSINOPHIL NFR BLD AUTO: 0 % (ref 0–6)
ERYTHROCYTE [DISTWIDTH] IN BLOOD BY AUTOMATED COUNT: 15.9 % (ref 11.6–15.1)
GFR SERPL CREATININE-BSD FRML MDRD: 95 ML/MIN/1.73SQ M
GLUCOSE SERPL-MCNC: 86 MG/DL (ref 65–140)
HCT VFR BLD AUTO: 39.7 % (ref 36.5–49.3)
HGB BLD-MCNC: 12.6 G/DL (ref 12–17)
IMM GRANULOCYTES # BLD AUTO: 0.08 THOUSAND/UL (ref 0–0.2)
IMM GRANULOCYTES NFR BLD AUTO: 1 % (ref 0–2)
LYMPHOCYTES # BLD AUTO: 2.9 THOUSANDS/ÂΜL (ref 0.6–4.47)
LYMPHOCYTES NFR BLD AUTO: 17 % (ref 14–44)
MCH RBC QN AUTO: 29.3 PG (ref 26.8–34.3)
MCHC RBC AUTO-ENTMCNC: 31.7 G/DL (ref 31.4–37.4)
MCV RBC AUTO: 92 FL (ref 82–98)
MONOCYTES # BLD AUTO: 0.93 THOUSAND/ÂΜL (ref 0.17–1.22)
MONOCYTES NFR BLD AUTO: 6 % (ref 4–12)
NEUTROPHILS # BLD AUTO: 13.07 THOUSANDS/ÂΜL (ref 1.85–7.62)
NEUTS SEG NFR BLD AUTO: 76 % (ref 43–75)
NRBC BLD AUTO-RTO: 0 /100 WBCS
PLATELET # BLD AUTO: 276 THOUSANDS/UL (ref 149–390)
PMV BLD AUTO: 9.8 FL (ref 8.9–12.7)
POTASSIUM SERPL-SCNC: 4.1 MMOL/L (ref 3.5–5.3)
PROT SERPL-MCNC: 7.2 G/DL (ref 6.4–8.4)
RBC # BLD AUTO: 4.3 MILLION/UL (ref 3.88–5.62)
SODIUM SERPL-SCNC: 139 MMOL/L (ref 135–147)
WBC # BLD AUTO: 17.01 THOUSAND/UL (ref 4.31–10.16)

## 2024-11-09 PROCEDURE — 85025 COMPLETE CBC W/AUTO DIFF WBC: CPT | Performed by: PHYSICIAN ASSISTANT

## 2024-11-09 PROCEDURE — 99024 POSTOP FOLLOW-UP VISIT: CPT | Performed by: PHYSICIAN ASSISTANT

## 2024-11-09 PROCEDURE — 80053 COMPREHEN METABOLIC PANEL: CPT | Performed by: PHYSICIAN ASSISTANT

## 2024-11-09 RX ORDER — DEXTROSE MONOHYDRATE, SODIUM CHLORIDE, AND POTASSIUM CHLORIDE 50; 1.49; 4.5 G/1000ML; G/1000ML; G/1000ML
85 INJECTION, SOLUTION INTRAVENOUS CONTINUOUS
Status: DISCONTINUED | OUTPATIENT
Start: 2024-11-09 | End: 2024-11-10

## 2024-11-09 RX ADMIN — SODIUM CHLORIDE, SODIUM LACTATE, POTASSIUM CHLORIDE, AND CALCIUM CHLORIDE 100 ML/HR: .6; .31; .03; .02 INJECTION, SOLUTION INTRAVENOUS at 04:42

## 2024-11-09 RX ADMIN — DEXTROSE, SODIUM CHLORIDE, AND POTASSIUM CHLORIDE 85 ML/HR: 5; .45; .15 INJECTION INTRAVENOUS at 11:47

## 2024-11-09 RX ADMIN — OXYCODONE HYDROCHLORIDE 5 MG: 5 TABLET ORAL at 21:29

## 2024-11-09 RX ADMIN — HYDROMORPHONE HYDROCHLORIDE 0.5 MG: 1 INJECTION, SOLUTION INTRAMUSCULAR; INTRAVENOUS; SUBCUTANEOUS at 16:59

## 2024-11-09 RX ADMIN — MIRTAZAPINE 15 MG: 15 TABLET, FILM COATED ORAL at 21:27

## 2024-11-09 RX ADMIN — NICOTINE 1 PATCH: 14 PATCH, EXTENDED RELEASE TRANSDERMAL at 09:17

## 2024-11-09 RX ADMIN — HEPARIN SODIUM 5000 UNITS: 5000 INJECTION INTRAVENOUS; SUBCUTANEOUS at 21:30

## 2024-11-09 RX ADMIN — HYDROMORPHONE HYDROCHLORIDE 0.5 MG: 1 INJECTION, SOLUTION INTRAMUSCULAR; INTRAVENOUS; SUBCUTANEOUS at 05:57

## 2024-11-09 RX ADMIN — AMLODIPINE BESYLATE 10 MG: 10 TABLET ORAL at 09:17

## 2024-11-09 RX ADMIN — OXYCODONE HYDROCHLORIDE 10 MG: 10 TABLET ORAL at 11:46

## 2024-11-09 RX ADMIN — GABAPENTIN 100 MG: 100 CAPSULE ORAL at 21:27

## 2024-11-09 RX ADMIN — OXYCODONE HYDROCHLORIDE 5 MG: 5 TABLET ORAL at 04:34

## 2024-11-09 RX ADMIN — HEPARIN SODIUM 5000 UNITS: 5000 INJECTION INTRAVENOUS; SUBCUTANEOUS at 05:45

## 2024-11-09 RX ADMIN — DULOXETINE HYDROCHLORIDE 20 MG: 20 CAPSULE, DELAYED RELEASE ORAL at 16:47

## 2024-11-09 NOTE — PLAN OF CARE
Problem: PAIN - ADULT  Goal: Verbalizes/displays adequate comfort level or baseline comfort level  Description: Interventions:  - Encourage patient to monitor pain and request assistance  - Assess pain using appropriate pain scale  - Administer analgesics based on type and severity of pain and evaluate response  - Implement non-pharmacological measures as appropriate and evaluate response  - Consider cultural and social influences on pain and pain management  - Notify physician/advanced practitioner if interventions unsuccessful or patient reports new pain  Outcome: Progressing     Problem: INFECTION - ADULT  Goal: Absence or prevention of progression during hospitalization  Description: INTERVENTIONS:  - Assess and monitor for signs and symptoms of infection  - Monitor lab/diagnostic results  - Monitor all insertion sites, i.e. indwelling lines, tubes, and drains  - Monitor endotracheal if appropriate and nasal secretions for changes in amount and color  - [place] appropriate cooling/warming therapies per order  - Administer medications as ordered  - Instruct and encourage patient and family to use good hand hygiene technique  - Identify and instruct in appropriate isolation precautions for identified infection/condition  Outcome: Progressing  Goal: Absence of fever/infection during neutropenic period  Description: INTERVENTIONS:  - Monitor WBC    Outcome: Progressing     Problem: SAFETY ADULT  Goal: Patient will remain free of falls  Description: INTERVENTIONS:  - Educate patient/family on patient safety including physical limitations  - Instruct patient to call for assistance with activity   - Consult OT/PT to assist with strengthening/mobility   - Keep Call bell within reach  - Keep bed low and locked with side rails adjusted as appropriate  - Keep care items and personal belongings within reach  - Initiate and maintain comfort rounds  - Make Fall Risk Sign visible to staff  - Offer Toileting every 2 Hours,  in advance of need  - Apply yellow socks and bracelet for high fall risk patients  - Consider moving patient to room near nurses station  Outcome: Progressing     Problem: DISCHARGE PLANNING  Goal: Discharge to home or other facility with appropriate resources  Description: INTERVENTIONS:  - Identify barriers to discharge w/patient and caregiver  - Arrange for needed discharge resources and transportation as appropriate  - Identify discharge learning needs (meds, wound care, etc.)  - Arrange for interpretive services to assist at discharge as needed  - Refer to Case Management Department for coordinating discharge planning if the patient needs post-hospital services based on physician/advanced practitioner order or complex needs related to functional status, cognitive ability, or social support system  Outcome: Progressing     Problem: Knowledge Deficit  Goal: Patient/family/caregiver demonstrates understanding of disease process, treatment plan, medications, and discharge instructions  Description: Complete learning assessment and assess knowledge base.  Interventions:  - Provide teaching at level of understanding  - Provide teaching via preferred learning methods  Outcome: Progressing     Problem: GASTROINTESTINAL - ADULT  Goal: Minimal or absence of nausea and/or vomiting  Description: INTERVENTIONS:  - Administer IV fluids if ordered to ensure adequate hydration  - Maintain NPO status until nausea and vomiting are resolved  - Nasogastric tube if ordered  - Administer ordered antiemetic medications as needed  - Provide nonpharmacologic comfort measures as appropriate  - Advance diet as tolerated, if ordered  - Consider nutrition services referral to assist patient with adequate nutrition and appropriate food choices  Outcome: Progressing  Goal: Maintains or returns to baseline bowel function  Description: INTERVENTIONS:  - Assess bowel function  - Encourage oral fluids to ensure adequate hydration  - Administer  IV fluids if ordered to ensure adequate hydration  - Administer ordered medications as needed  - Encourage mobilization and activity  - Consider nutritional services referral to assist patient with adequate nutrition and appropriate food choices  Outcome: Progressing  Goal: Maintains adequate nutritional intake  Description: INTERVENTIONS:  - Monitor percentage of each meal consumed  - Identify factors contributing to decreased intake, treat as appropriate  - Assist with meals as needed  - Monitor I&O, weight, and lab values if indicated  - Obtain nutrition services referral as needed  Outcome: Progressing     Problem: SKIN/TISSUE INTEGRITY - ADULT  Goal: Incision(s), wounds(s) or drain site(s) healing without S/S of infection  Description: INTERVENTIONS  - Assess and document dressing, incision, wound bed, drain sites and surrounding tissue  - Provide patient and family education  - Perform skin care/dressing changes every shift/as needed  Outcome: Progressing

## 2024-11-09 NOTE — UTILIZATION REVIEW
Initial Clinical Review    Elective inpatient  surgical procedure  Age/Sex: 59 y.o. male  Surgery Date: 11-08-24  Procedure: REVERSAL OF LOOP ILEOSTOMY   Anesthesia: general  Operative Findings: Patient had paucity of adhesions under the ileostomy, hence we were able to deliver the ileum for a stapled anastomosis.  We also found a suture granuloma in his midline incision which was the PDS knot.    POD#1 Progress Note: 11-09-24 Patient was seen and examined bedside. Patient is tolerating clear liquid diet Pain well controlled. abdominal tenderness (surrounding incisioin sight) incision c/d/i    Admission Orders: Date/Time/Statement:   Admission Orders (From admission, onward)       Ordered        11/08/24 0715  Inpatient Admission  Once                          Orders Placed This Encounter   Procedures    Inpatient Admission     Standing Status:   Standing     Number of Occurrences:   1     Order Specific Question:   Level of Care     Answer:   Med Surg [16]     Order Specific Question:   Estimated length of stay     Answer:   More than 2 Midnights     Order Specific Question:   Certification     Answer:   I certify that inpatient services are medically necessary for this patient for a duration of greater than two midnights. See H&P and MD Progress Notes for additional information about the patient's course of treatment.     Diet: clears  Mobility: encourage ambulation   DVT Prophylaxis: SCD and sq heparin     Medications/Pain Control:   Scheduled Medications:  amLODIPine, 10 mg, Oral, Daily  DULoxetine, 20 mg, Oral, QPM  gabapentin, 100 mg, Oral, HS  heparin (porcine), 5,000 Units, Subcutaneous, Q8H BERYL  mirtazapine, 15 mg, Oral, HS  nicotine, 1 patch, Transdermal, Daily      Continuous IV Infusions:  dextrose 5 % and sodium chloride 0.45 % with KCl 20 mEq/L, 85 mL/hr, Intravenous, Continuous      PRN Meds:  acetaminophen, 650 mg, Oral, Q6H PRN  albuterol, 1 puff, Inhalation, Q6H PRN  ALPRAZolam, 0.5 mg, Oral, TID  PRN  HYDROmorphone, 0.5 mg, Intravenous, Q4H PRN  X 3  ondansetron, 4 mg, Intravenous, Q6H PRN  oxyCODONE, 10 mg, Oral, Q6H PRN  X  3  oxyCODONE, 5 mg, Oral, Q6H PRN  X 1      Vital Signs (last 3 days)       Date/Time Temp Pulse Resp BP MAP (mmHg) SpO2 O2 Flow Rate (L/min) O2 Device Cardiac (WDL) Patient Position - Orthostatic VS [place] Coma Scale Score Pain    11/09/24 1300 -- -- -- -- -- -- -- -- -- -- 15 --    11/09/24 1146 -- -- -- -- -- -- -- -- -- -- -- 10 - Worst Possible Pain    11/09/24 0800 97.6 °F (36.4 °C) 75 18 146/102 125 100 % -- None (Room air) -- Lying -- --    11/09/24 0557 97.8 °F (36.6 °C) 82 18 143/75 -- -- -- -- -- Lying -- 9    11/09/24 0434 -- -- -- -- -- -- -- -- -- -- -- 6    11/08/24 2257 97.7 °F (36.5 °C) 79 16 157/76 109 100 % -- None (Room air) -- Lying -- --    11/08/24 2156 -- -- -- -- -- -- -- -- -- -- -- 10 - Worst Possible Pain    11/08/24 2155 -- -- -- -- -- -- -- -- -- -- 15 --    11/08/24 1749 -- -- -- -- -- -- -- -- -- -- -- 8    11/08/24 1430 97.6 °F (36.4 °C) 72 16 151/95 -- 100 % -- -- -- Lying -- --    11/08/24 1401 -- -- -- -- -- -- -- -- -- -- -- 9    11/08/24 1300 98.1 °F (36.7 °C) 71 18 142/102 -- 97 % -- None (Room air) -- Lying -- --    11/08/24 1203 -- 65 18 154/96 -- 96 % -- -- -- Lying -- --    11/08/24 1200 -- -- -- -- -- -- -- -- -- -- 15 --    11/08/24 1117 -- -- -- -- -- -- -- -- -- -- -- 9    11/08/24 1105 97.3 °F (36.3 °C) 60 18 137/99 -- 100 % -- -- -- Lying -- --    11/08/24 1035 -- 58 18 126/87 -- 100 % -- -- -- Lying -- --    11/08/24 1005 -- 56 20 130/95 -- 100 % -- -- -- Lying -- --    11/08/24 0935 97.9 °F (36.6 °C) 56 21 114/76 91 96 % -- -- -- -- -- --    11/08/24 0930 97.5 °F (36.4 °C) 55 26 115/78 92 97 % -- None (Room air) [facility] -- 12 5    11/08/24 0925 -- 56 16 113/77 91 97 % -- -- -- -- -- --    11/08/24 0920 97.9 °F (36.6 °C) 58 28 104/70 82 95 % -- None (Room air) [facility] -- 12 No Pain    11/08/24 0915 -- 57 1 105/70 83 96 % -- -- -- -- -- --     11/08/24 0910 97.5 °F (36.4 °C) 55 18 110/71 87 98 % -- None (Room air) [facility] -- 12 --    11/08/24 0905 97.7 °F (36.5 °C) 65 16 118/74 91 99 % -- -- -- -- -- --    11/08/24 0900 97.4 °F (36.3 °C) 72 12 107/72 84 93 % 6 L/min Simple mask WDL -- 7 --    11/08/24 0701 97.3 °F (36.3 °C) 70 16 145/90 -- 100 % -- None (Room air) -- -- -- 6          Weight (last 2 days)       Date/Time Weight    11/08/24 1300 83.5 (184)    11/08/24 0701 83.5 (184)            Pertinent Labs/Diagnostic Test Results:   Radiology:  No orders to display     Cardiology:  No orders to display     GI:  No orders to display           Results from last 7 days   Lab Units 11/09/24  0447   WBC Thousand/uL 17.01*   HEMOGLOBIN g/dL 12.6   HEMATOCRIT % 39.7   PLATELETS Thousands/uL 276   TOTAL NEUT ABS Thousands/µL 13.07*         Results from last 7 days   Lab Units 11/09/24  0447   SODIUM mmol/L 139   POTASSIUM mmol/L 4.1   CHLORIDE mmol/L 102   CO2 mmol/L 27   ANION GAP mmol/L 10   BUN mg/dL 8   CREATININE mg/dL 0.84   EGFR ml/min/1.73sq m 95   CALCIUM mg/dL 9.5     Results from last 7 days   Lab Units 11/09/24  0447   AST U/L 19   ALT U/L 11   ALK PHOS U/L 58   TOTAL PROTEIN g/dL 7.2   ALBUMIN g/dL 3.9   TOTAL BILIRUBIN mg/dL 0.51     Network Utilization Review Department  ATTENTION: Please call with any questions or concerns to [phone] and carefully listen to the prompts so that you are directed to the right person. All voicemails are confidential.   For Discharge needs, contact Care Management DC Support Team at [phone] opt. 2  Send all requests for admission clinical reviews, approved or denied determinations and any other requests to dedicated fax number below belonging to the campus where the patient is receiving treatment. List of dedicated fax numbers for the Facilities:  FACILITY NAME UR FAX NUMBER   ADMISSION DENIALS (Administrative/Medical Necessity) [phone]   DISCHARGE SUPPORT TEAM (NETWORK) [phone]   PARENT CHILD HEALTH  (Maternity/NICU/Pediatrics) [phone]   [facility] [phone]   [facility] [phone]   [facility] [phone]   [facility] [phone]   [facility] [phone]   [facility] [phone]   [facility] [phone]   [facility] [phone]   [facility] [phone]   [facility] [phone]   [facility] [phone]   [facility] [phone]

## 2024-11-09 NOTE — UTILIZATION REVIEW
NOTIFICATION OF INPATIENT ADMISSION   AUTHORIZATION REQUEST   SERVICING FACILITY:   [place] [address]  Tax ID: [id number]  NPI: [id number] ATTENDING PROVIDER:  Attending Name and NPI#: Adelso Abernathy Md [[id number]]  Address: [address] [place] [address]  Phone:  [phone]     ADMISSION INFORMATION:  Place of Service: Inpatient [facility]  Place of Service Code: 21  Inpatient Admission Date/Time: 11/8/24  7:15 AM  Discharge Date/Time: No discharge date for patient encounter.  Admitting Diagnosis Code/Description:  Ileostomy status (HCC) [Z93.2]     UTILIZATION REVIEW CONTACT:  Zenobia Boyle, Utilization   Network Utilization Review Department  Phone: [phone]  Fax: [phone]  Email: [email or web address]  Contact for approvals/pending authorizations, clinical reviews, and discharge.     PHYSICIAN ADVISORY SERVICES:  Medical Necessity Denial & [employer] Review  Phone: [phone]  Fax: [phone]  Email: [email or web address]     DISCHARGE SUPPORT TEAM:  For Patients Discharge Needs & Updates  Phone: [phone] opt. 2 Fax: [phone]  Email: [email or web address]

## 2024-11-09 NOTE — ASSESSMENT & PLAN NOTE
Postoperative day #1 status post reversal of loop ileostomy.    WBC: 17.01. reactive to surgery.     AVSS.     -Continue clear liquid diet.   -OOB and ambulate.   -PRN analgesics.   -Am labs: cbc, bmp.

## 2024-11-09 NOTE — PROGRESS NOTES
Progress Note - Surgery-General   Name: Homero Driver Jr. 59 y.o. male I MRN: [id number]  Unit/Bed#: -01 I Date of Admission: 11/8/2024   Date of Service: 11/9/2024 I Hospital Day: 1     Assessment & Plan  Status post closure of ileostomy  Postoperative day #1 status post reversal of loop ileostomy.    WBC: 17.01. reactive to surgery.     AVSS.     -Continue clear liquid diet.   -OOB and ambulate.   -PRN analgesics.   -Am labs: cbc, bmp.     Anxiety    HTN (hypertension)          Subjective   Patient was seen and examined bedside. Patient is tolerating diet. Pain well controlled.     Objective :  Temp:  [97.3 °F (36.3 °C)-98.1 °F (36.7 °C)] 97.8 °F (36.6 °C)  HR:  [55-82] 82  BP: (104-157)/() 143/75  Resp:  [1-28] 18  SpO2:  [93 %-100 %] 100 %  O2 Device: None (Room air)    I/O         11/07 0701 11/08 0700 11/08 0701 11/09 0700 11/09 0701  11/10 0700    P.O.  480     I.V. (mL/kg)  2340 (28)     IV Piggyback  100     Total Intake(mL/kg)  2920 (35)     Urine (mL/kg/hr)  250 (0.1)     Stool  0     Total Output  250     Net  +2670            Unmeasured Urine Occurrence  2 x     Unmeasured Stool Occurrence  1 x             Physical Exam  Vitals and nursing note reviewed.   HENT:      Head: Normocephalic and atraumatic.      Mouth/Throat:      Mouth: Mucous membranes are moist.   Eyes:      Extraocular Movements: Extraocular movements intact.   Cardiovascular:      Rate and Rhythm: Normal rate.   Pulmonary:      Effort: Pulmonary effort is normal.   Abdominal:      General: There is no distension.      Tenderness: There is abdominal tenderness (surrounding incisioin sight). There is no guarding or rebound.      Comments: Incision c/d/I.    Skin:     Capillary Refill: Capillary refill takes less than 2 seconds.   Psychiatric:         Mood and Affect: Mood normal.           Lab Results: I have reviewed the following results:  Recent Labs     11/09/24  0447   WBC 17.01*   HGB 12.6   HCT 39.7      SODIUM  139   K 4.1      CO2 27   BUN 8   CREATININE 0.84   GLUC 86   AST 19   ALT 11   ALB 3.9   TBILI 0.51   ALKPHOS 58           VTE Pharmacologic Prophylaxis: Heparin  VTE Mechanical Prophylaxis: sequential compression device

## 2024-11-09 NOTE — PLAN OF CARE
Problem: PAIN - ADULT  Goal: Verbalizes/displays adequate comfort level or baseline comfort level  Description: Interventions:  - Encourage patient to monitor pain and request assistance  - Assess pain using appropriate pain scale  - Administer analgesics based on type and severity of pain and evaluate response  - Implement non-pharmacological measures as appropriate and evaluate response  - Consider cultural and social influences on pain and pain management  - Notify physician/advanced practitioner if interventions unsuccessful or patient reports new pain  Outcome: Progressing     Problem: INFECTION - ADULT  Goal: Absence or prevention of progression during hospitalization  Description: INTERVENTIONS:  - Assess and monitor for signs and symptoms of infection  - Monitor lab/diagnostic results  - Monitor all insertion sites, i.e. indwelling lines, tubes, and drains  - Monitor endotracheal if appropriate and nasal secretions for changes in amount and color  - [place] appropriate cooling/warming therapies per order  - Administer medications as ordered  - Instruct and encourage patient and family to use good hand hygiene technique  - Identify and instruct in appropriate isolation precautions for identified infection/condition  Outcome: Progressing  Goal: Absence of fever/infection during neutropenic period  Description: INTERVENTIONS:  - Monitor WBC    Outcome: Progressing     Problem: SAFETY ADULT  Goal: Patient will remain free of falls  Description: INTERVENTIONS:  - Educate patient/family on patient safety including physical limitations  - Instruct patient to call for assistance with activity   - Consult OT/PT to assist with strengthening/mobility   - Keep Call bell within reach  - Keep bed low and locked with side rails adjusted as appropriate  - Keep care items and personal belongings within reach  - Initiate and maintain comfort rounds  - Make Fall Risk Sign visible to staff  - Apply yellow socks and bracelet  for high fall risk patients  - Consider moving patient to room near nurses station  Outcome: Progressing  Goal: Maintain or return to baseline ADL function  Description: INTERVENTIONS:  -  Assess patient's ability to carry out ADLs; assess patient's baseline for ADL function and identify physical deficits which impact ability to perform ADLs (bathing, care of mouth/teeth, toileting, grooming, dressing, etc.)  - Assess/evaluate cause of self-care deficits   - Assess range of motion  - Assess patient's mobility; develop plan if impaired  - Assess patient's need for assistive devices and provide as appropriate  - Encourage maximum independence but intervene and supervise when necessary  - Involve family in performance of ADLs  - Assess for home care needs following discharge   - Consider OT consult to assist with ADL evaluation and planning for discharge  - Provide patient education as appropriate  Outcome: Progressing  Goal: Maintains/Returns to pre admission functional level  Description: INTERVENTIONS:  - Perform AM-PAC 6 Click Basic Mobility/ Daily Activity assessment daily.  - Set and communicate daily mobility goal to care team and patient/family/caregiver.   - Collaborate with rehabilitation services on mobility goals if consulted  - Out of bed for toileting  - Record patient progress and toleration of activity level   Outcome: Progressing     Problem: DISCHARGE PLANNING  Goal: Discharge to home or other facility with appropriate resources  Description: INTERVENTIONS:  - Identify barriers to discharge w/patient and caregiver  - Arrange for needed discharge resources and transportation as appropriate  - Identify discharge learning needs (meds, wound care, etc.)  - Arrange for interpretive services to assist at discharge as needed  - Refer to Case Management Department for coordinating discharge planning if the patient needs post-hospital services based on physician/advanced practitioner order or complex needs  related to functional status, cognitive ability, or social support system  Outcome: Progressing     Problem: Knowledge Deficit  Goal: Patient/family/caregiver demonstrates understanding of disease process, treatment plan, medications, and discharge instructions  Description: Complete learning assessment and assess knowledge base.  Interventions:  - Provide teaching at level of understanding  - Provide teaching via preferred learning methods  Outcome: Progressing     Problem: GASTROINTESTINAL - ADULT  Goal: Minimal or absence of nausea and/or vomiting  Description: INTERVENTIONS:  - Administer IV fluids if ordered to ensure adequate hydration  - Maintain NPO status until nausea and vomiting are resolved  - Nasogastric tube if ordered  - Administer ordered antiemetic medications as needed  - Provide nonpharmacologic comfort measures as appropriate  - Advance diet as tolerated, if ordered  - Consider nutrition services referral to assist patient with adequate nutrition and appropriate food choices  Outcome: Progressing  Goal: Maintains or returns to baseline bowel function  Description: INTERVENTIONS:  - Assess bowel function  - Encourage oral fluids to ensure adequate hydration  - Administer IV fluids if ordered to ensure adequate hydration  - Administer ordered medications as needed  - Encourage mobilization and activity  - Consider nutritional services referral to assist patient with adequate nutrition and appropriate food choices  Outcome: Progressing  Goal: Maintains adequate nutritional intake  Description: INTERVENTIONS:  - Monitor percentage of each meal consumed  - Identify factors contributing to decreased intake, treat as appropriate  - Assist with meals as needed  - Monitor I&O, weight, and lab values if indicated  - Obtain nutrition services referral as needed  Outcome: Progressing     Problem: SKIN/TISSUE INTEGRITY - ADULT  Goal: Incision(s), wounds(s) or drain site(s) healing without S/S of  infection  Description: INTERVENTIONS  - Assess and document dressing, incision, wound bed, drain sites and surrounding tissue  - Provide patient and family education

## 2024-11-10 LAB
ANION GAP SERPL CALCULATED.3IONS-SCNC: 9 MMOL/L (ref 4–13)
BASOPHILS # BLD AUTO: 0.02 THOUSANDS/ÂΜL (ref 0–0.1)
BASOPHILS NFR BLD AUTO: 0 % (ref 0–1)
BUN SERPL-MCNC: 7 MG/DL (ref 5–25)
CALCIUM SERPL-MCNC: 9.7 MG/DL (ref 8.4–10.2)
CHLORIDE SERPL-SCNC: 99 MMOL/L (ref 96–108)
CO2 SERPL-SCNC: 29 MMOL/L (ref 21–32)
CREAT SERPL-MCNC: 0.84 MG/DL (ref 0.6–1.3)
EOSINOPHIL # BLD AUTO: 0.05 THOUSAND/ÂΜL (ref 0–0.61)
EOSINOPHIL NFR BLD AUTO: 1 % (ref 0–6)
ERYTHROCYTE [DISTWIDTH] IN BLOOD BY AUTOMATED COUNT: 16.1 % (ref 11.6–15.1)
GFR SERPL CREATININE-BSD FRML MDRD: 95 ML/MIN/1.73SQ M
GLUCOSE SERPL-MCNC: 91 MG/DL (ref 65–140)
HCT VFR BLD AUTO: 42.1 % (ref 36.5–49.3)
HGB BLD-MCNC: 13.2 G/DL (ref 12–17)
IMM GRANULOCYTES # BLD AUTO: 0.02 THOUSAND/UL (ref 0–0.2)
IMM GRANULOCYTES NFR BLD AUTO: 0 % (ref 0–2)
LYMPHOCYTES # BLD AUTO: 2.2 THOUSANDS/ÂΜL (ref 0.6–4.47)
LYMPHOCYTES NFR BLD AUTO: 22 % (ref 14–44)
MCH RBC QN AUTO: 28.8 PG (ref 26.8–34.3)
MCHC RBC AUTO-ENTMCNC: 31.4 G/DL (ref 31.4–37.4)
MCV RBC AUTO: 92 FL (ref 82–98)
MONOCYTES # BLD AUTO: 0.68 THOUSAND/ÂΜL (ref 0.17–1.22)
MONOCYTES NFR BLD AUTO: 7 % (ref 4–12)
NEUTROPHILS # BLD AUTO: 6.92 THOUSANDS/ÂΜL (ref 1.85–7.62)
NEUTS SEG NFR BLD AUTO: 70 % (ref 43–75)
NRBC BLD AUTO-RTO: 0 /100 WBCS
PLATELET # BLD AUTO: 252 THOUSANDS/UL (ref 149–390)
PMV BLD AUTO: 8.8 FL (ref 8.9–12.7)
POTASSIUM SERPL-SCNC: 3.8 MMOL/L (ref 3.5–5.3)
RBC # BLD AUTO: 4.58 MILLION/UL (ref 3.88–5.62)
SODIUM SERPL-SCNC: 137 MMOL/L (ref 135–147)
WBC # BLD AUTO: 9.89 THOUSAND/UL (ref 4.31–10.16)

## 2024-11-10 PROCEDURE — 99024 POSTOP FOLLOW-UP VISIT: CPT | Performed by: PHYSICIAN ASSISTANT

## 2024-11-10 PROCEDURE — 80048 BASIC METABOLIC PNL TOTAL CA: CPT | Performed by: PHYSICIAN ASSISTANT

## 2024-11-10 PROCEDURE — 85025 COMPLETE CBC W/AUTO DIFF WBC: CPT | Performed by: PHYSICIAN ASSISTANT

## 2024-11-10 RX ADMIN — HYDROMORPHONE HYDROCHLORIDE 0.5 MG: 1 INJECTION, SOLUTION INTRAMUSCULAR; INTRAVENOUS; SUBCUTANEOUS at 21:12

## 2024-11-10 RX ADMIN — NICOTINE 1 PATCH: 14 PATCH, EXTENDED RELEASE TRANSDERMAL at 09:41

## 2024-11-10 RX ADMIN — AMLODIPINE BESYLATE 10 MG: 10 TABLET ORAL at 09:40

## 2024-11-10 RX ADMIN — OXYCODONE HYDROCHLORIDE 5 MG: 5 TABLET ORAL at 14:20

## 2024-11-10 RX ADMIN — DEXTROSE, SODIUM CHLORIDE, AND POTASSIUM CHLORIDE 85 ML/HR: 5; .45; .15 INJECTION INTRAVENOUS at 06:42

## 2024-11-10 RX ADMIN — HEPARIN SODIUM 5000 UNITS: 5000 INJECTION INTRAVENOUS; SUBCUTANEOUS at 14:19

## 2024-11-10 RX ADMIN — OXYCODONE HYDROCHLORIDE 5 MG: 5 TABLET ORAL at 04:44

## 2024-11-10 RX ADMIN — DULOXETINE HYDROCHLORIDE 20 MG: 20 CAPSULE, DELAYED RELEASE ORAL at 18:08

## 2024-11-10 RX ADMIN — HEPARIN SODIUM 5000 UNITS: 5000 INJECTION INTRAVENOUS; SUBCUTANEOUS at 06:43

## 2024-11-10 RX ADMIN — HEPARIN SODIUM 5000 UNITS: 5000 INJECTION INTRAVENOUS; SUBCUTANEOUS at 21:12

## 2024-11-10 RX ADMIN — GABAPENTIN 100 MG: 100 CAPSULE ORAL at 21:12

## 2024-11-10 RX ADMIN — MIRTAZAPINE 15 MG: 15 TABLET, FILM COATED ORAL at 21:12

## 2024-11-10 NOTE — ASSESSMENT & PLAN NOTE
Postoperative day #2 status post reversal of loop ileostomy.    Passing flatus.    AVSS.     -Advance to full liquid diet  -OOB and ambulate.   -PRN analgesics.   -Am labs: cbc, bmp.

## 2024-11-10 NOTE — PLAN OF CARE
Problem: PAIN - ADULT  Goal: Verbalizes/displays adequate comfort level or baseline comfort level  Description: Interventions:  - Encourage patient to monitor pain and request assistance  - Assess pain using appropriate pain scale  - Administer analgesics based on type and severity of pain and evaluate response  - Implement non-pharmacological measures as appropriate and evaluate response  - Consider cultural and social influences on pain and pain management  - Notify physician/advanced practitioner if interventions unsuccessful or patient reports new pain  Outcome: Progressing     Problem: INFECTION - ADULT  Goal: Absence or prevention of progression during hospitalization  Description: INTERVENTIONS:  - Assess and monitor for signs and symptoms of infection  - Monitor lab/diagnostic results  - Monitor all insertion sites, i.e. indwelling lines, tubes, and drains  - Monitor endotracheal if appropriate and nasal secretions for changes in amount and color  - [place] appropriate cooling/warming therapies per order  - Administer medications as ordered  - Instruct and encourage patient and family to use good hand hygiene technique  - Identify and instruct in appropriate isolation precautions for identified infection/condition  Outcome: Progressing  Goal: Absence of fever/infection during neutropenic period  Description: INTERVENTIONS:  - Monitor WBC    Outcome: Progressing     Problem: SAFETY ADULT  Goal: Patient will remain free of falls  Description: INTERVENTIONS:  - Educate patient/family on patient safety including physical limitations  - Instruct patient to call for assistance with activity   - Consult OT/PT to assist with strengthening/mobility   - Keep Call bell within reach  - Keep bed low and locked with side rails adjusted as appropriate  - Keep care items and personal belongings within reach  - Initiate and maintain comfort rounds  - Make Fall Risk Sign visible to staff  - Offer Toileting every 2 Hours,  in advance of need  - Obtain necessary fall risk management equipment: call bell in reach  - Apply yellow socks and bracelet for high fall risk patients  - Consider moving patient to room near nurses station  Outcome: Progressing  Goal: Maintain or return to baseline ADL function  Description: INTERVENTIONS:  -  Assess patient's ability to carry out ADLs; assess patient's baseline for ADL function and identify physical deficits which impact ability to perform ADLs (bathing, care of mouth/teeth, toileting, grooming, dressing, etc.)  - Assess/evaluate cause of self-care deficits   - Assess range of motion  - Assess patient's mobility; develop plan if impaired  - Assess patient's need for assistive devices and provide as appropriate  - Encourage maximum independence but intervene and supervise when necessary  - Involve family in performance of ADLs  - Assess for home care needs following discharge   - Consider OT consult to assist with ADL evaluation and planning for discharge  - Provide patient education as appropriate  Outcome: Progressing  Goal: Maintains/Returns to pre admission functional level  Description: INTERVENTIONS:  - Perform AM-PAC 6 Click Basic Mobility/ Daily Activity assessment daily.  - Set and communicate daily mobility goal to care team and patient/family/caregiver.   - Collaborate with rehabilitation services on mobility goals if consulted  - Stand patient 3 times a day  - Ambulate patient 3 times a day  - Out of bed to chair 3 times a day   - Out of bed for meals 3 times a day  - Out of bed for toileting  - Record patient progress and toleration of activity level   Outcome: Progressing     Problem: DISCHARGE PLANNING  Goal: Discharge to home or other facility with appropriate resources  Description: INTERVENTIONS:  - Identify barriers to discharge w/patient and caregiver  - Arrange for needed discharge resources and transportation as appropriate  - Identify discharge learning needs (meds, wound  care, etc.)  - Arrange for interpretive services to assist at discharge as needed  - Refer to Case Management Department for coordinating discharge planning if the patient needs post-hospital services based on physician/advanced practitioner order or complex needs related to functional status, cognitive ability, or social support system  Outcome: Progressing     Problem: Knowledge Deficit  Goal: Patient/family/caregiver demonstrates understanding of disease process, treatment plan, medications, and discharge instructions  Description: Complete learning assessment and assess knowledge base.  Interventions:  - Provide teaching at level of understanding  - Provide teaching via preferred learning methods  Outcome: Progressing     Problem: GASTROINTESTINAL - ADULT  Goal: Minimal or absence of nausea and/or vomiting  Description: INTERVENTIONS:  - Administer IV fluids if ordered to ensure adequate hydration  - Maintain NPO status until nausea and vomiting are resolved  - Nasogastric tube if ordered  - Administer ordered antiemetic medications as needed  - Provide nonpharmacologic comfort measures as appropriate  - Advance diet as tolerated, if ordered  - Consider nutrition services referral to assist patient with adequate nutrition and appropriate food choices  Outcome: Progressing  Goal: Maintains or returns to baseline bowel function  Description: INTERVENTIONS:  - Assess bowel function  - Encourage oral fluids to ensure adequate hydration  - Administer IV fluids if ordered to ensure adequate hydration  - Administer ordered medications as needed  - Encourage mobilization and activity  - Consider nutritional services referral to assist patient with adequate nutrition and appropriate food choices  Outcome: Progressing  Goal: Maintains adequate nutritional intake  Description: INTERVENTIONS:  - Monitor percentage of each meal consumed  - Identify factors contributing to decreased intake, treat as appropriate  - Assist with  meals as needed  - Monitor I&O, weight, and lab values if indicated  - Obtain nutrition services referral as needed  Outcome: Progressing     Problem: SKIN/TISSUE INTEGRITY - ADULT  Goal: Incision(s), wounds(s) or drain site(s) healing without S/S of infection  Description: INTERVENTIONS  - Assess and document dressing, incision, wound bed, drain sites and surrounding tissue  - Provide patient and family education  - Perform skin care/dressing changes every q day or when soiled  Outcome: Progressing

## 2024-11-10 NOTE — PROGRESS NOTES
Progress Note - Surgery-General   Name: Homero Driver Jr. 59 y.o. male I MRN: [id number]  Unit/Bed#: -01 I Date of Admission: 11/8/2024   Date of Service: 11/10/2024 I Hospital Day: 2     Assessment & Plan  Status post closure of ileostomy  Postoperative day #2 status post reversal of loop ileostomy.    Passing flatus.    AVSS.     -Advance to full liquid diet  -OOB and ambulate.   -PRN analgesics.   -Am labs: cbc, bmp.     Anxiety    HTN (hypertension)          Subjective   Patient was seen examined bedside.  Patient reports no acute changes to the night.  Patient reports abdominal pain is well-controlled.  Patient is tolerating clear liquid diet with no difficulties.  Patient is anxious to start regular food.    Objective :  Temp:  [97.5 °F (36.4 °C)-98.9 °F (37.2 °C)] 97.5 °F (36.4 °C)  HR:  [69-79] 71  BP: (119-138)/(59-81) 128/59  Resp:  [16-20] 20  SpO2:  [98 %-99 %] 99 %  O2 Device: None (Room air)    I/O         11/08 0701  11/09 0700 11/09 0701  11/10 0700 11/10 0701  11/11 0700    P.O. 480      I.V. (mL/kg) 2340 (28)      IV Piggyback 100      Total Intake(mL/kg) 2920 (35)      Urine (mL/kg/hr) 250 (0.1)      Stool 0      Total Output 250      Net +2670             Unmeasured Urine Occurrence 2 x      Unmeasured Stool Occurrence 1 x              Physical Exam  Vitals and nursing note reviewed.   Constitutional:       Appearance: He is well-developed.   HENT:      Nose: Nose normal.   Eyes:      Extraocular Movements: Extraocular movements intact.   Cardiovascular:      Rate and Rhythm: Normal rate and regular rhythm.      Heart sounds: Normal heart sounds, S1 normal and S2 normal. No murmur heard.  Pulmonary:      Effort: Pulmonary effort is normal. No respiratory distress.      Breath sounds: Normal breath sounds. No wheezing or rales.   Chest:      Chest wall: No tenderness.   Abdominal:      General: There is no distension.      Palpations: Abdomen is soft.      Tenderness: There is no guarding or  rebound.   Musculoskeletal:      Cervical back: Normal range of motion. No edema.   Lymphadenopathy:      Cervical: No cervical adenopathy.   Skin:     General: Skin is warm and dry.      Capillary Refill: Capillary refill takes less than 2 seconds.      Findings: No rash.   Neurological:      Mental Status: He is alert and oriented to person, place, and time.   Psychiatric:         Speech: Speech normal.         Behavior: Behavior normal.           Lab Results: I have reviewed the following results:  Recent Labs     11/09/24  0447   WBC 17.01*   HGB 12.6   HCT 39.7      SODIUM 139   K 4.1      CO2 27   BUN 8   CREATININE 0.84   GLUC 86   AST 19   ALT 11   ALB 3.9   TBILI 0.51   ALKPHOS 58           VTE Pharmacologic Prophylaxis: Heparin  VTE Mechanical Prophylaxis: sequential compression device

## 2024-11-11 VITALS
HEART RATE: 72 BPM | HEIGHT: 72 IN | DIASTOLIC BLOOD PRESSURE: 87 MMHG | TEMPERATURE: 97.8 F | SYSTOLIC BLOOD PRESSURE: 138 MMHG | BODY MASS INDEX: 24.92 KG/M2 | RESPIRATION RATE: 20 BRPM | WEIGHT: 184 LBS | OXYGEN SATURATION: 98 %

## 2024-11-11 LAB
ANION GAP SERPL CALCULATED.3IONS-SCNC: 7 MMOL/L (ref 4–13)
BUN SERPL-MCNC: 8 MG/DL (ref 5–25)
CALCIUM SERPL-MCNC: 9.5 MG/DL (ref 8.4–10.2)
CHLORIDE SERPL-SCNC: 99 MMOL/L (ref 96–108)
CO2 SERPL-SCNC: 32 MMOL/L (ref 21–32)
CREAT SERPL-MCNC: 0.93 MG/DL (ref 0.6–1.3)
ERYTHROCYTE [DISTWIDTH] IN BLOOD BY AUTOMATED COUNT: 15.9 % (ref 11.6–15.1)
GFR SERPL CREATININE-BSD FRML MDRD: 89 ML/MIN/1.73SQ M
GLUCOSE SERPL-MCNC: 94 MG/DL (ref 65–140)
HCT VFR BLD AUTO: 41.5 % (ref 36.5–49.3)
HGB BLD-MCNC: 12.8 G/DL (ref 12–17)
MCH RBC QN AUTO: 29 PG (ref 26.8–34.3)
MCHC RBC AUTO-ENTMCNC: 30.8 G/DL (ref 31.4–37.4)
MCV RBC AUTO: 94 FL (ref 82–98)
PLATELET # BLD AUTO: 276 THOUSANDS/UL (ref 149–390)
PMV BLD AUTO: 9.5 FL (ref 8.9–12.7)
POTASSIUM SERPL-SCNC: 4 MMOL/L (ref 3.5–5.3)
RBC # BLD AUTO: 4.42 MILLION/UL (ref 3.88–5.62)
SODIUM SERPL-SCNC: 138 MMOL/L (ref 135–147)
WBC # BLD AUTO: 9.12 THOUSAND/UL (ref 4.31–10.16)

## 2024-11-11 PROCEDURE — 99024 POSTOP FOLLOW-UP VISIT: CPT | Performed by: PHYSICIAN ASSISTANT

## 2024-11-11 PROCEDURE — NC001 PR NO CHARGE: Performed by: PHYSICIAN ASSISTANT

## 2024-11-11 PROCEDURE — 99231 SBSQ HOSP IP/OBS SF/LOW 25: CPT | Performed by: PHYSICIAN ASSISTANT

## 2024-11-11 PROCEDURE — 85027 COMPLETE CBC AUTOMATED: CPT | Performed by: PHYSICIAN ASSISTANT

## 2024-11-11 PROCEDURE — 80048 BASIC METABOLIC PNL TOTAL CA: CPT | Performed by: PHYSICIAN ASSISTANT

## 2024-11-11 RX ORDER — OXYCODONE AND ACETAMINOPHEN 5; 325 MG/1; MG/1
1 TABLET ORAL EVERY 4 HOURS PRN
Qty: 12 TABLET | Refills: 0 | Status: SHIPPED | OUTPATIENT
Start: 2024-11-11 | End: 2024-11-21

## 2024-11-11 RX ADMIN — OXYCODONE HYDROCHLORIDE 10 MG: 10 TABLET ORAL at 05:46

## 2024-11-11 RX ADMIN — NICOTINE 1 PATCH: 14 PATCH, EXTENDED RELEASE TRANSDERMAL at 08:26

## 2024-11-11 RX ADMIN — HEPARIN SODIUM 5000 UNITS: 5000 INJECTION INTRAVENOUS; SUBCUTANEOUS at 05:45

## 2024-11-11 RX ADMIN — AMLODIPINE BESYLATE 10 MG: 10 TABLET ORAL at 08:25

## 2024-11-11 NOTE — PROGRESS NOTES
Progress Note - Surgery-General   Name: Homero Driver Jr. 59 y.o. male I MRN: [id number]  Unit/Bed#: -Geni I Date of Admission: 11/8/2024   Date of Service: 11/11/2024 I Hospital Day: 3     Assessment & Plan  Status post closure of ileostomy  POD#2 s/p reversal of loop ileostomy  Passing flatus, had numerous loose BMs  AFVSS  Labs WNL    -Advance to regular diet  -OOB and ambulate.   -PRN analgesics.   -Anticipate discharge later today        Subjective   Pt doing great. Hoping for discharge today.       Objective :  Temp:  [97.8 °F (36.6 °C)-99 °F (37.2 °C)] 97.8 °F (36.6 °C)  HR:  [72-81] 72  BP: (138-145)/(80-87) 138/87  Resp:  [20] 20  SpO2:  [98 %-100 %] 98 %  O2 Device: None (Room air)    I/O         11/09 0701  11/10 0700 11/10 0701 11/11 0700 11/11 0701  11/12 0700    P.O.       I.V. (mL/kg)       IV Piggyback       Total Intake(mL/kg)       Urine (mL/kg/hr)       Stool       Total Output       Net              Unmeasured Stool Occurrence  1 x             Physical Exam  Vitals and nursing note reviewed.   Constitutional:       General: He is not in acute distress.     Appearance: He is not toxic-appearing.   HENT:      Head: Normocephalic and atraumatic.   Eyes:      Extraocular Movements: Extraocular movements intact.   Pulmonary:      Effort: Pulmonary effort is normal. No respiratory distress.   Abdominal:      General: Bowel sounds are normal. There is no distension.      Palpations: Abdomen is soft.      Tenderness: There is no abdominal tenderness. There is no guarding.      Comments: Prior ostomy site CDI with sutures and surgical glue intact. Two small portions of midline incision that were explored intra-op appear healthy with minimal serous drainage.   Musculoskeletal:         General: Normal range of motion.      Cervical back: Normal range of motion.   Skin:     General: Skin is warm and dry.   Neurological:      Mental Status: He is alert and oriented to person, place, and time.    Psychiatric:         Mood and Affect: Mood normal.         Behavior: Behavior normal.         Thought Content: Thought content normal.           Lab Results: I have reviewed the following results:  Recent Labs     11/09/24  0447 11/10/24  0943 11/11/24  0523   WBC 17.01*   < > 9.12   HGB 12.6   < > 12.8   HCT 39.7   < > 41.5      < > 276   SODIUM 139   < > 138   K 4.1   < > 4.0      < > 99   CO2 27   < > 32   BUN 8   < > 8   CREATININE 0.84   < > 0.93   GLUC 86   < > 94   AST 19  --   --    ALT 11  --   --    ALB 3.9  --   --    TBILI 0.51  --   --    ALKPHOS 58  --   --     < > = values in this interval not displayed.       Imaging Results Review: No pertinent imaging studies reviewed.  Other Study Results Review: No additional pertinent studies reviewed.    VTE Pharmacologic Prophylaxis: VTE covered by:  heparin (porcine), Subcutaneous, 5,000 Units at 11/11/24 0527     VTE Mechanical Prophylaxis: sequential compression device

## 2024-11-11 NOTE — DISCHARGE INSTR - AVS FIRST PAGE
Postoperative Care Instructions      1. General: You may feel pulling sensations around the wound or funny aches and pains around the incisions. This is normal. Even minor surgery is a change in your body and this is your body's reaction to it. If you have had abdominal surgery, it may help to support the incision with a small pillow or blanket for comfort when moving or coughing.    2. Wound care:  Keep open areas of the midline incision covered with a clean dry dressing until they heal. Change dressing daily or as needed for soilage. The glue over the incisions will fall off over the next week or two. If you have staples or stitches, they will be removed by the physician at your follow up appointment.    3. Showering: You may shower. Please do not soak wound in standing water such as a bath, hot tub, pool, lake, etc. Do not scrub or use exfoliants on the surgical wounds.    4. Activity: You may go up and down stairs, walk as much as you are comfortable, but walk at least 3 times each day. If you have had abdominal surgery, do not perform any strenuous exercise or lift anything heavier than 15 pounds for at least 4 weeks, unless cleared by your physician.    5. Diet: You may resume your regular diet. Please drink lots of water.    6. Medications: Resume all of your previous medications, unless told otherwise by the doctor.  A good option for pain control is to start with acetaminophen (Tylenol) 650mg and ibuprofen (Advil) 600mg and alternate taking them every 3 hours.  If this is not sufficient, you make take the narcotic pain medicine as prescribed. You do not need to take the narcotic pain medication unless you are having significant pain and discomfort. Please take the narcotic medication with food. Ensure that you do not take more than 4000 mg of Tylenol per day.     7. Driving: You will need someone to drive you home on the day of surgery. Do not drive or make any important decisions while on narcotic pain  medication. Generally, you may drive 48 hours after you've stopped taking all narcotic pain medications. Generally, you may drive when you are off all narcotic pain medications, and you can turn in your seat comfortably to check your blind spot and area able to slam on the brakes while driving if needed.     8. Upset Stomach: You may take Maalox, Tums, or similar items for an upset stomach. If your narcotic pain medication causes an upset stomach, do not take it on an empty stomach. Try taking it with at least some crackers or toast.     9. Constipation: Patients often experience constipation after surgery. We recommend starting an over-the-counter medication for this, such as Metamucil, Senokot, Colace, milk of magnesia, etc. You may stop taking these medications a couple days after your last dose of narcotic medication. If you experience significant nausea or vomiting after abdominal surgery, call the office before trying any of these medications.     10. Call the office: If you are experiencing any of the following: fevers above 101.5°, significant nausea or vomiting, if the wound develops drainage and/or excessive redness around the wound, or if you have significant diarrhea or other worsening symptoms.    11. Pain: A prescription for narcotic pain medication will be sent to your pharmacy upon discharge from the hospital.

## 2024-11-11 NOTE — ANESTHESIA POSTPROCEDURE EVALUATION
Post-Op Assessment Note    CV Status:  Stable  Pain Score: 2    Pain management: adequate       Mental Status:  Alert and awake   Hydration Status:  Euvolemic   PONV Controlled:  Controlled   Airway Patency:  Patent  Two or more mitigation strategies used for obstructive sleep apnea   Post Op Vitals Reviewed: Yes    No anethesia notable event occurred.    Staff: Anesthesiologist           Last Filed PACU Vitals:  Vitals Value Taken Time   Temp 97.9 °F (36.6 °C) 11/08/24 0935   Pulse 54 11/08/24 0943   /82 11/08/24 0942   Resp 17 11/08/24 0943   SpO2 98 % 11/08/24 0943   Vitals shown include unfiled device data.    Modified Jeanna:  No data recorded

## 2024-11-11 NOTE — PROGRESS NOTES
Progress Note - Hospitalist   Name: Homero Driver Jr. 59 y.o. male I MRN: [id number]  Unit/Bed#: -01 I Date of Admission: 11/8/2024   Date of Service: 11/11/2024 I Hospital Day: 3    Assessment & Plan  Status post closure of ileostomy  Patient sp closure of ileostomy  Management per surgery  Stable from medical standpoint for discharge  Anxiety  Mood stable  Continue home med regimen  HTN (hypertension)  Continue home amlodpine  BP acceptable      Mobility:   Basic Mobility Inpatient Raw Score: 23  JH-HLM Goal: 7: Walk 25 feet or more  JH-HLM Achieved: 7: Walk 25 feet or more  JH-HLM Goal achieved. Continue to encourage appropriate mobility.    Patient Centered Rounds: I performed bedside rounds with nursing staff today.       Current Length of Stay: 3 day(s)  Current Patient Status: Inpatient     Discharge Plan: [facility] is following this patient on consult. They are medically stable for discharge when deemed appropriate by primary service.    Code Status: Level 1 - Full Code    Subjective   Patient feels well. Pain controlled. Appetite good. He is hoping to be discharged home today     Objective :  Temp:  [97.8 °F (36.6 °C)-99 °F (37.2 °C)] 97.8 °F (36.6 °C)  HR:  [72-81] 72  BP: (138-145)/(80-87) 138/87  Resp:  [20] 20  SpO2:  [98 %-100 %] 98 %  O2 Device: None (Room air)    Body mass index is 24.95 kg/m².     Input and Output Summary (last 24 hours):   No intake or output data in the 24 hours ending 11/11/24 1030    Physical Exam  Vitals reviewed.   Constitutional:       General: He is not in acute distress.     Appearance: He is not ill-appearing or diaphoretic.   HENT:      Head: Normocephalic and atraumatic.      Nose: Nose normal.      Mouth/Throat:      Pharynx: Oropharynx is clear.   Cardiovascular:      Rate and Rhythm: Normal rate.   Pulmonary:      Effort: Pulmonary effort is normal. No respiratory distress.   Abdominal:      General: Bowel sounds are normal.      Palpations: Abdomen is soft.    Musculoskeletal:         General: No deformity or signs of injury.   Skin:     General: Skin is warm and dry.   Neurological:      Mental Status: He is alert and oriented to person, place, and time.                     Lab Results: I have reviewed the following results:   Results from last 7 days   Lab Units 11/11/24  0523 11/10/24  0943   WBC Thousand/uL 9.12 9.89   HEMOGLOBIN g/dL 12.8 13.2   HEMATOCRIT % 41.5 42.1   PLATELETS Thousands/uL 276 252   SEGS PCT %  --  70   LYMPHO PCT %  --  22   MONO PCT %  --  7   EOS PCT %  --  1     Results from last 7 days   Lab Units 11/11/24  0523 11/10/24  0943 11/09/24  0447   SODIUM mmol/L 138   < > 139   POTASSIUM mmol/L 4.0   < > 4.1   CHLORIDE mmol/L 99   < > 102   CO2 mmol/L 32   < > 27   BUN mg/dL 8   < > 8   CREATININE mg/dL 0.93   < > 0.84   ANION GAP mmol/L 7   < > 10   CALCIUM mg/dL 9.5   < > 9.5   ALBUMIN g/dL  --   --  3.9   TOTAL BILIRUBIN mg/dL  --   --  0.51   ALK PHOS U/L  --   --  58   ALT U/L  --   --  11   AST U/L  --   --  19   GLUCOSE RANDOM mg/dL 94   < > 86    < > = values in this interval not displayed.                         Last 24 Hours Medication List:     Current Facility-Administered Medications:     acetaminophen (TYLENOL) tablet 650 mg, Q6H PRN    albuterol (PROVENTIL HFA,VENTOLIN HFA) inhaler 1 puff, Q6H PRN    ALPRAZolam (XANAX) tablet 0.5 mg, TID PRN    amLODIPine (NORVASC) tablet 10 mg, Daily    DULoxetine (CYMBALTA) delayed release capsule 20 mg, QPM    gabapentin (NEURONTIN) capsule 100 mg, HS    heparin (porcine) subcutaneous injection 5,000 Units, Q8H BERYL    HYDROmorphone (DILAUDID) injection 0.5 mg, Q4H PRN    mirtazapine (REMERON) tablet 15 mg, HS    nicotine (NICODERM CQ) 14 mg/24hr TD 24 hr patch 1 patch, Daily    ondansetron (ZOFRAN) injection 4 mg, Q6H PRN    oxyCODONE (ROXICODONE) immediate release tablet 10 mg, Q6H PRN    oxyCODONE (ROXICODONE) IR tablet 5 mg, Q6H PRN    Administrative Statements   Today, Patient Was Seen By:  Almita Wynn PA-C  I have spent a total time of 20 minutes in caring for this patient on the day of the visit/encounter including Impressions, Counseling / Coordination of care, Documenting in the medical record, Reviewing / ordering tests, medicine, procedures  , Obtaining or reviewing history  , and Communicating with other healthcare professionals .    **Please Note: This note may have been constructed using a voice recognition system.**

## 2024-11-11 NOTE — PLAN OF CARE
Problem: PAIN - ADULT  Goal: Verbalizes/displays adequate comfort level or baseline comfort level  Description: Interventions:  - Encourage patient to monitor pain and request assistance  - Assess pain using appropriate pain scale  - Administer analgesics based on type and severity of pain and evaluate response  - Implement non-pharmacological measures as appropriate and evaluate response  - Consider cultural and social influences on pain and pain management  - Notify physician/advanced practitioner if interventions unsuccessful or patient reports new pain  Outcome: Progressing     Problem: INFECTION - ADULT  Goal: Absence or prevention of progression during hospitalization  Description: INTERVENTIONS:  - Assess and monitor for signs and symptoms of infection  - Monitor lab/diagnostic results  - Monitor all insertion sites, i.e. indwelling lines, tubes, and drains  - Monitor endotracheal if appropriate and nasal secretions for changes in amount and color  - Swisher appropriate cooling/warming therapies per order  - Administer medications as ordered  - Instruct and encourage patient and family to use good hand hygiene technique  - Identify and instruct in appropriate isolation precautions for identified infection/condition  Outcome: Progressing  Goal: Absence of fever/infection during neutropenic period  Description: INTERVENTIONS:  - Monitor WBC    Outcome: Progressing     Problem: SAFETY ADULT  Goal: Patient will remain free of falls  Description: INTERVENTIONS:  - Educate patient/family on patient safety including physical limitations  - Instruct patient to call for assistance with activity   - Consult OT/PT to assist with strengthening/mobility   - Keep Call bell within reach  - Keep bed low and locked with side rails adjusted as appropriate  - Keep care items and personal belongings within reach  - Initiate and maintain comfort rounds  - Make Fall Risk Sign visible to staff  - Offer Toileting every 2 Hours,  in advance of need  - Apply yellow socks and bracelet for high fall risk patients  - Consider moving patient to room near nurses station  Outcome: Progressing  Goal: Maintain or return to baseline ADL function  Description: INTERVENTIONS:  -  Assess patient's ability to carry out ADLs; assess patient's baseline for ADL function and identify physical deficits which impact ability to perform ADLs (bathing, care of mouth/teeth, toileting, grooming, dressing, etc.)  - Assess/evaluate cause of self-care deficits   - Assess range of motion  - Assess patient's mobility; develop plan if impaired  - Assess patient's need for assistive devices and provide as appropriate  - Encourage maximum independence but intervene and supervise when necessary  - Involve family in performance of ADLs  - Assess for home care needs following discharge   - Consider OT consult to assist with ADL evaluation and planning for discharge  - Provide patient education as appropriate  Outcome: Progressing  Goal: Maintains/Returns to pre admission functional level  Description: INTERVENTIONS:  - Perform AM-PAC 6 Click Basic Mobility/ Daily Activity assessment daily.  - Set and communicate daily mobility goal to care team and patient/family/caregiver.   - Collaborate with rehabilitation services on mobility goals if consulted  - Ambulate patient 3 times a day  - Out of bed to chair 3 times a day   - Out of bed for meals 3 times a day  - Out of bed for toileting  - Record patient progress and toleration of activity level   Outcome: Progressing     Problem: DISCHARGE PLANNING  Goal: Discharge to home or other facility with appropriate resources  Description: INTERVENTIONS:  - Identify barriers to discharge w/patient and caregiver  - Arrange for needed discharge resources and transportation as appropriate  - Identify discharge learning needs (meds, wound care, etc.)  - Arrange for interpretive services to assist at discharge as needed  - Refer to Case  Management Department for coordinating discharge planning if the patient needs post-hospital services based on physician/advanced practitioner order or complex needs related to functional status, cognitive ability, or social support system  Outcome: Progressing     Problem: Knowledge Deficit  Goal: Patient/family/caregiver demonstrates understanding of disease process, treatment plan, medications, and discharge instructions  Description: Complete learning assessment and assess knowledge base.  Interventions:  - Provide teaching at level of understanding  - Provide teaching via preferred learning methods  Outcome: Progressing     Problem: GASTROINTESTINAL - ADULT  Goal: Minimal or absence of nausea and/or vomiting  Description: INTERVENTIONS:  - Administer IV fluids if ordered to ensure adequate hydration  - Maintain NPO status until nausea and vomiting are resolved  - Nasogastric tube if ordered  - Administer ordered antiemetic medications as needed  - Provide nonpharmacologic comfort measures as appropriate  - Advance diet as tolerated, if ordered  - Consider nutrition services referral to assist patient with adequate nutrition and appropriate food choices  Outcome: Progressing  Goal: Maintains or returns to baseline bowel function  Description: INTERVENTIONS:  - Assess bowel function  - Encourage oral fluids to ensure adequate hydration  - Administer IV fluids if ordered to ensure adequate hydration  - Administer ordered medications as needed  - Encourage mobilization and activity  - Consider nutritional services referral to assist patient with adequate nutrition and appropriate food choices  Outcome: Progressing  Goal: Maintains adequate nutritional intake  Description: INTERVENTIONS:  - Monitor percentage of each meal consumed  - Identify factors contributing to decreased intake, treat as appropriate  - Assist with meals as needed  - Monitor I&O, weight, and lab values if indicated  - Obtain nutrition services  referral as needed  Outcome: Progressing     Problem: SKIN/TISSUE INTEGRITY - ADULT  Goal: Incision(s), wounds(s) or drain site(s) healing without S/S of infection  Description: INTERVENTIONS  - Assess and document dressing, incision, wound bed, drain sites and surrounding tissue  - Provide patient and family education  - Perform skin care/dressing changes every q shift/soil  Outcome: Progressing

## 2024-11-11 NOTE — DISCHARGE SUMMARY
"Discharge Summary - Surgery-General   Name: Homero Driver Jr. 59 y.o. male I MRN: [id number]  Unit/Bed#: -01 I Date of Admission: 11/8/2024   Date of Service: 11/11/2024 I Hospital Day: 3        Admitting Diagnosis: Ileostomy status ([facility]) [Z93.2]    Discharge Diagnosis: s/p ileostomy status    Attending and Service: Adelso Abernathy    Consulting Physician(s): internal medicine    Procedures Performed: REVERSAL OF LOOP ILEOSTOMY     Imaging:  No results found.      Hospital Course:   Homero Driver Jr. is a 59 y.o. male who presented 11/8/2024 for planned reversal of loop ileostomy. The patient was taken to the operating room on 11/8/24. Intraoperative findings included: \"Patient had paucity of adhesions under the ileostomy, hence we were able to deliver the ileum for a stapled anastomosis.  We also found a suture granuloma in his midline incision which was the PDS knot.\" The patient's hospital course was uncomplicated.    Patient was discharged on POD#3. On the day of discharge, the patient was voiding spontaneously, ambulating at baseline, tolerating a regular diet, and pain was well controlled. The patient understood all instructions for discharge. He was also given the names and numbers of the providers as well as instructions for follow up appointments.          Condition at Discharge: fair     Discharge instructions/Information to patient and family:   See after visit summary for information provided to patient and family.      Provisions for Follow-Up Care:  See after visit summary for information related to follow-up care and any pertinent home health orders.      Disposition: Home    Planned Readmission: No    Discharge Statement   I spent 30 minutes discharging the patient. This time was spent on the day of discharge. I had direct contact with the patient on the day of discharge. Additional documentation is required if more than 30 minutes were spent on discharge.     Discharge Medications:  See " after visit summary for reconciled discharge medications provided to patient and family.    Brissa Rowell PA-C  11/11/2024

## 2024-11-11 NOTE — ASSESSMENT & PLAN NOTE
POD#2 s/p reversal of loop ileostomy  Passing flatus, had numerous loose BMs  AFVSS  Labs WNL    -Advance to regular diet  -OOB and ambulate.   -PRN analgesics.   -Anticipate discharge later today

## 2024-11-11 NOTE — ASSESSMENT & PLAN NOTE
Patient sp closure of ileostomy  Management per surgery  Stable from medical standpoint for discharge

## 2024-11-12 NOTE — UTILIZATION REVIEW
NOTIFICATION OF ADMISSION DISCHARGE   This is a Notification of Discharge from [facility]. Please be advised that this patient has been discharge from our facility. Below you will find the admission and discharge date and time including the patient’s disposition.   UTILIZATION REVIEW CONTACT:  Zenobia Boyle  Utilization   Network Utilization Review Department  Phone: [phone] x carefully listen to the prompts. All voicemails are confidential.  Email: [email or web address]     ADMISSION INFORMATION  PRESENTATION DATE: 11/8/2024  6:09 AM  OBERVATION ADMISSION DATE: N/A  INPATIENT ADMISSION DATE: 11/8/24  7:15 AM   DISCHARGE DATE: 11/11/2024  1:03 PM   DISPOSITION:Home/Self Care    Network Utilization Review Department  ATTENTION: Please call with any questions or concerns to [phone] and carefully listen to the prompts so that you are directed to the right person. All voicemails are confidential.   For Discharge needs, contact Care Management DC Support Team at [phone] opt. 2  Send all requests for admission clinical reviews, approved or denied determinations and any other requests to dedicated fax number below belonging to the campus where the patient is receiving treatment. List of dedicated fax numbers for the Facilities:  FACILITY NAME UR FAX NUMBER   ADMISSION DENIALS (Administrative/Medical Necessity) [phone]   DISCHARGE SUPPORT TEAM ([facility]) [phone]   PARENT CHILD HEALTH (Maternity/NICU/Pediatrics) [phone]   [facility] [phone]   [facility] [phone]   [facility] [phone]   [facility] [phone]   [facility] [phone]   [facility] [phone]   [facility] [phone]   [facility]  [phone]   [facility] [phone]   [facility] [phone]   [facility] [phone]   [facility] [phone]

## 2024-11-13 PROCEDURE — 88304 TISSUE EXAM BY PATHOLOGIST: CPT | Performed by: STUDENT IN AN ORGANIZED HEALTH CARE EDUCATION/TRAINING PROGRAM

## 2024-11-25 ENCOUNTER — OFFICE VISIT (OUTPATIENT)
Dept: SURGERY | Facility: CLINIC | Age: 59
End: 2024-11-25

## 2024-11-25 VITALS
WEIGHT: 187.6 LBS | BODY MASS INDEX: 25.41 KG/M2 | DIASTOLIC BLOOD PRESSURE: 70 MMHG | TEMPERATURE: 97.3 F | OXYGEN SATURATION: 98 % | HEART RATE: 87 BPM | SYSTOLIC BLOOD PRESSURE: 118 MMHG | HEIGHT: 72 IN

## 2024-11-25 DIAGNOSIS — Z98.890 STATUS POST CLOSURE OF ILEOSTOMY: Primary | ICD-10-CM

## 2024-11-25 PROCEDURE — 99024 POSTOP FOLLOW-UP VISIT: CPT | Performed by: SURGERY

## 2024-11-25 NOTE — PATIENT INSTRUCTIONS
Patient can shower daily.  Change the midline gauze dressing until he notices discharge.  Make follow-up appointment if there are further problems with the incision.

## 2024-11-25 NOTE — PROGRESS NOTES
Assessment/Plan:     Diagnoses and all orders for this visit:    Status post closure of ileostomy     All sutures removed    Patient has suture granuloma in his midline laparotomy incision.  It appears that he is reacting to the PDS sutures.  Will see as needed for this problem.    Patient's next surveillance colonoscopy is due in July 2026    Subjective:      Patient ID: Homero Driver Jr. is a 59 y.o. male.    HPI  Patient is 2-1/2 weeks status post reversal of his loop ileostomy.  He recovered well from that surgery.    Patient is 4 months since he underwent a sigmoid colectomy with concomitant cholecystectomy for complicated diverticular disease.  The laparotomy incision had healed well but patient developed suture granulomas which are extremely painful.  During his last surgery I explored the sites.  I removed a PDS knot from the center of the incision.  Patient still has problems at a second site in his incision which is painful.    Patient has recovered well from the ileostomy reversal and is having normal bowel movements.    The following portions of the patient's history were reviewed and updated as appropriate: allergies, current medications, past family history, past medical history, past social history, past surgical history, and problem list.    Review of Systems    Patient has pain from cervical stenosis and is awaiting orthopedic appointment    Objective:    /70 (BP Location: Left arm, Patient Position: Sitting, Cuff Size: Standard)   Pulse 87   Temp (!) 97.3 °F (36.3 °C) (Tympanic)   Ht 6' (1.829 m)   Wt 85.1 kg (187 lb 9.6 oz)   SpO2 98%   BMI 25.44 kg/m²      Physical Exam    The right lateral quadrant ileostomy incision is healed and sutures were removed.    I explored the midline suture granuloma with local anesthesia.  I used 10 mL of 1% lidocaine with 1 in 200,000 epinephrine.  There was a small pus pocket which was unroofed.  I probed the depth of the incision.  After cleaning a  gauze dressing was applied.

## 2024-11-26 ENCOUNTER — TELEPHONE (OUTPATIENT)
Dept: SURGERY | Facility: CLINIC | Age: 59
End: 2024-11-26

## 2024-11-26 NOTE — TELEPHONE ENCOUNTER
Patient called the RX Refill Line. Message is being forwarded to the office.     Patient is requesting a rx refill on oxycodone 10mg due to the 5mg didn't work to well and he is still having some pain.    Please contact patient at [phone]    Pharmacy: [id number] - [place] - [address]

## 2024-11-29 DIAGNOSIS — K57.20 DIVERTICULITIS OF LARGE INTESTINE WITH ABSCESS WITHOUT BLEEDING: Primary | ICD-10-CM

## 2024-11-29 RX ORDER — OXYCODONE HYDROCHLORIDE 10 MG/1
10 TABLET ORAL EVERY 4 HOURS PRN
Qty: 25 TABLET | Refills: 0 | Status: SHIPPED | OUTPATIENT
Start: 2024-11-29

## 2024-12-11 ENCOUNTER — TELEPHONE (OUTPATIENT)
Age: 59
End: 2024-12-11

## 2024-12-11 NOTE — TELEPHONE ENCOUNTER
Patient of Dr Abernathy s/p REVERSAL OF ILEOSTOMY on 11/8/24.  Patient calling about his incision area. He stated that 2 areas near his bellybutton have become inflamed and draining. He stated they came to a head and began to drain pus. He noticed this starting roughly 2 days ago.  He is calling to be seen by Dr Abernathy.    Warm transferred to office to schedule appointment to be seen tomorrow.  #[phone]

## 2024-12-12 ENCOUNTER — OFFICE VISIT (OUTPATIENT)
Dept: SURGERY | Facility: CLINIC | Age: 59
End: 2024-12-12

## 2024-12-12 VITALS
OXYGEN SATURATION: 97 % | WEIGHT: 183 LBS | HEIGHT: 72 IN | SYSTOLIC BLOOD PRESSURE: 126 MMHG | TEMPERATURE: 96.4 F | HEART RATE: 82 BPM | BODY MASS INDEX: 24.79 KG/M2 | DIASTOLIC BLOOD PRESSURE: 80 MMHG

## 2024-12-12 DIAGNOSIS — K57.20 DIVERTICULITIS OF LARGE INTESTINE WITH ABSCESS WITHOUT BLEEDING: ICD-10-CM

## 2024-12-12 DIAGNOSIS — Z98.890 STATUS POST CLOSURE OF ILEOSTOMY: Primary | ICD-10-CM

## 2024-12-12 DIAGNOSIS — T81.89XD SUTURE GRANULOMA, SUBSEQUENT ENCOUNTER: ICD-10-CM

## 2024-12-12 PROBLEM — L98.8 DRAINING CUTANEOUS SINUS TRACT: Status: RESOLVED | Noted: 2024-10-16 | Resolved: 2024-12-12

## 2024-12-12 PROCEDURE — 99024 POSTOP FOLLOW-UP VISIT: CPT | Performed by: SURGERY

## 2024-12-12 RX ORDER — MELOXICAM 15 MG/1
TABLET ORAL
COMMUNITY
Start: 2024-12-03

## 2024-12-12 RX ORDER — OXYCODONE HYDROCHLORIDE 10 MG/1
10 TABLET ORAL EVERY 4 HOURS PRN
Qty: 25 TABLET | Refills: 0 | Status: SHIPPED | OUTPATIENT
Start: 2024-12-12 | End: 2024-12-19 | Stop reason: SDUPTHER

## 2024-12-12 RX ORDER — OXYCODONE HYDROCHLORIDE 10 MG/1
10 TABLET ORAL EVERY 4 HOURS PRN
Qty: 25 TABLET | Refills: 0 | Status: CANCELLED | OUTPATIENT
Start: 2024-12-12

## 2024-12-12 NOTE — PROGRESS NOTES
Assessment/Plan:     Diagnoses and all orders for this visit:    Status post closure of ileostomy    Suture granuloma, subsequent encounter    Diverticulitis of large intestine with abscess without bleeding  -     oxyCODONE (ROXICODONE) 10 MG TABS; Take 1 tablet (10 mg total) by mouth every 4 (four) hours as needed for moderate pain Max Daily Amount: 60 mg    Other orders  -     meloxicam (MOBIC) 15 mg tablet; TAKE 1 TABLET EVERY DAY BY ORAL ROUTE AS NEEDED.     Will see as needed    Next colonoscopy is due in July 2026    Subjective:      Patient ID: Homero Driver Jr. is a 59 y.o. male.    HPI  Patient is 5 weeks status post reversal of loop ileostomy.  He has fully recovered from the surgery and is having normal bowel movements.    Patient's main complaint is suture granulomas in his midline incision which is more than 5 months old.  This probably is related to PDS suture.  I explored the incision during his last surgery and removed the PDS knot.  Patient continues to have 2 areas on the incision which are painful and draining.    The following portions of the patient's history were reviewed and updated as appropriate: allergies, current medications, past family history, past medical history, past social history, past surgical history, and problem list.    Review of Systems    Non Contributory    Objective:    /80 (BP Location: Left arm, Patient Position: Sitting, Cuff Size: Standard)   Pulse 82   Temp (!) 96.4 °F (35.8 °C) (Tympanic)   Ht 6' (1.829 m)   Wt 83 kg (183 lb)   SpO2 97%   BMI 24.82 kg/m²      Physical Exam    Ileostomy closure incision on the right abdominal wall is fully healed.    In the midline laparotomy incision there are 2 open areas one in the superior end and second just below the umbilicus.  There is small amount of seropurulent drainage.  The wounds were cleaned and covered with dry gauze.

## 2024-12-17 NOTE — TELEPHONE ENCOUNTER
[employer] calling over regarding prior auth. They state it has not been started yet with member services and are asking for this to be expedited since the patient has been out of medication for 2 days and is in pain.     Please initiate prior auth for oxycodone.

## 2024-12-18 NOTE — TELEPHONE ENCOUNTER
PA for Oxycodone SUBMITTED to [facility]    via    [x]CMM-KEY: [id number]  []Surescripts-Case ID #    []Availity-Auth ID #  NDC #    []Faxed to plan   []Other website    []Phone call Case ID #      [x]PA sent as URGENT    All office notes, labs and other pertaining documents and studies sent. Clinical questions answered. Awaiting determination from insurance company.     Turnaround time for your insurance to make a decision on your Prior Authorization can take 7-21 business days.

## 2024-12-19 ENCOUNTER — TELEPHONE (OUTPATIENT)
Dept: SURGERY | Facility: CLINIC | Age: 59
End: 2024-12-19

## 2024-12-19 DIAGNOSIS — K57.20 DIVERTICULITIS OF LARGE INTESTINE WITH ABSCESS WITHOUT BLEEDING: ICD-10-CM

## 2024-12-19 PROCEDURE — ERR1 ERRONEOUS ENCOUNTER-DISREGARD: Performed by: SURGERY

## 2024-12-19 RX ORDER — OXYCODONE HYDROCHLORIDE 10 MG/1
10 TABLET ORAL EVERY 4 HOURS PRN
Qty: 25 TABLET | Refills: 0 | Status: SHIPPED | OUTPATIENT
Start: 2024-12-19

## 2024-12-19 NOTE — TELEPHONE ENCOUNTER
Patient called the RX Refill Line. Message is being forwarded to the office.     Patient is out of pain medication waiting for prior authorization to be done. Patient needs pain medication please call patient     Please contact patient at [phone]

## 2024-12-20 NOTE — TELEPHONE ENCOUNTER
"[facility] called in reporting that orders for home care supplies will be faxed to office requiring Dr. Abernathy's signature. Verified fax number ([phone]).    Reason for Disposition   Information only question and nurse able to answer    Answer Assessment - Initial Assessment Questions  1. REASON FOR CALL or QUESTION: \"What is your reason for calling today?\" or \"How can I best help you?\" or \"What question do you have that I can help answer?\"      Requesting orders be signed    Protocols used: Information Only Call - No Triage-ADULT-[place]    " Please inform patient to increase levothyroxine to 100 mcg qd

## 2025-03-17 DIAGNOSIS — K57.20 DIVERTICULITIS OF LARGE INTESTINE WITH ABSCESS WITHOUT BLEEDING: ICD-10-CM

## 2025-03-17 RX ORDER — OXYCODONE HYDROCHLORIDE 10 MG/1
10 TABLET ORAL EVERY 4 HOURS PRN
Qty: 25 TABLET | Refills: 0 | Status: CANCELLED | OUTPATIENT
Start: 2025-03-17

## 2025-03-17 NOTE — TELEPHONE ENCOUNTER
Medication: Oxycodone    Dose/Frequency: Take 1 tablet (10 mg total) by mouth every 4 (four) hours as needed for moderate pain    Quantity: 25    Pharmacy:   [facility] #[id number] - [place] - [address]  [address] [place] [address]       Office:   [] PCP/Provider -   [x] Speciality/Provider -     Does the patient have enough for 3 days?   [] Yes   [x] No - Send as HP to POD     Patient stated that his pain level is a 10.  Appt on 03/20 with Dr. Abernathy.  Issues with swelling/draining

## 2025-03-20 ENCOUNTER — OFFICE VISIT (OUTPATIENT)
Dept: SURGERY | Facility: CLINIC | Age: 60
End: 2025-03-20
Payer: MEDICARE

## 2025-03-20 VITALS
HEIGHT: 72 IN | TEMPERATURE: 97.1 F | BODY MASS INDEX: 24.06 KG/M2 | DIASTOLIC BLOOD PRESSURE: 80 MMHG | SYSTOLIC BLOOD PRESSURE: 110 MMHG | WEIGHT: 177.6 LBS | HEART RATE: 84 BPM | OXYGEN SATURATION: 96 %

## 2025-03-20 DIAGNOSIS — T81.89XD SUTURE GRANULOMA, SUBSEQUENT ENCOUNTER: Primary | ICD-10-CM

## 2025-03-20 PROCEDURE — 87070 CULTURE OTHR SPECIMN AEROBIC: CPT | Performed by: SURGERY

## 2025-03-20 PROCEDURE — 99212 OFFICE O/P EST SF 10 MIN: CPT | Performed by: SURGERY

## 2025-03-20 PROCEDURE — 87147 CULTURE TYPE IMMUNOLOGIC: CPT | Performed by: SURGERY

## 2025-03-20 PROCEDURE — 87205 SMEAR GRAM STAIN: CPT | Performed by: SURGERY

## 2025-03-20 PROCEDURE — 87186 SC STD MICRODIL/AGAR DIL: CPT | Performed by: SURGERY

## 2025-03-20 RX ORDER — OXYCODONE HYDROCHLORIDE 5 MG/1
5 TABLET ORAL EVERY 4 HOURS PRN
Qty: 30 TABLET | Refills: 0 | Status: SHIPPED | OUTPATIENT
Start: 2025-03-20

## 2025-03-20 NOTE — PROGRESS NOTES
Name: Homero Driver Jr.      : 1965      MRN: [id number]  Encounter Provider:  Adelso Abernathy MD  Encounter Date: 3/20/2025   Encounter department: [facility] GENERAL SURGERY [facility]  :  Assessment & Plan  Suture granuloma, subsequent encounter    Orders:    oxyCODONE (Roxicodone) 5 immediate release tablet; Take 1 tablet (5 mg total) by mouth every 4 (four) hours as needed for moderate pain Max Daily Amount: 30 mg    CT abdomen pelvis w contrast; Future    Will check CT scan results.  If no underlying abnormality, will refer to wound care center    History of Present Illness   Homero Driver Jr. is a 59 y.o. male who presents with persistent granuloma on laparotomy scar.    HPI  Patient is status post exploratory laparotomy and sigmoid colectomy performed for perforated sigmoid diverticulitis with abscess done 8 months ago.  Patient had a de- functioning loop ileostomy which was reversed 3 months ago.    Patient complained of two sites on his midline laparotomy scar which appeared to be suture granulomas.  These were explored in the operating room.  A PDS suture knot which was present on the middle site was removed.  The superior site did not have any suture to be removed.    Patient called the office last week requesting Percocet.    Patient comes to the office stating that the granuloma in the middle of the incision has not healed and intermittently drains blood.  He is also complaining of unbearable pain and is asking for Percocets.    Review of Systems as per HPI.  Pertinent Medical History   Essential hypertension           Objective     /80 (BP Location: Left arm, Patient Position: Sitting, Cuff Size: Standard)   Pulse 84   Temp (!) 97.1 °F (36.2 °C) (Tympanic)   Ht 6' (1.829 m)   Wt 80.6 kg (177 lb 9.6 oz)   SpO2 96%   BMI 24.09 kg/m²      Physical Exam   In the middle of the laparotomy scar just inferior to the umbilicus there is a blistered area.  This is draining sanguinous  fluid.  There is no depth to this lesion.  The appearance is that of a granuloma.  Cultures were taken.  The site was cauterized with silver nitrate.  Dry gauze dressing was applied.

## 2025-03-21 ENCOUNTER — TELEPHONE (OUTPATIENT)
Age: 60
End: 2025-03-21

## 2025-03-21 NOTE — TELEPHONE ENCOUNTER
PA for oxyCODONE (Roxicodone) 5 immediate release tab SUBMITTED to [facility]    via    [x]CMM-KEY: [id number]  []Surescripts-Case ID #  []Availity-Auth ID #NDC #   []Faxed to plan   []Other website   []Phone call Case ID #     [x]PA sent as URGENT    All office notes, labs and other pertaining documents and studies sent. Clinical questions answered. Awaiting determination from insurance company.     Turnaround time for your insurance to make a decision on your Prior Authorization can take 7-21 business days.

## 2025-03-23 LAB
BACTERIA WND AEROBE CULT: ABNORMAL
BACTERIA WND AEROBE CULT: ABNORMAL
GRAM STN SPEC: ABNORMAL

## 2025-03-24 NOTE — TELEPHONE ENCOUNTER
PA for oxyCODONE (Roxicodone) 5 IR tab  APPROVED     Date(s) approved 03/21/2025-03/28/2025    Case # [id number]    Patient advised by          []MyChart Message  [x]Phone call   [x]LMOM  []L/M to call office as no active Communication consent on file  []Unable to leave detailed message as VM not approved on Communication consent       Pharmacy advised by    []Fax  [x]Phone call  []Secure Chat    Specialty Pharmacy    []     Approval letter scanned into Media No Not available at decision

## 2025-04-10 ENCOUNTER — HOSPITAL ENCOUNTER (EMERGENCY)
Facility: HOSPITAL | Age: 60
Discharge: HOME | End: 2025-04-10
Attending: EMERGENCY MEDICINE
Payer: MEDICARE

## 2025-04-10 ENCOUNTER — APPOINTMENT (EMERGENCY)
Dept: CT IMAGING | Facility: HOSPITAL | Age: 60
End: 2025-04-10
Payer: MEDICARE

## 2025-04-10 ENCOUNTER — TELEPHONE (OUTPATIENT)
Age: 60
End: 2025-04-10

## 2025-04-10 VITALS
RESPIRATION RATE: 18 BRPM | HEART RATE: 76 BPM | DIASTOLIC BLOOD PRESSURE: 119 MMHG | SYSTOLIC BLOOD PRESSURE: 175 MMHG | TEMPERATURE: 98.3 F | OXYGEN SATURATION: 100 %

## 2025-04-10 DIAGNOSIS — T81.89XA SUTURE GRANULOMA, INITIAL ENCOUNTER: Primary | ICD-10-CM

## 2025-04-10 DIAGNOSIS — T14.8XXA CHRONIC WOUND: ICD-10-CM

## 2025-04-10 LAB
ALBUMIN SERPL BCG-MCNC: 4.2 G/DL (ref 3.5–5)
ALP SERPL-CCNC: 51 U/L (ref 34–104)
ALT SERPL W P-5'-P-CCNC: 6 U/L (ref 7–52)
ANION GAP SERPL CALCULATED.3IONS-SCNC: 6 MMOL/L (ref 4–13)
AST SERPL W P-5'-P-CCNC: 11 U/L (ref 13–39)
BASOPHILS # BLD AUTO: 0.02 THOUSANDS/ÂΜL (ref 0–0.1)
BASOPHILS NFR BLD AUTO: 0 % (ref 0–1)
BILIRUB SERPL-MCNC: 0.35 MG/DL (ref 0.2–1)
BILIRUB UR QL STRIP: NEGATIVE
BUN SERPL-MCNC: 12 MG/DL (ref 5–25)
CALCIUM SERPL-MCNC: 9.6 MG/DL (ref 8.4–10.2)
CHLORIDE SERPL-SCNC: 103 MMOL/L (ref 96–108)
CLARITY UR: CLEAR
CO2 SERPL-SCNC: 30 MMOL/L (ref 21–32)
COLOR UR: YELLOW
CREAT SERPL-MCNC: 0.91 MG/DL (ref 0.6–1.3)
EOSINOPHIL # BLD AUTO: 0.06 THOUSAND/ÂΜL (ref 0–0.61)
EOSINOPHIL NFR BLD AUTO: 1 % (ref 0–6)
ERYTHROCYTE [DISTWIDTH] IN BLOOD BY AUTOMATED COUNT: 14.7 % (ref 11.6–15.1)
GFR SERPL CREATININE-BSD FRML MDRD: 91 ML/MIN/1.73SQ M
GLUCOSE SERPL-MCNC: 97 MG/DL (ref 65–140)
GLUCOSE UR STRIP-MCNC: NEGATIVE MG/DL
HCT VFR BLD AUTO: 42.5 % (ref 36.5–49.3)
HGB BLD-MCNC: 13.4 G/DL (ref 12–17)
HGB UR QL STRIP.AUTO: NEGATIVE
IMM GRANULOCYTES # BLD AUTO: 0.02 THOUSAND/UL (ref 0–0.2)
IMM GRANULOCYTES NFR BLD AUTO: 0 % (ref 0–2)
KETONES UR STRIP-MCNC: NEGATIVE MG/DL
LEUKOCYTE ESTERASE UR QL STRIP: NEGATIVE
LYMPHOCYTES # BLD AUTO: 2.3 THOUSANDS/ÂΜL (ref 0.6–4.47)
LYMPHOCYTES NFR BLD AUTO: 30 % (ref 14–44)
MCH RBC QN AUTO: 29.1 PG (ref 26.8–34.3)
MCHC RBC AUTO-ENTMCNC: 31.5 G/DL (ref 31.4–37.4)
MCV RBC AUTO: 92 FL (ref 82–98)
MONOCYTES # BLD AUTO: 0.54 THOUSAND/ÂΜL (ref 0.17–1.22)
MONOCYTES NFR BLD AUTO: 7 % (ref 4–12)
NEUTROPHILS # BLD AUTO: 4.7 THOUSANDS/ÂΜL (ref 1.85–7.62)
NEUTS SEG NFR BLD AUTO: 62 % (ref 43–75)
NITRITE UR QL STRIP: NEGATIVE
NRBC BLD AUTO-RTO: 0 /100 WBCS
PH UR STRIP.AUTO: 6.5 [PH]
PLATELET # BLD AUTO: 251 THOUSANDS/UL (ref 149–390)
PMV BLD AUTO: 9 FL (ref 8.9–12.7)
POTASSIUM SERPL-SCNC: 4.3 MMOL/L (ref 3.5–5.3)
PROT SERPL-MCNC: 7.1 G/DL (ref 6.4–8.4)
PROT UR STRIP-MCNC: NEGATIVE MG/DL
RBC # BLD AUTO: 4.61 MILLION/UL (ref 3.88–5.62)
SODIUM SERPL-SCNC: 139 MMOL/L (ref 135–147)
SP GR UR STRIP.AUTO: 1.01 (ref 1–1.03)
UROBILINOGEN UR QL STRIP.AUTO: 0.2 E.U./DL
WBC # BLD AUTO: 7.64 THOUSAND/UL (ref 4.31–10.16)

## 2025-04-10 PROCEDURE — 99285 EMERGENCY DEPT VISIT HI MDM: CPT | Performed by: PHYSICIAN ASSISTANT

## 2025-04-10 PROCEDURE — 74177 CT ABD & PELVIS W/CONTRAST: CPT

## 2025-04-10 PROCEDURE — 81003 URINALYSIS AUTO W/O SCOPE: CPT | Performed by: PHYSICIAN ASSISTANT

## 2025-04-10 PROCEDURE — 36415 COLL VENOUS BLD VENIPUNCTURE: CPT | Performed by: PHYSICIAN ASSISTANT

## 2025-04-10 PROCEDURE — 99284 EMERGENCY DEPT VISIT MOD MDM: CPT

## 2025-04-10 PROCEDURE — 85025 COMPLETE CBC W/AUTO DIFF WBC: CPT | Performed by: PHYSICIAN ASSISTANT

## 2025-04-10 PROCEDURE — 80053 COMPREHEN METABOLIC PANEL: CPT | Performed by: PHYSICIAN ASSISTANT

## 2025-04-10 RX ORDER — OXYCODONE AND ACETAMINOPHEN 5; 325 MG/1; MG/1
1 TABLET ORAL EVERY 6 HOURS PRN
Qty: 12 TABLET | Refills: 0 | Status: SHIPPED | OUTPATIENT
Start: 2025-04-10 | End: 2025-04-13

## 2025-04-10 RX ORDER — OXYCODONE AND ACETAMINOPHEN 5; 325 MG/1; MG/1
1 TABLET ORAL ONCE
Refills: 0 | Status: COMPLETED | OUTPATIENT
Start: 2025-04-10 | End: 2025-04-10

## 2025-04-10 RX ORDER — SULFAMETHOXAZOLE AND TRIMETHOPRIM 800; 160 MG/1; MG/1
1 TABLET ORAL 2 TIMES DAILY
Qty: 14 TABLET | Refills: 0 | Status: SHIPPED | OUTPATIENT
Start: 2025-04-10 | End: 2025-04-17

## 2025-04-10 RX ADMIN — IOHEXOL 85 ML: 350 INJECTION, SOLUTION INTRAVENOUS at 18:32

## 2025-04-10 RX ADMIN — OXYCODONE HYDROCHLORIDE AND ACETAMINOPHEN 1 TABLET: 5; 325 TABLET ORAL at 17:51

## 2025-04-10 NOTE — ED NOTES
Discharge reviewed with patient. Patient verbalized understanding and no further questions at this time. Patient ambulatory off unit with steady gait.      Colleen Reyes  04/10/25 1938

## 2025-04-10 NOTE — ED PROVIDER NOTES
Time reflects when diagnosis was documented in both MDM as applicable and the Disposition within this note       Time User Action Codes Description Comment    4/10/2025  7:25 PM Miley Davis [T81.89XA] Suture granuloma, initial encounter     4/10/2025  7:26 PM Miley Davis [T14.8XXA] Chronic wound           ED Disposition       ED Disposition   Discharge    Condition   Stable    Date/Time   Thu Apr 10, 2025  7:24 PM    Comment   Homero Villa Barney discharge to home/self care.                   Assessment & Plan       Medical Decision Making  Pt with suture granulomas, frustrated with chronic nature; surgeon is aware.  Wound today does not look acutely infection, no discharged expressed, some noted on gauze.  Patient has pictures that show areas seem to expand/filled with fluid, then rupture with purulent/sanguinous material  During last visit surgeon ordered CAT scan which was not done, will check labs and CAT scan here in emergency department  Labs reassuring  CT rev't        Amount and/or Complexity of Data Reviewed  External Data Reviewed: labs, radiology and notes.     Details: 3/21 pt got #30 tables  Labs: ordered. Decision-making details documented in ED Course.  Radiology: ordered. Decision-making details documented in ED Course.  Discussion of management or test interpretation with external provider(s): PT requesting I reach out to surgeon, Epic chat with Dr Arroyo who's covering for Michela, agrees that pt is stable for DC to be seen in office Monday    Risk  Prescription drug management.        ED Course as of 04/10/25 1932   Thu Apr 10, 2025   1809 CBC unremarkable   1814 CMP unremarkable   1916 UA doesn't show any UTI       Medications   oxyCODONE-acetaminophen (PERCOCET) 5-325 mg per tablet 1 tablet (1 tablet Oral Given 4/10/25 1751)   iohexol (OMNIPAQUE) 350 MG/ML injection (SINGLE-DOSE) 100 mL (85 mL Intravenous Given 4/10/25 1832)       ED Risk Strat Scores                    No data  recorded        SBIRT 20yo+      Flowsheet Row Most Recent Value   Initial Alcohol Screen: US AUDIT-C     1. How often do you have a drink containing alcohol? 0 Filed at: 04/10/2025 1755   2. How many drinks containing alcohol do you have on a typical day you are drinking?  0 Filed at: 04/10/2025 1755   3a. Male UNDER 65: How often do you have five or more drinks on one occasion? 0 Filed at: 04/10/2025 1755   3b. FEMALE Any Age, or MALE 65+: How often do you have 4 or more drinks on one occassion? 0 Filed at: 04/10/2025 1755   Audit-C Score 0 Filed at: 04/10/2025 1755   TAYA: How many times in the past year have you...    Used an illegal drug or used a prescription medication for non-medical reasons? Never Filed at: 04/10/2025 1755                            History of Present Illness       Chief Complaint   Patient presents with    Post-op Problem     Pt reports having an ostomy reversal done in January but since then pt reports pus draining from the wound, foul odor and pain. No meds pta         Past Medical History:   Diagnosis Date    Anesthesia complication     body temp burns off anesthesia more quickly--woke up during surgery    Anxiety     due to medical conditions    Arthritis     Hypertension     Sleep apnea     Tobacco abuse       Past Surgical History:   Procedure Laterality Date    BACK SURGERY      CERVICAL FUSION N/A 04/05/2016    Procedure: FUSION CERVICAL POSTERIOR - revision of instrumentation T1;  Surgeon: Srikanth Siu MD;  Location: [facility];  Service:     COLONOSCOPY      FOOT MASS EXCISION Right     HERNIA REPAIR Right     x2    INCISION AND DRAINAGE POSTERIOR SPINE N/A 04/27/2016    Procedure: INCISION AND DRAINAGE (I&D) SPINE;  Surgeon: Srikanth Siu MD;  [facility];  Service:     IR DRAINAGE TUBE CHECK/CHANGE/REPOSITION/REINSERTION/UPSIZE  03/01/2024    IR DRAINAGE TUBE CHECK/CHANGE/REPOSITION/REINSERTION/UPSIZE  03/21/2024    IR DRAINAGE TUBE  CHECK/CHANGE/REPOSITION/REINSERTION/UPSIZE  04/05/2024    IR DRAINAGE TUBE CHECK/CHANGE/REPOSITION/REINSERTION/UPSIZE  05/23/2024    IR DRAINAGE TUBE PLACEMENT  02/18/2024    PA ARTHRODESIS PST/PSTLAT TQ 1NTRSPC EA ADDL NTRSP N/A 04/04/2016    Procedure: C3-C6 POSTERIOR CERVICAL LAMINECTOMY AND DECOMPRESSION; C3-T2 INSTRUMENTED POSTERIOR CERVICAL SPINAL FUSION WITH POSSIBLE ADDITIONAL LEVELS WITH ALLOGRAFT AND AUTOGRAFT (IMPULSE MONITORING);  Surgeon: Srikanth Siu MD;  Location: [facility];  Service: Orthopedics    PA CLOSURE ENTEROSTOMY LG/SMALL INTESTINE N/A 11/8/2024    Procedure: REVERSAL OF ILEOSTOMY;  Surgeon: Adelso Abernathy MD;  Location: [facility];  Service: General    PA CYSTOURETHROSCOPY W/URETERAL CATHETERIZATION Bilateral 7/26/2024    Procedure: INSERTION URETERAL CATHETERS PREOP;  Surgeon: Vitaliy Leahy MD;  Location: [facility];  Service: Urology    PA LAPAROSCOPY COLECTOMY PARTIAL W/ANASTOMOSIS N/A 7/26/2024    Procedure: SIGMOID COLECTOMY, CHOLECYSTECTOMY, CREATION OF LOOP ILEOSTOMY, REMOVAL OF URETERAL STENTS. REMOVAL OF LEFT BUTTOCK ABSCESS DRAIN.;  Surgeon: Adelso Abernathy MD;  Location: [facility];  Service: General    PA SIGMOIDOSCOPY FLX DX W/COLLJ SPEC BR/WA IF PFRMD N/A 7/26/2024    Procedure: FLEXIBLE SIGMOIDOSCOPY;  Surgeon: Adelso Abernathy MD;  Location: [facility];  Service: General    SHOULDER OPEN ROTATOR CUFF REPAIR Bilateral       Family History   Problem Relation Age of Onset    Cancer Mother     Cancer Father     Cancer Family       Social History     Tobacco Use    Smoking status: Every Day     Current packs/day: 0.25     Average packs/day: 0.3 packs/day for 15.0 years (3.8 ttl pk-yrs)     Types: Cigarettes     Passive exposure: Never    Smokeless tobacco: Never    Tobacco comments:     2 cigarettes per day     Vapes on weekends   Vaping Use    Vaping status: Some Days    Substances: Flavoring   Substance Use Topics    Alcohol use: Not Currently     Drug use: Yes     Frequency: 14.0 times per week     Types: Marijuana      E-Cigarette/Vaping    E-Cigarette Use Current Some Day User       E-Cigarette/Vaping Substances    Nicotine No     THC No     CBD No     Flavoring Yes     Other No     Unknown No       I have reviewed and agree with the history as documented.     PMH: Cervical stenosis of spine, Pseudoarthrosis of cervical spine, Anxiety, Diverticulitis of large intestine with abscess, HTN, Sleep apnea,   PSH: S/P cervical spinal fusion, S/P closure of ileostomy, Shoulder open rotator cuff repair (Bilateral), Hernia repair (Right), Foot mass excision (Right), Back surgery, I&D posterior spine (04/27/2016), Cervical fusion (04/05/2016), IR drainage tube check/change/reposition/reinsertion/upsize, LAPAROSCOPY COLECTOMY PARTIAL W/ANASTOMOSIS (7/26/2024),  SIGMOIDOSCOPY FLX DX W/COLLJ SPEC BR/WA IF PFRMD (7/26/2024), CYSTOURETHROSCOPY W/URETERAL CATHETERIZATION (Bilateral, 7/26/2024), CLOSURE ENTEROSTOMY LG/SMALL INTESTINE (11/8/2024)    Pt presents to ED frustrated c/o persistent midline abd pain-needing Percocet/opiate pain medication and drainage from midline surgical suture granuloma-described as 2 areas fill up with fluid, bubble up and then ruptured, draining large amount of odorous, purulent, serosanguineous fluid, worsening pain which then repeats itself every few days; and pt is very frustruated  Pt has FU with surgeon Dr Abernathy on Monday, 4/14      3/20: Michela office visit: Suture granuloma   Orders: oxyCODONE, CT abdomen pelvis w contrast; Future  Will check CT scan results.  If no underlying abnormality, will refer to wound care center  Homero Driver Jr. is a 59 y.o. male who presents with persistent granuloma on laparotomy scar.   Pt s/p exploratory laparotomy and sigmoid colectomy performed for perforated sigmoid diverticulitis with abscess done 8 months ago; then had a de- functioning loop ileostomy which was reversed 3 months ago (11/8/2024)  Pt c/o  two sites on his midline laparotomy scar which appeared to be suture granulomas.  These were explored in the operating room.  A PDS suture knot which was present on the middle site was removed.  The superior site did not have any suture to be removed.  Patient comes to the office stating that the granuloma in the middle of the incision has not healed and intermittently drains blood.  He is also complaining of unbearable pain and is asking for Percocets.             Review of Systems        Objective       ED Triage Vitals [04/10/25 1713]   Temperature Pulse Blood Pressure Respirations SpO2 Patient Position - Orthostatic VS   98.3 °F (36.8 °C) 76 (!) 175/119 18 100 % Sitting      Temp Source Heart Rate Source BP Location FiO2 (%) Pain Score    Oral Monitor Left arm -- 9      Vitals      Date and Time Temp Pulse SpO2 Resp BP Pain Score FACES Pain Rating User   04/10/25 1751 -- -- -- -- -- 8 -- SD   04/10/25 1713 98.3 °F (36.8 °C) 76 100 % 18 175/119 9 -- RN            Physical Exam  Vitals and nursing note reviewed.   Constitutional:       General: He is in acute distress (mild).      Appearance: He is well-developed.   HENT:      Head: Normocephalic and atraumatic.      Nose: Nose normal.      Mouth/Throat:      Mouth: Mucous membranes are moist.      Pharynx: Oropharynx is clear.   Eyes:      Conjunctiva/sclera: Conjunctivae normal.   Cardiovascular:      Rate and Rhythm: Normal rate.   Pulmonary:      Effort: Pulmonary effort is normal. No respiratory distress.      Breath sounds: Normal breath sounds.   Abdominal:      General: Bowel sounds are normal.      Palpations: Abdomen is soft.      Tenderness: There is abdominal tenderness.      Comments: + #2 midline suture granulomas noted, scant purulent dc noted on gauze; some irritation noted along tape lines without cellulitis, no drainage with palpation.   Musculoskeletal:         General: Normal range of motion.      Cervical back: Normal range of motion.    Skin:     General: Skin is warm and dry.   Neurological:      Mental Status: He is alert.   Psychiatric:         Behavior: Behavior normal.         Results Reviewed       Procedure Component Value Units Date/Time    UA w Reflex to Microscopic w Reflex to Culture [[id number]]  (Normal) Collected: 04/10/25 1900    Lab Status: Final result Specimen: Urine, Other Updated: 04/10/25 1915     Color, UA Yellow     Clarity, UA Clear     Specific Gravity, UA 1.015     pH, UA 6.5     Leukocytes, UA Negative     Nitrite, UA Negative     Protein, UA Negative mg/dl      Glucose, UA Negative mg/dl      Ketones, UA Negative mg/dl      Urobilinogen, UA 0.2 E.U./dl      Bilirubin, UA Negative     Occult Blood, UA Negative    Comprehensive metabolic panel [[id number]]  (Abnormal) Collected: 04/10/25 1752    Lab Status: Final result Specimen: Blood from Arm, Right Updated: 04/10/25 1813     Sodium 139 mmol/L      Potassium 4.3 mmol/L      Chloride 103 mmol/L      CO2 30 mmol/L      ANION GAP 6 mmol/L      BUN 12 mg/dL      Creatinine 0.91 mg/dL      Glucose 97 mg/dL      Calcium 9.6 mg/dL      AST 11 U/L      ALT 6 U/L      Alkaline Phosphatase 51 U/L      Total Protein 7.1 g/dL      Albumin 4.2 g/dL      Total Bilirubin 0.35 mg/dL      eGFR 91 ml/min/1.73sq m     Narrative:      National Kidney Disease Foundation guidelines for Chronic Kidney Disease (CKD):     Stage 1 with normal or high GFR (GFR > 90 mL/min/1.73 square meters)    Stage 2 Mild CKD (GFR = 60-89 mL/min/1.73 square meters)    Stage 3A Moderate CKD (GFR = 45-59 mL/min/1.73 square meters)    Stage 3B Moderate CKD (GFR = 30-44 mL/min/1.73 square meters)    Stage 4 Severe CKD (GFR = 15-29 mL/min/1.73 square meters)    Stage 5 End Stage CKD (GFR <15 mL/min/1.73 square meters)  Note: GFR calculation is accurate only with a steady state creatinine    CBC and differential [[id number]] Collected: 04/10/25 1752    Lab Status: Final result Specimen: Blood from Arm, Right Updated:  04/10/25 1757     WBC 7.64 Thousand/uL      RBC 4.61 Million/uL      Hemoglobin 13.4 g/dL      Hematocrit 42.5 %      MCV 92 fL      MCH 29.1 pg      MCHC 31.5 g/dL      RDW 14.7 %      MPV 9.0 fL      Platelets 251 Thousands/uL      nRBC 0 /100 WBCs      Segmented % 62 %      Immature Grans % 0 %      Lymphocytes % 30 %      Monocytes % 7 %      Eosinophils Relative 1 %      Basophils Relative 0 %      Absolute Neutrophils 4.70 Thousands/µL      Absolute Immature Grans 0.02 Thousand/uL      Absolute Lymphocytes 2.30 Thousands/µL      Absolute Monocytes 0.54 Thousand/µL      Eosinophils Absolute 0.06 Thousand/µL      Basophils Absolute 0.02 Thousands/µL             CT abdomen pelvis with contrast   Final Interpretation by Faisal Drummond DO (04/10 1905)      1.  Periumbilical abdominal wall soft tissue thickening and hyperdensity redemonstrated, possibly scarring. No focal fluid collection. Small bowel loops also intimately associated with the undersurface of the abdominal mesh/anterior peritoneal cavity as    before. Negative for small bowel obstruction.  Although enterocutaneous fistula cannot be reliably excluded without oral contrast, no clear evidence of this on limited noncontrast imaging. There is persistent clinical concern in this regard, repeat    imaging with oral contrast could be considered      2.  Mild circumferential urinary bladder wall thickening may reflect underdistention. No perivesical inflammatory changes. If there is clinical concern for cystitis, correlate with urinalysis.      3.  Additional incidental findings as above.         Workstation performed: [id number]             Procedures    ED Medication and Procedure Management   Prior to Admission Medications   Prescriptions Last Dose Informant Patient Reported? Taking?   ALPRAZolam (XANAX) 0.5 mg tablet   Yes No   Sig: take 1 tablet by mouth three times a day for 30 days   DULoxetine (CYMBALTA) 20 mg capsule  Self Yes No   Sig: Take  20 mg by mouth every evening   albuterol (PROVENTIL HFA,VENTOLIN HFA) 90 mcg/act inhaler  Self Yes No   Sig: INHALE 2 PUFFS INTO THE LUNGS EVERY 4 HOURS AS NEEDED FOR 30 DAYS   amLODIPine (NORVASC) 10 mg tablet   Yes No   Sig: Take 10 mg by mouth daily   cyclobenzaprine (FLEXERIL) 10 mg tablet  Self Yes No   Sig: TAKE 1 TABLET BY MOUTH THREE TIMES A DAY AS NEEDED FOR 10 DAYS   gabapentin (NEURONTIN) 100 mg capsule  Self Yes No   Sig: Take 100 mg by mouth daily at bedtime   meloxicam (MOBIC) 15 mg tablet   Yes No   Sig: TAKE 1 TABLET EVERY DAY BY ORAL ROUTE AS NEEDED.   mirtazapine (REMERON) 15 mg tablet  Self Yes No   Sig: Take 15 mg by mouth daily at bedtime     nicotine (NICODERM CQ) 14 mg/24hr TD 24 hr patch   No No   Sig: Place 1 patch on the skin over 24 hours daily   oxyCODONE (Roxicodone) 5 immediate release tablet   No No   Sig: Take 1 tablet (5 mg total) by mouth every 4 (four) hours as needed for moderate pain Max Daily Amount: 30 mg      Facility-Administered Medications: None     Patient's Medications   Discharge Prescriptions    OXYCODONE-ACETAMINOPHEN (PERCOCET) 5-325 MG PER TABLET    Take 1 tablet by mouth every 6 (six) hours as needed for moderate pain for up to 3 days Max Daily Amount: 4 tablets       Start Date: 4/10/2025 End Date: 4/13/2025       Order Dose: 1 tablet       Quantity: 12 tablet    Refills: 0    SULFAMETHOXAZOLE-TRIMETHOPRIM (BACTRIM DS) 800-160 MG PER TABLET    Take 1 tablet by mouth 2 (two) times a day for 7 days smx-tmp DS (BACTRIM) 800-160 mg tabs (1tab q12 D10)       Start Date: 4/10/2025 End Date: 4/17/2025       Order Dose: 1 tablet       Quantity: 14 tablet    Refills: 0     No discharge procedures on file.  ED SEPSIS DOCUMENTATION   Time reflects when diagnosis was documented in both MDM as applicable and the Disposition within this note       Time User Action Codes Description Comment    4/10/2025  7:25 PM Miley Davis [T81.89XA] Suture granuloma, initial encounter      4/10/2025  7:26 PM Miley Davis Add [T14.8XXA] Chronic wound                  Miley Davis PA-C  04/10/25 1933

## 2025-04-10 NOTE — DISCHARGE INSTRUCTIONS
Use Tylenol every 4 hours or Motrin every 6 hours; you can alternate the 2 medications taking something every 3 hours for pain or fever.    If no improvement add Percocet.    Take all oral antibiotics until done.    Wash wound twice a day with warm soapy water, pat dry, keep covered.  Try to limit skin contact with tape    Follow-up with surgeon as scheduled in few days

## 2025-04-14 ENCOUNTER — OFFICE VISIT (OUTPATIENT)
Dept: SURGERY | Facility: CLINIC | Age: 60
End: 2025-04-14
Payer: MEDICARE

## 2025-04-14 VITALS
HEIGHT: 72 IN | WEIGHT: 184.2 LBS | SYSTOLIC BLOOD PRESSURE: 118 MMHG | HEART RATE: 82 BPM | TEMPERATURE: 97.3 F | DIASTOLIC BLOOD PRESSURE: 80 MMHG | OXYGEN SATURATION: 98 % | BODY MASS INDEX: 24.95 KG/M2

## 2025-04-14 DIAGNOSIS — L91.8 HYPERTROPHIC GRANULATION TISSUE: Primary | ICD-10-CM

## 2025-04-14 PROCEDURE — 99212 OFFICE O/P EST SF 10 MIN: CPT | Performed by: SURGERY

## 2025-04-14 NOTE — PROGRESS NOTES
Name: Homero Driver Jr.      : 1965      MRN: [id number]  Encounter Provider:  Adelso Abernathy MD  Encounter Date: 2025   Encounter department: [facility] GENERAL SURGERY [facility]  :  Assessment & Plan  Hypertrophic granulation tissue       Midline laparotomy wound.  Cauterized with silver nitrate.    Follow-up in 1 week.      History of Present Illness     Homero Driver Jr. is a 59 y.o. male who presents bleeding and discharge from midline laparotomy wound.    HPI  Patient is 9 months status post open sigmoid colectomy performed for perforated diverticulitis.  Patient appeared to have developed 2 suture granulomas in the midline laparotomy incision which were explored in November last year.  The PDS knot was found on the periumbilical site and removed.  No abnormality was found in the superior site.    Patient then seemed to have healed well but then presented again with draining wounds at the sites.  He states that he has significant pain requiring frequent prescriptions of Percocet.  He notices blood and purulent drainage.  Cultures have grown Staphylococcus aureus and CT scan is unremarkable.  There is no evidence of a enterocutaneous fistula as no succus is every been seen.    Review of Systems as per HPI.       Objective     /80 (BP Location: Left arm, Patient Position: Sitting, Cuff Size: Standard)   Pulse 82   Temp (!) 97.3 °F (36.3 °C) (Tympanic)   Ht 6' (1.829 m)   Wt 83.6 kg (184 lb 3.2 oz)   SpO2 98%   BMI 24.98 kg/m²      Physical Exam   There is excessive granulation tissue in the midline laparotomy scar at the umbilical site.  This was cauterized with silver nitrate.  Dry gauze dressing was applied.  The rest of the incision looks healed at present time.    Radiology Results Review: I personally reviewed the following image studies in PACS and associated radiology reports: CT abdomen/pelvis. My interpretation of the radiology images/reports is: No subcutaneous  collection.

## 2025-05-05 ENCOUNTER — TELEPHONE (OUTPATIENT)
Age: 60
End: 2025-05-05

## 2025-05-05 ENCOUNTER — OFFICE VISIT (OUTPATIENT)
Dept: SURGERY | Facility: CLINIC | Age: 60
End: 2025-05-05
Payer: MEDICARE

## 2025-05-05 VITALS
DIASTOLIC BLOOD PRESSURE: 96 MMHG | OXYGEN SATURATION: 98 % | SYSTOLIC BLOOD PRESSURE: 142 MMHG | BODY MASS INDEX: 25.6 KG/M2 | HEIGHT: 72 IN | HEART RATE: 62 BPM | WEIGHT: 189 LBS | TEMPERATURE: 97 F

## 2025-05-05 DIAGNOSIS — Z51.89 ENCOUNTER FOR WOUND CARE: Primary | ICD-10-CM

## 2025-05-05 PROCEDURE — 99212 OFFICE O/P EST SF 10 MIN: CPT | Performed by: SURGERY

## 2025-05-05 NOTE — PROGRESS NOTES
Name: Homero Driver Jr.      : 1965      MRN: [id number]  Encounter Provider:  Adelso Abernathy MD  Encounter Date: 2025   Encounter department: [facility] SURGERY [facility]  :  Assessment & Plan  Encounter for wound care       Granulation tissue cauterized with silver nitrate.  Patient told that he has to come every week to the office.  He missed the appointment last week.      History of Present Illness   Homero Driver Jr. is a 59 y.o. male who presents for wound care    HPI  Patient has a site of suture granuloma in the middle of his laparotomy incision which is draining purulent material.    Review of Systems as per HPI.  Noncontributory       Objective     /96 (BP Location: Left arm, Patient Position: Sitting, Cuff Size: Standard)   Pulse 62   Temp (!) 97 °F (36.1 °C) (Tympanic)   Ht 6' (1.829 m)   Wt 85.7 kg (189 lb)   SpO2 98%   BMI 25.63 kg/m²      Physical Exam   Granulation tissue near the umbilicus in the midline laparotomy incision.  This was cauterized with silver nitrate.  The rest of the incision is fully healed.  In the superior area there are 2 sites of prominent scar tissue which may be sites for future keloid formation.

## 2025-05-05 NOTE — TELEPHONE ENCOUNTER
Patient called to confirmed appointment time, advised patient 05/05/2025 at 11:45 am arrival time and appointment time 12 pm

## 2025-05-20 ENCOUNTER — TELEPHONE (OUTPATIENT)
Age: 60
End: 2025-05-20

## 2025-05-20 NOTE — TELEPHONE ENCOUNTER
Hi - There are appointments available with Dr. Abernathy on Thursday morning. Homero didn't want to come in then?

## 2025-05-20 NOTE — TELEPHONE ENCOUNTER
"FOLLOW UP: n/a    REASON FOR CONVERSATION: going to ED and Wound    SYMPTOMS: PT's SO, rigoberto Sanchez transferred from [facility] to this CTS-RN. [facility] Medical Communication Consent Form verified.    PT is not going to ER. SO would like to \"get to the root of the problem\" with PT's chronic wound. Reviewed x2 last office visit notes where PT was instructed to follow-up weekly for wound care.     PT and SO agreeable for weekly appointments. Appointment scheduled. Offered to schedule additional weekly appointments, PT declined.     OTHER: n/a    DISPOSITION: Next Available Follow-Up Appointment    "

## 2025-05-20 NOTE — TELEPHONE ENCOUNTER
Patient called back to make an appt with Dr Abernathy, first available showing June 5th. Post op complications, call transferred to CTS

## 2025-05-20 NOTE — TELEPHONE ENCOUNTER
PT called to let Dr Abernathy know he is going to the ER. It is getting worse and the pain is not tolerable and he will not be coming back for anymore appts.

## 2025-06-09 ENCOUNTER — OFFICE VISIT (OUTPATIENT)
Dept: SURGERY | Facility: CLINIC | Age: 60
End: 2025-06-09
Payer: MEDICARE

## 2025-06-09 VITALS
HEART RATE: 87 BPM | DIASTOLIC BLOOD PRESSURE: 78 MMHG | WEIGHT: 191 LBS | HEIGHT: 72 IN | TEMPERATURE: 96.2 F | BODY MASS INDEX: 25.87 KG/M2 | SYSTOLIC BLOOD PRESSURE: 116 MMHG | OXYGEN SATURATION: 98 %

## 2025-06-09 DIAGNOSIS — Z51.89 ENCOUNTER FOR WOUND CARE: Primary | ICD-10-CM

## 2025-06-09 PROCEDURE — 99212 OFFICE O/P EST SF 10 MIN: CPT | Performed by: SURGERY

## 2025-06-09 NOTE — PROGRESS NOTES
Name: Homero Driver Jr.      : 1965      MRN: [id number]  Encounter Provider:  Adelso Abernathy MD  Encounter Date: 2025   Encounter department: [facility] GENERAL SURGERY [place]  :  Assessment & Plan  Encounter for wound care  Patient has missed last several appointments.  He has come to the office after 1 month.  Patient states that he is going to a wound care center at [place].  I advised the patient that he should follow-up with the wound care center so he only has one physician taking care of him.    I have discharged the patient from our practice.           History of Present Illness     Homero Driver Jr. is a 59 y.o. male who presents for wound care.    HPI  Patient has a midline laparotomy wound which is 1-year-old.  The majority of the wound is healed.  Patient has 2 small areas on the wound which look like excessive granulation tissue.  This wound has been explored by me in the operating room in the past.  We also did a CT scan 2 months ago.  There is no underlying disease process.  This is not an enterocutaneous fistula as the contents are not intestinal and cultures have grown Staph aureus rather than GI ladonna.  This is purely a skin related problem.    Review of Systems as per HPI.    Pertinent Medical History     Essential hypertension             Objective     /78 (BP Location: Left arm, Patient Position: Sitting, Cuff Size: Standard)   Pulse 87   Temp (!) 96.2 °F (35.7 °C) (Tympanic)   Ht 6' (1.829 m)   Wt 86.6 kg (191 lb)   SpO2 98%   BMI 25.90 kg/m²      Physical Exam  There are two small areas of excessive granulation tissue on the laparotomy scar.  There is purulent drainage on the gauze.  There are no GI contents.  This is not a enterocutaneous fistula.

## 2025-06-24 NOTE — H&P (VIEW-ONLY)
Assessment/Plan:     Diagnoses and all orders for this visit:    Ileostomy status (HCC)    Cervical stenosis of spine  -     Ambulatory Referral to Orthopedic Surgery; Future     Patient is being scheduled for reversal of ileostomy at [facility] next week.    Procedure was discussed with patient and informed consent was obtained.    Risks include bleeding, infection, risk of anastomotic leak.    Preadmission testing is complete    Patient is requesting a referral with orthopedic spine surgery for his cervical stenosis.    Subjective:      Patient ID: Homero Driver Jr. is a 59 y.o. male.    HPI  Patient is 3 months status post low anterior resection for Hinchey class II sigmoid diverticulitis which was treated with prolonged IR drainage catheter.  Patient had developed a fecal fistula.  Surgical exploration revealed extensive scarring due to the abscess and the prolonged catheter presence.  Patient had a low colorectal anastomosis performed which was technically difficult.  A defunctioning loop ileostomy was hence given.  Patient subsequently has had a barium enema performed which reveals that there is no leak at the anastomotic site.  Patient is now ready for reversal of his ileostomy.    Patient was seen in the office last week with painful pustular sites on his midline laparotomy incision.  These were drained under local anesthesia.  Cultures were negative.  These wounds are healing satisfactorily.    Patient had a recent hospitalization at [facility] for partial small bowel obstruction.    The following portions of the patient's history were reviewed and updated as appropriate: allergies, current medications, past family history, past medical history, past social history, past surgical history, and problem list.    Review of Systems    Essential hypertension  Long-term smoker with mild COPD  History of wakefulness during general anesthesia  Incisional herniorrhaphy with mesh performed in the remote  Patient seen by Dr. Bustos in the clinic today. Per MD, patient to RTC the second week of August.     Patient discharged in ambulatory, stable condition.    past  Cholecystectomy performed for gallstones during the colonic surgery    Objective:    Resp 18   Ht 6' (1.829 m)   Wt 81.5 kg (179 lb 9.6 oz)   SpO2 99%   BMI 24.36 kg/m²      Physical Exam    No pallor or icterus    Heart sounds are normal  Chest is clear to auscultation    Abdominal exam reveals healed laparotomy scar with 3 open sites which are clean now.  The ileostomy is on the right side and is functional    Extremity exam is normal

## (undated) DEVICE — POOLE SUCTION HANDLE W/TUBING: Brand: CARDINAL HEALTH

## (undated) DEVICE — ECHELON CIRCULAR POWERED STAPLER: Brand: ECHELON CIRCULAR

## (undated) DEVICE — ANTIBACTERIAL UNDYED BRAIDED (POLYGLACTIN 910), SYNTHETIC ABSORBABLE SUTURE: Brand: COATED VICRYL

## (undated) DEVICE — CHLORAPREP HI-LITE 26ML ORANGE

## (undated) DEVICE — EXOFIN PRECISION PEN HIGH VISCOSITY TOPICAL SKIN ADHESIVE: Brand: EXOFIN PRECISION PEN, 1G

## (undated) DEVICE — BULB SYRINGE,IRRIGATION WITH PROTECTIVE CAP: Brand: DOVER

## (undated) DEVICE — 3000CC GUARDIAN II: Brand: GUARDIAN

## (undated) DEVICE — DRESSING MEPILEX AG BORDER POST-OP 4 X 10 IN

## (undated) DEVICE — SUT PROLENE 0 CT-1 30 IN 8424H

## (undated) DEVICE — SUT PROLENE 2-0 SH 30 IN 8833H

## (undated) DEVICE — SUT SILK 0 30 IN SA86G

## (undated) DEVICE — GAUZE SPONGES,16 PLY: Brand: CURITY

## (undated) DEVICE — SUT SILK 2-0 30 IN SA85H

## (undated) DEVICE — URETERAL CATHETER 5 FR POLLACK

## (undated) DEVICE — CARDINAL HEALTH LAP SPONGE  8 X 36 IN. PREWASHED X-RAY DETECTABLE SOFTPACK: Brand: CARDINAL HEALTH

## (undated) DEVICE — 1820 FOAM BLOCK NEEDLE COUNTER: Brand: DEVON

## (undated) DEVICE — SUT SILK 3-0 SH 30 IN K832H

## (undated) DEVICE — MEDI-VAC YANKAUER SUCTION HANDLE: Brand: CARDINAL HEALTH

## (undated) DEVICE — LIGACLIP MCA MULTIPLE CLIP APPLIERS, 20 MEDIUM CLIPS: Brand: LIGACLIP

## (undated) DEVICE — TRAY FOLEY 16FR URIMETER SURESTEP

## (undated) DEVICE — STERILE DOUBLE BASIN SET PACK: Brand: CARDINAL HEALTH

## (undated) DEVICE — SUT SILK PERMA-HAND 3-0 18IN A182H

## (undated) DEVICE — IV CATH 14 G X 3 1/4 IN

## (undated) DEVICE — SUT VICRYL 3-0 18 IN J904T

## (undated) DEVICE — POV-IOD SOLUTION 4OZ BT

## (undated) DEVICE — SPONGE LAP 18 X 18 IN

## (undated) DEVICE — PROXIMATE SKIN STAPLERS (35 WIDE) CONTAINS 35 STAINLESS STEEL STAPLES (FIXED HEAD): Brand: PROXIMATE

## (undated) DEVICE — PROXIMATE PLUS MD MULTI-DIRECTIONAL RELEASE SKIN STAPLERS CONTAINS 35 STAINLESS STEEL STAPLES APPROXIMATE CLOSED DIMENSIONS: 6.9MM X 3.9MM WIDE: Brand: PROXIMATE

## (undated) DEVICE — SUT SILK 2-0 SH 30 IN K833H

## (undated) DEVICE — SYRINGE 5ML LL

## (undated) DEVICE — GLOVE INDICATOR PI UNDERGLOVE SZ 7 BLUE

## (undated) DEVICE — SUT PROLENE 2-0 CT-1 30 IN 8423H

## (undated) DEVICE — JACKSON-PRATT 100CC BULB RESERVOIR: Brand: CARDINAL HEALTH

## (undated) DEVICE — SOLUTION BOWL: Brand: KENDALL

## (undated) DEVICE — ELECTRODE BLADE MOD  E-Z CLEAN 6.5IN -0014M

## (undated) DEVICE — ABDOMINAL PAD: Brand: DERMACEA

## (undated) DEVICE — INTENDED FOR TISSUE SEPARATION, AND OTHER PROCEDURES THAT REQUIRE A SHARP SURGICAL BLADE TO PUNCTURE OR CUT.: Brand: BARD-PARKER SAFETY BLADES SIZE 15, STERILE

## (undated) DEVICE — SUT SILK 3-0 SH CR/8 18 IN C013D

## (undated) DEVICE — BASIC SINGLE BASIN-LF: Brand: MEDLINE INDUSTRIES, INC.

## (undated) DEVICE — SPONGE CHERRY 1/2IN

## (undated) DEVICE — BETHLEHEM MAJOR GENERAL PACK: Brand: CARDINAL HEALTH

## (undated) DEVICE — LIGACLIP MCA MULTIPLE CLIP APPLIERS, 20 LARGE CLIPS: Brand: LIGACLIP

## (undated) DEVICE — NEPTUNE E-SEP SMOKE EVACUATION PENCIL, COATED, 70MM BLADE, PUSH BUTTON SWITCH: Brand: NEPTUNE E-SEP

## (undated) DEVICE — GROUNDING PAD UNIVERSAL SLW

## (undated) DEVICE — ECHELON CONTOUR GST RELOAD GREEN: Brand: ECHELON

## (undated) DEVICE — JP PERF DRN SIL FLT 10MM FULL: Brand: CARDINAL HEALTH

## (undated) DEVICE — MAYO STAND COVER: Brand: CONVERTORS

## (undated) DEVICE — THE ECHELON, ECHELON ENDOPATH™ AND ECHELON FLEX™ FAMILIES OF ENDOSCOPIC LINEAR CUTTERS AND RELOADS ARE STERILE, SINGLE PATIENT USE INSTRUMENTS THAT SIMULTANEOUSLY CUT AND STAPLE TISSUE. THERE ARE SIX STAGGERED ROWS OF STAPLES, THREE ON EITHER SIDE OF THE CUT LINE. THE 45 MM INSTRUMENTS HAVE A STAPLE LINE THATIS APPROXIMATELY 45 MM LONG AND A CUT LINE THAT IS APPROXIMATELY 42 MM LONG. THE SHAFT CAN ROTATE FREELY IN BOTH DIRECTIONS AND AN ARTICULATION MECHANISM ON ARTICULATING INSTRUMENTS ENABLES BENDING THE DISTAL PORTIONOF THE SHAFT TO FACILITATE LATERAL ACCESS OF THE OPERATIVE SITE.THE INSTRUMENTS ARE SHIPPED WITHOUT A RELOAD AND MUST BE LOADED PRIOR TO USE. A STAPLE RETAINING CAP ON THE RELOAD PROTECTS THE STAPLE LEG POINTS DURING SHIPPING AND TRANSPORTATION. THE INSTRUMENTS’ LOCK-OUT FEATURE IS DESIGNED TO PREVENT A USED RELOAD FROM BEING REFIRED.: Brand: ECHELON ENDOPATH

## (undated) DEVICE — SYRINGE CATH TIP 50ML

## (undated) DEVICE — TUBING SUCTION 5MM X 12 FT

## (undated) DEVICE — ADHESIVE SKIN HIGH VISCOSITY EXOFIN 1ML

## (undated) DEVICE — PROXIMATE LINEAR CUTTER RELOAD, BLUE, 75MM: Brand: PROXIMATE

## (undated) DEVICE — SUT SILK 3-0 PS-2 18 IN 1679H

## (undated) DEVICE — GLOVE SRG BIOGEL 8

## (undated) DEVICE — POOLE SUCTION HANDLE: Brand: CARDINAL HEALTH

## (undated) DEVICE — PACK TUR

## (undated) DEVICE — UROLOGIC DRAIN BAG: Brand: UNBRANDED

## (undated) DEVICE — SUT VICRYL 0 CT-1 27 IN J260H

## (undated) DEVICE — FISH VISCERAL RETAINER LG

## (undated) DEVICE — PROXIMATE RELOADABLE LINEAR CUTTER WITH SAFETY LOCK-OUT, 75MM: Brand: PROXIMATE

## (undated) DEVICE — INVIEW CLEAR LEGGINGS: Brand: CONVERTORS

## (undated) DEVICE — PREMIUM DRY TRAY LF: Brand: MEDLINE INDUSTRIES, INC.

## (undated) DEVICE — LUBRICANT JELLY SURGILUBE TUBE 4OZ FLIP TOP

## (undated) DEVICE — THE ECHELON FLEX POWERED PLUS ARTICULATING ENDOSCOPIC LINEAR CUTTERS ARE STERILE, SINGLE PATIENT USE INSTRUMENTS THAT SIMULTANEOUSLYCUT AND STAPLE TISSUE. THERE ARE SIX STAGGERED ROWS OF STAPLES, THREE ON EITHER SIDE OF THE CUT LINE. THE ECHELON FLEX 45 POWERED PLUSINSTRUMENTS HAVE A STAPLE LINE THAT IS APPROXIMATELY 45 MM LONG AND A CUT LINE THAT IS APPROXIMATELY 42 MM LONG. THE SHAFT CAN ROTATE FREELYIN BOTH DIRECTIONS AND AN ARTICULATION MECHANISM ENABLES THE DISTAL PORTION OF THE SHAFT TO PIVOT TO FACILITATE LATERAL ACCESS TO THE OPERATIVESITE.THE INSTRUMENTS ARE PACKAGED WITH A PRIMARY LITHIUM BATTERY PACK THAT MUST BE INSTALLED PRIOR TO USE. THERE ARE SPECIFIC REQUIREMENTS FORDISPOSING OF THE BATTERY PACK. REFER TO THE BATTERY PACK DISPOSAL SECTION.THE INSTRUMENTS ARE PACKAGED WITHOUT A RELOAD AND MUST BE LOADED PRIOR TO USE. A STAPLE RETAINING CAP ON THE RELOAD PROTECTS THE STAPLE LEGPOINTS DURING SHIPPING AND TRANSPORTATION. THE INSTRUMENTS’ LOCK-OUT FEATURE IS DESIGNED TO PREVENT A USED OR IMPROPERLY INSTALLED RELOADFROM BEING REFIRED OR AN INSTRUMENT FROM BEING FIRED WITHOUT A RELOAD.: Brand: ECHELON FLEX

## (undated) DEVICE — PROXIMATE RELOADABLE LINEAR CUTTER WITH SAFETY LOCK-OUT.  55MM LINEAR CUTTER.: Brand: PROXIMATE

## (undated) DEVICE — SUT VICRYL 2-0 18 IN J911T

## (undated) DEVICE — PACK GENERAL LF

## (undated) DEVICE — GUIDEWIRE STRGHT TIP 0.038 IN SOLO PLUS

## (undated) DEVICE — SUT SILK 3-0 30 IN SA84H

## (undated) DEVICE — INTENDED FOR TISSUE SEPARATION, AND OTHER PROCEDURES THAT REQUIRE A SHARP SURGICAL BLADE TO PUNCTURE OR CUT.: Brand: BARD-PARKER SAFETY BLADES SIZE 10, STERILE

## (undated) DEVICE — SUT PDS II 0 CT-1 27 IN Z340H

## (undated) DEVICE — SUT VICRYL 0 18 IN J906G

## (undated) DEVICE — SYRINGE 10ML LL

## (undated) DEVICE — ENSEAL 20 CM SHAFT, LARGE JAW: Brand: ENSEAL X1

## (undated) DEVICE — PROXIMATE RELOADABLE LINEAR STAPLER, 60MM: Brand: PROXIMATE

## (undated) DEVICE — ECHELON CONTOUR W/ GREEN RELOAD: Brand: ECHELON

## (undated) DEVICE — GLOVE SRG BIOGEL 7

## (undated) DEVICE — SEPRA FILM 6 X 5